# Patient Record
Sex: FEMALE | Race: WHITE | Employment: OTHER | URBAN - METROPOLITAN AREA
[De-identification: names, ages, dates, MRNs, and addresses within clinical notes are randomized per-mention and may not be internally consistent; named-entity substitution may affect disease eponyms.]

---

## 2017-01-03 ENCOUNTER — TELEPHONE (OUTPATIENT)
Dept: INTERNAL MEDICINE | Age: 77
End: 2017-01-03

## 2017-01-03 RX ORDER — DULOXETIN HYDROCHLORIDE 30 MG/1
30 CAPSULE, DELAYED RELEASE ORAL DAILY
Qty: 30 CAPSULE | Refills: 5 | Status: SHIPPED | OUTPATIENT
Start: 2017-01-03 | End: 2017-10-18 | Stop reason: CLARIF

## 2017-01-23 RX ORDER — LANSOPRAZOLE 30 MG/1
CAPSULE, DELAYED RELEASE ORAL
Qty: 90 CAPSULE | Refills: 0 | Status: SHIPPED | OUTPATIENT
Start: 2017-01-23 | End: 2017-01-26 | Stop reason: RX

## 2017-01-26 RX ORDER — PANTOPRAZOLE SODIUM 40 MG/1
40 TABLET, DELAYED RELEASE ORAL DAILY
Qty: 30 TABLET | Refills: 5 | Status: SHIPPED | OUTPATIENT
Start: 2017-01-26 | End: 2017-10-18 | Stop reason: CLARIF

## 2017-02-03 ENCOUNTER — OFFICE VISIT (OUTPATIENT)
Dept: INTERNAL MEDICINE | Age: 77
End: 2017-02-03

## 2017-02-03 VITALS
DIASTOLIC BLOOD PRESSURE: 76 MMHG | BODY MASS INDEX: 24.32 KG/M2 | HEIGHT: 65 IN | SYSTOLIC BLOOD PRESSURE: 120 MMHG | WEIGHT: 146 LBS

## 2017-02-03 DIAGNOSIS — E11.9 CONTROLLED TYPE 2 DIABETES MELLITUS WITHOUT COMPLICATION, WITHOUT LONG-TERM CURRENT USE OF INSULIN (HCC): ICD-10-CM

## 2017-02-03 DIAGNOSIS — B18.2 HEP C W/O COMA, CHRONIC (HCC): Primary | ICD-10-CM

## 2017-02-03 DIAGNOSIS — M05.739 RHEUMATOID ARTHRITIS INVOLVING WRIST WITH POSITIVE RHEUMATOID FACTOR, UNSPECIFIED LATERALITY (HCC): ICD-10-CM

## 2017-02-03 LAB
A/G RATIO: 1.2 (ref 1.1–2.2)
ALBUMIN SERPL-MCNC: 4.1 G/DL (ref 3.4–5)
ALP BLD-CCNC: 52 U/L (ref 40–129)
ALT SERPL-CCNC: 34 U/L (ref 10–40)
ANION GAP SERPL CALCULATED.3IONS-SCNC: 18 MMOL/L (ref 3–16)
AST SERPL-CCNC: 32 U/L (ref 15–37)
BASOPHILS ABSOLUTE: 0.1 K/UL (ref 0–0.2)
BASOPHILS RELATIVE PERCENT: 0.8 %
BILIRUB SERPL-MCNC: 0.3 MG/DL (ref 0–1)
BUN BLDV-MCNC: 27 MG/DL (ref 7–20)
CALCIUM SERPL-MCNC: 10.3 MG/DL (ref 8.3–10.6)
CHLORIDE BLD-SCNC: 97 MMOL/L (ref 99–110)
CO2: 21 MMOL/L (ref 21–32)
CREAT SERPL-MCNC: 0.9 MG/DL (ref 0.6–1.2)
EOSINOPHILS ABSOLUTE: 0.2 K/UL (ref 0–0.6)
EOSINOPHILS RELATIVE PERCENT: 1.4 %
GFR AFRICAN AMERICAN: >60
GFR NON-AFRICAN AMERICAN: >60
GLOBULIN: 3.4 G/DL
GLUCOSE BLD-MCNC: 165 MG/DL (ref 70–99)
HCT VFR BLD CALC: 40.7 % (ref 36–48)
HEMOGLOBIN: 13.3 G/DL (ref 12–16)
LYMPHOCYTES ABSOLUTE: 2.6 K/UL (ref 1–5.1)
LYMPHOCYTES RELATIVE PERCENT: 21.2 %
MCH RBC QN AUTO: 27.8 PG (ref 26–34)
MCHC RBC AUTO-ENTMCNC: 32.8 G/DL (ref 31–36)
MCV RBC AUTO: 84.9 FL (ref 80–100)
MONOCYTES ABSOLUTE: 1.4 K/UL (ref 0–1.3)
MONOCYTES RELATIVE PERCENT: 11.5 %
NEUTROPHILS ABSOLUTE: 8 K/UL (ref 1.7–7.7)
NEUTROPHILS RELATIVE PERCENT: 65.1 %
PDW BLD-RTO: 13.4 % (ref 12.4–15.4)
PLATELET # BLD: 366 K/UL (ref 135–450)
PMV BLD AUTO: 8.2 FL (ref 5–10.5)
POTASSIUM SERPL-SCNC: 4.9 MMOL/L (ref 3.5–5.1)
RBC # BLD: 4.8 M/UL (ref 4–5.2)
SODIUM BLD-SCNC: 136 MMOL/L (ref 136–145)
TOTAL PROTEIN: 7.5 G/DL (ref 6.4–8.2)
WBC # BLD: 12.2 K/UL (ref 4–11)

## 2017-02-03 PROCEDURE — G8484 FLU IMMUNIZE NO ADMIN: HCPCS | Performed by: INTERNAL MEDICINE

## 2017-02-03 PROCEDURE — G8420 CALC BMI NORM PARAMETERS: HCPCS | Performed by: INTERNAL MEDICINE

## 2017-02-03 PROCEDURE — G8400 PT W/DXA NO RESULTS DOC: HCPCS | Performed by: INTERNAL MEDICINE

## 2017-02-03 PROCEDURE — 4040F PNEUMOC VAC/ADMIN/RCVD: CPT | Performed by: INTERNAL MEDICINE

## 2017-02-03 PROCEDURE — 1123F ACP DISCUSS/DSCN MKR DOCD: CPT | Performed by: INTERNAL MEDICINE

## 2017-02-03 PROCEDURE — 1090F PRES/ABSN URINE INCON ASSESS: CPT | Performed by: INTERNAL MEDICINE

## 2017-02-03 PROCEDURE — 1036F TOBACCO NON-USER: CPT | Performed by: INTERNAL MEDICINE

## 2017-02-03 PROCEDURE — G8427 DOCREV CUR MEDS BY ELIG CLIN: HCPCS | Performed by: INTERNAL MEDICINE

## 2017-02-03 PROCEDURE — 99213 OFFICE O/P EST LOW 20 MIN: CPT | Performed by: INTERNAL MEDICINE

## 2017-02-03 ASSESSMENT — ENCOUNTER SYMPTOMS
WHEEZING: 0
NAUSEA: 0
STRIDOR: 0
COUGH: 1
CONSTIPATION: 0
ABDOMINAL PAIN: 0
SHORTNESS OF BREATH: 0
CHOKING: 0
CHEST TIGHTNESS: 0
DIARRHEA: 0
VOMITING: 0

## 2017-02-04 LAB
ESTIMATED AVERAGE GLUCOSE: 203 MG/DL
HBA1C MFR BLD: 8.7 %

## 2017-02-13 ENCOUNTER — TELEPHONE (OUTPATIENT)
Dept: INTERNAL MEDICINE | Age: 77
End: 2017-02-13

## 2017-02-24 RX ORDER — ESTROGEN,CON/M-PROGEST ACET 0.625-2.5
TABLET ORAL
Qty: 84 TABLET | Refills: 0 | Status: SHIPPED | OUTPATIENT
Start: 2017-02-24 | End: 2017-05-28 | Stop reason: SDUPTHER

## 2017-03-07 DIAGNOSIS — E11.9 CONTROLLED TYPE 2 DIABETES MELLITUS WITHOUT COMPLICATION, WITHOUT LONG-TERM CURRENT USE OF INSULIN (HCC): ICD-10-CM

## 2017-03-07 DIAGNOSIS — F98.8 ATTENTION DEFICIT DISORDER: ICD-10-CM

## 2017-03-08 RX ORDER — METHYLPHENIDATE HYDROCHLORIDE 10 MG/1
10 TABLET ORAL 2 TIMES DAILY
Qty: 120 TABLET | Refills: 0 | Status: SHIPPED | OUTPATIENT
Start: 2017-03-08 | End: 2017-10-18 | Stop reason: CLARIF

## 2017-03-08 RX ORDER — METHYLPHENIDATE HYDROCHLORIDE 20 MG/1
20 TABLET ORAL 2 TIMES DAILY
Qty: 120 TABLET | Refills: 0 | Status: SHIPPED | OUTPATIENT
Start: 2017-03-08 | End: 2017-10-18 | Stop reason: CLARIF

## 2017-04-13 RX ORDER — CELECOXIB 200 MG/1
CAPSULE ORAL
Qty: 90 CAPSULE | Refills: 0 | Status: SHIPPED | OUTPATIENT
Start: 2017-04-13 | End: 2017-08-13 | Stop reason: SDUPTHER

## 2017-05-12 ENCOUNTER — OFFICE VISIT (OUTPATIENT)
Dept: INTERNAL MEDICINE | Age: 77
End: 2017-05-12

## 2017-05-12 VITALS
SYSTOLIC BLOOD PRESSURE: 118 MMHG | BODY MASS INDEX: 23.3 KG/M2 | DIASTOLIC BLOOD PRESSURE: 64 MMHG | HEIGHT: 66 IN | WEIGHT: 145 LBS

## 2017-05-12 DIAGNOSIS — F22 DELUSIONS (HCC): ICD-10-CM

## 2017-05-12 DIAGNOSIS — E11.9 CONTROLLED TYPE 2 DIABETES MELLITUS WITHOUT COMPLICATION, WITHOUT LONG-TERM CURRENT USE OF INSULIN (HCC): ICD-10-CM

## 2017-05-12 DIAGNOSIS — F98.8 ATTENTION DEFICIT DISORDER: ICD-10-CM

## 2017-05-12 DIAGNOSIS — M05.739 RHEUMATOID ARTHRITIS INVOLVING WRIST WITH POSITIVE RHEUMATOID FACTOR, UNSPECIFIED LATERALITY (HCC): Primary | ICD-10-CM

## 2017-05-12 LAB
A/G RATIO: 1.1 (ref 1.1–2.2)
ALBUMIN SERPL-MCNC: 3.9 G/DL (ref 3.4–5)
ALP BLD-CCNC: 52 U/L (ref 40–129)
ALT SERPL-CCNC: 33 U/L (ref 10–40)
ANION GAP SERPL CALCULATED.3IONS-SCNC: 19 MMOL/L (ref 3–16)
AST SERPL-CCNC: 33 U/L (ref 15–37)
BASOPHILS ABSOLUTE: 0.1 K/UL (ref 0–0.2)
BASOPHILS RELATIVE PERCENT: 0.8 %
BILIRUB SERPL-MCNC: <0.2 MG/DL (ref 0–1)
BUN BLDV-MCNC: 26 MG/DL (ref 7–20)
CALCIUM SERPL-MCNC: 10.1 MG/DL (ref 8.3–10.6)
CHLORIDE BLD-SCNC: 100 MMOL/L (ref 99–110)
CO2: 21 MMOL/L (ref 21–32)
CREAT SERPL-MCNC: 1.1 MG/DL (ref 0.6–1.2)
EOSINOPHILS ABSOLUTE: 0.1 K/UL (ref 0–0.6)
EOSINOPHILS RELATIVE PERCENT: 0.6 %
GFR AFRICAN AMERICAN: 58
GFR NON-AFRICAN AMERICAN: 48
GLOBULIN: 3.4 G/DL
GLUCOSE BLD-MCNC: 327 MG/DL (ref 70–99)
HCT VFR BLD CALC: 38.5 % (ref 36–48)
HEMOGLOBIN: 12.5 G/DL (ref 12–16)
LYMPHOCYTES ABSOLUTE: 2.3 K/UL (ref 1–5.1)
LYMPHOCYTES RELATIVE PERCENT: 19.4 %
MCH RBC QN AUTO: 27.1 PG (ref 26–34)
MCHC RBC AUTO-ENTMCNC: 32.4 G/DL (ref 31–36)
MCV RBC AUTO: 83.5 FL (ref 80–100)
MONOCYTES ABSOLUTE: 1.3 K/UL (ref 0–1.3)
MONOCYTES RELATIVE PERCENT: 11.3 %
NEUTROPHILS ABSOLUTE: 8.1 K/UL (ref 1.7–7.7)
NEUTROPHILS RELATIVE PERCENT: 67.9 %
PDW BLD-RTO: 15.2 % (ref 12.4–15.4)
PLATELET # BLD: 297 K/UL (ref 135–450)
PMV BLD AUTO: 8.4 FL (ref 5–10.5)
POTASSIUM SERPL-SCNC: 4.6 MMOL/L (ref 3.5–5.1)
RBC # BLD: 4.61 M/UL (ref 4–5.2)
SODIUM BLD-SCNC: 140 MMOL/L (ref 136–145)
TOTAL PROTEIN: 7.3 G/DL (ref 6.4–8.2)
TSH SERPL DL<=0.05 MIU/L-ACNC: 2.58 UIU/ML (ref 0.27–4.2)
WBC # BLD: 11.9 K/UL (ref 4–11)

## 2017-05-12 PROCEDURE — 99213 OFFICE O/P EST LOW 20 MIN: CPT | Performed by: INTERNAL MEDICINE

## 2017-05-12 PROCEDURE — G8427 DOCREV CUR MEDS BY ELIG CLIN: HCPCS | Performed by: INTERNAL MEDICINE

## 2017-05-12 PROCEDURE — 4040F PNEUMOC VAC/ADMIN/RCVD: CPT | Performed by: INTERNAL MEDICINE

## 2017-05-12 PROCEDURE — G8400 PT W/DXA NO RESULTS DOC: HCPCS | Performed by: INTERNAL MEDICINE

## 2017-05-12 PROCEDURE — G8420 CALC BMI NORM PARAMETERS: HCPCS | Performed by: INTERNAL MEDICINE

## 2017-05-12 PROCEDURE — 1090F PRES/ABSN URINE INCON ASSESS: CPT | Performed by: INTERNAL MEDICINE

## 2017-05-12 PROCEDURE — 1036F TOBACCO NON-USER: CPT | Performed by: INTERNAL MEDICINE

## 2017-05-12 PROCEDURE — 1123F ACP DISCUSS/DSCN MKR DOCD: CPT | Performed by: INTERNAL MEDICINE

## 2017-05-12 RX ORDER — METHYLPHENIDATE HYDROCHLORIDE 10 MG/1
10 TABLET ORAL 2 TIMES DAILY
Qty: 120 TABLET | Refills: 0 | Status: CANCELLED | OUTPATIENT
Start: 2017-05-12 | End: 2018-05-12

## 2017-05-12 RX ORDER — METHYLPHENIDATE HYDROCHLORIDE 20 MG/1
20 TABLET ORAL 2 TIMES DAILY
Qty: 120 TABLET | Refills: 0 | Status: CANCELLED | OUTPATIENT
Start: 2017-05-12 | End: 2018-05-12

## 2017-05-12 ASSESSMENT — ENCOUNTER SYMPTOMS
CONSTIPATION: 0
BLOOD IN STOOL: 0
VOMITING: 0
SHORTNESS OF BREATH: 1
NAUSEA: 0
ABDOMINAL PAIN: 0
CHEST TIGHTNESS: 0
COUGH: 1
DIARRHEA: 0

## 2017-05-13 LAB
ESTIMATED AVERAGE GLUCOSE: 248.9 MG/DL
HBA1C MFR BLD: 10.3 %

## 2017-05-30 RX ORDER — ESTROGEN,CON/M-PROGEST ACET 0.625-2.5
TABLET ORAL
Qty: 84 TABLET | Refills: 0 | Status: SHIPPED | OUTPATIENT
Start: 2017-05-30 | End: 2017-10-18 | Stop reason: CLARIF

## 2017-06-01 RX ORDER — ALPRAZOLAM 0.5 MG/1
TABLET ORAL
Qty: 360 TABLET | Refills: 0 | Status: ON HOLD | OUTPATIENT
Start: 2017-06-01 | End: 2017-10-27 | Stop reason: HOSPADM

## 2017-08-14 RX ORDER — CELECOXIB 200 MG/1
CAPSULE ORAL
Qty: 90 CAPSULE | Refills: 0 | Status: SHIPPED | OUTPATIENT
Start: 2017-08-14 | End: 2017-10-18 | Stop reason: CLARIF

## 2017-08-23 RX ORDER — MOMETASONE FUROATE 1 MG/G
CREAM TOPICAL
Qty: 15 G | Refills: 0 | Status: SHIPPED | OUTPATIENT
Start: 2017-08-23 | End: 2017-09-28 | Stop reason: ALTCHOICE

## 2017-09-08 ENCOUNTER — TELEPHONE (OUTPATIENT)
Dept: INTERNAL MEDICINE | Age: 77
End: 2017-09-08

## 2017-09-19 ENCOUNTER — TELEPHONE (OUTPATIENT)
Dept: INTERNAL MEDICINE | Age: 77
End: 2017-09-19

## 2017-09-19 ENCOUNTER — OFFICE VISIT (OUTPATIENT)
Dept: INTERNAL MEDICINE | Age: 77
End: 2017-09-19

## 2017-09-19 DIAGNOSIS — E11.9 CONTROLLED TYPE 2 DIABETES MELLITUS WITHOUT COMPLICATION, WITHOUT LONG-TERM CURRENT USE OF INSULIN (HCC): ICD-10-CM

## 2017-09-19 DIAGNOSIS — F03.91 DEMENTIA WITH BEHAVIORAL DISTURBANCE, UNSPECIFIED DEMENTIA TYPE: Primary | ICD-10-CM

## 2017-09-19 DIAGNOSIS — F32.89 DEPRESSIVE DISORDER, NOT ELSEWHERE CLASSIFIED: ICD-10-CM

## 2017-09-19 PROCEDURE — 99214 OFFICE O/P EST MOD 30 MIN: CPT | Performed by: INTERNAL MEDICINE

## 2017-09-21 ENCOUNTER — TELEPHONE (OUTPATIENT)
Dept: INTERNAL MEDICINE | Age: 77
End: 2017-09-21

## 2017-09-28 ENCOUNTER — TELEPHONE (OUTPATIENT)
Dept: INTERNAL MEDICINE | Age: 77
End: 2017-09-28

## 2017-09-29 ENCOUNTER — TELEPHONE (OUTPATIENT)
Dept: INTERNAL MEDICINE | Age: 77
End: 2017-09-29

## 2017-10-12 ENCOUNTER — TELEPHONE (OUTPATIENT)
Dept: INTERNAL MEDICINE | Age: 77
End: 2017-10-12

## 2017-10-12 NOTE — TELEPHONE ENCOUNTER
Pts daughter would like to receive documentation in regards to all of the diabetic medication that the patient should be taking. She also needs the medication directions. The patient is confused about what she should be taking and how. The patients daughter feels like the only way to make the patient understand what she should be taking is to show her documentation from Dr. Shawna Diaz. Can it be emailed to [email or web address]. Please call with questions.

## 2017-10-13 NOTE — TELEPHONE ENCOUNTER
Call returned to the patient's daughter. Patient is currently in a skilled nursing facility. Her daughter has been advised that she should let the patient know that she needs to follow the directions of the physician that is caring for her at the facility. She is now asking that we put this in writing so that the patient believes her. [id number] Aletha Quarles for note to be sent. Message left for the patient's daughter.

## 2017-10-18 ENCOUNTER — TELEPHONE (OUTPATIENT)
Dept: INTERNAL MEDICINE | Age: 77
End: 2017-10-18

## 2017-10-18 NOTE — TELEPHONE ENCOUNTER
CALL RETURNED TO THE PATIENT'S DAUGHTER NATACHA    PATIENT HAS JUST BEEN SENT BACK TO [facility] DUE TO MENTAL STATUS CHANGE AND UTI    I HAVE ADVISED NATACHA TO CALL ME BACK WHEN SHE KNOWS MORE OF WHEN AN APPOINTMENT WILL BE NEEDED.

## 2017-10-23 ENCOUNTER — TELEPHONE (OUTPATIENT)
Dept: INTERNAL MEDICINE | Age: 77
End: 2017-10-23

## 2017-10-23 NOTE — TELEPHONE ENCOUNTER
Juanis Gomez pt daughter stated that needs to know the status of pt. What test was ran, what is the plan for py.   Please call

## 2017-11-16 ENCOUNTER — CARE COORDINATION (OUTPATIENT)
Dept: CASE MANAGEMENT | Age: 77
End: 2017-11-16

## 2017-12-07 ENCOUNTER — TELEPHONE (OUTPATIENT)
Dept: INTERNAL MEDICINE | Age: 77
End: 2017-12-07

## 2017-12-09 ENCOUNTER — CARE COORDINATION (OUTPATIENT)
Dept: CASE MANAGEMENT | Age: 77
End: 2017-12-09

## 2017-12-09 NOTE — CARE COORDINATION
Care Transition call back to pt home and spoke with Branden. She said [facility] finished and left. She said she is just trying to organize herself with the care of her mother. Discussed having her family help and she said they are helping. Also discussed option of private duty if further assist needed with out of pocket pay. She will keep in mind. Dtr seemed a little overwhelmed but she also said this is her first day with caring for pt and she thinks it will go a little smoother with time. [id number] Transition Coordinator  [phone]  [email or web address]

## 2017-12-09 NOTE — CARE COORDINATION
Received a Careport alert patient discharged to home with [facility] on 12/8/17. Agency unknown. CC notified. Post acute care coordinator signing off.  SABRINA MixN RN  Care Transition Coordinator  [phone]

## 2017-12-09 NOTE — CARE COORDINATION
[facility] Discharge Call    2017    Patient: Cassie Mendiola Patient : 1940   MRN: [id number]  Reason for Admission: Acute encephalopathy  Discharge Date: 10/27/17 RARS: Geisinger Risk Score: 24       Discharge Facility: [facility]  Non face-to-face services provided:  Scheduled appointment with PCP-Dr Salomon 4 Acute Facility Transition    Have you scheduled your follow up appointment?:  Yes (Comment: 17)   Were you discharged with any Home Care or Post Acute Services or do you currently have any active services?:  Yes   Post Acute Services:  Home Health (Comment: [facility] )         Care Transitions Interventions     Care Transition f/u call to pt dtr- Sherry Barbosa. She was meeting with [facility] who was in home at time of call and requested call back later. Noted pt does have PCP appt scheduled as noted. Future Appointments  Date Time Provider Tosha Aguilar   2017 2:40 PM Reji Moy MD [address] [place] [address] Transition Coordinator  [phone]  [email or web address]

## 2017-12-12 ENCOUNTER — OFFICE VISIT (OUTPATIENT)
Dept: INTERNAL MEDICINE | Age: 77
End: 2017-12-12

## 2017-12-12 ENCOUNTER — HOSPITAL ENCOUNTER (OUTPATIENT)
Dept: OTHER | Age: 77
Discharge: HOME | End: 2017-12-12
Attending: INTERNAL MEDICINE | Admitting: INTERNAL MEDICINE

## 2017-12-12 ENCOUNTER — TELEPHONE (OUTPATIENT)
Dept: INTERNAL MEDICINE | Age: 77
End: 2017-12-12

## 2017-12-12 VITALS
HEIGHT: 66 IN | WEIGHT: 133 LBS | DIASTOLIC BLOOD PRESSURE: 74 MMHG | BODY MASS INDEX: 21.38 KG/M2 | SYSTOLIC BLOOD PRESSURE: 122 MMHG

## 2017-12-12 DIAGNOSIS — E11.65 TYPE 2 DIABETES MELLITUS WITH HYPERGLYCEMIA, WITHOUT LONG-TERM CURRENT USE OF INSULIN (HCC): Primary | ICD-10-CM

## 2017-12-12 DIAGNOSIS — R07.89 OTHER CHEST PAIN: ICD-10-CM

## 2017-12-12 LAB
A/G RATIO: 1 (ref 1.1–2.2)
ALBUMIN SERPL-MCNC: 3.7 G/DL (ref 3.4–5)
ALP BLD-CCNC: 73 U/L (ref 40–129)
ALT SERPL-CCNC: 21 U/L (ref 10–40)
ANION GAP SERPL CALCULATED.3IONS-SCNC: 15 MMOL/L (ref 3–16)
AST SERPL-CCNC: 22 U/L (ref 15–37)
BILIRUB SERPL-MCNC: <0.2 MG/DL (ref 0–1)
BUN BLDV-MCNC: 20 MG/DL (ref 7–20)
CALCIUM SERPL-MCNC: 10.9 MG/DL (ref 8.3–10.6)
CHLORIDE BLD-SCNC: 100 MMOL/L (ref 99–110)
CO2: 25 MMOL/L (ref 21–32)
CREAT SERPL-MCNC: 0.7 MG/DL (ref 0.6–1.2)
GFR AFRICAN AMERICAN: >60
GFR NON-AFRICAN AMERICAN: >60
GLOBULIN: 3.7 G/DL
GLUCOSE BLD-MCNC: 155 MG/DL (ref 70–99)
POTASSIUM SERPL-SCNC: 5.3 MMOL/L (ref 3.5–5.1)
SODIUM BLD-SCNC: 140 MMOL/L (ref 136–145)
TOTAL PROTEIN: 7.4 G/DL (ref 6.4–8.2)

## 2017-12-12 PROCEDURE — G8400 PT W/DXA NO RESULTS DOC: HCPCS | Performed by: INTERNAL MEDICINE

## 2017-12-12 PROCEDURE — 1090F PRES/ABSN URINE INCON ASSESS: CPT | Performed by: INTERNAL MEDICINE

## 2017-12-12 PROCEDURE — 4040F PNEUMOC VAC/ADMIN/RCVD: CPT | Performed by: INTERNAL MEDICINE

## 2017-12-12 PROCEDURE — G8420 CALC BMI NORM PARAMETERS: HCPCS | Performed by: INTERNAL MEDICINE

## 2017-12-12 PROCEDURE — G8427 DOCREV CUR MEDS BY ELIG CLIN: HCPCS | Performed by: INTERNAL MEDICINE

## 2017-12-12 PROCEDURE — 99213 OFFICE O/P EST LOW 20 MIN: CPT | Performed by: INTERNAL MEDICINE

## 2017-12-12 PROCEDURE — 1036F TOBACCO NON-USER: CPT | Performed by: INTERNAL MEDICINE

## 2017-12-12 PROCEDURE — G8484 FLU IMMUNIZE NO ADMIN: HCPCS | Performed by: INTERNAL MEDICINE

## 2017-12-12 PROCEDURE — 1123F ACP DISCUSS/DSCN MKR DOCD: CPT | Performed by: INTERNAL MEDICINE

## 2017-12-12 RX ORDER — GLIMEPIRIDE 4 MG/1
4 TABLET ORAL
Qty: 30 TABLET | Refills: 3 | Status: SHIPPED | OUTPATIENT
Start: 2017-12-12 | End: 2018-03-07 | Stop reason: CLARIF

## 2017-12-12 RX ORDER — ZINC GLUCONATE 13.3 MG
LOZENGE ORAL
Qty: 30 TABLET | Refills: 3 | Status: SHIPPED | OUTPATIENT
Start: 2017-12-12 | End: 2018-03-07 | Stop reason: CLARIF

## 2017-12-12 RX ORDER — OMEPRAZOLE 20 MG/1
20 CAPSULE, DELAYED RELEASE ORAL 2 TIMES DAILY
Qty: 60 CAPSULE | Refills: 3 | Status: SHIPPED | OUTPATIENT
Start: 2017-12-12 | End: 2018-05-03 | Stop reason: SDUPTHER

## 2017-12-12 RX ORDER — SENNA AND DOCUSATE SODIUM 50; 8.6 MG/1; MG/1
2 TABLET, FILM COATED ORAL DAILY PRN
Qty: 30 TABLET | Refills: 3 | Status: SHIPPED | OUTPATIENT
Start: 2017-12-12 | End: 2018-10-10 | Stop reason: CLARIF

## 2017-12-12 RX ORDER — DULOXETIN HYDROCHLORIDE 30 MG/1
30 CAPSULE, DELAYED RELEASE ORAL 2 TIMES DAILY
Qty: 60 CAPSULE | Refills: 3 | Status: SHIPPED | OUTPATIENT
Start: 2017-12-12 | End: 2018-05-27 | Stop reason: SDUPTHER

## 2017-12-12 ASSESSMENT — PATIENT HEALTH QUESTIONNAIRE - PHQ9
SUM OF ALL RESPONSES TO PHQ9 QUESTIONS 1 & 2: 1
2. FEELING DOWN, DEPRESSED OR HOPELESS: 0
SUM OF ALL RESPONSES TO PHQ QUESTIONS 1-9: 1
1. LITTLE INTEREST OR PLEASURE IN DOING THINGS: 1

## 2017-12-13 ENCOUNTER — CARE COORDINATION (OUTPATIENT)
Dept: CASE MANAGEMENT | Age: 77
End: 2017-12-13

## 2017-12-13 ENCOUNTER — TELEPHONE (OUTPATIENT)
Dept: INTERNAL MEDICINE | Age: 77
End: 2017-12-13

## 2017-12-13 LAB
BASOPHILS ABSOLUTE: 0.1 K/UL (ref 0–0.2)
BASOPHILS RELATIVE PERCENT: 0.9 %
BILIRUBIN URINE: NEGATIVE
BLOOD, URINE: NEGATIVE
CLARITY: CLEAR
COLOR: YELLOW
EOSINOPHILS ABSOLUTE: 0.1 K/UL (ref 0–0.6)
EOSINOPHILS RELATIVE PERCENT: 0.8 %
ESTIMATED AVERAGE GLUCOSE: 182.9 MG/DL
GLUCOSE URINE: NEGATIVE MG/DL
HBA1C MFR BLD: 8 %
HCT VFR BLD CALC: 38.2 % (ref 36–48)
HEMATOLOGY PATH CONSULT: NO
HEMOGLOBIN: 12.6 G/DL (ref 12–16)
KETONES, URINE: NEGATIVE MG/DL
LEUKOCYTE ESTERASE, URINE: NEGATIVE
LYMPHOCYTES ABSOLUTE: 2.2 K/UL (ref 1–5.1)
LYMPHOCYTES RELATIVE PERCENT: 14.4 %
MCH RBC QN AUTO: 26.2 PG (ref 26–34)
MCHC RBC AUTO-ENTMCNC: 32.9 G/DL (ref 31–36)
MCV RBC AUTO: 79.8 FL (ref 80–100)
MICROSCOPIC EXAMINATION: NORMAL
MONOCYTES ABSOLUTE: 1.8 K/UL (ref 0–1.3)
MONOCYTES RELATIVE PERCENT: 12.3 %
NEUTROPHILS ABSOLUTE: 10.8 K/UL (ref 1.7–7.7)
NEUTROPHILS RELATIVE PERCENT: 71.6 %
NITRITE, URINE: NEGATIVE
PDW BLD-RTO: 15.6 % (ref 12.4–15.4)
PH UA: 7
PLATELET # BLD: 523 K/UL (ref 135–450)
PMV BLD AUTO: 7.6 FL (ref 5–10.5)
PROTEIN UA: NEGATIVE MG/DL
RBC # BLD: 4.78 M/UL (ref 4–5.2)
SPECIFIC GRAVITY UA: 1.01
URINE TYPE: NORMAL
UROBILINOGEN, URINE: 1 E.U./DL
WBC # BLD: 15 K/UL (ref 4–11)

## 2017-12-13 NOTE — CARE COORDINATION
[facility] Discharge Call    2017    Patient: Rafy Goodman Patient : 1940   MRN: [id number]  Reason for Admission: AMS  Discharge Date: 10/27/17 RARS: Geisinger Risk Score: 24       Discharge Facility: [address] [place] [address] Transition    Do you have any ongoing symptoms?:  No   Do you have all of your prescriptions and are they filled?:  Yes   Do you have any questions related to your medications?:  No   Have you scheduled your follow up appointment?:  Yes   How are you going to get to your appointment?:  Car - family or friend to transport   Were you discharged with any Home Care or  [facility] or do you currently have any active services?:  Yes   Post Acute Services:  Home Health (Comment: [address] )         Care Transitions Interventions     Care Transition call to pt dtr. She said pt is doing \"pretty good\". She took pt to see Dr Froilan Urban this week. No changes in meds. Noted dtr has called for refills on all meds. Pt now lives with her dtr and is in [facility]. Discussed referral to [facility] and dtr is interested. CTN will make referral on line-Done. Dtr said [facility] is coming to see pt. She would like other options for HC. She said she wasn't completely happy with the service but did not provide details. Provided options of [facility], [facility] and [facility]. Advised only 1 company can be in at a given time for skilled services. Dtr seemed less stressed on today's call compared to 24hr call with dtr on Sat. No future appointments. [address] Transition Coordinator  [phone]  [email or web address]

## 2017-12-14 ENCOUNTER — TELEPHONE (OUTPATIENT)
Dept: INTERNAL MEDICINE | Age: 77
End: 2017-12-14

## 2017-12-14 RX ORDER — PROMETHAZINE HYDROCHLORIDE AND PHENYLEPHRINE HYDROCHLORIDE 6.25; 5 MG/5ML; MG/5ML
5 SYRUP ORAL 4 TIMES DAILY PRN
Qty: 120 ML | Refills: 0 | Status: SHIPPED | OUTPATIENT
Start: 2017-12-14 | End: 2018-03-07 | Stop reason: CLARIF

## 2017-12-14 RX ORDER — PEN NEEDLE, DIABETIC 31 GX5/16"
NEEDLE, DISPOSABLE MISCELLANEOUS
Qty: 100 EACH | Refills: 1 | Status: SHIPPED | OUTPATIENT
Start: 2017-12-14 | End: 2018-03-07 | Stop reason: CLARIF

## 2017-12-14 NOTE — TELEPHONE ENCOUNTER
[facility] [phone]  C/C - non productive cough x 1 week that has worsen in the last x 3 days   Med tried - OTC Mucinex DM with no relief in sx's   Would like a call back to be advised what Flavio Fenton MD  Recommends

## 2017-12-15 ENCOUNTER — TELEPHONE (OUTPATIENT)
Dept: INTERNAL MEDICINE | Age: 77
End: 2017-12-15

## 2017-12-15 NOTE — TELEPHONE ENCOUNTER
Pt called needs RX for. Solo Pugh Lancets  (FREESTYLE LITE) pt test tid Sent to . .. Pharm on file . .. Advised Pt to check with Pharm in 24-48 hours . ..  Pls send  Elizabeth Baker can be reached at [phone]

## 2017-12-18 ENCOUNTER — TELEPHONE (OUTPATIENT)
Dept: INTERNAL MEDICINE | Age: 77
End: 2017-12-18

## 2017-12-18 NOTE — TELEPHONE ENCOUNTER
Courtesy Call   [facility] -[phone]  C/C - Patient refused Physical Therapy last week( actually daughter refused for pt ) due to not feeling well   Aware that Kami Cornell MD arrives at 1:20 pm

## 2017-12-19 ENCOUNTER — TELEPHONE (OUTPATIENT)
Dept: INTERNAL MEDICINE | Age: 77
End: 2017-12-19

## 2017-12-19 NOTE — TELEPHONE ENCOUNTER
The pts daughter would like to know at what point the patient should stop taking Promethazine-Phenylephrine [facility]) 6.25-5 MG/5ML syrup due to possible side effects. Please call.

## 2017-12-21 ENCOUNTER — CARE COORDINATION (OUTPATIENT)
Dept: CASE MANAGEMENT | Age: 77
End: 2017-12-21

## 2017-12-22 ENCOUNTER — TELEPHONE (OUTPATIENT)
Dept: INTERNAL MEDICINE | Age: 77
End: 2017-12-22

## 2017-12-22 RX ORDER — AMOXICILLIN AND CLAVULANATE POTASSIUM 875; 125 MG/1; MG/1
1 TABLET, FILM COATED ORAL 2 TIMES DAILY
Qty: 14 TABLET | Refills: 0 | Status: ON HOLD | OUTPATIENT
Start: 2017-12-22 | End: 2018-01-02 | Stop reason: HOSPADM

## 2017-12-25 PROBLEM — J18.9 PNEUMONIA: Status: ACTIVE | Noted: 2017-12-25

## 2018-01-08 ENCOUNTER — TELEPHONE (OUTPATIENT)
Dept: INTERNAL MEDICINE | Age: 78
End: 2018-01-08

## 2018-01-08 NOTE — TELEPHONE ENCOUNTER
Marylou Orellana Speech therapist stated that needs to discuss swallowing evaluation. Marylou Orellana Speech Therapist stated family has concerns about consistency.   Please call

## 2018-01-09 NOTE — TELEPHONE ENCOUNTER
Call returned to Yasir at [facility]. We only have Lantus listed on her medication list.    I see where she was on Humalog in the past.    Yasir has been notified of this. Called returned to [employer] .     She already received the information that she needed from [facility]

## 2018-01-09 NOTE — TELEPHONE ENCOUNTER
reinaldo [facility] skill rehab is calling to get the dose of insulin the patient takes   Please call [phone] ask for janeth

## 2018-01-18 ENCOUNTER — TELEPHONE (OUTPATIENT)
Dept: INTERNAL MEDICINE | Age: 78
End: 2018-01-18

## 2018-01-18 RX ORDER — MOMETASONE FUROATE 1 MG/G
CREAM TOPICAL
Qty: 15 G | Refills: 2 | Status: SHIPPED | OUTPATIENT
Start: 2018-01-18 | End: 2019-08-19 | Stop reason: SDUPTHER

## 2018-01-23 ENCOUNTER — CARE COORDINATION (OUTPATIENT)
Dept: CASE MANAGEMENT | Age: 78
End: 2018-01-23

## 2018-01-23 ENCOUNTER — OFFICE VISIT (OUTPATIENT)
Dept: INTERNAL MEDICINE | Age: 78
End: 2018-01-23

## 2018-01-23 ENCOUNTER — TELEPHONE (OUTPATIENT)
Dept: INTERNAL MEDICINE | Age: 78
End: 2018-01-23

## 2018-01-23 ENCOUNTER — HOSPITAL ENCOUNTER (OUTPATIENT)
Dept: OTHER | Age: 78
Discharge: HOME | End: 2018-01-23
Attending: INTERNAL MEDICINE | Admitting: INTERNAL MEDICINE

## 2018-01-23 VITALS — BODY MASS INDEX: 22.67 KG/M2 | DIASTOLIC BLOOD PRESSURE: 64 MMHG | SYSTOLIC BLOOD PRESSURE: 122 MMHG | WEIGHT: 128 LBS
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
## 2018-01-23 DIAGNOSIS — J18.9 PNEUMONIA DUE TO ORGANISM: Primary | ICD-10-CM

## 2018-01-23 DIAGNOSIS — Z09 HOSPITAL DISCHARGE FOLLOW-UP: ICD-10-CM

## 2018-01-23 DIAGNOSIS — Z09 HOSPITAL DISCHARGE FOLLOW-UP: Primary | ICD-10-CM

## 2018-01-23 LAB
A/G RATIO: 1.1 (ref 1.1–2.2)
ALBUMIN SERPL-MCNC: 3.8 G/DL (ref 3.4–5)
ALP BLD-CCNC: 77 U/L (ref 40–129)
ALT SERPL-CCNC: 27 U/L (ref 10–40)
ANION GAP SERPL CALCULATED.3IONS-SCNC: 13 MMOL/L (ref 3–16)
AST SERPL-CCNC: 24 U/L (ref 15–37)
BASOPHILS ABSOLUTE: 0.1 K/UL (ref 0–0.2)
BASOPHILS RELATIVE PERCENT: 1.1 %
BILIRUB SERPL-MCNC: <0.2 MG/DL (ref 0–1)
BUN BLDV-MCNC: 26 MG/DL (ref 7–20)
CALCIUM SERPL-MCNC: 11.2 MG/DL (ref 8.3–10.6)
CHLORIDE BLD-SCNC: 101 MMOL/L (ref 99–110)
CO2: 25 MMOL/L (ref 21–32)
CREAT SERPL-MCNC: 0.7 MG/DL (ref 0.6–1.2)
EOSINOPHILS ABSOLUTE: 0.2 K/UL (ref 0–0.6)
EOSINOPHILS RELATIVE PERCENT: 1.6 %
GFR AFRICAN AMERICAN: >60
GFR NON-AFRICAN AMERICAN: >60
GLOBULIN: 3.6 G/DL
GLUCOSE BLD-MCNC: 203 MG/DL (ref 70–99)
HCT VFR BLD CALC: 39.4 % (ref 36–48)
HEMOGLOBIN: 12.7 G/DL (ref 12–16)
LYMPHOCYTES ABSOLUTE: 2.2 K/UL (ref 1–5.1)
LYMPHOCYTES RELATIVE PERCENT: 17.1 %
MCH RBC QN AUTO: 25.7 PG (ref 26–34)
MCHC RBC AUTO-ENTMCNC: 32.2 G/DL (ref 31–36)
MCV RBC AUTO: 79.7 FL (ref 80–100)
MONOCYTES ABSOLUTE: 1.5 K/UL (ref 0–1.3)
MONOCYTES RELATIVE PERCENT: 11.4 %
NEUTROPHILS ABSOLUTE: 8.8 K/UL (ref 1.7–7.7)
NEUTROPHILS RELATIVE PERCENT: 68.8 %
PDW BLD-RTO: 16.2 % (ref 12.4–15.4)
PLATELET # BLD: 506 K/UL (ref 135–450)
PMV BLD AUTO: 7.8 FL (ref 5–10.5)
POTASSIUM SERPL-SCNC: 6.4 MMOL/L (ref 3.5–5.1)
RBC # BLD: 4.95 M/UL (ref 4–5.2)
SODIUM BLD-SCNC: 139 MMOL/L (ref 136–145)
TOTAL PROTEIN: 7.4 G/DL (ref 6.4–8.2)
WBC # BLD: 12.8 K/UL (ref 4–11)

## 2018-01-23 PROCEDURE — 1036F TOBACCO NON-USER: CPT | Performed by: INTERNAL MEDICINE

## 2018-01-23 PROCEDURE — G8427 DOCREV CUR MEDS BY ELIG CLIN: HCPCS | Performed by: INTERNAL MEDICINE

## 2018-01-23 PROCEDURE — G8420 CALC BMI NORM PARAMETERS: HCPCS | Performed by: INTERNAL MEDICINE

## 2018-01-23 PROCEDURE — G8400 PT W/DXA NO RESULTS DOC: HCPCS | Performed by: INTERNAL MEDICINE

## 2018-01-23 PROCEDURE — 99213 OFFICE O/P EST LOW 20 MIN: CPT | Performed by: INTERNAL MEDICINE

## 2018-01-23 PROCEDURE — G8484 FLU IMMUNIZE NO ADMIN: HCPCS | Performed by: INTERNAL MEDICINE

## 2018-01-23 PROCEDURE — 1123F ACP DISCUSS/DSCN MKR DOCD: CPT | Performed by: INTERNAL MEDICINE

## 2018-01-23 PROCEDURE — 4040F PNEUMOC VAC/ADMIN/RCVD: CPT | Performed by: INTERNAL MEDICINE

## 2018-01-23 PROCEDURE — 1111F DSCHRG MED/CURRENT MED MERGE: CPT | Performed by: INTERNAL MEDICINE

## 2018-01-23 PROCEDURE — 1090F PRES/ABSN URINE INCON ASSESS: CPT | Performed by: INTERNAL MEDICINE

## 2018-01-23 RX ORDER — INSULIN GLARGINE 100 [IU]/ML
INJECTION, SOLUTION SUBCUTANEOUS
Qty: 15 PEN | Refills: 1 | Status: SHIPPED | OUTPATIENT
Start: 2018-01-23 | End: 2018-02-20 | Stop reason: DRUGHIGH

## 2018-01-23 NOTE — CARE COORDINATION
Per skilled nursing facility [address] [facility], rehab manager Bereket Mathew reports patient discharged to home on 1/18/18 with [facility] [address] [facility]. Will continue to follow.  Juli Jacobo, SABRINAN RN  Care Transition Coordinator  [phone]

## 2018-01-23 NOTE — PROGRESS NOTES
 insulin glargine (LANTUS SOLOSTAR) 100 UNIT/ML injection pen Inject 15 Units into the skin nightly 5 pen 3     No current facility-administered medications for this visit. Physical Exam   /64   Wt 128 lb (58.1 kg)   BMI 22.67 kg/m²     Physical Exam   Constitutional: She is oriented to person, place, and time. She appears well-developed and well-nourished. HENT:   Head: Normocephalic and atraumatic. Eyes: EOM are normal. Pupils are equal, round, and reactive to light. Neck: Normal range of motion. Neck supple. Cardiovascular: Normal rate and regular rhythm. Pulmonary/Chest: Effort normal and breath sounds normal.   Abdominal: Soft. Bowel sounds are normal.   Musculoskeletal: Normal range of motion. Neurological: She is alert and oriented to person, place, and time. Skin: Skin is warm. Capillary refill takes less than 2 seconds. Psychiatric: She has a normal mood and affect. Assessment/ plan:     Sheryl Villar was seen today for other. Diagnoses and all orders for this visit:    Hospital discharge follow-up    Fall, subsequent encounter      Hospital follow up    Fall and R side pain: will get CXR  - encouraged to walk with walker  - home health/PT/OT twice a week each   - tylenol, advil and topical lidocaine as needed     DM2: poorly controlled, BS has been 101-287, the goal is less than 180      Lester Silva, [address]

## 2018-01-23 NOTE — TELEPHONE ENCOUNTER
Radha Mendoza from [place] [phone]  Called to inform Dr. Ramona Verma about pt home health skilled care. Pt is doing p.t & o.t. Pt declined speach therapy.

## 2018-01-24 ENCOUNTER — TELEPHONE (OUTPATIENT)
Dept: INTERNAL MEDICINE | Age: 78
End: 2018-01-24

## 2018-01-24 DIAGNOSIS — E87.5 SERUM POTASSIUM ELEVATED: Primary | ICD-10-CM

## 2018-01-24 DIAGNOSIS — D64.9 ANEMIA, UNSPECIFIED TYPE: Primary | ICD-10-CM

## 2018-01-24 DIAGNOSIS — E83.52 SERUM CALCIUM ELEVATED: ICD-10-CM

## 2018-01-24 LAB
A/G RATIO: 1.1 (ref 1.1–2.2)
ALBUMIN SERPL-MCNC: 4 G/DL (ref 3.4–5)
ALP BLD-CCNC: 81 U/L (ref 40–129)
ALT SERPL-CCNC: 27 U/L (ref 10–40)
ANION GAP SERPL CALCULATED.3IONS-SCNC: 15 MMOL/L (ref 3–16)
AST SERPL-CCNC: 25 U/L (ref 15–37)
BILIRUB SERPL-MCNC: 0.3 MG/DL (ref 0–1)
BUN BLDV-MCNC: 22 MG/DL (ref 7–20)
CALCIUM SERPL-MCNC: 11 MG/DL (ref 8.3–10.6)
CHLORIDE BLD-SCNC: 98 MMOL/L (ref 99–110)
CO2: 24 MMOL/L (ref 21–32)
CREAT SERPL-MCNC: 0.7 MG/DL (ref 0.6–1.2)
GFR AFRICAN AMERICAN: >60
GFR NON-AFRICAN AMERICAN: >60
GLOBULIN: 3.5 G/DL
GLUCOSE BLD-MCNC: 304 MG/DL (ref 70–99)
IRON SATURATION: 18 % (ref 15–50)
IRON: 53 UG/DL (ref 37–145)
PARATHYROID HORMONE INTACT: 26.6 PG/ML (ref 14–72)
POTASSIUM SERPL-SCNC: 5.6 MMOL/L (ref 3.5–5.1)
SODIUM BLD-SCNC: 137 MMOL/L (ref 136–145)
TOTAL IRON BINDING CAPACITY: 293 UG/DL (ref 260–445)
TOTAL PROTEIN: 7.5 G/DL (ref 6.4–8.2)

## 2018-01-24 NOTE — CARE COORDINATION
[facility] Discharge Call    2018    Patient: Jaziel Kingston Patient : 1940   MRN: [id number]    Discharge Date: 18 RARS: Geisinger Risk Score: 22.75       Discharge Facility: [address] [place] [address] Transitions Post Acute Facility Transition    Do you have any ongoing symptoms?:  No   Do you have all of your prescriptions and are they filled?:  Yes   Do you have any questions related to your medications?:  Yes   Patient Reports:  some medication on list not taking   Have you scheduled your follow up appointment?:  Yes (Comment: 18)   How are you going to get to your appointment?:  Car - family or friend to transport   Were you discharged with any Home Care or  [facility] or do you currently have any active services?:  Yes   Post Acute Services:  [address] [place] [address] (Comment: On license of [facility])         Do you have support at home?:  Child   Do you feel like you have everything you need to keep you well at home?:  Yes   Care Transitions Interventions  No Identified Needs       Initial call, Patient participating in therapy per daughter Rosa Maria Dyer. She reports patient is doing fine. Patient seen by PCP yesterday. Patient requested to return to office d/t blood draw results for further testing. Medications reviewed,daughter had questions about some medications on the list. Message routed to office to call and advise. Will follow up.   Future Appointments  Date Time Provider Tosha Aguilar   3/7/2018 4:40 PM [facility]       Aydin Lamas RN

## 2018-01-25 ENCOUNTER — TELEPHONE (OUTPATIENT)
Dept: INTERNAL MEDICINE | Age: 78
End: 2018-01-25

## 2018-01-25 RX ORDER — PYRIDOXINE HCL (VITAMIN B6) 100 MG
TABLET ORAL
Qty: 30 TABLET | COMMUNITY
Start: 2018-01-25 | End: 2018-03-07 | Stop reason: CLARIF

## 2018-01-25 RX ORDER — ASCORBIC ACID 1000 MG
1 TABLET, EXTENDED RELEASE ORAL DAILY
Qty: 30 TABLET | Refills: 0 | COMMUNITY
Start: 2018-01-25

## 2018-01-25 RX ORDER — IBUPROFEN 200 MG
200 TABLET ORAL EVERY 6 HOURS PRN
Qty: 120 TABLET | Refills: 3 | COMMUNITY
Start: 2018-01-25 | End: 2019-12-01

## 2018-01-25 RX ORDER — CHOLECALCIFEROL (VITAMIN D3) 125 MCG
500 CAPSULE ORAL DAILY
Qty: 30 TABLET | Refills: 3 | COMMUNITY
Start: 2018-01-25 | End: 2019-06-12

## 2018-01-25 RX ORDER — GUAIFENESIN 600 MG/1
600 TABLET, EXTENDED RELEASE ORAL 2 TIMES DAILY PRN
COMMUNITY
Start: 2018-01-25

## 2018-01-25 NOTE — TELEPHONE ENCOUNTER
Is there a maxium dose of Advil per day? Max of 800 mg    Patient is drinking Propel is this ok. Should stop and drink plain water for now. Using patch on rib cage can she use gel instead patch is not holding the pain , ok to use gel but not both at same time     How do you want the bs levels presented - fasting morning blood sugar is fine     Daughter notified.

## 2018-01-25 NOTE — TELEPHONE ENCOUNTER
Pt's daughter also wants to know if patient should be taking basaglar or should she be switching back to lantus. Also want to know if there should be any adjustment to dosage after labs.  Please advise

## 2018-01-25 NOTE — TELEPHONE ENCOUNTER
Patient's daughter Escobar Lee is calling for x-ray, lab results and bigg want's to know what her mother can take she doesn't want to take tylenol anymore what can she take   Pt has be using salonpas patch 4% how often she the patient change the patch is there a better medication maybe a gel if possible   Pt daughter wants to know how does MD want blood sugar to be written to give to him at pt office visits.   Please call pt daughter to advise

## 2018-01-26 ENCOUNTER — TELEPHONE (OUTPATIENT)
Dept: INTERNAL MEDICINE | Age: 78
End: 2018-01-26

## 2018-01-29 ENCOUNTER — CARE COORDINATION (OUTPATIENT)
Dept: CASE MANAGEMENT | Age: 78
End: 2018-01-29

## 2018-01-30 LAB
BILIRUBIN URINE: NEGATIVE
BLOOD, URINE: NEGATIVE
CLARITY: ABNORMAL
COLOR: ABNORMAL
CRYSTALS, UA: ABNORMAL /HPF
EPITHELIAL CELLS, UA: 3 /HPF (ref 0–5)
GLUCOSE URINE: NEGATIVE MG/DL
HYALINE CASTS: 13 /LPF (ref 0–8)
KETONES, URINE: NEGATIVE MG/DL
LEUKOCYTE ESTERASE, URINE: ABNORMAL
MICROSCOPIC EXAMINATION: YES
NITRITE, URINE: NEGATIVE
PH UA: 5.5
PROTEIN UA: 30 MG/DL
RBC UA: 6 /HPF (ref 0–4)
SPECIFIC GRAVITY UA: 1.02
URINE TYPE: ABNORMAL
UROBILINOGEN, URINE: 1 E.U./DL
WBC UA: 12 /HPF (ref 0–5)

## 2018-01-31 ENCOUNTER — TELEPHONE (OUTPATIENT)
Dept: INTERNAL MEDICINE | Age: 78
End: 2018-01-31

## 2018-02-01 ENCOUNTER — TELEPHONE (OUTPATIENT)
Dept: INTERNAL MEDICINE | Age: 78
End: 2018-02-01

## 2018-02-01 RX ORDER — AMLODIPINE BESYLATE 5 MG/1
5 TABLET ORAL NIGHTLY
Qty: 90 TABLET | Refills: 1 | Status: SHIPPED | OUTPATIENT
Start: 2018-02-01 | End: 2018-04-09 | Stop reason: CLARIF

## 2018-02-05 ENCOUNTER — TELEPHONE (OUTPATIENT)
Dept: INTERNAL MEDICINE | Age: 78
End: 2018-02-05

## 2018-02-05 RX ORDER — LANCETS 28 GAUGE
EACH MISCELLANEOUS
Qty: 100 EACH | Refills: 3 | Status: SHIPPED | OUTPATIENT
Start: 2018-02-05 | End: 2018-03-07 | Stop reason: CLARIF

## 2018-02-05 RX ORDER — BLOOD PRESSURE TEST KIT
KIT MISCELLANEOUS
Qty: 100 EACH | Refills: 3 | Status: SHIPPED | OUTPATIENT
Start: 2018-02-05 | End: 2018-03-07 | Stop reason: CLARIF

## 2018-02-05 RX ORDER — SULFAMETHOXAZOLE AND TRIMETHOPRIM 800; 160 MG/1; MG/1
1 TABLET ORAL 2 TIMES DAILY
Qty: 14 TABLET | Refills: 0 | Status: SHIPPED | OUTPATIENT
Start: 2018-02-05 | End: 2018-02-12

## 2018-02-05 NOTE — TELEPHONE ENCOUNTER
Pt daughter calling to discuss blood sugar uti and a bad cough. Pt daughter has taken glimepiride (AMARYL) 4 MG tablet  out of med rotation. Has changed units of lancets. Was taking 17 now taking 10. Took blood sugar at  530A 156 and 9:30 Blood sugar is high 223. Pt has UTI would like to know if that would have any effect of blood sugar. Pt has had the cough x3 days. PT has been sleep disoriented ans weak. Please call.

## 2018-02-05 NOTE — TELEPHONE ENCOUNTER
Maricarmen from [place] [id number] to see if it is ok if pt is seen by company. Pt family requested, during hospital visit.   Please call. [phone]

## 2018-02-05 NOTE — TELEPHONE ENCOUNTER
Bactrim DS bid x 7 days    Continue Lantus 10 mg nightly for now    Do not take Amaryl    Probably not but will treat anyway. Daughter advised of recommendations    What should the patient use for cough medicine? The over the counter cough syrups interact with her medications. Should she use phenergan , all the time or limit use of? Use phenergan only as needed. Message left for patient's daughter.

## 2018-02-09 ENCOUNTER — TELEPHONE (OUTPATIENT)
Dept: INTERNAL MEDICINE | Age: 78
End: 2018-02-09

## 2018-02-20 ENCOUNTER — TELEPHONE (OUTPATIENT)
Dept: INTERNAL MEDICINE | Age: 78
End: 2018-02-20

## 2018-02-20 NOTE — TELEPHONE ENCOUNTER
Call returned to [facility]. Per 's instructions: increase Lantus by 5 units and continue daily.

## 2018-02-20 NOTE — TELEPHONE ENCOUNTER
Pts daughter called concerned about an elevated sugar reading this afternoon of 326. The reading was 3 hours after she ate. Advised that Dr. Winter Hermosillo is not in the office. Pt is currently using 18 Units of Lantus every evening before bed. Please advise.

## 2018-03-07 ENCOUNTER — OFFICE VISIT (OUTPATIENT)
Dept: INTERNAL MEDICINE | Age: 78
End: 2018-03-07

## 2018-03-07 VITALS — SYSTOLIC BLOOD PRESSURE: 110 MMHG | DIASTOLIC BLOOD PRESSURE: 70 MMHG | BODY MASS INDEX: 22.32 KG/M2 | WEIGHT: 126 LBS

## 2018-03-07 DIAGNOSIS — E11.9 DIABETES MELLITUS TYPE 2 IN NONOBESE (HCC): Primary | ICD-10-CM

## 2018-03-07 DIAGNOSIS — M05.739 RHEUMATOID ARTHRITIS INVOLVING WRIST WITH POSITIVE RHEUMATOID FACTOR, UNSPECIFIED LATERALITY (HCC): ICD-10-CM

## 2018-03-07 DIAGNOSIS — I10 ESSENTIAL HYPERTENSION: ICD-10-CM

## 2018-03-07 PROBLEM — J18.9 PNEUMONIA DUE TO ORGANISM: Status: RESOLVED | Noted: 2017-12-25 | Resolved: 2018-03-07

## 2018-03-07 LAB
A/G RATIO: 1.1 (ref 1.1–2.2)
ALBUMIN SERPL-MCNC: 4 G/DL (ref 3.4–5)
ALP BLD-CCNC: 77 U/L (ref 40–129)
ALT SERPL-CCNC: 28 U/L (ref 10–40)
ANION GAP SERPL CALCULATED.3IONS-SCNC: 12 MMOL/L (ref 3–16)
AST SERPL-CCNC: 31 U/L (ref 15–37)
BILIRUB SERPL-MCNC: <0.2 MG/DL (ref 0–1)
BUN BLDV-MCNC: 22 MG/DL (ref 7–20)
CALCIUM SERPL-MCNC: 10.3 MG/DL (ref 8.3–10.6)
CHLORIDE BLD-SCNC: 103 MMOL/L (ref 99–110)
CO2: 25 MMOL/L (ref 21–32)
CREAT SERPL-MCNC: 0.8 MG/DL (ref 0.6–1.2)
GFR AFRICAN AMERICAN: >60
GFR NON-AFRICAN AMERICAN: >60
GLOBULIN: 3.5 G/DL
GLUCOSE BLD-MCNC: 207 MG/DL (ref 70–99)
POTASSIUM SERPL-SCNC: 5.5 MMOL/L (ref 3.5–5.1)
SODIUM BLD-SCNC: 140 MMOL/L (ref 136–145)
TOTAL PROTEIN: 7.5 G/DL (ref 6.4–8.2)

## 2018-03-07 PROCEDURE — 99213 OFFICE O/P EST LOW 20 MIN: CPT | Performed by: INTERNAL MEDICINE

## 2018-03-07 PROCEDURE — 1123F ACP DISCUSS/DSCN MKR DOCD: CPT | Performed by: INTERNAL MEDICINE

## 2018-03-07 PROCEDURE — G8427 DOCREV CUR MEDS BY ELIG CLIN: HCPCS | Performed by: INTERNAL MEDICINE

## 2018-03-07 PROCEDURE — 4040F PNEUMOC VAC/ADMIN/RCVD: CPT | Performed by: INTERNAL MEDICINE

## 2018-03-07 PROCEDURE — G8400 PT W/DXA NO RESULTS DOC: HCPCS | Performed by: INTERNAL MEDICINE

## 2018-03-07 PROCEDURE — 1090F PRES/ABSN URINE INCON ASSESS: CPT | Performed by: INTERNAL MEDICINE

## 2018-03-07 PROCEDURE — 1036F TOBACCO NON-USER: CPT | Performed by: INTERNAL MEDICINE

## 2018-03-07 PROCEDURE — G8484 FLU IMMUNIZE NO ADMIN: HCPCS | Performed by: INTERNAL MEDICINE

## 2018-03-07 PROCEDURE — G8420 CALC BMI NORM PARAMETERS: HCPCS | Performed by: INTERNAL MEDICINE

## 2018-03-07 RX ORDER — MULTIVIT WITH MINERALS/LUTEIN
TABLET ORAL DAILY
Status: ON HOLD | COMMUNITY
End: 2020-08-12

## 2018-03-07 NOTE — PROGRESS NOTES
HPI: Patient presented with [place] up of her diabetes she notes that she's been having some orthostatic symptoms after starting Norvasc and stopped it a few days ago and her blood pressures been fine and her orthostasis is resolved her blood sugars have been much better controlled    Review of Systems: Otherwise negative    Vital Signs: /70   Wt 126 lb (57.2 kg)   BMI 22.32 kg/m²   General: Patient appears  non-toxic  HENT: Atraumatic, normocephalic, oral mucosa moist  Lungs:  Clear bilaterally  Heart: Regular rate and rhythm  Abdomen: Non-distended, soft, non-tender  Extremities: No edema  Neuro: Nonfocal    Medical Decision Making and Plan:  Pertinent Labs & Imaging studies reviewed. (See chart for details)  Blood studies are pending    1. Rheumatoid arthritis involving wrist with positive rheumatoid factor, unspecified laterality ([address]  His problem is stable continue to monitor    2. Diabetes mellitus type 2 in nonobese [facility])  This problem is stable check above labs continue present meds i.e. insulin 23 units a day  - CBC Auto Differential  - Comprehensive Metabolic Panel  - Hemoglobin A1C    3.  Essential hypertension  Problem is stable off blood pressure medicine she is to get her blood pressure checked at least every 3 days and if her blood pressure starts going up over 150/90 will restart blood pressure medicines

## 2018-03-08 LAB
ESTIMATED AVERAGE GLUCOSE: 177.2 MG/DL
HBA1C MFR BLD: 7.8 %

## 2018-03-12 ENCOUNTER — TELEPHONE (OUTPATIENT)
Dept: INTERNAL MEDICINE | Age: 78
End: 2018-03-12

## 2018-03-12 NOTE — TELEPHONE ENCOUNTER
Take half the dose this morning and half tonight then resume regular dose tomorrow in the evening. Detailed message has been left for the patient's daughter.

## 2018-04-09 ENCOUNTER — OFFICE VISIT (OUTPATIENT)
Dept: INTERNAL MEDICINE | Age: 78
End: 2018-04-09

## 2018-04-09 VITALS — BODY MASS INDEX: 21.05 KG/M2 | WEIGHT: 131 LBS | HEIGHT: 66 IN

## 2018-04-09 DIAGNOSIS — E11.9 TYPE 2 DIABETES MELLITUS WITHOUT COMPLICATION, WITH LONG-TERM CURRENT USE OF INSULIN (HCC): ICD-10-CM

## 2018-04-09 DIAGNOSIS — M75.51 BURSITIS OF RIGHT SHOULDER: Primary | ICD-10-CM

## 2018-04-09 DIAGNOSIS — Z79.4 TYPE 2 DIABETES MELLITUS WITHOUT COMPLICATION, WITH LONG-TERM CURRENT USE OF INSULIN (HCC): ICD-10-CM

## 2018-04-09 PROCEDURE — G8400 PT W/DXA NO RESULTS DOC: HCPCS | Performed by: INTERNAL MEDICINE

## 2018-04-09 PROCEDURE — G8427 DOCREV CUR MEDS BY ELIG CLIN: HCPCS | Performed by: INTERNAL MEDICINE

## 2018-04-09 PROCEDURE — 1123F ACP DISCUSS/DSCN MKR DOCD: CPT | Performed by: INTERNAL MEDICINE

## 2018-04-09 PROCEDURE — G8420 CALC BMI NORM PARAMETERS: HCPCS | Performed by: INTERNAL MEDICINE

## 2018-04-09 PROCEDURE — 4040F PNEUMOC VAC/ADMIN/RCVD: CPT | Performed by: INTERNAL MEDICINE

## 2018-04-09 PROCEDURE — 1036F TOBACCO NON-USER: CPT | Performed by: INTERNAL MEDICINE

## 2018-04-09 PROCEDURE — 99213 OFFICE O/P EST LOW 20 MIN: CPT | Performed by: INTERNAL MEDICINE

## 2018-04-09 PROCEDURE — 1090F PRES/ABSN URINE INCON ASSESS: CPT | Performed by: INTERNAL MEDICINE

## 2018-04-09 RX ORDER — INCONTINENCE PAD,LINER,DISP
EACH MISCELLANEOUS
Qty: 5 PACKAGE | Refills: 11 | Status: SHIPPED | OUTPATIENT
Start: 2018-04-09 | End: 2018-07-10 | Stop reason: CLARIF

## 2018-04-09 ASSESSMENT — ENCOUNTER SYMPTOMS
SHORTNESS OF BREATH: 0
COUGH: 0

## 2018-04-12 ENCOUNTER — TELEPHONE (OUTPATIENT)
Dept: INTERNAL MEDICINE | Age: 78
End: 2018-04-12

## 2018-05-03 RX ORDER — OMEPRAZOLE 20 MG/1
CAPSULE, DELAYED RELEASE ORAL
Qty: 60 CAPSULE | Refills: 2 | Status: SHIPPED | OUTPATIENT
Start: 2018-05-03 | End: 2018-07-10 | Stop reason: SDUPTHER

## 2018-05-29 ENCOUNTER — TELEPHONE (OUTPATIENT)
Dept: INTERNAL MEDICINE | Age: 78
End: 2018-05-29

## 2018-05-29 DIAGNOSIS — R41.82 ALTERED MENTAL STATUS, UNSPECIFIED ALTERED MENTAL STATUS TYPE: Primary | ICD-10-CM

## 2018-05-29 RX ORDER — DULOXETIN HYDROCHLORIDE 30 MG/1
CAPSULE, DELAYED RELEASE ORAL
Qty: 60 CAPSULE | Refills: 0 | Status: SHIPPED | OUTPATIENT
Start: 2018-05-29 | End: 2018-06-01 | Stop reason: SDUPTHER

## 2018-06-05 RX ORDER — DULOXETIN HYDROCHLORIDE 30 MG/1
CAPSULE, DELAYED RELEASE ORAL
Qty: 60 CAPSULE | Refills: 0 | Status: SHIPPED | OUTPATIENT
Start: 2018-06-05 | End: 2018-07-02 | Stop reason: SDUPTHER

## 2018-06-07 ENCOUNTER — TELEPHONE (OUTPATIENT)
Dept: INTERNAL MEDICINE | Age: 78
End: 2018-06-07

## 2018-06-11 ENCOUNTER — TELEPHONE (OUTPATIENT)
Dept: INTERNAL MEDICINE | Age: 78
End: 2018-06-11

## 2018-06-28 ENCOUNTER — TELEPHONE (OUTPATIENT)
Dept: INTERNAL MEDICINE | Age: 78
End: 2018-06-28

## 2018-07-02 LAB
BASOPHILS ABSOLUTE: 0.3 K/UL (ref 0–0.2)
BASOPHILS RELATIVE PERCENT: 2.4 %
EOSINOPHILS ABSOLUTE: 0.1 K/UL (ref 0–0.6)
EOSINOPHILS RELATIVE PERCENT: 1 %
HCT VFR BLD CALC: 35.8 % (ref 36–48)
HEMATOLOGY PATH CONSULT: NO
HEMOGLOBIN: 12 G/DL (ref 12–16)
LYMPHOCYTES ABSOLUTE: 2.4 K/UL (ref 1–5.1)
LYMPHOCYTES RELATIVE PERCENT: 18.2 %
MCH RBC QN AUTO: 26 PG (ref 26–34)
MCHC RBC AUTO-ENTMCNC: 33.4 G/DL (ref 31–36)
MCV RBC AUTO: 77.7 FL (ref 80–100)
MONOCYTES ABSOLUTE: 1.8 K/UL (ref 0–1.3)
MONOCYTES RELATIVE PERCENT: 13.7 %
NEUTROPHILS ABSOLUTE: 8.6 K/UL (ref 1.7–7.7)
NEUTROPHILS RELATIVE PERCENT: 64.7 %
PDW BLD-RTO: 17.4 % (ref 12.4–15.4)
PLATELET # BLD: 405 K/UL (ref 135–450)
PMV BLD AUTO: 8 FL (ref 5–10.5)
RBC # BLD: 4.61 M/UL (ref 4–5.2)
WBC # BLD: 13.4 K/UL (ref 4–11)

## 2018-07-02 RX ORDER — DULOXETIN HYDROCHLORIDE 30 MG/1
CAPSULE, DELAYED RELEASE ORAL
Qty: 60 CAPSULE | Refills: 5 | Status: SHIPPED | OUTPATIENT
Start: 2018-07-02 | End: 2018-10-10 | Stop reason: CLARIF

## 2018-07-03 DIAGNOSIS — D64.9 ANEMIA, UNSPECIFIED TYPE: Primary | ICD-10-CM

## 2018-07-09 RX ORDER — PEN NEEDLE, DIABETIC 31 GX5/16"
NEEDLE, DISPOSABLE MISCELLANEOUS
Qty: 100 EACH | Refills: 0 | Status: SHIPPED | OUTPATIENT
Start: 2018-07-09 | End: 2018-07-10 | Stop reason: CLARIF

## 2018-07-10 ENCOUNTER — OFFICE VISIT (OUTPATIENT)
Dept: INTERNAL MEDICINE | Age: 78
End: 2018-07-10

## 2018-07-10 VITALS
DIASTOLIC BLOOD PRESSURE: 66 MMHG | WEIGHT: 136 LBS | SYSTOLIC BLOOD PRESSURE: 120 MMHG | BODY MASS INDEX: 21.86 KG/M2 | HEIGHT: 66 IN

## 2018-07-10 DIAGNOSIS — R63.5 WEIGHT GAIN: ICD-10-CM

## 2018-07-10 DIAGNOSIS — K21.9 GASTROESOPHAGEAL REFLUX DISEASE WITHOUT ESOPHAGITIS: ICD-10-CM

## 2018-07-10 DIAGNOSIS — E11.9 DIABETES MELLITUS TYPE 2 IN NONOBESE (HCC): Primary | ICD-10-CM

## 2018-07-10 DIAGNOSIS — Z91.81 AT HIGH RISK FOR FALLS: ICD-10-CM

## 2018-07-10 DIAGNOSIS — N39.44 NOCTURNAL ENURESIS: ICD-10-CM

## 2018-07-10 LAB — TSH SERPL DL<=0.05 MIU/L-ACNC: 3.32 UIU/ML (ref 0.27–4.2)

## 2018-07-10 PROCEDURE — 4040F PNEUMOC VAC/ADMIN/RCVD: CPT | Performed by: INTERNAL MEDICINE

## 2018-07-10 PROCEDURE — G8420 CALC BMI NORM PARAMETERS: HCPCS | Performed by: INTERNAL MEDICINE

## 2018-07-10 PROCEDURE — G8400 PT W/DXA NO RESULTS DOC: HCPCS | Performed by: INTERNAL MEDICINE

## 2018-07-10 PROCEDURE — 99213 OFFICE O/P EST LOW 20 MIN: CPT | Performed by: INTERNAL MEDICINE

## 2018-07-10 PROCEDURE — 0509F URINE INCON PLAN DOCD: CPT | Performed by: INTERNAL MEDICINE

## 2018-07-10 PROCEDURE — 1090F PRES/ABSN URINE INCON ASSESS: CPT | Performed by: INTERNAL MEDICINE

## 2018-07-10 PROCEDURE — 1123F ACP DISCUSS/DSCN MKR DOCD: CPT | Performed by: INTERNAL MEDICINE

## 2018-07-10 PROCEDURE — 1036F TOBACCO NON-USER: CPT | Performed by: INTERNAL MEDICINE

## 2018-07-10 PROCEDURE — G8427 DOCREV CUR MEDS BY ELIG CLIN: HCPCS | Performed by: INTERNAL MEDICINE

## 2018-07-10 RX ORDER — OMEPRAZOLE 20 MG/1
40 CAPSULE, DELAYED RELEASE ORAL DAILY
Qty: 60 CAPSULE | Refills: 3 | Status: SHIPPED | OUTPATIENT
Start: 2018-07-10 | End: 2018-07-10 | Stop reason: ALTCHOICE

## 2018-07-10 RX ORDER — CHOLECALCIFEROL (VITAMIN D3) 125 MCG
5 CAPSULE ORAL DAILY
COMMUNITY
End: 2018-10-10 | Stop reason: CLARIF

## 2018-07-10 NOTE — PROGRESS NOTES
Follow Up Visit  Established Patient Visit    Patient:  Savannah Condon                                               : 1940  Age: 65 y.o. MRN: [id number]  Date : 7/10/2018      CHIEF COMPLAINT: Savannah Condon is a 65 y.o. female who presents for :  Follow-up    Chief Complaint   Patient presents with    Diabetes    Hypertension     Melatonin takes a few hours to kick in, and has dizziness in the morning. Reports phlegm in mornings and after large meals.    20lbs weight gain, fatigue and malaise, night sweats in past 6mo  Urinary and bowel incontinence for 1 year  -250    Past Medical History:   Diagnosis Date    ADHD (attention deficit hyperactivity disorder)     Attention deficit disorder without mention of hyperactivity 2010    Autoimmune disease NEC     Carotid artery disease ([facility] 2013    LEFT CAROTID ENDARTERECTOMY               Carotid artery disease ([facility] 8/3/2013    Chronic persistent hepatitis ([facility] 2010    Depression     Depressive disorder, not elsewhere classified 2010    Double vision     left eye, since cataract surgery    GERD (gastroesophageal reflux disease)     Neuropathy ([facility]     Rheumatoid arthritis(714.0) 2010    Spinal stenosis     Type II or unspecified type diabetes mellitus without mention of complication, not stated as uncontrolled 2010    Unspecified cerebral artery occlusion with cerebral infarction     right hand, loss of some sensation       Past Surgical History:   Procedure Laterality Date    COLONOSCOPY      COSMETIC SURGERY      tummy tuck,face lift,mammoplasties- hepatitis blood transfusion complication    EYE SURGERY Bilateral     cataract       Current Outpatient Prescriptions   Medication Sig Dispense Refill    melatonin 5 MG TABS tablet Take 5 mg by mouth daily      omeprazole (PRILOSEC) 20 MG delayed release capsule Take 2 capsules by mouth Daily 60 capsule 3    DULoxetine (CYMBALTA) 30 MG extended release capsule TAKE ONE CAPSULE BY MOUTH TWICE A DAY 60 capsule 5    insulin glargine (LANTUS SOLOSTAR) 100 UNIT/ML injection pen Inject 25 Units into the skin nightly 5 pen 5    Pyridoxine HCl (VITAMIN B6 PO) Take by mouth      Multiple Vitamins-Minerals (CENTRUM SILVER) TABS Take by mouth daily      Biotin 1000 MCG TABS Take one tablet daily in am.  0    Ascorbic Acid (VITAMIN C CR) 1000 MG TBCR Take 1 tablet by mouth daily 30 tablet 0    vitamin B-12 (CYANOCOBALAMIN) 500 MCG tablet Take 1 tablet by mouth daily 30 tablet 3    guaiFENesin (MUCINEX) 600 MG extended release tablet Take 1 tablet by mouth 2 times daily as needed for Congestion      ibuprofen (ADVIL) 200 MG tablet Take 1 tablet by mouth every 6 hours as needed for Pain (max 800 mg in 24 hours) 120 tablet 3    mometasone (ELOCON) 0.1 % cream APPLY DAILY 15 g 2    sennosides-docusate sodium (SENOKOT-S) 8.6-50 MG tablet Take 2 tablets by mouth daily as needed for Constipation 30 tablet 3    polyethyl glycol-propyl glycol 0.4-0.3 % (SYSTANE) 0.4-0.3 % ophthalmic solution Place 1 drop into both eyes as needed for Dry Eyes 1 Bottle 3    Vitamin D 3 (CHOLECALCIFEROL) 1000 UNITS TABS tablet Take 1,000 Units by mouth daily. No current facility-administered medications for this visit. Physical Exam   /66   Ht 5' 6\" (1.676 m)   Wt 136 lb (61.7 kg)   BMI 21.95 kg/m²     Physical Exam   Constitutional: She is oriented to person, place, and time. She appears well-developed and well-nourished. No distress. HENT:   Head: Normocephalic and atraumatic. Eyes: EOM are normal.   Cardiovascular: Normal rate, regular rhythm, normal heart sounds and intact distal pulses. Pulmonary/Chest: Effort normal and breath sounds normal. No respiratory distress. She has no wheezes. Abdominal: Soft. She exhibits no distension. There is no tenderness. Musculoskeletal: Normal range of motion. She exhibits deformity (RA). She exhibits no edema.

## 2018-07-13 ENCOUNTER — TELEPHONE (OUTPATIENT)
Dept: INTERNAL MEDICINE | Age: 78
End: 2018-07-13

## 2018-07-13 RX ORDER — ESOMEPRAZOLE MAGNESIUM 40 MG/1
40 CAPSULE, DELAYED RELEASE ORAL 2 TIMES DAILY
Qty: 60 CAPSULE | Refills: 3 | Status: SHIPPED | OUTPATIENT
Start: 2018-07-13 | End: 2018-11-08 | Stop reason: SDUPTHER

## 2018-07-13 RX ORDER — ESOMEPRAZOLE MAGNESIUM 40 MG/1
40 CAPSULE, DELAYED RELEASE ORAL 2 TIMES DAILY
Qty: 30 CAPSULE | Refills: 3 | Status: CANCELLED | OUTPATIENT
Start: 2018-07-13

## 2018-07-13 NOTE — TELEPHONE ENCOUNTER
needs to review the result. And I need to verify the medication dose. Nexium 40 mg twice daily. Labs are ok.     Ok to send in Nexium 40 mg bid

## 2018-07-19 ENCOUNTER — TELEPHONE (OUTPATIENT)
Dept: INTERNAL MEDICINE | Age: 78
End: 2018-07-19

## 2018-07-19 NOTE — TELEPHONE ENCOUNTER
Urinalysis was completed in office     It was normal , no white cells or blood found in urine. Rita Breath was notified.

## 2018-07-30 RX ORDER — ISOPROPYL ALCOHOL 0.75 G/1
SWAB TOPICAL
Qty: 100 EACH | Refills: 2 | Status: SHIPPED | OUTPATIENT
Start: 2018-07-30 | End: 2018-09-25 | Stop reason: CLARIF

## 2018-07-31 ENCOUNTER — TELEPHONE (OUTPATIENT)
Dept: INTERNAL MEDICINE | Age: 78
End: 2018-07-31

## 2018-07-31 RX ORDER — DOXYCYCLINE HYCLATE 100 MG
100 TABLET ORAL 2 TIMES DAILY
Qty: 14 TABLET | Refills: 0 | Status: SHIPPED | OUTPATIENT
Start: 2018-07-31 | End: 2018-08-07

## 2018-07-31 NOTE — TELEPHONE ENCOUNTER
Skin cyst?    Doxycycline 100 mg bid x 7 days     Blood sugar should go down when infection goes away.

## 2018-08-01 ENCOUNTER — TELEPHONE (OUTPATIENT)
Dept: INTERNAL MEDICINE | Age: 78
End: 2018-08-01

## 2018-08-01 NOTE — TELEPHONE ENCOUNTER
Pt will be in office 8/2 for nurse visit, and request the Physicians Report that was faxed to office 7/31 be available to  during that time as well. Thank you.

## 2018-08-06 ENCOUNTER — TELEPHONE (OUTPATIENT)
Dept: INTERNAL MEDICINE | Age: 78
End: 2018-08-06

## 2018-08-06 NOTE — TELEPHONE ENCOUNTER
Have patient take the Cymbalta one tablet daily for 7 days and then stop the medication. Call returned to the patient's daughter Rachel Miranda.

## 2018-08-10 ENCOUNTER — TELEPHONE (OUTPATIENT)
Dept: INTERNAL MEDICINE | Age: 78
End: 2018-08-10

## 2018-08-10 NOTE — TELEPHONE ENCOUNTER
These are fasting BS    Pt takes 25 units Lantus nightly    States that she has had a stressful week.

## 2018-08-21 ENCOUNTER — TELEPHONE (OUTPATIENT)
Dept: INTERNAL MEDICINE | Age: 78
End: 2018-08-21

## 2018-08-31 ENCOUNTER — TELEPHONE (OUTPATIENT)
Dept: INTERNAL MEDICINE | Age: 78
End: 2018-08-31

## 2018-08-31 RX ORDER — FUROSEMIDE 20 MG/1
20 TABLET ORAL DAILY
Qty: 5 TABLET | Refills: 0 | Status: SHIPPED | OUTPATIENT
Start: 2018-08-31 | End: 2018-09-02 | Stop reason: SDUPTHER

## 2018-08-31 NOTE — TELEPHONE ENCOUNTER
Lasix X 5 days 20 mg, keep legs elevated- per [facility] to [facility] about this

## 2018-09-04 ENCOUNTER — TELEPHONE (OUTPATIENT)
Dept: INTERNAL MEDICINE | Age: 78
End: 2018-09-04

## 2018-09-04 RX ORDER — FUROSEMIDE 20 MG/1
TABLET ORAL
Qty: 5 TABLET | Refills: 0 | Status: SHIPPED | OUTPATIENT
Start: 2018-09-04 | End: 2018-09-05 | Stop reason: SDUPTHER

## 2018-09-07 RX ORDER — FUROSEMIDE 20 MG/1
20 TABLET ORAL DAILY PRN
Qty: 30 TABLET | Refills: 0 | Status: SHIPPED | OUTPATIENT
Start: 2018-09-07 | End: 2018-10-10 | Stop reason: CLARIF

## 2018-09-13 ENCOUNTER — OFFICE VISIT (OUTPATIENT)
Dept: INTERNAL MEDICINE | Age: 78
End: 2018-09-13

## 2018-09-13 VITALS
WEIGHT: 134 LBS | SYSTOLIC BLOOD PRESSURE: 138 MMHG | TEMPERATURE: 98.6 F | DIASTOLIC BLOOD PRESSURE: 70 MMHG | HEIGHT: 66 IN | BODY MASS INDEX: 21.53 KG/M2

## 2018-09-13 DIAGNOSIS — E11.9 DIABETES MELLITUS TYPE 2 IN NONOBESE (HCC): Primary | ICD-10-CM

## 2018-09-13 DIAGNOSIS — R05.9 COUGH: ICD-10-CM

## 2018-09-13 DIAGNOSIS — R60.0 LOCALIZED EDEMA: ICD-10-CM

## 2018-09-13 DIAGNOSIS — I10 ESSENTIAL HYPERTENSION: ICD-10-CM

## 2018-09-13 PROBLEM — R60.9 EDEMA: Status: ACTIVE | Noted: 2018-09-13

## 2018-09-13 PROCEDURE — 1036F TOBACCO NON-USER: CPT | Performed by: INTERNAL MEDICINE

## 2018-09-13 PROCEDURE — G8400 PT W/DXA NO RESULTS DOC: HCPCS | Performed by: INTERNAL MEDICINE

## 2018-09-13 PROCEDURE — 1101F PT FALLS ASSESS-DOCD LE1/YR: CPT | Performed by: INTERNAL MEDICINE

## 2018-09-13 PROCEDURE — 1090F PRES/ABSN URINE INCON ASSESS: CPT | Performed by: INTERNAL MEDICINE

## 2018-09-13 PROCEDURE — G8427 DOCREV CUR MEDS BY ELIG CLIN: HCPCS | Performed by: INTERNAL MEDICINE

## 2018-09-13 PROCEDURE — 4040F PNEUMOC VAC/ADMIN/RCVD: CPT | Performed by: INTERNAL MEDICINE

## 2018-09-13 PROCEDURE — G8510 SCR DEP NEG, NO PLAN REQD: HCPCS | Performed by: INTERNAL MEDICINE

## 2018-09-13 PROCEDURE — 99214 OFFICE O/P EST MOD 30 MIN: CPT | Performed by: INTERNAL MEDICINE

## 2018-09-13 PROCEDURE — G8420 CALC BMI NORM PARAMETERS: HCPCS | Performed by: INTERNAL MEDICINE

## 2018-09-13 PROCEDURE — 1123F ACP DISCUSS/DSCN MKR DOCD: CPT | Performed by: INTERNAL MEDICINE

## 2018-09-13 ASSESSMENT — ENCOUNTER SYMPTOMS
COUGH: 1
ABDOMINAL PAIN: 0
BACK PAIN: 0
CHEST TIGHTNESS: 0
WHEEZING: 0
SHORTNESS OF BREATH: 1
COLOR CHANGE: 0

## 2018-09-13 ASSESSMENT — PATIENT HEALTH QUESTIONNAIRE - PHQ9
1. LITTLE INTEREST OR PLEASURE IN DOING THINGS: 1
SUM OF ALL RESPONSES TO PHQ9 QUESTIONS 1 & 2: 1
SUM OF ALL RESPONSES TO PHQ QUESTIONS 1-9: 1
2. FEELING DOWN, DEPRESSED OR HOPELESS: 0
SUM OF ALL RESPONSES TO PHQ QUESTIONS 1-9: 1

## 2018-09-13 NOTE — ASSESSMENT & PLAN NOTE
Needs to do CXR- wants to do it tomorrow -refusing abx for now -there was a ?  Of aspiration per daughter so needs cxr

## 2018-09-17 ENCOUNTER — HOSPITAL ENCOUNTER (OUTPATIENT)
Dept: GENERAL RADIOLOGY | Age: 78
Discharge: HOME | End: 2018-09-17
Payer: MEDICARE

## 2018-09-17 ENCOUNTER — HOSPITAL ENCOUNTER (OUTPATIENT)
Age: 78
Discharge: HOME | End: 2018-09-17
Payer: MEDICARE

## 2018-09-17 DIAGNOSIS — E11.9 DIABETES MELLITUS TYPE 2 IN NONOBESE (HCC): ICD-10-CM

## 2018-09-17 DIAGNOSIS — R60.0 LOCALIZED EDEMA: ICD-10-CM

## 2018-09-17 DIAGNOSIS — R05.9 COUGH: ICD-10-CM

## 2018-09-17 PROCEDURE — 71046 X-RAY EXAM CHEST 2 VIEWS: CPT

## 2018-09-18 ENCOUNTER — TELEPHONE (OUTPATIENT)
Dept: INTERNAL MEDICINE | Age: 78
End: 2018-09-18

## 2018-09-20 ENCOUNTER — TELEPHONE (OUTPATIENT)
Dept: ORTHOPEDIC SURGERY | Age: 78
End: 2018-09-20

## 2018-09-20 NOTE — TELEPHONE ENCOUNTER
RECEIVED A PA VIA [facility] FOR Basaglar KwikPen 100UNIT/ML SC SOPN  I DO NOT SEE A SCRIPT IN THE CHART FOR THIS

## 2018-09-25 ENCOUNTER — OFFICE VISIT (OUTPATIENT)
Dept: INTERNAL MEDICINE CLINIC | Age: 78
End: 2018-09-25
Payer: MEDICARE

## 2018-09-25 ENCOUNTER — TELEPHONE (OUTPATIENT)
Dept: INTERNAL MEDICINE CLINIC | Age: 78
End: 2018-09-25

## 2018-09-25 VITALS
HEIGHT: 66 IN | SYSTOLIC BLOOD PRESSURE: 140 MMHG | WEIGHT: 137 LBS | BODY MASS INDEX: 22.02 KG/M2 | OXYGEN SATURATION: 98 % | DIASTOLIC BLOOD PRESSURE: 90 MMHG | HEART RATE: 96 BPM

## 2018-09-25 DIAGNOSIS — Z23 NEED FOR INFLUENZA VACCINATION: ICD-10-CM

## 2018-09-25 DIAGNOSIS — R60.0 LOCALIZED EDEMA: Primary | ICD-10-CM

## 2018-09-25 PROCEDURE — G0008 ADMIN INFLUENZA VIRUS VAC: HCPCS | Performed by: INTERNAL MEDICINE

## 2018-09-25 PROCEDURE — G8427 DOCREV CUR MEDS BY ELIG CLIN: HCPCS | Performed by: INTERNAL MEDICINE

## 2018-09-25 PROCEDURE — 1123F ACP DISCUSS/DSCN MKR DOCD: CPT | Performed by: INTERNAL MEDICINE

## 2018-09-25 PROCEDURE — 1036F TOBACCO NON-USER: CPT | Performed by: INTERNAL MEDICINE

## 2018-09-25 PROCEDURE — G8400 PT W/DXA NO RESULTS DOC: HCPCS | Performed by: INTERNAL MEDICINE

## 2018-09-25 PROCEDURE — 4040F PNEUMOC VAC/ADMIN/RCVD: CPT | Performed by: INTERNAL MEDICINE

## 2018-09-25 PROCEDURE — 90662 IIV NO PRSV INCREASED AG IM: CPT | Performed by: INTERNAL MEDICINE

## 2018-09-25 PROCEDURE — 1101F PT FALLS ASSESS-DOCD LE1/YR: CPT | Performed by: INTERNAL MEDICINE

## 2018-09-25 PROCEDURE — 99213 OFFICE O/P EST LOW 20 MIN: CPT | Performed by: INTERNAL MEDICINE

## 2018-09-25 PROCEDURE — 1090F PRES/ABSN URINE INCON ASSESS: CPT | Performed by: INTERNAL MEDICINE

## 2018-09-25 PROCEDURE — G8420 CALC BMI NORM PARAMETERS: HCPCS | Performed by: INTERNAL MEDICINE

## 2018-09-25 RX ORDER — SPIRONOLACTONE AND HYDROCHLOROTHIAZIDE 25; 25 MG/1; MG/1
1 TABLET ORAL DAILY
Qty: 30 TABLET | Refills: 3 | Status: SHIPPED | OUTPATIENT
Start: 2018-09-25 | End: 2018-09-25 | Stop reason: SDUPTHER

## 2018-09-25 RX ORDER — SPIRONOLACTONE AND HYDROCHLOROTHIAZIDE 25; 25 MG/1; MG/1
1 TABLET ORAL DAILY
Qty: 30 TABLET | Refills: 3 | Status: SHIPPED | OUTPATIENT
Start: 2018-09-25 | End: 2019-07-31 | Stop reason: SDUPTHER

## 2018-09-25 NOTE — PROGRESS NOTES
Follow Up Visit  Established Patient Visit    Patient:  Juan Miguel Acuna                                               : 1940  Age: 77 y.o. MRN: [id number]  Date : 2018      CHIEF COMPLAINT: Juan Miguel Acuna is a 77 y.o. female who presents for : Foot swelling    Ms. Moses presents with 1 month history of bilateral foot and lower extremity swelling, and cough. She also notices sweating in the morning and has a wet tshirt when she wakes up in the morning. She feels like she has associated chills as well and needs to put a sweater on a night. When asked about changes in weight she says she has both weight loss and weight gain. She was most recently admitted to the hospital in [place] [address] for aspiration pna. She was discharged to ARU and has since fallen. She was in the hospital in 2017 after a fall that resulted in a broken collar bone. Her glucose has been un-controlled on recent blood work.        Chief Complaint   Patient presents with    Edema     ankles    Cough       Past Medical History:   Diagnosis Date    ADHD (attention deficit hyperactivity disorder)     Attention deficit disorder without mention of hyperactivity 2010    Autoimmune disease NEC     Carotid artery disease ([facility] [address] 2013    LEFT CAROTID ENDARTERECTOMY               Carotid artery disease ([facility] [address] 8/3/2013    Chronic persistent hepatitis ([facility] [address] 2010    Depression     Depressive disorder, not elsewhere classified 2010    Double vision     left eye, since cataract surgery    GERD (gastroesophageal reflux disease)     Neuropathy     Rheumatoid arthritis(714.0) 2010    Spinal stenosis     Type II or unspecified type diabetes mellitus without mention of complication, not stated as uncontrolled 2010    Unspecified cerebral artery occlusion with cerebral infarction     right hand, loss of some sensation       Past Surgical History:   Procedure Laterality Date    COLONOSCOPY      COSMETIC SURGERY      tummy tuck,face lift,mammoplasties- hepatitis blood transfusion complication    EYE SURGERY Bilateral     cataract       Current Outpatient Prescriptions   Medication Sig Dispense Refill    spironolactone-hydrochlorothiazide (ALDACTAZIDE) 25-25 MG per tablet Take 1 tablet by mouth daily 30 tablet 3    insulin glargine (LANTUS SOLOSTAR) 100 UNIT/ML injection pen Inject 30 Units into the skin nightly 5 pen 5    furosemide (LASIX) 20 MG tablet Take 1 tablet by mouth daily as needed (edema) 30 tablet 0    esomeprazole (NEXIUM) 40 MG delayed release capsule Take 1 capsule by mouth 2 times daily 60 capsule 3    melatonin 5 MG TABS tablet Take 5 mg by mouth daily      DULoxetine (CYMBALTA) 30 MG extended release capsule TAKE ONE CAPSULE BY MOUTH TWICE A DAY 60 capsule 5    Pyridoxine HCl (VITAMIN B6 PO) Take by mouth      Multiple Vitamins-Minerals (CENTRUM SILVER) TABS Take by mouth daily      Biotin 1000 MCG TABS Take one tablet daily in am.  0    Ascorbic Acid (VITAMIN C CR) 1000 MG TBCR Take 1 tablet by mouth daily 30 tablet 0    vitamin B-12 (CYANOCOBALAMIN) 500 MCG tablet Take 1 tablet by mouth daily 30 tablet 3    guaiFENesin (MUCINEX) 600 MG extended release tablet Take 1 tablet by mouth 2 times daily as needed for Congestion      ibuprofen (ADVIL) 200 MG tablet Take 1 tablet by mouth every 6 hours as needed for Pain (max 800 mg in 24 hours) 120 tablet 3    mometasone (ELOCON) 0.1 % cream APPLY DAILY 15 g 2    sennosides-docusate sodium (SENOKOT-S) 8.6-50 MG tablet Take 2 tablets by mouth daily as needed for Constipation 30 tablet 3    polyethyl glycol-propyl glycol 0.4-0.3 % (SYSTANE) 0.4-0.3 % ophthalmic solution Place 1 drop into both eyes as needed for Dry Eyes 1 Bottle 3    Vitamin D 3 (CHOLECALCIFEROL) 1000 UNITS TABS tablet Take 1,000 Units by mouth daily. No current facility-administered medications for this visit.         Physical Exam   BP (!) 140/90   Pulse 96   Ht 5' 6\" (1.676 m)   Wt 137 lb (62.1 kg)   SpO2 98%   BMI 22.11 kg/m²     Physical Exam   Constitutional: She is oriented to person, place, and time. She appears well-nourished. HENT:   Head: Atraumatic. Eyes: EOM are normal.   Cardiovascular: Normal rate, regular rhythm and normal heart sounds. Pulmonary/Chest: Effort normal. She has rales. Abdominal: Soft. Bowel sounds are normal. She exhibits no distension. Musculoskeletal: She exhibits edema. Neurological: She is oriented to person, place, and time. Skin: Skin is warm and dry. Assessment/ plan:     Chito Stapleton was seen today for edema and cough. Diagnoses and all orders for this visit:    Localized edema to the lower extremities  - Start aldactazide 25-25mg daily  -     COMPREHENSIVE METABOLIC PANEL; Future    Jai Negron, [address]

## 2018-10-04 ENCOUNTER — TELEPHONE (OUTPATIENT)
Dept: INTERNAL MEDICINE CLINIC | Age: 78
End: 2018-10-04

## 2018-10-04 RX ORDER — PEN NEEDLE, DIABETIC 31 GX5/16"
NEEDLE, DISPOSABLE MISCELLANEOUS
Qty: 100 EACH | Refills: 0 | Status: SHIPPED | OUTPATIENT
Start: 2018-10-04 | End: 2019-02-21 | Stop reason: SDUPTHER

## 2018-10-10 ENCOUNTER — APPOINTMENT (OUTPATIENT)
Dept: GENERAL RADIOLOGY | Age: 78
DRG: 563 | End: 2018-10-10
Payer: MEDICARE

## 2018-10-10 ENCOUNTER — HOSPITAL ENCOUNTER (INPATIENT)
Age: 78
LOS: 6 days | Discharge: NURSING, REHAB OR LONG-TERM CARE | DRG: 563 | End: 2018-10-16
Attending: EMERGENCY MEDICINE | Admitting: INTERNAL MEDICINE
Payer: MEDICARE

## 2018-10-10 ENCOUNTER — APPOINTMENT (OUTPATIENT)
Dept: CT IMAGING | Age: 78
DRG: 563 | End: 2018-10-10
Payer: MEDICARE

## 2018-10-10 DIAGNOSIS — S42.291A OTHER CLOSED DISPLACED FRACTURE OF PROXIMAL END OF RIGHT HUMERUS, INITIAL ENCOUNTER: Primary | ICD-10-CM
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
## 2018-10-10 PROBLEM — T14.8XXA FRACTURE: Status: ACTIVE | Noted: 2018-10-10

## 2018-10-10 LAB
ANION GAP SERPL CALCULATED.3IONS-SCNC: 14 MMOL/L (ref 3–16)
BASE EXCESS VENOUS: -2 (ref -3–3)
BASOPHILS ABSOLUTE: 0.1 K/UL (ref 0–0.2)
BASOPHILS RELATIVE PERCENT: 1 %
BUN BLDV-MCNC: 27 MG/DL (ref 7–20)
CALCIUM SERPL-MCNC: 10.3 MG/DL (ref 8.3–10.6)
CHLORIDE BLD-SCNC: 99 MMOL/L (ref 99–110)
CO2: 18 MMOL/L (ref 21–32)
CREAT SERPL-MCNC: 0.8 MG/DL (ref 0.6–1.2)
EOSINOPHILS ABSOLUTE: 0 K/UL (ref 0–0.6)
EOSINOPHILS RELATIVE PERCENT: 0.5 %
GFR AFRICAN AMERICAN: >60
GFR NON-AFRICAN AMERICAN: >60
GLUCOSE BLD-MCNC: 318 MG/DL (ref 70–99)
GLUCOSE BLD-MCNC: 347 MG/DL (ref 70–99)
GLUCOSE BLD-MCNC: 389 MG/DL (ref 70–99)
HCO3 VENOUS: 21.8 MMOL/L (ref 23–29)
HCT VFR BLD CALC: 39.5 % (ref 36–48)
HEMOGLOBIN: 12.7 G/DL (ref 12–16)
LACTATE: 1.51 MMOL/L (ref 0.4–2)
LYMPHOCYTES ABSOLUTE: 1.8 K/UL (ref 1–5.1)
LYMPHOCYTES RELATIVE PERCENT: 17.3 %
MCH RBC QN AUTO: 25.7 PG (ref 26–34)
MCHC RBC AUTO-ENTMCNC: 32.1 G/DL (ref 31–36)
MCV RBC AUTO: 80.3 FL (ref 80–100)
MONOCYTES ABSOLUTE: 1.2 K/UL (ref 0–1.3)
MONOCYTES RELATIVE PERCENT: 12 %
NEUTROPHILS ABSOLUTE: 7.2 K/UL (ref 1.7–7.7)
NEUTROPHILS RELATIVE PERCENT: 69.2 %
O2 SAT, VEN: 71 %
PCO2, VEN: 28.9 MM HG (ref 40–50)
PDW BLD-RTO: 15.6 % (ref 12.4–15.4)
PERFORMED ON: ABNORMAL
PH VENOUS: 7.49 (ref 7.35–7.45)
PLATELET # BLD: 281 K/UL (ref 135–450)
PMV BLD AUTO: 8.3 FL (ref 5–10.5)
PO2, VEN: 34 MM HG
POC SAMPLE TYPE: ABNORMAL
POTASSIUM SERPL-SCNC: 4.6 MMOL/L (ref 3.5–5.1)
RBC # BLD: 4.92 M/UL (ref 4–5.2)
SODIUM BLD-SCNC: 131 MMOL/L (ref 136–145)
TCO2 CALC VENOUS: 23 MMOL/L
WBC # BLD: 10.4 K/UL (ref 4–11)

## 2018-10-10 PROCEDURE — 73200 CT UPPER EXTREMITY W/O DYE: CPT

## 2018-10-10 PROCEDURE — 96375 TX/PRO/DX INJ NEW DRUG ADDON: CPT

## 2018-10-10 PROCEDURE — 36415 COLL VENOUS BLD VENIPUNCTURE: CPT

## 2018-10-10 PROCEDURE — 99285 EMERGENCY DEPT VISIT HI MDM: CPT

## 2018-10-10 PROCEDURE — 6370000000 HC RX 637 (ALT 250 FOR IP): Performed by: ORTHOPAEDIC SURGERY

## 2018-10-10 PROCEDURE — 1200000000 HC SEMI PRIVATE

## 2018-10-10 PROCEDURE — 6370000000 HC RX 637 (ALT 250 FOR IP): Performed by: STUDENT IN AN ORGANIZED HEALTH CARE EDUCATION/TRAINING PROGRAM

## 2018-10-10 PROCEDURE — 80048 BASIC METABOLIC PNL TOTAL CA: CPT

## 2018-10-10 PROCEDURE — 6360000002 HC RX W HCPCS: Performed by: STUDENT IN AN ORGANIZED HEALTH CARE EDUCATION/TRAINING PROGRAM

## 2018-10-10 PROCEDURE — 96374 THER/PROPH/DIAG INJ IV PUSH: CPT

## 2018-10-10 PROCEDURE — 85025 COMPLETE CBC W/AUTO DIFF WBC: CPT

## 2018-10-10 PROCEDURE — 72125 CT NECK SPINE W/O DYE: CPT

## 2018-10-10 PROCEDURE — 70450 CT HEAD/BRAIN W/O DYE: CPT

## 2018-10-10 PROCEDURE — 73090 X-RAY EXAM OF FOREARM: CPT

## 2018-10-10 PROCEDURE — 82803 BLOOD GASES ANY COMBINATION: CPT

## 2018-10-10 PROCEDURE — 2580000003 HC RX 258: Performed by: EMERGENCY MEDICINE

## 2018-10-10 PROCEDURE — 83605 ASSAY OF LACTIC ACID: CPT

## 2018-10-10 PROCEDURE — 73060 X-RAY EXAM OF HUMERUS: CPT

## 2018-10-10 PROCEDURE — 71046 X-RAY EXAM CHEST 2 VIEWS: CPT

## 2018-10-10 PROCEDURE — 73070 X-RAY EXAM OF ELBOW: CPT

## 2018-10-10 PROCEDURE — 96376 TX/PRO/DX INJ SAME DRUG ADON: CPT

## 2018-10-10 PROCEDURE — 6360000002 HC RX W HCPCS: Performed by: EMERGENCY MEDICINE

## 2018-10-10 PROCEDURE — 6370000000 HC RX 637 (ALT 250 FOR IP): Performed by: EMERGENCY MEDICINE

## 2018-10-10 PROCEDURE — 2580000003 HC RX 258: Performed by: STUDENT IN AN ORGANIZED HEALTH CARE EDUCATION/TRAINING PROGRAM

## 2018-10-10 PROCEDURE — 93005 ELECTROCARDIOGRAM TRACING: CPT | Performed by: ORTHOPAEDIC SURGERY

## 2018-10-10 RX ORDER — SENNA AND DOCUSATE SODIUM 50; 8.6 MG/1; MG/1
2 TABLET, FILM COATED ORAL DAILY PRN
Status: DISCONTINUED | OUTPATIENT
Start: 2018-10-10 | End: 2018-10-16 | Stop reason: HOSPADM

## 2018-10-10 RX ORDER — SPIRONOLACTONE 25 MG/1
25 TABLET ORAL DAILY
Status: DISCONTINUED | OUTPATIENT
Start: 2018-10-11 | End: 2018-10-16 | Stop reason: HOSPADM

## 2018-10-10 RX ORDER — POLYVINYL ALCOHOL 14 MG/ML
1 SOLUTION/ DROPS OPHTHALMIC PRN
Status: DISCONTINUED | OUTPATIENT
Start: 2018-10-10 | End: 2018-10-11 | Stop reason: ALTCHOICE

## 2018-10-10 RX ORDER — UREA 10 %
5 LOTION (ML) TOPICAL DAILY
Status: DISCONTINUED | OUTPATIENT
Start: 2018-10-10 | End: 2018-10-11

## 2018-10-10 RX ORDER — DEXTROSE MONOHYDRATE 25 G/50ML
12.5 INJECTION, SOLUTION INTRAVENOUS PRN
Status: DISCONTINUED | OUTPATIENT
Start: 2018-10-10 | End: 2018-10-16 | Stop reason: HOSPADM

## 2018-10-10 RX ORDER — SPIRONOLACTONE AND HYDROCHLOROTHIAZIDE 25; 25 MG/1; MG/1
1 TABLET ORAL DAILY
Status: DISCONTINUED | OUTPATIENT
Start: 2018-10-10 | End: 2018-10-10

## 2018-10-10 RX ORDER — ASCORBIC ACID 500 MG
1000 TABLET ORAL DAILY
Status: DISCONTINUED | OUTPATIENT
Start: 2018-10-10 | End: 2018-10-16 | Stop reason: HOSPADM

## 2018-10-10 RX ORDER — IBUPROFEN 200 MG
400 TABLET ORAL EVERY 6 HOURS PRN
Status: DISCONTINUED | OUTPATIENT
Start: 2018-10-10 | End: 2018-10-16 | Stop reason: HOSPADM

## 2018-10-10 RX ORDER — 0.9 % SODIUM CHLORIDE 0.9 %
1000 INTRAVENOUS SOLUTION INTRAVENOUS ONCE
Status: COMPLETED | OUTPATIENT
Start: 2018-10-10 | End: 2018-10-10

## 2018-10-10 RX ORDER — MORPHINE SULFATE 4 MG/ML
4 INJECTION, SOLUTION INTRAMUSCULAR; INTRAVENOUS ONCE
Status: DISCONTINUED | OUTPATIENT
Start: 2018-10-10 | End: 2018-10-10

## 2018-10-10 RX ORDER — SODIUM CHLORIDE 0.9 % (FLUSH) 0.9 %
10 SYRINGE (ML) INJECTION PRN
Status: DISCONTINUED | OUTPATIENT
Start: 2018-10-10 | End: 2018-10-16 | Stop reason: HOSPADM

## 2018-10-10 RX ORDER — ASCORBIC ACID 1000 MG
1 TABLET, EXTENDED RELEASE ORAL DAILY
Status: DISCONTINUED | OUTPATIENT
Start: 2018-10-10 | End: 2018-10-10

## 2018-10-10 RX ORDER — HYDROCODONE BITARTRATE AND ACETAMINOPHEN 5; 325 MG/1; MG/1
2 TABLET ORAL EVERY 6 HOURS PRN
Status: DISCONTINUED | OUTPATIENT
Start: 2018-10-10 | End: 2018-10-16 | Stop reason: HOSPADM

## 2018-10-10 RX ORDER — FUROSEMIDE 40 MG/1
20 TABLET ORAL DAILY PRN
Status: DISCONTINUED | OUTPATIENT
Start: 2018-10-10 | End: 2018-10-16 | Stop reason: HOSPADM

## 2018-10-10 RX ORDER — DULOXETIN HYDROCHLORIDE 30 MG/1
30 CAPSULE, DELAYED RELEASE ORAL DAILY
Status: DISCONTINUED | OUTPATIENT
Start: 2018-10-11 | End: 2018-10-11

## 2018-10-10 RX ORDER — ONDANSETRON 2 MG/ML
4 INJECTION INTRAMUSCULAR; INTRAVENOUS EVERY 6 HOURS PRN
Status: DISCONTINUED | OUTPATIENT
Start: 2018-10-10 | End: 2018-10-16 | Stop reason: HOSPADM

## 2018-10-10 RX ORDER — MORPHINE SULFATE 4 MG/ML
4 INJECTION, SOLUTION INTRAMUSCULAR; INTRAVENOUS ONCE
Status: COMPLETED | OUTPATIENT
Start: 2018-10-10 | End: 2018-10-10

## 2018-10-10 RX ORDER — LANOLIN ALCOHOL/MO/W.PET/CERES
500 CREAM (GRAM) TOPICAL DAILY
Status: DISCONTINUED | OUTPATIENT
Start: 2018-10-10 | End: 2018-10-16 | Stop reason: HOSPADM

## 2018-10-10 RX ORDER — NICOTINE POLACRILEX 4 MG
15 LOZENGE BUCCAL PRN
Status: DISCONTINUED | OUTPATIENT
Start: 2018-10-10 | End: 2018-10-16 | Stop reason: HOSPADM

## 2018-10-10 RX ORDER — DEXTROSE MONOHYDRATE 50 MG/ML
100 INJECTION, SOLUTION INTRAVENOUS PRN
Status: DISCONTINUED | OUTPATIENT
Start: 2018-10-10 | End: 2018-10-16 | Stop reason: HOSPADM

## 2018-10-10 RX ORDER — HYDROCHLOROTHIAZIDE 25 MG/1
25 TABLET ORAL DAILY
Status: DISCONTINUED | OUTPATIENT
Start: 2018-10-11 | End: 2018-10-16 | Stop reason: HOSPADM

## 2018-10-10 RX ORDER — BIOTIN 1 MG
1000 TABLET ORAL DAILY
Status: DISCONTINUED | OUTPATIENT
Start: 2018-10-11 | End: 2018-10-10

## 2018-10-10 RX ORDER — SODIUM CHLORIDE 0.9 % (FLUSH) 0.9 %
10 SYRINGE (ML) INJECTION EVERY 12 HOURS SCHEDULED
Status: DISCONTINUED | OUTPATIENT
Start: 2018-10-10 | End: 2018-10-16 | Stop reason: HOSPADM

## 2018-10-10 RX ADMIN — HYDROMORPHONE HYDROCHLORIDE 1 MG: 1 INJECTION, SOLUTION INTRAMUSCULAR; INTRAVENOUS; SUBCUTANEOUS at 23:02

## 2018-10-10 RX ADMIN — HYDROMORPHONE HYDROCHLORIDE 0.5 MG: 1 INJECTION, SOLUTION INTRAMUSCULAR; INTRAVENOUS; SUBCUTANEOUS at 17:21

## 2018-10-10 RX ADMIN — INSULIN GLARGINE 15 UNITS: 100 INJECTION, SOLUTION SUBCUTANEOUS at 23:28

## 2018-10-10 RX ADMIN — SODIUM CHLORIDE 1000 ML: 9 INJECTION, SOLUTION INTRAVENOUS at 17:14

## 2018-10-10 RX ADMIN — HYDROCODONE BITARTRATE AND ACETAMINOPHEN 1 TABLET: 5; 325 TABLET ORAL at 20:54

## 2018-10-10 RX ADMIN — INSULIN LISPRO 7 UNITS: 100 INJECTION, SOLUTION INTRAVENOUS; SUBCUTANEOUS at 18:02

## 2018-10-10 RX ADMIN — INSULIN LISPRO 4 UNITS: 100 INJECTION, SOLUTION INTRAVENOUS; SUBCUTANEOUS at 23:30

## 2018-10-10 RX ADMIN — MORPHINE SULFATE 4 MG: 4 INJECTION INTRAVENOUS at 15:35

## 2018-10-10 RX ADMIN — HYDROMORPHONE HYDROCHLORIDE 1 MG: 1 INJECTION, SOLUTION INTRAMUSCULAR; INTRAVENOUS; SUBCUTANEOUS at 16:23

## 2018-10-10 RX ADMIN — Medication 10 ML: at 23:27

## 2018-10-10 ASSESSMENT — PAIN SCALES - GENERAL
PAINLEVEL_OUTOF10: 9
PAINLEVEL_OUTOF10: 6
PAINLEVEL_OUTOF10: 7
PAINLEVEL_OUTOF10: 10
PAINLEVEL_OUTOF10: 9

## 2018-10-10 NOTE — ED NOTES
Attempted to call report to [address] [place] [address] [address] again, RN stated she was unable to take report at this time.      Diane El RN  10/10/18 [id number]

## 2018-10-10 NOTE — ED NOTES
Attempted to call report to RN on [address] [facility], [facility] informed me that the bed had not been assigned yet.        Lashonda Dia RN  10/10/18 4636

## 2018-10-10 NOTE — ED NOTES
Asked patient if she needed to use the bathroom, patient refused and stated that her depends was dirty. I informed the patient that I could change her depends and get her cleaned up and patient refused stating, \"It will hurt too much. \"  Patient's daughter in room and also stated to leave the patient alone at this time. I explained to her that the patient ran the risk of getting skin breakdown if she laid in a soiled depends. Patient still refusing to be turned and changed.      Chaya Hodge RN  10/10/18 6037

## 2018-10-10 NOTE — ED PROVIDER NOTES
1. Follow-up with your cardiologist within 2-3 days for reassessment.   2. Return to the Emergency Department if you have increased pain, shortness of breath, dizziness, or for any other concerns.        Uncertain Causes of Chest Pain    Chest pain can happen for a number of reasons. Sometimes the cause can not be determined. If your condition does not seem serious, and your pain does not appear to be coming from your heart, your healthcare provider may recommend watching it closely. Sometimes the signs of a serious problem take more time to appear. Many problems can cause chest pain that are not related to your heart. These include:  · Musculoskeletal. Costochondritis, an inflammation of the tissues around the ribs that can occur from trauma or overuse injuries  · Respiratory.Pneumonia, pneumothorax, or pneumonitis (inflammation of the lining of the chest and lungs)  · Gastrointestinal. Esophageal reflux, heartburn, or gallbladder disease  · Anxiety and panic disorders  · Nerve compression and neuritis  · Miscellaneous others such as aortic aneurysm or pulmonary embolism (a blood clot in the lungs)  Home care  After your visit, follow these recommendations:  · Rest today and avoid strenuous activity.  · Take any prescribed medicine as directed.  · Be aware of any recurrent chest pain and notice any changes  Follow-up care  Follow up with your healthcare provider if you do not start to feel better within 24 hours, or as advised.  Call 911  Call 911 if any of these occur:  · A change in the type of pain: if it feels different, becomes more severe, lasts longer, or begins to spread into your shoulder, arm, neck, jaw or back  · Shortness of breath or increased pain with breathing  · Weakness, dizziness, or fainting  · Rapid heart beat  · Crushing sensation in your chest  When to seek medical advice  Call your healthcare provider right away if any of the following occur:  · Cough with dark colored sputum (phlegm) or  blood  · Fever of 100.4ºF (38ºC) or higher, or as directed by your healthcare provider  · Swelling, pain or redness in one leg  · Shortness of breath  © 4214-4934 [employer]. [address] [place] [address], [place] [address]. All rights reserved. This information is not intended as a substitute for professional medical care. Always follow your healthcare professional's instructions.           fragment adjacent to the anteroinferior glenoid suggestive of an osseous Bankart injury. This implies that there was also a anterior shoulder dislocation. The alignment of the joint is in near-anatomic alignment. 3. Extravasation of marrow fat into the subdeltoid space. CT HEAD WO CONTRAST   Final Result      Age-related changes      No acute intracranial hemorrhage or signs of mass effect            CT cervical spine      HISTORY: Neck trauma; neck pain; patient fell      Thin section axial images were obtained from skull base to upper thoracic spine. Images reconstructed in sagittal and coronal planes. Craniovertebral junction and atlantodental interval normal. Odontoid intact. Lateral masses of C1 normal.      Reversal of lordotic curvature centered at C5. Multilevel degenerative disc disease with disc space narrowing predominating from C5-C6 to T1-T2. Osteophytic spurring at each level. Minimal (2 mm) (anterolisthesis of C3 over C4. Vertebral height maintained. Prevertebral soft tissue thickness normal.      No fracture or compression deformity. Posterior neural arch structures intact. C2-C3-AP and transverse dimensions of spinal canal are within normal limits and neural foramina patent. C3-C4-minimal anterolisthesis of C3 due to facet arthropathy. No significant spinal stenosis. Uncinate and facet arthropathy with mild bilateral foraminal stenosis      C4-C5-central spondylotic protrusion with underlying soft tissues component producing moderate central spinal canal stenosis. Uncinate and facet arthropathy with severe right foraminal stenosis. C5-C6-broad central-right paracentral spondylotic protrusion, moderately impressing on the cord, with posterior cord displacement. Mild bilateral foraminal stenosis with uncinate arthropathy      C6-C7-broad right paracentral/preforaminal spondylotic protrusion with thecal sac effacement and probable flattening of the anterolateral cord. Uncinate arthropathy with at least mild bilateral foraminal stenosis      C7-T1-no significant abnormality. IMPRESSION:      Multilevel degenerative changes as described with minimal degenerative anterolisthesis of C3 over C4      Right paracentral spondylotic protrusion C5-C6 with cord compression      Broad right paracentral/preforaminal spondylotic protrusion C6-C7 with probable flattening of the anterolateral cord and      No fracture or prevertebral soft tissue swelling            CT CERVICAL SPINE WO CONTRAST   Final Result      Age-related changes      No acute intracranial hemorrhage or signs of mass effect            CT cervical spine      HISTORY: Neck trauma; neck pain; patient fell      Thin section axial images were obtained from skull base to upper thoracic spine. Images reconstructed in sagittal and coronal planes. Craniovertebral junction and atlantodental interval normal. Odontoid intact. Lateral masses of C1 normal.      Reversal of lordotic curvature centered at C5. Multilevel degenerative disc disease with disc space narrowing predominating from C5-C6 to T1-T2. Osteophytic spurring at each level. Minimal (2 mm) (anterolisthesis of C3 over C4. Vertebral height maintained. Prevertebral soft tissue thickness normal.      No fracture or compression deformity. Posterior neural arch structures intact. C2-C3-AP and transverse dimensions of spinal canal are within normal limits and neural foramina patent. C3-C4-minimal anterolisthesis of C3 due to facet arthropathy. No significant spinal stenosis. Uncinate and facet arthropathy with mild bilateral foraminal stenosis      C4-C5-central spondylotic protrusion with underlying soft tissues component producing moderate central spinal canal stenosis. Uncinate and facet arthropathy with severe right foraminal stenosis.       C5-C6-broad central-right paracentral spondylotic protrusion, moderately impressing on the cord, with posterior

## 2018-10-10 NOTE — ED NOTES
Bed: A10-10  Expected date:   Expected time:   Means of arrival:   Comments:  Tonja Magaña RN  10/10/18 6613

## 2018-10-10 NOTE — H&P
alert and oriented to person, place, and time. Skin: Skin is warm and dry. Psychiatric: She has a normal mood and affect. Physical exam:       Vitals:    10/10/18 1516   BP: (!) 180/72   Pulse: 97   Resp: 18   Temp: 97.7 °F (36.5 °C)   SpO2: 100%       DATA:    Labs:  CBC:   Recent Labs      10/10/18   1540   WBC  10.4   HGB  12.7   HCT  39.5   PLT  281       BMP:   Recent Labs      10/10/18   1540   NA  131*   K  4.6   CL  99   CO2  18*   BUN  27*   CREATININE  0.8   GLUCOSE  389*     LFT's: No results for input(s): AST, ALT, ALB, BILITOT, ALKPHOS in the last 72 hours. Troponin: No results for input(s): TROPONINI in the last 72 hours. BNP:No results for input(s): BNP in the last 72 hours. ABGs: No results for input(s): PHART, QCM5ELL, PO2ART in the last 72 hours. INR: No results for input(s): INR in the last 72 hours. [email or web address]    CT SHOULDER RIGHT WO CONTRAST   Final Result   1. Highly comminuted fracture involving surgical neck of the humerus with involvement of the greater tuberosity. There is a fracture plane that extends into the superior/posterior lateral humeral head which could represent a Hill-Sachs injury. 2. Bone fragment adjacent to the anteroinferior glenoid suggestive of an osseous Bankart injury. This implies that there was also a anterior shoulder dislocation. The alignment of the joint is in near-anatomic alignment. 3. Extravasation of marrow fat into the subdeltoid space. CT HEAD WO CONTRAST   Final Result      Age-related changes      No acute intracranial hemorrhage or signs of mass effect            CT cervical spine      HISTORY: Neck trauma; neck pain; patient fell      Thin section axial images were obtained from skull base to upper thoracic spine. Images reconstructed in sagittal and coronal planes. probable flattening of the anterolateral cord and      No fracture or prevertebral soft tissue swelling            XR ELBOW RIGHT (2 VIEWS)   Final Result   1. Mildly comminuted, impacted fracture within the surgical neck of the humerus with suspected involvement of the greater tuberosity. 2. No acute fracture in the elbow or forearm. 3. Osteopenia. XR HUMERUS RIGHT (MIN 2 VIEWS)   Final Result   1. Mildly comminuted, impacted fracture within the surgical neck of the humerus with suspected involvement of the greater tuberosity. 2. No acute fracture in the elbow or forearm. 3. Osteopenia. XR RADIUS ULNA RIGHT (2 VIEWS)   Final Result   1. Mildly comminuted, impacted fracture within the surgical neck of the humerus with suspected involvement of the greater tuberosity. 2. No acute fracture in the elbow or forearm. 3. Osteopenia. ASSESSMENT AND PLAN:  Ms. Álvaro Rinaldi is a 78 yo F with PMHx significant for HTN, DM-2 and depression who presents after mechanical fall. Found to have surgical neck fracture of humerus. Surgical Neck fracture of the humerus 2/2 mechanical fall- Patient presents from half-way home after mechanical fall. Elbow, humerus, radius and ulna x-rays remarkable for mildly comminuted impacted fracture within the surgical neck of humerus with suspected involvement of greater tuberosity. No acute fracture in elbow or forearm. CT head negative for acute bleed. Patient did not lose consciousness. She is able to move hand and has intact sensation throughout arm and fingertips. - Ortho consulted   - Diluadid 1mg Q4H for pain  - NPO at midnight   - Follow- up CXR  - PT/INR  - Daily CBC, BMP  - SCDs PPx  - Protonix daily     DM-2- Patient with known history of DM-2 requiring insulin, hyperglycemic on presentation.   - Medium dose sliding scale  - hypoglycemic protocol  - 30 units lantus nightly   - accu checks    Hyponatremia- likely 2/2 hyperglycemia.  Patient with Na 131

## 2018-10-11 ENCOUNTER — APPOINTMENT (OUTPATIENT)
Dept: GENERAL RADIOLOGY | Age: 78
DRG: 563 | End: 2018-10-11
Payer: MEDICARE

## 2018-10-11 LAB
ANION GAP SERPL CALCULATED.3IONS-SCNC: 9 MMOL/L (ref 3–16)
APTT: 28.3 SEC (ref 26–36)
BASOPHILS ABSOLUTE: 0.1 K/UL (ref 0–0.2)
BASOPHILS RELATIVE PERCENT: 0.9 %
BUN BLDV-MCNC: 22 MG/DL (ref 7–20)
CALCIUM SERPL-MCNC: 10 MG/DL (ref 8.3–10.6)
CHLORIDE BLD-SCNC: 101 MMOL/L (ref 99–110)
CO2: 23 MMOL/L (ref 21–32)
CREAT SERPL-MCNC: 0.6 MG/DL (ref 0.6–1.2)
EOSINOPHILS ABSOLUTE: 0.1 K/UL (ref 0–0.6)
EOSINOPHILS RELATIVE PERCENT: 0.7 %
GFR AFRICAN AMERICAN: >60
GFR NON-AFRICAN AMERICAN: >60
GLUCOSE BLD-MCNC: 218 MG/DL (ref 70–99)
GLUCOSE BLD-MCNC: 220 MG/DL (ref 70–99)
GLUCOSE BLD-MCNC: 242 MG/DL (ref 70–99)
GLUCOSE BLD-MCNC: 242 MG/DL (ref 70–99)
GLUCOSE BLD-MCNC: 297 MG/DL (ref 70–99)
HCT VFR BLD CALC: 33.2 % (ref 36–48)
HEMOGLOBIN: 10.6 G/DL (ref 12–16)
INR BLD: 1.04 (ref 0.86–1.14)
LYMPHOCYTES ABSOLUTE: 1.9 K/UL (ref 1–5.1)
LYMPHOCYTES RELATIVE PERCENT: 17.1 %
MCH RBC QN AUTO: 25.6 PG (ref 26–34)
MCHC RBC AUTO-ENTMCNC: 32 G/DL (ref 31–36)
MCV RBC AUTO: 79.8 FL (ref 80–100)
MONOCYTES ABSOLUTE: 1.5 K/UL (ref 0–1.3)
MONOCYTES RELATIVE PERCENT: 13.3 %
NEUTROPHILS ABSOLUTE: 7.5 K/UL (ref 1.7–7.7)
NEUTROPHILS RELATIVE PERCENT: 68 %
PDW BLD-RTO: 15.9 % (ref 12.4–15.4)
PERFORMED ON: ABNORMAL
PLATELET # BLD: 273 K/UL (ref 135–450)
PMV BLD AUTO: 8.2 FL (ref 5–10.5)
POTASSIUM REFLEX MAGNESIUM: 4.1 MMOL/L (ref 3.5–5.1)
PROTHROMBIN TIME: 11.9 SEC (ref 9.8–13)
RBC # BLD: 4.16 M/UL (ref 4–5.2)
SODIUM BLD-SCNC: 133 MMOL/L (ref 136–145)
WBC # BLD: 11.1 K/UL (ref 4–11)

## 2018-10-11 PROCEDURE — 6360000002 HC RX W HCPCS: Performed by: STUDENT IN AN ORGANIZED HEALTH CARE EDUCATION/TRAINING PROGRAM

## 2018-10-11 PROCEDURE — 85730 THROMBOPLASTIN TIME PARTIAL: CPT

## 2018-10-11 PROCEDURE — 6370000000 HC RX 637 (ALT 250 FOR IP): Performed by: STUDENT IN AN ORGANIZED HEALTH CARE EDUCATION/TRAINING PROGRAM

## 2018-10-11 PROCEDURE — 99221 1ST HOSP IP/OBS SF/LOW 40: CPT | Performed by: NURSE PRACTITIONER

## 2018-10-11 PROCEDURE — 85610 PROTHROMBIN TIME: CPT

## 2018-10-11 PROCEDURE — 2580000003 HC RX 258: Performed by: STUDENT IN AN ORGANIZED HEALTH CARE EDUCATION/TRAINING PROGRAM

## 2018-10-11 PROCEDURE — 6370000000 HC RX 637 (ALT 250 FOR IP): Performed by: NURSE PRACTITIONER

## 2018-10-11 PROCEDURE — 1200000000 HC SEMI PRIVATE

## 2018-10-11 PROCEDURE — 85025 COMPLETE CBC W/AUTO DIFF WBC: CPT

## 2018-10-11 PROCEDURE — 99222 1ST HOSP IP/OBS MODERATE 55: CPT | Performed by: INTERNAL MEDICINE

## 2018-10-11 PROCEDURE — 36415 COLL VENOUS BLD VENIPUNCTURE: CPT

## 2018-10-11 PROCEDURE — 80048 BASIC METABOLIC PNL TOTAL CA: CPT

## 2018-10-11 PROCEDURE — 6370000000 HC RX 637 (ALT 250 FOR IP): Performed by: ORTHOPAEDIC SURGERY

## 2018-10-11 RX ORDER — PANTOPRAZOLE SODIUM 40 MG/1
40 TABLET, DELAYED RELEASE ORAL
Status: DISCONTINUED | OUTPATIENT
Start: 2018-10-11 | End: 2018-10-16 | Stop reason: HOSPADM

## 2018-10-11 RX ORDER — SENNA PLUS 8.6 MG/1
1 TABLET ORAL NIGHTLY
Status: DISCONTINUED | OUTPATIENT
Start: 2018-10-11 | End: 2018-10-16 | Stop reason: HOSPADM

## 2018-10-11 RX ORDER — PANTOPRAZOLE SODIUM 40 MG/1
40 TABLET, DELAYED RELEASE ORAL
Status: DISCONTINUED | OUTPATIENT
Start: 2018-10-11 | End: 2018-10-11

## 2018-10-11 RX ORDER — PANTOPRAZOLE SODIUM 40 MG/1
40 TABLET, DELAYED RELEASE ORAL
Status: DISCONTINUED | OUTPATIENT
Start: 2018-10-12 | End: 2018-10-11

## 2018-10-11 RX ORDER — PETROLATUM 42 G/100G
OINTMENT TOPICAL 2 TIMES DAILY PRN
Status: DISCONTINUED | OUTPATIENT
Start: 2018-10-11 | End: 2018-10-16 | Stop reason: HOSPADM

## 2018-10-11 RX ORDER — POLYVINYL ALCOHOL 14 MG/ML
1 SOLUTION/ DROPS OPHTHALMIC PRN
Status: DISCONTINUED | OUTPATIENT
Start: 2018-10-11 | End: 2018-10-12

## 2018-10-11 RX ADMIN — CYANOCOBALAMIN TAB 1000 MCG 500 MCG: 1000 TAB at 10:15

## 2018-10-11 RX ADMIN — Medication 10 ML: at 10:16

## 2018-10-11 RX ADMIN — INSULIN LISPRO 4 UNITS: 100 INJECTION, SOLUTION INTRAVENOUS; SUBCUTANEOUS at 16:24

## 2018-10-11 RX ADMIN — PETROLATUM: 42 OINTMENT TOPICAL at 14:59

## 2018-10-11 RX ADMIN — HYDROCHLOROTHIAZIDE 25 MG: 25 TABLET ORAL at 10:13

## 2018-10-11 RX ADMIN — INSULIN LISPRO 4 UNITS: 100 INJECTION, SOLUTION INTRAVENOUS; SUBCUTANEOUS at 10:21

## 2018-10-11 RX ADMIN — HYPROMELLOSE 2906 (4000 MPA.S) AND HYPROMELLOSE 2906 (50 MPA.S) 1 DROP: 25; 25 SOLUTION OPHTHALMIC at 23:05

## 2018-10-11 RX ADMIN — POLYVINYL ALCOHOL 1 DROP: 14 SOLUTION/ DROPS OPHTHALMIC at 14:56

## 2018-10-11 RX ADMIN — PANTOPRAZOLE SODIUM 40 MG: 40 TABLET, DELAYED RELEASE ORAL at 10:35

## 2018-10-11 RX ADMIN — PANTOPRAZOLE SODIUM 40 MG: 40 TABLET, DELAYED RELEASE ORAL at 16:29

## 2018-10-11 RX ADMIN — INSULIN LISPRO 2 UNITS: 100 INJECTION, SOLUTION INTRAVENOUS; SUBCUTANEOUS at 20:49

## 2018-10-11 RX ADMIN — OXYCODONE HYDROCHLORIDE AND ACETAMINOPHEN 1000 MG: 500 TABLET ORAL at 10:15

## 2018-10-11 RX ADMIN — HYDROMORPHONE HYDROCHLORIDE 1 MG: 1 INJECTION, SOLUTION INTRAMUSCULAR; INTRAVENOUS; SUBCUTANEOUS at 15:43

## 2018-10-11 RX ADMIN — SENNOSIDES 8.6 MG: 8.6 TABLET, FILM COATED ORAL at 20:48

## 2018-10-11 RX ADMIN — HYDROMORPHONE HYDROCHLORIDE 1 MG: 1 INJECTION, SOLUTION INTRAMUSCULAR; INTRAVENOUS; SUBCUTANEOUS at 20:48

## 2018-10-11 RX ADMIN — SPIRONOLACTONE 25 MG: 25 TABLET ORAL at 10:14

## 2018-10-11 RX ADMIN — INSULIN LISPRO 6 UNITS: 100 INJECTION, SOLUTION INTRAVENOUS; SUBCUTANEOUS at 12:24

## 2018-10-11 RX ADMIN — HYDROCODONE BITARTRATE AND ACETAMINOPHEN 2 TABLET: 5; 325 TABLET ORAL at 08:24

## 2018-10-11 RX ADMIN — INSULIN GLARGINE 15 UNITS: 100 INJECTION, SOLUTION SUBCUTANEOUS at 20:48

## 2018-10-11 RX ADMIN — Medication 10 ML: at 20:48

## 2018-10-11 RX ADMIN — HYDROCODONE BITARTRATE AND ACETAMINOPHEN 2 TABLET: 5; 325 TABLET ORAL at 14:47

## 2018-10-11 RX ADMIN — HYDROMORPHONE HYDROCHLORIDE 1 MG: 1 INJECTION, SOLUTION INTRAMUSCULAR; INTRAVENOUS; SUBCUTANEOUS at 10:48

## 2018-10-11 RX ADMIN — CHOLECALCIFEROL TAB 25 MCG (1000 UNIT) 1000 UNITS: 25 TAB at 10:15

## 2018-10-11 ASSESSMENT — PAIN DESCRIPTION - PAIN TYPE
TYPE: ACUTE PAIN

## 2018-10-11 ASSESSMENT — PAIN SCALES - GENERAL
PAINLEVEL_OUTOF10: 7
PAINLEVEL_OUTOF10: 6
PAINLEVEL_OUTOF10: 7
PAINLEVEL_OUTOF10: 8
PAINLEVEL_OUTOF10: 7
PAINLEVEL_OUTOF10: 6

## 2018-10-11 ASSESSMENT — PAIN DESCRIPTION - ORIENTATION
ORIENTATION: RIGHT

## 2018-10-11 ASSESSMENT — PAIN DESCRIPTION - LOCATION
LOCATION: ARM

## 2018-10-11 ASSESSMENT — PAIN DESCRIPTION - FREQUENCY
FREQUENCY: CONTINUOUS

## 2018-10-11 ASSESSMENT — PAIN DESCRIPTION - PROGRESSION
CLINICAL_PROGRESSION: NOT CHANGED

## 2018-10-11 ASSESSMENT — PAIN DESCRIPTION - ONSET
ONSET: ON-GOING

## 2018-10-11 ASSESSMENT — PAIN DESCRIPTION - DESCRIPTORS
DESCRIPTORS: CONSTANT;SHARP;THROBBING

## 2018-10-11 NOTE — CONSULTS
intake reduced; LOC full/confusion  20% ? Bed Bound; Extensive disease; Total care; intake minimal; Drowsy/coma  10% ? Bed Bound; Extensive disease; Total care; Mouth care only; Drowsy/coma  0 ?  Death    PPS: 50%    Vitals:    BP (!) 118/57   Pulse 90   Temp 97.9 °F (36.6 °C) (Oral)   Resp 16   SpO2 93%     DATA:    CBC with Differential:    Lab Results   Component Value Date    WBC 11.1 10/11/2018    RBC 4.16 10/11/2018    HGB 10.6 10/11/2018    HCT 33.2 10/11/2018     10/11/2018    MCV 79.8 10/11/2018    MCH 25.6 10/11/2018    MCHC 32.0 10/11/2018    RDW 15.9 10/11/2018    SEGSPCT 66.8 03/18/2013    BANDSPCT 2 10/25/2017    LYMPHOPCT 17.1 10/11/2018    MONOPCT 13.3 10/11/2018    EOSPCT 1.0 03/09/2012    BASOPCT 0.9 10/11/2018    MONOSABS 1.5 10/11/2018    LYMPHSABS 1.9 10/11/2018    EOSABS 0.1 10/11/2018    BASOSABS 0.1 10/11/2018    DIFFTYPE Auto-K 03/18/2013     CMP:    Lab Results   Component Value Date     10/11/2018    K 4.1 10/11/2018     10/11/2018    CO2 23 10/11/2018    BUN 22 10/11/2018    CREATININE 0.6 10/11/2018    GFRAA >60 10/11/2018    GFRAA >60 03/18/2013    AGRATIO 1.1 10/03/2018    LABGLOM >60 10/11/2018    GLUCOSE 220 10/11/2018    GLUCOSE 163 09/09/2011    PROT 7.6 10/03/2018    PROT 7.8 03/18/2013    LABALBU 3.9 10/03/2018    CALCIUM 10.0 10/11/2018    BILITOT 0.4 10/03/2018    ALKPHOS 72 10/03/2018    AST 56 10/03/2018    ALT 47 10/03/2018     Hepatic Function Panel:    Lab Results   Component Value Date    ALKPHOS 72 10/03/2018    ALT 47 10/03/2018    AST 56 10/03/2018    PROT 7.6 10/03/2018    PROT 7.8 03/18/2013    BILITOT 0.4 10/03/2018    BILIDIR <0.2 10/18/2017    IBILI see below 10/18/2017    LABALBU 3.9 10/03/2018     Albumin:    Lab Results   Component Value Date    LABALBU 3.9 10/03/2018         Conclusion/Time spent:         Recommendations see above    Time spent with patient and/or family:20  Time reviewing records:10  Time communicating with staff:10    A

## 2018-10-11 NOTE — PLAN OF CARE
Problem: Pain:  Goal: Pain level will decrease  Pain level will decrease   Outcome: Ongoing  Pt. Asks for pain medication as often as allowed per order. States pain is usually around an 8-10/10 to LUE. States pain medications especially Dilaudid are helpful. States pain does not go below a 7/10 however. Problem: Risk for Impaired Skin Integrity  Goal: Tissue integrity - skin and mucous membranes  Structural intactness and normal physiological function of skin and  mucous membranes. Outcome: Ongoing  Blanchable redness noted to bilateral heels. Small scab to L heel. Heels elevated on pillows. Scattered bruising noted. Moderate amount of bruising to R side of forehead from fall. KIMMIE bottom d/t refusal of pt.

## 2018-10-12 LAB
ANION GAP SERPL CALCULATED.3IONS-SCNC: 11 MMOL/L (ref 3–16)
BASOPHILS ABSOLUTE: 0 K/UL (ref 0–0.2)
BASOPHILS RELATIVE PERCENT: 0 %
BUN BLDV-MCNC: 23 MG/DL (ref 7–20)
CALCIUM SERPL-MCNC: 9.7 MG/DL (ref 8.3–10.6)
CHLORIDE BLD-SCNC: 95 MMOL/L (ref 99–110)
CO2: 23 MMOL/L (ref 21–32)
CREAT SERPL-MCNC: 0.7 MG/DL (ref 0.6–1.2)
EOSINOPHILS ABSOLUTE: 0.1 K/UL (ref 0–0.6)
EOSINOPHILS RELATIVE PERCENT: 1 %
FERRITIN: 123.5 NG/ML (ref 15–150)
GFR AFRICAN AMERICAN: >60
GFR NON-AFRICAN AMERICAN: >60
GLUCOSE BLD-MCNC: 218 MG/DL (ref 70–99)
GLUCOSE BLD-MCNC: 234 MG/DL (ref 70–99)
GLUCOSE BLD-MCNC: 248 MG/DL (ref 70–99)
GLUCOSE BLD-MCNC: 308 MG/DL (ref 70–99)
GLUCOSE BLD-MCNC: 333 MG/DL (ref 70–99)
HCT VFR BLD CALC: 33.7 % (ref 36–48)
HEMOGLOBIN: 11 G/DL (ref 12–16)
IRON SATURATION: 15 % (ref 15–50)
IRON: 41 UG/DL (ref 37–145)
LYMPHOCYTES ABSOLUTE: 1.8 K/UL (ref 1–5.1)
LYMPHOCYTES RELATIVE PERCENT: 15 %
MCH RBC QN AUTO: 25.9 PG (ref 26–34)
MCHC RBC AUTO-ENTMCNC: 32.6 G/DL (ref 31–36)
MCV RBC AUTO: 79.4 FL (ref 80–100)
MONOCYTES ABSOLUTE: 1.1 K/UL (ref 0–1.3)
MONOCYTES RELATIVE PERCENT: 9 %
NEUTROPHILS ABSOLUTE: 8.8 K/UL (ref 1.7–7.7)
NEUTROPHILS RELATIVE PERCENT: 75 %
PDW BLD-RTO: 15.3 % (ref 12.4–15.4)
PERFORMED ON: ABNORMAL
PLATELET # BLD: 261 K/UL (ref 135–450)
PMV BLD AUTO: 8.4 FL (ref 5–10.5)
POTASSIUM REFLEX MAGNESIUM: 4.7 MMOL/L (ref 3.5–5.1)
RBC # BLD: 4.24 M/UL (ref 4–5.2)
SODIUM BLD-SCNC: 129 MMOL/L (ref 136–145)
TOTAL IRON BINDING CAPACITY: 282 UG/DL (ref 260–445)
WBC # BLD: 11.7 K/UL (ref 4–11)

## 2018-10-12 PROCEDURE — 6370000000 HC RX 637 (ALT 250 FOR IP): Performed by: STUDENT IN AN ORGANIZED HEALTH CARE EDUCATION/TRAINING PROGRAM

## 2018-10-12 PROCEDURE — 83550 IRON BINDING TEST: CPT

## 2018-10-12 PROCEDURE — 2580000003 HC RX 258: Performed by: STUDENT IN AN ORGANIZED HEALTH CARE EDUCATION/TRAINING PROGRAM

## 2018-10-12 PROCEDURE — 99232 SBSQ HOSP IP/OBS MODERATE 35: CPT | Performed by: INTERNAL MEDICINE

## 2018-10-12 PROCEDURE — 1200000000 HC SEMI PRIVATE

## 2018-10-12 PROCEDURE — 80048 BASIC METABOLIC PNL TOTAL CA: CPT

## 2018-10-12 PROCEDURE — 6360000002 HC RX W HCPCS: Performed by: STUDENT IN AN ORGANIZED HEALTH CARE EDUCATION/TRAINING PROGRAM

## 2018-10-12 PROCEDURE — 83540 ASSAY OF IRON: CPT

## 2018-10-12 PROCEDURE — 85025 COMPLETE CBC W/AUTO DIFF WBC: CPT

## 2018-10-12 PROCEDURE — 36415 COLL VENOUS BLD VENIPUNCTURE: CPT

## 2018-10-12 PROCEDURE — 6370000000 HC RX 637 (ALT 250 FOR IP): Performed by: ORTHOPAEDIC SURGERY

## 2018-10-12 PROCEDURE — 82728 ASSAY OF FERRITIN: CPT

## 2018-10-12 RX ORDER — MULTIVITAMIN WITH FOLIC ACID 400 MCG
1 TABLET ORAL DAILY
Status: DISCONTINUED | OUTPATIENT
Start: 2018-10-12 | End: 2018-10-16 | Stop reason: HOSPADM

## 2018-10-12 RX ORDER — CALCIUM CARBONATE 200(500)MG
500 TABLET,CHEWABLE ORAL 3 TIMES DAILY PRN
Status: DISCONTINUED | OUTPATIENT
Start: 2018-10-12 | End: 2018-10-16 | Stop reason: HOSPADM

## 2018-10-12 RX ADMIN — INSULIN LISPRO 2 UNITS: 100 INJECTION, SOLUTION INTRAVENOUS; SUBCUTANEOUS at 21:12

## 2018-10-12 RX ADMIN — OXYCODONE HYDROCHLORIDE AND ACETAMINOPHEN 1000 MG: 500 TABLET ORAL at 08:39

## 2018-10-12 RX ADMIN — INSULIN LISPRO 4 UNITS: 100 INJECTION, SOLUTION INTRAVENOUS; SUBCUTANEOUS at 17:24

## 2018-10-12 RX ADMIN — INSULIN LISPRO 4 UNITS: 100 INJECTION, SOLUTION INTRAVENOUS; SUBCUTANEOUS at 13:08

## 2018-10-12 RX ADMIN — THERA TABS 1 TABLET: TAB at 14:28

## 2018-10-12 RX ADMIN — HYDROCHLOROTHIAZIDE 25 MG: 25 TABLET ORAL at 08:39

## 2018-10-12 RX ADMIN — Medication 10 ML: at 08:42

## 2018-10-12 RX ADMIN — CYANOCOBALAMIN TAB 1000 MCG 500 MCG: 1000 TAB at 08:40

## 2018-10-12 RX ADMIN — CHOLECALCIFEROL TAB 25 MCG (1000 UNIT) 1000 UNITS: 25 TAB at 08:40

## 2018-10-12 RX ADMIN — PANTOPRAZOLE SODIUM 40 MG: 40 TABLET, DELAYED RELEASE ORAL at 06:58

## 2018-10-12 RX ADMIN — HYDROCODONE BITARTRATE AND ACETAMINOPHEN 2 TABLET: 5; 325 TABLET ORAL at 17:23

## 2018-10-12 RX ADMIN — INSULIN LISPRO 8 UNITS: 100 INJECTION, SOLUTION INTRAVENOUS; SUBCUTANEOUS at 07:29

## 2018-10-12 RX ADMIN — HYDROMORPHONE HYDROCHLORIDE 1 MG: 1 INJECTION, SOLUTION INTRAMUSCULAR; INTRAVENOUS; SUBCUTANEOUS at 18:27

## 2018-10-12 RX ADMIN — SPIRONOLACTONE 25 MG: 25 TABLET ORAL at 08:39

## 2018-10-12 RX ADMIN — HYDROMORPHONE HYDROCHLORIDE 1 MG: 1 INJECTION, SOLUTION INTRAMUSCULAR; INTRAVENOUS; SUBCUTANEOUS at 06:26

## 2018-10-12 RX ADMIN — ANTACID TABLETS 500 MG: 500 TABLET, CHEWABLE ORAL at 10:02

## 2018-10-12 RX ADMIN — HYDROMORPHONE HYDROCHLORIDE 1 MG: 1 INJECTION, SOLUTION INTRAMUSCULAR; INTRAVENOUS; SUBCUTANEOUS at 10:26

## 2018-10-12 RX ADMIN — PANTOPRAZOLE SODIUM 40 MG: 40 TABLET, DELAYED RELEASE ORAL at 16:20

## 2018-10-12 RX ADMIN — HYDROMORPHONE HYDROCHLORIDE 1 MG: 1 INJECTION, SOLUTION INTRAMUSCULAR; INTRAVENOUS; SUBCUTANEOUS at 14:28

## 2018-10-12 ASSESSMENT — PAIN SCALES - GENERAL
PAINLEVEL_OUTOF10: 7
PAINLEVEL_OUTOF10: 8
PAINLEVEL_OUTOF10: 7

## 2018-10-12 ASSESSMENT — PAIN DESCRIPTION - ORIENTATION: ORIENTATION: RIGHT

## 2018-10-12 ASSESSMENT — PAIN DESCRIPTION - LOCATION: LOCATION: ARM

## 2018-10-12 ASSESSMENT — PAIN DESCRIPTION - FREQUENCY: FREQUENCY: CONTINUOUS

## 2018-10-12 ASSESSMENT — PAIN DESCRIPTION - DESCRIPTORS: DESCRIPTORS: SORE

## 2018-10-12 ASSESSMENT — PAIN DESCRIPTION - PAIN TYPE: TYPE: ACUTE PAIN

## 2018-10-12 ASSESSMENT — PAIN DESCRIPTION - ONSET: ONSET: ON-GOING

## 2018-10-12 NOTE — DISCHARGE SUMMARY
done which indicated a mildly comminuted impacted fracture within the surgical neck of the humerus with suspected involvement of the greater tuberosity. No acute fracture in elbow or forearm. She was given 4mg of morphine which did not touch her pain. She was eventually started on Dilaudid 1mg which relieved pain significantly. Hospital Course: While in the hospital, Dr. Karthik Conde evaluated her for surgical management. It was determined to treat her conservatively and she did not receive surgical fixation of her fracture. Due to concerns of her progressively becoming debilitated after this fall, it was recommended that she go to SNF after this hospital stay to work on functional mobility and ADL's. Her pain was controlled with dilaudid IV and Norco PO. Her blood glucoses were elevated during her stay. She was resumed on home lantus on day 2 of her hospital stay and her blood glucoses were better controlled. She remained in the 230's-330's during her hospital stay. During her hospital stay she was managed for the following:     Surgical Neck fracture of the humerus 2/2 mechanical fall- Patient presented from halfway home after mechanical fall. Elbow, humerus, radius and ulna x-rays remarkable for mildly comminuted impacted fracture within the surgical neck of humerus with suspected involvement of greater tuberosity. No acute fracture in elbow or forearm. CT head negative for acute bleed. No LOC. Patient has normal ROM of wrist joint, intact sensation throughout arm and fingertips.  Currently pain well controlled not in any distress   - Ortho consulted; no surgical intervention   - PO pain management  - SCDs PPx  - Protonix BID     DM-2- Patient with known history of DM-2 requiring insulin, hyperglycemic on presentation.   - Medium dose sliding scale  - hypoglycemic protocol  - increase to 40 units lantus nightly   - accu checks     HTN-   - Cont Aldactazide (or pharmacy equivalent)  - Cont Lasix 20mg cream APPLY DAILY  Qty: 15 g, Refills: 2      polyethyl glycol-propyl glycol 0.4-0.3 % (SYSTANE) 0.4-0.3 % ophthalmic solution Place 1 drop into both eyes as needed for Dry Eyes  Qty: 1 Bottle, Refills: 3      Vitamin D 3 (CHOLECALCIFEROL) 1000 UNITS TABS tablet Take 1,000 Units by mouth daily. B-D ULTRAFINE III SHORT PEN 31G X 8 MM MISC USE DAILY AS DIRECTED  Qty: 100 each, Refills: 0      guaiFENesin (MUCINEX) 600 MG extended release tablet Take 1 tablet by mouth 2 times daily as needed for Congestion      ibuprofen (ADVIL) 200 MG tablet Take 1 tablet by mouth every 6 hours as needed for Pain (max 800 mg in 24 hours)  Qty: 120 tablet, Refills: 3           Current Discharge Medication List            Exam:   /61   Pulse 86   Temp 98.2 °F (36.8 °C) (Oral)   Resp 16   SpO2 97%   Physical Exam   Constitutional: She is oriented to person, place, and time and well-developed, well-nourished, and in no distress. HENT:   Head: Normocephalic and atraumatic. Eyes: Pupils are equal, round, and reactive to light. Conjunctivae and EOM are normal.   Neck: Normal range of motion. Neck supple. Cardiovascular: Normal rate, regular rhythm, normal heart sounds and intact distal pulses. Pulmonary/Chest: Effort normal and breath sounds normal.   Abdominal: Soft. Bowel sounds are normal.   Musculoskeletal:   Sling in place   Neurological: She is alert and oriented to person, place, and time. Skin: Skin is warm and dry. Significant Test Results   Radiology:  CT SHOULDER RIGHT WO CONTRAST   Final Result   1. Highly comminuted fracture involving surgical neck of the humerus with involvement of the greater tuberosity. There is a fracture plane that extends into the superior/posterior lateral humeral head which could represent a Hill-Sachs injury. 2. Bone fragment adjacent to the anteroinferior glenoid suggestive of an osseous Bankart injury.  This implies that there was also a anterior shoulder degenerative changes as described with minimal degenerative anterolisthesis of C3 over C4      Right paracentral spondylotic protrusion C5-C6 with cord compression      Broad right paracentral/preforaminal spondylotic protrusion C6-C7 with probable flattening of the anterolateral cord and      No fracture or prevertebral soft tissue swelling            CT CERVICAL SPINE WO CONTRAST   Final Result      Age-related changes      No acute intracranial hemorrhage or signs of mass effect            CT cervical spine      HISTORY: Neck trauma; neck pain; patient fell      Thin section axial images were obtained from skull base to upper thoracic spine. Images reconstructed in sagittal and coronal planes. Craniovertebral junction and atlantodental interval normal. Odontoid intact. Lateral masses of C1 normal.      Reversal of lordotic curvature centered at C5. Multilevel degenerative disc disease with disc space narrowing predominating from C5-C6 to T1-T2. Osteophytic spurring at each level. Minimal (2 mm) (anterolisthesis of C3 over C4. Vertebral height maintained. Prevertebral soft tissue thickness normal.      No fracture or compression deformity. Posterior neural arch structures intact. C2-C3-AP and transverse dimensions of spinal canal are within normal limits and neural foramina patent. C3-C4-minimal anterolisthesis of C3 due to facet arthropathy. No significant spinal stenosis. Uncinate and facet arthropathy with mild bilateral foraminal stenosis      C4-C5-central spondylotic protrusion with underlying soft tissues component producing moderate central spinal canal stenosis. Uncinate and facet arthropathy with severe right foraminal stenosis. C5-C6-broad central-right paracentral spondylotic protrusion, moderately impressing on the cord, with posterior cord displacement.  Mild bilateral foraminal stenosis with uncinate arthropathy      C6-C7-broad right paracentral/preforaminal spondylotic

## 2018-10-13 PROBLEM — R05.9 COUGH: Status: RESOLVED | Noted: 2018-09-13 | Resolved: 2018-10-13

## 2018-10-13 LAB
ANION GAP SERPL CALCULATED.3IONS-SCNC: 10 MMOL/L (ref 3–16)
BASOPHILS ABSOLUTE: 0.1 K/UL (ref 0–0.2)
BASOPHILS RELATIVE PERCENT: 0.6 %
BUN BLDV-MCNC: 27 MG/DL (ref 7–20)
CALCIUM SERPL-MCNC: 10.3 MG/DL (ref 8.3–10.6)
CHLORIDE BLD-SCNC: 97 MMOL/L (ref 99–110)
CO2: 23 MMOL/L (ref 21–32)
CREAT SERPL-MCNC: 0.8 MG/DL (ref 0.6–1.2)
EOSINOPHILS ABSOLUTE: 0 K/UL (ref 0–0.6)
EOSINOPHILS RELATIVE PERCENT: 0.3 %
GFR AFRICAN AMERICAN: >60
GFR NON-AFRICAN AMERICAN: >60
GLUCOSE BLD-MCNC: 235 MG/DL (ref 70–99)
GLUCOSE BLD-MCNC: 237 MG/DL (ref 70–99)
GLUCOSE BLD-MCNC: 274 MG/DL (ref 70–99)
GLUCOSE BLD-MCNC: 280 MG/DL (ref 70–99)
GLUCOSE BLD-MCNC: 322 MG/DL (ref 70–99)
HCT VFR BLD CALC: 34.3 % (ref 36–48)
HEMOGLOBIN: 11.1 G/DL (ref 12–16)
LYMPHOCYTES ABSOLUTE: 1.8 K/UL (ref 1–5.1)
LYMPHOCYTES RELATIVE PERCENT: 13.4 %
MCH RBC QN AUTO: 25.7 PG (ref 26–34)
MCHC RBC AUTO-ENTMCNC: 32.3 G/DL (ref 31–36)
MCV RBC AUTO: 79.5 FL (ref 80–100)
MONOCYTES ABSOLUTE: 1.8 K/UL (ref 0–1.3)
MONOCYTES RELATIVE PERCENT: 13.1 %
NEUTROPHILS ABSOLUTE: 9.8 K/UL (ref 1.7–7.7)
NEUTROPHILS RELATIVE PERCENT: 72.6 %
PDW BLD-RTO: 15.4 % (ref 12.4–15.4)
PERFORMED ON: ABNORMAL
PLATELET # BLD: 274 K/UL (ref 135–450)
PMV BLD AUTO: 8.1 FL (ref 5–10.5)
POTASSIUM REFLEX MAGNESIUM: 4.7 MMOL/L (ref 3.5–5.1)
RBC # BLD: 4.31 M/UL (ref 4–5.2)
SODIUM BLD-SCNC: 130 MMOL/L (ref 136–145)
WBC # BLD: 13.5 K/UL (ref 4–11)

## 2018-10-13 PROCEDURE — 6370000000 HC RX 637 (ALT 250 FOR IP): Performed by: STUDENT IN AN ORGANIZED HEALTH CARE EDUCATION/TRAINING PROGRAM

## 2018-10-13 PROCEDURE — 6360000002 HC RX W HCPCS: Performed by: STUDENT IN AN ORGANIZED HEALTH CARE EDUCATION/TRAINING PROGRAM

## 2018-10-13 PROCEDURE — 85025 COMPLETE CBC W/AUTO DIFF WBC: CPT

## 2018-10-13 PROCEDURE — 36415 COLL VENOUS BLD VENIPUNCTURE: CPT

## 2018-10-13 PROCEDURE — 80048 BASIC METABOLIC PNL TOTAL CA: CPT

## 2018-10-13 PROCEDURE — 99232 SBSQ HOSP IP/OBS MODERATE 35: CPT | Performed by: INTERNAL MEDICINE

## 2018-10-13 PROCEDURE — 1200000000 HC SEMI PRIVATE

## 2018-10-13 PROCEDURE — 2580000003 HC RX 258: Performed by: STUDENT IN AN ORGANIZED HEALTH CARE EDUCATION/TRAINING PROGRAM

## 2018-10-13 PROCEDURE — 6370000000 HC RX 637 (ALT 250 FOR IP): Performed by: ORTHOPAEDIC SURGERY

## 2018-10-13 RX ORDER — HYDROCODONE BITARTRATE AND ACETAMINOPHEN 5; 325 MG/1; MG/1
2 TABLET ORAL EVERY 6 HOURS PRN
Qty: 15 TABLET | Refills: 0 | Status: SHIPPED | OUTPATIENT
Start: 2018-10-13 | End: 2018-11-15 | Stop reason: SDUPTHER

## 2018-10-13 RX ORDER — SENNA AND DOCUSATE SODIUM 50; 8.6 MG/1; MG/1
2 TABLET, FILM COATED ORAL DAILY PRN
Qty: 30 TABLET | Refills: 0 | Status: SHIPPED | OUTPATIENT
Start: 2018-10-13 | End: 2018-11-28 | Stop reason: ALTCHOICE

## 2018-10-13 RX ORDER — FUROSEMIDE 20 MG/1
20 TABLET ORAL DAILY PRN
Qty: 60 TABLET | Refills: 0 | Status: SHIPPED | OUTPATIENT
Start: 2018-10-13 | End: 2018-11-28 | Stop reason: ALTCHOICE

## 2018-10-13 RX ORDER — CALCIUM CARBONATE 200(500)MG
500 TABLET,CHEWABLE ORAL 3 TIMES DAILY PRN
Qty: 30 TABLET | Refills: 0 | Status: SHIPPED | OUTPATIENT
Start: 2018-10-13 | End: 2018-11-12

## 2018-10-13 RX ADMIN — CHOLECALCIFEROL TAB 25 MCG (1000 UNIT) 1000 UNITS: 25 TAB at 09:06

## 2018-10-13 RX ADMIN — HYDROCODONE BITARTRATE AND ACETAMINOPHEN 2 TABLET: 5; 325 TABLET ORAL at 02:33

## 2018-10-13 RX ADMIN — HYDROCODONE BITARTRATE AND ACETAMINOPHEN 2 TABLET: 5; 325 TABLET ORAL at 09:30

## 2018-10-13 RX ADMIN — INSULIN LISPRO 5 UNITS: 100 INJECTION, SOLUTION INTRAVENOUS; SUBCUTANEOUS at 17:59

## 2018-10-13 RX ADMIN — IBUPROFEN 400 MG: 200 TABLET, FILM COATED ORAL at 14:36

## 2018-10-13 RX ADMIN — INSULIN LISPRO 4 UNITS: 100 INJECTION, SOLUTION INTRAVENOUS; SUBCUTANEOUS at 09:04

## 2018-10-13 RX ADMIN — PANTOPRAZOLE SODIUM 40 MG: 40 TABLET, DELAYED RELEASE ORAL at 15:30

## 2018-10-13 RX ADMIN — INSULIN LISPRO 6 UNITS: 100 INJECTION, SOLUTION INTRAVENOUS; SUBCUTANEOUS at 18:04

## 2018-10-13 RX ADMIN — Medication 10 ML: at 09:13

## 2018-10-13 RX ADMIN — CYANOCOBALAMIN TAB 1000 MCG 500 MCG: 1000 TAB at 09:06

## 2018-10-13 RX ADMIN — OXYCODONE HYDROCHLORIDE AND ACETAMINOPHEN 1000 MG: 500 TABLET ORAL at 09:05

## 2018-10-13 RX ADMIN — HYDROCODONE BITARTRATE AND ACETAMINOPHEN 2 TABLET: 5; 325 TABLET ORAL at 15:30

## 2018-10-13 RX ADMIN — SPIRONOLACTONE 25 MG: 25 TABLET ORAL at 09:05

## 2018-10-13 RX ADMIN — Medication 10 ML: at 22:26

## 2018-10-13 RX ADMIN — INSULIN LISPRO 8 UNITS: 100 INJECTION, SOLUTION INTRAVENOUS; SUBCUTANEOUS at 12:05

## 2018-10-13 RX ADMIN — HYPROMELLOSE 2906 (4000 MPA.S) AND HYPROMELLOSE 2906 (50 MPA.S) 1 DROP: 25; 25 SOLUTION OPHTHALMIC at 12:01

## 2018-10-13 RX ADMIN — HYDROCHLOROTHIAZIDE 25 MG: 25 TABLET ORAL at 09:05

## 2018-10-13 RX ADMIN — THERA TABS 1 TABLET: TAB at 09:06

## 2018-10-13 RX ADMIN — INSULIN LISPRO 2 UNITS: 100 INJECTION, SOLUTION INTRAVENOUS; SUBCUTANEOUS at 22:23

## 2018-10-13 RX ADMIN — HYDROMORPHONE HYDROCHLORIDE 1 MG: 1 INJECTION, SOLUTION INTRAMUSCULAR; INTRAVENOUS; SUBCUTANEOUS at 04:43

## 2018-10-13 ASSESSMENT — PAIN DESCRIPTION - ORIENTATION
ORIENTATION: RIGHT

## 2018-10-13 ASSESSMENT — PAIN DESCRIPTION - DESCRIPTORS
DESCRIPTORS: ACHING
DESCRIPTORS: ACHING;CONSTANT
DESCRIPTORS: ACHING;CONSTANT
DESCRIPTORS: ACHING
DESCRIPTORS: ACHING;SORE

## 2018-10-13 ASSESSMENT — PAIN DESCRIPTION - PROGRESSION
CLINICAL_PROGRESSION: NOT CHANGED

## 2018-10-13 ASSESSMENT — PAIN SCALES - GENERAL
PAINLEVEL_OUTOF10: 6
PAINLEVEL_OUTOF10: 3
PAINLEVEL_OUTOF10: 10
PAINLEVEL_OUTOF10: 3
PAINLEVEL_OUTOF10: 0
PAINLEVEL_OUTOF10: 6
PAINLEVEL_OUTOF10: 0
PAINLEVEL_OUTOF10: 6

## 2018-10-13 ASSESSMENT — PAIN DESCRIPTION - PAIN TYPE
TYPE: ACUTE PAIN

## 2018-10-13 ASSESSMENT — PAIN DESCRIPTION - LOCATION
LOCATION: ARM

## 2018-10-13 ASSESSMENT — PAIN DESCRIPTION - ONSET
ONSET: ON-GOING

## 2018-10-13 ASSESSMENT — PAIN DESCRIPTION - FREQUENCY
FREQUENCY: CONTINUOUS

## 2018-10-14 LAB
ANION GAP SERPL CALCULATED.3IONS-SCNC: 11 MMOL/L (ref 3–16)
BASOPHILS ABSOLUTE: 0.1 K/UL (ref 0–0.2)
BASOPHILS RELATIVE PERCENT: 0.7 %
BUN BLDV-MCNC: 46 MG/DL (ref 7–20)
CALCIUM SERPL-MCNC: 9.9 MG/DL (ref 8.3–10.6)
CHLORIDE BLD-SCNC: 100 MMOL/L (ref 99–110)
CO2: 24 MMOL/L (ref 21–32)
CREAT SERPL-MCNC: 1 MG/DL (ref 0.6–1.2)
EOSINOPHILS ABSOLUTE: 0.1 K/UL (ref 0–0.6)
EOSINOPHILS RELATIVE PERCENT: 0.6 %
GFR AFRICAN AMERICAN: >60
GFR NON-AFRICAN AMERICAN: 53
GLUCOSE BLD-MCNC: 146 MG/DL (ref 70–99)
GLUCOSE BLD-MCNC: 178 MG/DL (ref 70–99)
GLUCOSE BLD-MCNC: 216 MG/DL (ref 70–99)
GLUCOSE BLD-MCNC: 255 MG/DL (ref 70–99)
GLUCOSE BLD-MCNC: 267 MG/DL (ref 70–99)
HCT VFR BLD CALC: 34.2 % (ref 36–48)
HEMOGLOBIN: 11.3 G/DL (ref 12–16)
LYMPHOCYTES ABSOLUTE: 1.9 K/UL (ref 1–5.1)
LYMPHOCYTES RELATIVE PERCENT: 18.2 %
MCH RBC QN AUTO: 25.9 PG (ref 26–34)
MCHC RBC AUTO-ENTMCNC: 32.9 G/DL (ref 31–36)
MCV RBC AUTO: 78.9 FL (ref 80–100)
MONOCYTES ABSOLUTE: 1.6 K/UL (ref 0–1.3)
MONOCYTES RELATIVE PERCENT: 14.6 %
NEUTROPHILS ABSOLUTE: 7 K/UL (ref 1.7–7.7)
NEUTROPHILS RELATIVE PERCENT: 65.9 %
PDW BLD-RTO: 15.5 % (ref 12.4–15.4)
PERFORMED ON: ABNORMAL
PLATELET # BLD: 282 K/UL (ref 135–450)
PMV BLD AUTO: 7.9 FL (ref 5–10.5)
POTASSIUM REFLEX MAGNESIUM: 4.7 MMOL/L (ref 3.5–5.1)
RBC # BLD: 4.34 M/UL (ref 4–5.2)
SODIUM BLD-SCNC: 135 MMOL/L (ref 136–145)
WBC # BLD: 10.7 K/UL (ref 4–11)

## 2018-10-14 PROCEDURE — 80048 BASIC METABOLIC PNL TOTAL CA: CPT

## 2018-10-14 PROCEDURE — 2580000003 HC RX 258: Performed by: STUDENT IN AN ORGANIZED HEALTH CARE EDUCATION/TRAINING PROGRAM

## 2018-10-14 PROCEDURE — 36415 COLL VENOUS BLD VENIPUNCTURE: CPT

## 2018-10-14 PROCEDURE — 6370000000 HC RX 637 (ALT 250 FOR IP): Performed by: STUDENT IN AN ORGANIZED HEALTH CARE EDUCATION/TRAINING PROGRAM

## 2018-10-14 PROCEDURE — 85025 COMPLETE CBC W/AUTO DIFF WBC: CPT

## 2018-10-14 PROCEDURE — 99232 SBSQ HOSP IP/OBS MODERATE 35: CPT | Performed by: INTERNAL MEDICINE

## 2018-10-14 PROCEDURE — 1200000000 HC SEMI PRIVATE

## 2018-10-14 PROCEDURE — 6370000000 HC RX 637 (ALT 250 FOR IP): Performed by: ORTHOPAEDIC SURGERY

## 2018-10-14 RX ADMIN — INSULIN LISPRO 2 UNITS: 100 INJECTION, SOLUTION INTRAVENOUS; SUBCUTANEOUS at 17:11

## 2018-10-14 RX ADMIN — HYDROCHLOROTHIAZIDE 25 MG: 25 TABLET ORAL at 09:07

## 2018-10-14 RX ADMIN — INSULIN LISPRO 5 UNITS: 100 INJECTION, SOLUTION INTRAVENOUS; SUBCUTANEOUS at 12:35

## 2018-10-14 RX ADMIN — INSULIN LISPRO 4 UNITS: 100 INJECTION, SOLUTION INTRAVENOUS; SUBCUTANEOUS at 10:20

## 2018-10-14 RX ADMIN — PANTOPRAZOLE SODIUM 40 MG: 40 TABLET, DELAYED RELEASE ORAL at 05:58

## 2018-10-14 RX ADMIN — ANTACID TABLETS 500 MG: 500 TABLET, CHEWABLE ORAL at 21:32

## 2018-10-14 RX ADMIN — THERA TABS 1 TABLET: TAB at 09:07

## 2018-10-14 RX ADMIN — IBUPROFEN 400 MG: 200 TABLET, FILM COATED ORAL at 09:25

## 2018-10-14 RX ADMIN — INSULIN LISPRO 5 UNITS: 100 INJECTION, SOLUTION INTRAVENOUS; SUBCUTANEOUS at 17:17

## 2018-10-14 RX ADMIN — CHOLECALCIFEROL TAB 25 MCG (1000 UNIT) 1000 UNITS: 25 TAB at 09:07

## 2018-10-14 RX ADMIN — CYANOCOBALAMIN TAB 1000 MCG 500 MCG: 1000 TAB at 09:07

## 2018-10-14 RX ADMIN — INSULIN LISPRO 6 UNITS: 100 INJECTION, SOLUTION INTRAVENOUS; SUBCUTANEOUS at 12:31

## 2018-10-14 RX ADMIN — PANTOPRAZOLE SODIUM 40 MG: 40 TABLET, DELAYED RELEASE ORAL at 15:39

## 2018-10-14 RX ADMIN — INSULIN LISPRO 5 UNITS: 100 INJECTION, SOLUTION INTRAVENOUS; SUBCUTANEOUS at 10:26

## 2018-10-14 RX ADMIN — Medication 10 ML: at 09:08

## 2018-10-14 RX ADMIN — HYDROCODONE BITARTRATE AND ACETAMINOPHEN 2 TABLET: 5; 325 TABLET ORAL at 05:58

## 2018-10-14 RX ADMIN — Medication 10 ML: at 21:33

## 2018-10-14 RX ADMIN — SPIRONOLACTONE 25 MG: 25 TABLET ORAL at 09:07

## 2018-10-14 RX ADMIN — IBUPROFEN 400 MG: 200 TABLET, FILM COATED ORAL at 21:32

## 2018-10-14 RX ADMIN — HYDROCODONE BITARTRATE AND ACETAMINOPHEN 2 TABLET: 5; 325 TABLET ORAL at 12:02

## 2018-10-14 RX ADMIN — HYDROCODONE BITARTRATE AND ACETAMINOPHEN 2 TABLET: 5; 325 TABLET ORAL at 18:14

## 2018-10-14 RX ADMIN — OXYCODONE HYDROCHLORIDE AND ACETAMINOPHEN 1000 MG: 500 TABLET ORAL at 09:07

## 2018-10-14 ASSESSMENT — PAIN DESCRIPTION - ONSET
ONSET: ON-GOING
ONSET: ON-GOING

## 2018-10-14 ASSESSMENT — PAIN DESCRIPTION - DESCRIPTORS
DESCRIPTORS: ACHING;SORE
DESCRIPTORS: ACHING

## 2018-10-14 ASSESSMENT — PAIN DESCRIPTION - FREQUENCY
FREQUENCY: CONTINUOUS
FREQUENCY: CONTINUOUS

## 2018-10-14 ASSESSMENT — PAIN SCALES - GENERAL
PAINLEVEL_OUTOF10: 7
PAINLEVEL_OUTOF10: 0
PAINLEVEL_OUTOF10: 4
PAINLEVEL_OUTOF10: 0
PAINLEVEL_OUTOF10: 6
PAINLEVEL_OUTOF10: 4
PAINLEVEL_OUTOF10: 7
PAINLEVEL_OUTOF10: 3

## 2018-10-14 ASSESSMENT — PAIN DESCRIPTION - ORIENTATION
ORIENTATION: RIGHT
ORIENTATION: RIGHT

## 2018-10-14 ASSESSMENT — PAIN DESCRIPTION - LOCATION
LOCATION: ARM
LOCATION: ARM

## 2018-10-14 ASSESSMENT — PAIN DESCRIPTION - PAIN TYPE
TYPE: ACUTE PAIN
TYPE: ACUTE PAIN

## 2018-10-15 ENCOUNTER — TELEPHONE (OUTPATIENT)
Dept: INTERNAL MEDICINE CLINIC | Age: 78
End: 2018-10-15

## 2018-10-15 ENCOUNTER — TELEPHONE (OUTPATIENT)
Dept: ORTHOPEDIC SURGERY | Age: 78
End: 2018-10-15

## 2018-10-15 LAB
GLUCOSE BLD-MCNC: 123 MG/DL (ref 70–99)
GLUCOSE BLD-MCNC: 135 MG/DL (ref 70–99)
GLUCOSE BLD-MCNC: 207 MG/DL (ref 70–99)
GLUCOSE BLD-MCNC: 354 MG/DL (ref 70–99)
PERFORMED ON: ABNORMAL

## 2018-10-15 PROCEDURE — 99232 SBSQ HOSP IP/OBS MODERATE 35: CPT | Performed by: INTERNAL MEDICINE

## 2018-10-15 PROCEDURE — 6370000000 HC RX 637 (ALT 250 FOR IP): Performed by: STUDENT IN AN ORGANIZED HEALTH CARE EDUCATION/TRAINING PROGRAM

## 2018-10-15 PROCEDURE — 2580000003 HC RX 258: Performed by: STUDENT IN AN ORGANIZED HEALTH CARE EDUCATION/TRAINING PROGRAM

## 2018-10-15 PROCEDURE — 1200000000 HC SEMI PRIVATE

## 2018-10-15 PROCEDURE — 6370000000 HC RX 637 (ALT 250 FOR IP): Performed by: ORTHOPAEDIC SURGERY

## 2018-10-15 PROCEDURE — 6360000002 HC RX W HCPCS: Performed by: STUDENT IN AN ORGANIZED HEALTH CARE EDUCATION/TRAINING PROGRAM

## 2018-10-15 RX ADMIN — THERA TABS 1 TABLET: TAB at 08:44

## 2018-10-15 RX ADMIN — Medication 10 ML: at 19:58

## 2018-10-15 RX ADMIN — HYDROCODONE BITARTRATE AND ACETAMINOPHEN 2 TABLET: 5; 325 TABLET ORAL at 00:06

## 2018-10-15 RX ADMIN — INSULIN LISPRO 4 UNITS: 100 INJECTION, SOLUTION INTRAVENOUS; SUBCUTANEOUS at 12:34

## 2018-10-15 RX ADMIN — CHOLECALCIFEROL TAB 25 MCG (1000 UNIT) 1000 UNITS: 25 TAB at 08:45

## 2018-10-15 RX ADMIN — HYPROMELLOSE 2906 (4000 MPA.S) AND HYPROMELLOSE 2906 (50 MPA.S) 1 DROP: 25; 25 SOLUTION OPHTHALMIC at 19:59

## 2018-10-15 RX ADMIN — CYANOCOBALAMIN TAB 1000 MCG 500 MCG: 1000 TAB at 08:44

## 2018-10-15 RX ADMIN — ANTACID TABLETS 500 MG: 500 TABLET, CHEWABLE ORAL at 09:46

## 2018-10-15 RX ADMIN — OXYCODONE HYDROCHLORIDE AND ACETAMINOPHEN 1000 MG: 500 TABLET ORAL at 08:45

## 2018-10-15 RX ADMIN — SPIRONOLACTONE 25 MG: 25 TABLET ORAL at 08:44

## 2018-10-15 RX ADMIN — PANTOPRAZOLE SODIUM 40 MG: 40 TABLET, DELAYED RELEASE ORAL at 16:36

## 2018-10-15 RX ADMIN — PANTOPRAZOLE SODIUM 40 MG: 40 TABLET, DELAYED RELEASE ORAL at 08:10

## 2018-10-15 RX ADMIN — INSULIN LISPRO 10 UNITS: 100 INJECTION, SOLUTION INTRAVENOUS; SUBCUTANEOUS at 16:41

## 2018-10-15 RX ADMIN — HYDROCODONE BITARTRATE AND ACETAMINOPHEN 2 TABLET: 5; 325 TABLET ORAL at 18:42

## 2018-10-15 RX ADMIN — INSULIN LISPRO 5 UNITS: 100 INJECTION, SOLUTION INTRAVENOUS; SUBCUTANEOUS at 12:37

## 2018-10-15 RX ADMIN — HYDROMORPHONE HYDROCHLORIDE 0.5 MG: 1 INJECTION, SOLUTION INTRAMUSCULAR; INTRAVENOUS; SUBCUTANEOUS at 21:58

## 2018-10-15 RX ADMIN — HYDROCHLOROTHIAZIDE 25 MG: 25 TABLET ORAL at 08:45

## 2018-10-15 RX ADMIN — HYDROCODONE BITARTRATE AND ACETAMINOPHEN 2 TABLET: 5; 325 TABLET ORAL at 11:12

## 2018-10-15 RX ADMIN — Medication 10 ML: at 08:49

## 2018-10-15 RX ADMIN — INSULIN LISPRO 5 UNITS: 100 INJECTION, SOLUTION INTRAVENOUS; SUBCUTANEOUS at 16:53

## 2018-10-15 ASSESSMENT — PAIN DESCRIPTION - DESCRIPTORS
DESCRIPTORS: ACHING

## 2018-10-15 ASSESSMENT — PAIN DESCRIPTION - LOCATION
LOCATION: ARM
LOCATION: ARM
LOCATION: ARM;LEG
LOCATION: ARM

## 2018-10-15 ASSESSMENT — PAIN DESCRIPTION - ONSET
ONSET: ON-GOING

## 2018-10-15 ASSESSMENT — PAIN SCALES - GENERAL
PAINLEVEL_OUTOF10: 10
PAINLEVEL_OUTOF10: 0
PAINLEVEL_OUTOF10: 8
PAINLEVEL_OUTOF10: 6
PAINLEVEL_OUTOF10: 0
PAINLEVEL_OUTOF10: 6
PAINLEVEL_OUTOF10: 4

## 2018-10-15 ASSESSMENT — PAIN DESCRIPTION - PAIN TYPE
TYPE: ACUTE PAIN

## 2018-10-15 ASSESSMENT — PAIN DESCRIPTION - PROGRESSION
CLINICAL_PROGRESSION: NOT CHANGED

## 2018-10-15 ASSESSMENT — PAIN DESCRIPTION - ORIENTATION
ORIENTATION: RIGHT

## 2018-10-15 ASSESSMENT — PAIN DESCRIPTION - FREQUENCY
FREQUENCY: CONTINUOUS

## 2018-10-15 NOTE — TELEPHONE ENCOUNTER
Follow up with ortho for aftercare. Call has been returned to the patient's daughter who needs to know exactly what the plan of care is. Is the patient going to skilled nursing? She needs to be discharged by noon tomorrow in order not to have to pay out of pocket, the appeal was denied. Call returned again. Per physician he plans to discharge the patient. Skilled nursing/rehab will not be covered due to patient not participating.

## 2018-10-15 NOTE — FLOWSHEET NOTE
10/15/18 0951   Encounter Summary   Services provided to: Patient   Referral/Consult From: Palliative Care   Continue Visiting (es 10/15)   Complexity of Encounter Low   Length of Encounter 15 minutes   Routine   Type Initial   Assessment Approachable   Intervention Active listening   Outcome Refused/declined

## 2018-10-16 VITALS
OXYGEN SATURATION: 97 % | TEMPERATURE: 98.2 F | DIASTOLIC BLOOD PRESSURE: 61 MMHG | SYSTOLIC BLOOD PRESSURE: 104 MMHG | HEART RATE: 86 BPM | RESPIRATION RATE: 16 BRPM

## 2018-10-16 LAB
GLUCOSE BLD-MCNC: 108 MG/DL (ref 70–99)
GLUCOSE BLD-MCNC: 231 MG/DL (ref 70–99)
PERFORMED ON: ABNORMAL
PERFORMED ON: ABNORMAL

## 2018-10-16 PROCEDURE — 99238 HOSP IP/OBS DSCHRG MGMT 30/<: CPT | Performed by: INTERNAL MEDICINE

## 2018-10-16 PROCEDURE — 6370000000 HC RX 637 (ALT 250 FOR IP): Performed by: ORTHOPAEDIC SURGERY

## 2018-10-16 PROCEDURE — 6370000000 HC RX 637 (ALT 250 FOR IP): Performed by: STUDENT IN AN ORGANIZED HEALTH CARE EDUCATION/TRAINING PROGRAM

## 2018-10-16 PROCEDURE — 6360000002 HC RX W HCPCS: Performed by: STUDENT IN AN ORGANIZED HEALTH CARE EDUCATION/TRAINING PROGRAM

## 2018-10-16 RX ORDER — LORAZEPAM 2 MG/ML
1 INJECTION INTRAMUSCULAR ONCE
Status: COMPLETED | OUTPATIENT
Start: 2018-10-16 | End: 2018-10-16

## 2018-10-16 RX ADMIN — CYANOCOBALAMIN TAB 1000 MCG 500 MCG: 1000 TAB at 09:48

## 2018-10-16 RX ADMIN — LORAZEPAM 1 MG: 2 INJECTION INTRAMUSCULAR; INTRAVENOUS at 11:42

## 2018-10-16 RX ADMIN — OXYCODONE HYDROCHLORIDE AND ACETAMINOPHEN 1000 MG: 500 TABLET ORAL at 09:48

## 2018-10-16 RX ADMIN — HYDROCODONE BITARTRATE AND ACETAMINOPHEN 2 TABLET: 5; 325 TABLET ORAL at 03:53

## 2018-10-16 RX ADMIN — PANTOPRAZOLE SODIUM 40 MG: 40 TABLET, DELAYED RELEASE ORAL at 06:17

## 2018-10-16 RX ADMIN — CHOLECALCIFEROL TAB 25 MCG (1000 UNIT) 1000 UNITS: 25 TAB at 09:48

## 2018-10-16 RX ADMIN — INSULIN LISPRO 5 UNITS: 100 INJECTION, SOLUTION INTRAVENOUS; SUBCUTANEOUS at 07:56

## 2018-10-16 RX ADMIN — IBUPROFEN 400 MG: 200 TABLET, FILM COATED ORAL at 06:20

## 2018-10-16 RX ADMIN — IBUPROFEN 400 MG: 200 TABLET, FILM COATED ORAL at 11:42

## 2018-10-16 RX ADMIN — HYDROCODONE BITARTRATE AND ACETAMINOPHEN 2 TABLET: 5; 325 TABLET ORAL at 10:16

## 2018-10-16 RX ADMIN — THERA TABS 1 TABLET: TAB at 09:48

## 2018-10-16 ASSESSMENT — PAIN DESCRIPTION - DESCRIPTORS: DESCRIPTORS: ACHING

## 2018-10-16 ASSESSMENT — PAIN DESCRIPTION - PAIN TYPE: TYPE: ACUTE PAIN

## 2018-10-16 ASSESSMENT — PAIN SCALES - GENERAL
PAINLEVEL_OUTOF10: 0
PAINLEVEL_OUTOF10: 3
PAINLEVEL_OUTOF10: 3
PAINLEVEL_OUTOF10: 6
PAINLEVEL_OUTOF10: 6

## 2018-10-16 ASSESSMENT — PAIN DESCRIPTION - ONSET: ONSET: ON-GOING

## 2018-10-16 ASSESSMENT — PAIN DESCRIPTION - LOCATION: LOCATION: ARM

## 2018-10-16 ASSESSMENT — PAIN DESCRIPTION - FREQUENCY: FREQUENCY: CONTINUOUS

## 2018-10-16 ASSESSMENT — PAIN DESCRIPTION - PROGRESSION: CLINICAL_PROGRESSION: NOT CHANGED

## 2018-10-16 ASSESSMENT — PAIN DESCRIPTION - ORIENTATION: ORIENTATION: RIGHT

## 2018-10-16 NOTE — CARE COORDINATION
Daughter left message for SW/CM requesting that a referral be made to [facility] instead of [facility]. SW faxed referral to [facility] and left message for admissions asking about bed availability. No precert needed. But new HENS will need to be done prior to discharge.   Electronically signed by VIN Talbot, TIFFANIE on 10/14/2018 at 8:10 AM
No determination per [employer].     Electronically signed by Dany Douglass RN on 10/15/2018 at 12:39 PM
Patient has appealed discharge with [facility].     SW faxed progress notes and IMM form to [facility] Starr today at 3:45pm.      Informed Beck at [facility] that patient is not going today due to appeal.      Electronically signed by VIN Coughlin, TIFFANIE on 10/13/2018 at 3:50 PM
Pt is from [place] [id number] but may need SNF LOC. Called Beck at [facility] to ask if they would need therapy notes but had to leave a message. She asked me to fax her facesheet so they could review her information and then they will call me back if they can accept to SNF. If so, will need to submit a HENS.     Electronically signed by Juventino Xiao RN on 10/12/2018 at 12:44 PM
Received a call back from Kunal at [facility] and they are able to accept this patient. They will have a bed available tomorrow and once transport has been arranged we can call Beck at [phone] and report can be called to [phone] and fax orders to [phone]. Electronically signed by Jah Flor RN on 10/12/2018 at 1:56 PM     HENS submitted.
Spoke with admissions at [facility] who said that the patient has an outstanding bill of $6,000 and that she will have to discuss this with her  on Monday. But she said that if they can pay the bill, then there is a bed available. Called patient's daughter Tuyet Chaudhry [phone] to inform her that patient owes this bill and likely not going to be approved to go to [facility]. Asked daughter to provide a second choice and explained in detail that when the [employer] ruling comes through, no matter if appeals is granted or denied - there will be a discharge date. If the appeal is denied, then the patient could be discharged as soon as Monday. Daughter has many questions concerning her mother's health and SW referred her to the attending physician for health concerns. ARTUR provided daughter with Fannie Ganser  phone number if she calls on Monday, because I will not be in on Monday. In summary:  Daughter should have another SNF name for referral by Monday. Daughter does not want patient to go to [facility]. And discharge date is contingent on [facility] outcome.     Electronically signed by VIN Rabago, PAULIEW on 10/14/2018 at 9:15 AM
[facility]  Care Transitions Interventions     Care transition nurse met with pt and dtr today. Plan for pt to go to [facility]. Pt lived at [facility] prior to admission with home PT. Pt will be followed by Marlee Best at [facility]. Dtr asked who she would call if pt has problems after discharge and provided Patient Relations [phone]. Follow Up  No future appointments. Health Maintenance  There are no preventive care reminders to display for this patient.     Valentino Baird RN

## 2018-10-16 NOTE — PROGRESS NOTES
D/Glen per stretcher/ambulance to [facility]. Orders and report sent with pt. Condition good and goals met at D/C.
[facility] Orthopedic Surgery   Progress Note      SUBJECTIVE:  Patient feeling more comfortable today with regards to right shoulder. No additional new complaints reported per patient. OBJECTIVE:  /72   Pulse 89   Temp 97.5 °F (36.4 °C) (Oral)   Resp 16   SpO2 96%   General: alert, awake, eating breakfast  Right upper extremity:   Sling in place  Ecchymoses noted to distal arm with swelling near shoulder, no additional skin changes  Active 5/5 finger flexion/extension/abduction  5/5 epl/fpl/thumb abduction  5/5 ain/pin/io  Cap refill brisk all fingers, 2+ radial pulse  Sensation intact median/ulnar/radial nerve without deficit      CBC:   Lab Results   Component Value Date    WBC 10.7 10/14/2018    RBC 4.34 10/14/2018    HGB 11.3 10/14/2018    HCT 34.2 10/14/2018    MCV 78.9 10/14/2018    MCH 25.9 10/14/2018    MCHC 32.9 10/14/2018    RDW 15.5 10/14/2018     10/14/2018    MPV 7.9 10/14/2018     Hemoglobin/Hematocrit:    Lab Results   Component Value Date    HGB 11.3 10/14/2018    HCT 34.2 10/14/2018     PT/INR:    Lab Results   Component Value Date    PROTIME 11.9 10/11/2018    INR 1.04 10/11/2018     Chem Basic:   Lab Results   Component Value Date     10/14/2018    K 4.7 10/14/2018     10/14/2018    CO2 24 10/14/2018    GLUCOSE 255 10/14/2018    GLUCOSE 163 09/09/2011    BUN 46 10/14/2018    CREATININE 1.0 10/14/2018             ASSESSMENT AND PLAN:  78 y/o F with right proximal humerus fracture, fall 10/10/18  1. Continue with sling for support  2. Encourage finger and wrist ROM  3. Po pain control, pain improved  4. Ok to discharge from orthopaedic standpoint  Patient is to followup with Dr. Erik Hunter for further discussion of treatment options next week. From treatment standpoint, I did discuss again with patient treatment options including operative and nonopertive intervention. At this time, patient strongly prefers nonoperative treatment if possible.    Further discussion about
Message sent to the resident currently covering the pt updating them on the pt's appeal and that she will be staying rather than being discharged.   Jojo Medici
Palliative Care Chart Review  and Check in Note:     NAME:  Arleth Smoker  Admit Date: 10/10/2018  Hospital Day:  Hospital Day: 3   Current Code status: DNR-CCA    Palliative care is continuing to following Ms. Moses for symptom management, discharge planning and goals of care discussion as needed. Patient's chart reviewed today 10/12/18. The following are the currently established goals/code status, and discharge plan to date. Goals of care/Code status: DNR/CCA. As a DNR/CCA, this patient will continue to receive standard medical care until the time he or she experiences a cardiac or respiratory arrest.  In the event of respiratory distress, he/she would be okay with temporary intubation prior to cardiac/pulmonary arrest occurring. PC met with patient dtr, we discussed that again would not participate in the PT/OT evaluation today. She asked for advice regarding patient's lack of insight or desire to participate in therapy, PC again stressed that if she is able to return to [facility], and she continues not to participate and she is not safe to return to [address] living, then she will be looking at long term care. We again reviewed patient's pain management regimen, and PC explained that patient needs to take more of the oral pain medication because she will not be able to get IV pain medications at the SNF. Also, encouraged her to try the Advil for the antiinflammatory affects. Discharge plan: Touched base with Melody Chu, Rn/CM and [facility] can take patient, please see Melody Chu, Note.     Elissa Burch, APRN/CNP  Palliative Care  [phone]
Physical Therapy/Occupational therapy  Discharge    Chart reviewed. Attempted PT and OT evals this AM.  Spoke with pt x 10 min. Pt very clearly and repeatedly states \"I want to die. I want to leave this earth. \"  Educated pt on benefits of being OOB and focus of therapy will be on her goals. Pt continues to state, \"my goal is to die. \"  Discussed with RN. Will sign off at this time per pt request.    Ernesto Toussaint, PT, DPT  [id number]  ABE  [facility], [address] [place] [address]
Pt changed her mind and wanted to take the motrin, see MAR. Romana Carton
Pt states she she filed an appeal for her discharge, the pt doesn't want to leave for discharge and wants to stay in the hospital.  This nurse asked the pt the reason she wants to stay in the hospital vs going to [facility] and the pt cannot give this nurse an answer, the daughter then spoke for the pt and said \"she just isn't ready. \"  The pt continues to refuse most of her care here. Will continue to follow.   Will update the MD about the pt filing an appeal.  Leslie Whitney
Pt's daughters are at the bedside visiting, pt still refuses to turn/reposition. The pt's external female cath is still pulling in her urine.   Pt is talking with her daughters about her discharge for today, the pt and the daughters are aware she is scheduled to be picked up today at 5 pm.  Alek King
spinal canal stenosis. Uncinate and facet arthropathy with severe right foraminal stenosis. C5-C6-broad central-right paracentral spondylotic protrusion, moderately impressing on the cord, with posterior cord displacement. Mild bilateral foraminal stenosis with uncinate arthropathy      C6-C7-broad right paracentral/preforaminal spondylotic protrusion with thecal sac effacement and probable flattening of the anterolateral cord. Uncinate arthropathy with at least mild bilateral foraminal stenosis      C7-T1-no significant abnormality. IMPRESSION:      Multilevel degenerative changes as described with minimal degenerative anterolisthesis of C3 over C4      Right paracentral spondylotic protrusion C5-C6 with cord compression      Broad right paracentral/preforaminal spondylotic protrusion C6-C7 with probable flattening of the anterolateral cord and      No fracture or prevertebral soft tissue swelling            CT CERVICAL SPINE WO CONTRAST   Final Result      Age-related changes      No acute intracranial hemorrhage or signs of mass effect            CT cervical spine      HISTORY: Neck trauma; neck pain; patient fell      Thin section axial images were obtained from skull base to upper thoracic spine. Images reconstructed in sagittal and coronal planes. Craniovertebral junction and atlantodental interval normal. Odontoid intact. Lateral masses of C1 normal.      Reversal of lordotic curvature centered at C5. Multilevel degenerative disc disease with disc space narrowing predominating from C5-C6 to T1-T2. Osteophytic spurring at each level. Minimal (2 mm) (anterolisthesis of C3 over C4. Vertebral height maintained. Prevertebral soft tissue thickness normal.      No fracture or compression deformity. Posterior neural arch structures intact. C2-C3-AP and transverse dimensions of spinal canal are within normal limits and neural foramina patent.       C3-C4-minimal anterolisthesis of C3 due to
minimal degenerative anterolisthesis of C3 over C4      Right paracentral spondylotic protrusion C5-C6 with cord compression      Broad right paracentral/preforaminal spondylotic protrusion C6-C7 with probable flattening of the anterolateral cord and      No fracture or prevertebral soft tissue swelling            XR ELBOW RIGHT (2 VIEWS)   Final Result   1. Mildly comminuted, impacted fracture within the surgical neck of the humerus with suspected involvement of the greater tuberosity. 2. No acute fracture in the elbow or forearm. 3. Osteopenia. XR HUMERUS RIGHT (MIN 2 VIEWS)   Final Result   1. Mildly comminuted, impacted fracture within the surgical neck of the humerus with suspected involvement of the greater tuberosity. 2. No acute fracture in the elbow or forearm. 3. Osteopenia. XR RADIUS ULNA RIGHT (2 VIEWS)   Final Result   1. Mildly comminuted, impacted fracture within the surgical neck of the humerus with suspected involvement of the greater tuberosity. 2. No acute fracture in the elbow or forearm. 3. Osteopenia. Assessment/Plan:    Active Hospital Problems    Diagnosis Date Noted    Closed fracture of right proximal humerus [S42.201A]     Acute pain of right shoulder [M25.511]     Goals of care, counseling/discussion [Z71.89]     DNR (do not resuscitate) Marito Chaidezdt     Fracture [T14. 8XXA] 10/10/2018    Dementia without behavioral disturbance [F03.90]     Diabetes mellitus type 2 in nonobese [facility]) [E11.9] 07/22/2010     Kelsey Pa is a 77 y.o. female  PMHx significant for HTN, DM-2 and depression who presents after mechanical fall. Found to have surgical neck fracture of humerus. Patient has been medically cleared to be discharged however pt wants to contest dc order.      Surgical Neck fracture of the humerus 2/2 mechanical fall- Patient presents from long-term home after mechanical fall.  Elbow, humerus, radius and ulna x-rays remarkable for mildly comminuted

## 2018-10-17 LAB
EKG ATRIAL RATE: 96 BPM
EKG DIAGNOSIS: NORMAL
EKG P AXIS: 79 DEGREES
EKG P-R INTERVAL: 146 MS
EKG Q-T INTERVAL: 362 MS
EKG QRS DURATION: 114 MS
EKG QTC CALCULATION (BAZETT): 457 MS
EKG R AXIS: -43 DEGREES
EKG T AXIS: 79 DEGREES
EKG VENTRICULAR RATE: 96 BPM

## 2018-11-01 ENCOUNTER — TELEPHONE (OUTPATIENT)
Dept: INTERNAL MEDICINE CLINIC | Age: 78
End: 2018-11-01

## 2018-11-01 DIAGNOSIS — S42.201A CLOSED FRACTURE OF PROXIMAL END OF RIGHT HUMERUS, UNSPECIFIED FRACTURE MORPHOLOGY, INITIAL ENCOUNTER: Primary | ICD-10-CM

## 2018-11-01 NOTE — TELEPHONE ENCOUNTER
Pt daughter request a referral to Ortho to location closer to patient. Preference Dr. Holger Meyers or  Dr. Abhishek Han or / [facility] ([facility]) for a follow up X-ray once she is discharge from rehab. Please advise.

## 2018-11-08 RX ORDER — ESOMEPRAZOLE MAGNESIUM 40 MG/1
CAPSULE, DELAYED RELEASE ORAL
Qty: 60 CAPSULE | Refills: 3 | Status: SHIPPED | OUTPATIENT
Start: 2018-11-08 | End: 2018-11-28 | Stop reason: ALTCHOICE

## 2018-11-15 ENCOUNTER — OFFICE VISIT (OUTPATIENT)
Dept: ORTHOPEDIC SURGERY | Age: 78
End: 2018-11-15
Payer: MEDICARE

## 2018-11-15 VITALS
HEIGHT: 66 IN | BODY MASS INDEX: 22 KG/M2 | WEIGHT: 136.91 LBS | HEART RATE: 79 BPM | DIASTOLIC BLOOD PRESSURE: 43 MMHG | SYSTOLIC BLOOD PRESSURE: 99 MMHG

## 2018-11-15 DIAGNOSIS — M25.511 RIGHT SHOULDER PAIN, UNSPECIFIED CHRONICITY: ICD-10-CM

## 2018-11-15 DIAGNOSIS — S42.291D OTHER CLOSED DISPLACED FRACTURE OF PROXIMAL END OF RIGHT HUMERUS WITH ROUTINE HEALING, SUBSEQUENT ENCOUNTER: Primary | ICD-10-CM

## 2018-11-15 DIAGNOSIS — S42.291A OTHER CLOSED DISPLACED FRACTURE OF PROXIMAL END OF RIGHT HUMERUS, INITIAL ENCOUNTER: ICD-10-CM

## 2018-11-15 PROCEDURE — 23600 CLTX PROX HUMRL FX W/O MNPJ: CPT | Performed by: ORTHOPAEDIC SURGERY

## 2018-11-15 PROCEDURE — 99999 PR OFFICE/OUTPT VISIT,PROCEDURE ONLY: CPT | Performed by: ORTHOPAEDIC SURGERY

## 2018-11-15 RX ORDER — HYDROCODONE BITARTRATE AND ACETAMINOPHEN 5; 325 MG/1; MG/1
1 TABLET ORAL EVERY 6 HOURS PRN
Qty: 40 TABLET | Refills: 0 | Status: SHIPPED | OUTPATIENT
Start: 2018-11-15 | End: 2018-11-22

## 2018-11-15 NOTE — PROGRESS NOTES
Chief Complaint    Shoulder Injury (RIGHT shoulder fall on 10/10/18; seen at the [facility], [facility].; has been in sling since ER visit and currenlty living at [facility])      History of Present Illness:  Teresa Buchanan is a 77 y.o. female presents to the office today for a new problem. Patient sent in orthopedic consultation per Dr. Pierre Farris. Patient is here complaining of right shoulder pain after a fall at home on 10/10/2018. [facility] and is currently residing at [facility]. Currently the patient is in a shoulder immobilizer. She is also working on hand and wrist range of motion. She has not been participating in any elbow or shoulder exercises. Pain is concentrated over her proximal humerus. Increased pain with activities and improvement with rest.  She does suffer from rheumatoid arthritis and diabetes.     Pain Assessment  Location of Pain: Shoulder  Location Modifiers: Right  Severity of Pain: 7  Quality of Pain: Sharp, Aching, Dull  Duration of Pain: Persistent  Frequency of Pain: Intermittent  Date Pain First Started: 10/10/18  Aggravating Factors: Bending, Stretching, Straightening  Limiting Behavior: Some  Relieving Factors: Rest    Medical History:  Past Medical History:   Diagnosis Date    ADHD (attention deficit hyperactivity disorder)     Attention deficit disorder without mention of hyperactivity 7/22/2010    Autoimmune disease NEC     Carotid artery disease ([address] 8/2/2013    LEFT CAROTID ENDARTERECTOMY               Carotid artery disease ([facility] [address] 8/3/2013    Chronic persistent hepatitis ([facility] [address] 7/22/2010    Depression     Depressive disorder, not elsewhere classified 7/22/2010    Double vision     left eye, since cataract surgery    Fractures     GERD (gastroesophageal reflux disease)     Neuropathy     Rheumatoid arthritis(714.0) 7/22/2010    Spinal stenosis     Type II or unspecified type diabetes mellitus without mention of complication, not stated as patient's mood and affect are appropriate. Lymphatic: The lymphatic examination bilaterally reveals all areas to be without enlargement or induration. Vascular: Examination reveals no swelling or calf tenderness. Peripheral pulses are palpable and 2+. Neurological: The patient has good coordination. There is no weakness or sensory deficit. Right Shoulder Examination:    Inspection:  No rashes, scars, or lesions. No deformity or atrophy. Palpation:  Diffuse tenderness anterior lateral shoulder    Range of Motion:  Passive range of motion of 120° of forward flexion and external rotation 20°    Strength:  Biceps and triceps strength is 5/5. Skin: There are no rashes, ulcerations or lesions. Gait: Normal gait pattern    Reflex normal deep tendon reflexes    Additional Comments:       Additional Examinations:         Contralateral Exam: Examination of the left shoulder reveals no atrophy or deformity. Skin is warm and dry. Range of motion is within normal limits. There is no focal tenderness with palpation. No AC joint tenderness. Negative Neer and Baker-Elver exams. Strength is graded 5/5 throughout. Neck: Examination of the neck does not show any tenderness, deformity or injury. Range of motion is unremarkable. There is no gross instability. There are no rashes, ulcerations or lesions. Strength and tone are normal.    Radiology:     X-rays obtained and reviewed in office:  Views 2 views including AP and scapular Y  Location right shoulder  Impression: Minimally displaced right proximal humerus fracture. Positive new bone formation. Impression:  Encounter Diagnoses   Name Primary?     Right shoulder pain, unspecified chronicity     Other closed displaced fracture of proximal end of right humerus with routine healing, subsequent encounter Yes    Other closed displaced fracture of proximal end of right humerus, initial encounter        Office Procedures:  Orders Placed This Encounter   Procedures    XR SHOULDER RIGHT (MIN 2 VIEWS)    SC CLOSED RX PROX HUMERUS FRACTURE       Treatment Plan:  Patient will continue in her shoulder immobilizer at this time. Patient will continue to work on hand and wrist range of motion. We would also like her to work on active and passive range of motion of the elbow. She will start gentle pendulum exercises for the right shoulder. We will see her back in the office in approximately 3 weeks for 2 views of the right shoulder followed by clinical exam. In addition I would like occupational therapy to work on ADLs in an effort to transition her home in a safe manner. We will provide her with a printed prescription for hydrocodone today to use once she leaves the nursing home.

## 2018-11-21 ENCOUNTER — TELEPHONE (OUTPATIENT)
Dept: INTERNAL MEDICINE CLINIC | Age: 78
End: 2018-11-21

## 2018-11-27 ENCOUNTER — TELEPHONE (OUTPATIENT)
Dept: INTERNAL MEDICINE CLINIC | Age: 78
End: 2018-11-27

## 2018-11-27 NOTE — TELEPHONE ENCOUNTER
Pt daughter needs a call back to set up an appt   Pt's Left foot is swollen, red, warm to the touch, all toes are swollen as well  Pt also has peeling on the bottom of the foot  Daughter would like pt to be seen tomorrow if possible

## 2018-11-28 ENCOUNTER — OFFICE VISIT (OUTPATIENT)
Dept: INTERNAL MEDICINE CLINIC | Age: 78
End: 2018-11-28
Payer: MEDICARE

## 2018-11-28 VITALS — WEIGHT: 128 LBS | SYSTOLIC BLOOD PRESSURE: 112 MMHG | BODY MASS INDEX: 20.67 KG/M2 | DIASTOLIC BLOOD PRESSURE: 64 MMHG

## 2018-11-28 DIAGNOSIS — M79.605 LEFT LEG PAIN: ICD-10-CM

## 2018-11-28 DIAGNOSIS — R20.9 BILATERAL COLD FEET: Primary | ICD-10-CM

## 2018-11-28 DIAGNOSIS — I73.9 PAD (PERIPHERAL ARTERY DISEASE) (HCC): ICD-10-CM

## 2018-11-28 PROCEDURE — 1090F PRES/ABSN URINE INCON ASSESS: CPT | Performed by: HOSPITALIST

## 2018-11-28 PROCEDURE — G8482 FLU IMMUNIZE ORDER/ADMIN: HCPCS | Performed by: HOSPITALIST

## 2018-11-28 PROCEDURE — 1036F TOBACCO NON-USER: CPT | Performed by: HOSPITALIST

## 2018-11-28 PROCEDURE — G8420 CALC BMI NORM PARAMETERS: HCPCS | Performed by: HOSPITALIST

## 2018-11-28 PROCEDURE — G8427 DOCREV CUR MEDS BY ELIG CLIN: HCPCS | Performed by: HOSPITALIST

## 2018-11-28 PROCEDURE — 99213 OFFICE O/P EST LOW 20 MIN: CPT | Performed by: HOSPITALIST

## 2018-11-28 PROCEDURE — 1123F ACP DISCUSS/DSCN MKR DOCD: CPT | Performed by: HOSPITALIST

## 2018-11-28 PROCEDURE — G8400 PT W/DXA NO RESULTS DOC: HCPCS | Performed by: HOSPITALIST

## 2018-11-28 PROCEDURE — 4040F PNEUMOC VAC/ADMIN/RCVD: CPT | Performed by: HOSPITALIST

## 2018-11-28 PROCEDURE — 1101F PT FALLS ASSESS-DOCD LE1/YR: CPT | Performed by: HOSPITALIST

## 2018-11-28 RX ORDER — PANTOPRAZOLE SODIUM 40 MG/1
40 TABLET, DELAYED RELEASE ORAL 2 TIMES DAILY
Status: ON HOLD | COMMUNITY
End: 2019-05-06 | Stop reason: HOSPADM

## 2018-11-28 RX ORDER — DULOXETIN HYDROCHLORIDE 20 MG/1
20 CAPSULE, DELAYED RELEASE ORAL 2 TIMES DAILY
Qty: 60 CAPSULE | Refills: 2 | Status: SHIPPED | OUTPATIENT
Start: 2018-11-28 | End: 2019-02-21 | Stop reason: SDUPTHER

## 2018-11-28 RX ORDER — DULOXETIN HYDROCHLORIDE 20 MG/1
20 CAPSULE, DELAYED RELEASE ORAL DAILY
COMMUNITY
End: 2018-11-28 | Stop reason: SDUPTHER

## 2018-11-28 RX ORDER — HYDROCODONE BITARTRATE AND ACETAMINOPHEN 5; 325 MG/1; MG/1
2 TABLET ORAL EVERY 6 HOURS PRN
COMMUNITY
End: 2019-03-04 | Stop reason: CLARIF

## 2018-11-28 NOTE — PROGRESS NOTES
Follow Up Visit Established Patient Visit    Patient:  Silviano Ross                                               : 1940  Age: 77 y.o. MRN: [id number]  Date : 2018    Silviano Ross is a 77 y.o. female who presents for :  Evaluation of  swelling and the redness of the left foot. Chief Complaint   Patient presents with    Foot Swelling     swelling, redness of foot    Medication Problem     discuss tapering off duloxetine, on low dose     Left foot redness and swelling was noticed yesterday by a the patient's home health nurse. Emi Champion doesn't report fever/chills. No foot pain. There was no recent left foot injury. She reports left foot and the distal left leg burning and numbness.     Past Medical History:   Diagnosis Date    ADHD (attention deficit hyperactivity disorder)     Attention deficit disorder without mention of hyperactivity 2010    Autoimmune disease NEC     Carotid artery disease ([facility] 2013    LEFT CAROTID ENDARTERECTOMY               Carotid artery disease ([facility] [address] [facility] 8/3/2013    Chronic persistent hepatitis ([facility] 2010    Depression     Depressive disorder, not elsewhere classified 2010    Double vision     left eye, since cataract surgery    Fractures     GERD (gastroesophageal reflux disease)     Neuropathy     Rheumatoid arthritis(714.0) 2010    Spinal stenosis     Type II or unspecified type diabetes mellitus without mention of complication, not stated as uncontrolled 2010    Unspecified cerebral artery occlusion with cerebral infarction     right hand, loss of some sensation       Past Surgical History:   Procedure Laterality Date    COLONOSCOPY      COSMETIC SURGERY      tummy tuck,face lift,mammoplasties- hepatitis blood transfusion complication    EYE SURGERY Bilateral     cataract       Current Outpatient Prescriptions   Medication Sig Dispense Refill    pantoprazole (PROTONIX) 40 MG tablet Take 40 mg by mouth 2 times daily  HYDROcodone-acetaminophen (NORCO) 5-325 MG per tablet Take 2 tablets by mouth every 6 hours as needed for Pain. May take 1 or 2 tablets .  DULoxetine (CYMBALTA) 20 MG extended release capsule Take 1 capsule by mouth 2 times daily 60 capsule 2    insulin glargine (LANTUS SOLOSTAR) 100 UNIT/ML injection pen Inject 36 Units into the skin nightly       B-D ULTRAFINE III SHORT PEN 31G X 8 MM MISC USE DAILY AS DIRECTED 100 each 0    spironolactone-hydrochlorothiazide (ALDACTAZIDE) 25-25 MG per tablet Take 1 tablet by mouth daily 30 tablet 3    Pyridoxine HCl (VITAMIN B6 PO) Take by mouth      Multiple Vitamins-Minerals (CENTRUM SILVER) TABS Take by mouth daily      Biotin 1000 MCG TABS Take one tablet daily in am.  0    Ascorbic Acid (VITAMIN C CR) 1000 MG TBCR Take 1 tablet by mouth daily 30 tablet 0    vitamin B-12 (CYANOCOBALAMIN) 500 MCG tablet Take 1 tablet by mouth daily 30 tablet 3    mometasone (ELOCON) 0.1 % cream APPLY DAILY 15 g 2    polyethyl glycol-propyl glycol 0.4-0.3 % (SYSTANE) 0.4-0.3 % ophthalmic solution Place 1 drop into both eyes as needed for Dry Eyes 1 Bottle 3    Vitamin D 3 (CHOLECALCIFEROL) 1000 UNITS TABS tablet Take 1,000 Units by mouth daily.  guaiFENesin (MUCINEX) 600 MG extended release tablet Take 1 tablet by mouth 2 times daily as needed for Congestion      ibuprofen (ADVIL) 200 MG tablet Take 1 tablet by mouth every 6 hours as needed for Pain (max 800 mg in 24 hours) 120 tablet 3     No current facility-administered medications for this visit. /64   Wt 128 lb (58.1 kg)   BMI 20.67 kg/m²     Physical Exam   Constitutional: She is oriented to person, place, and time. Thin looking  female. In no acute distress. HENT:   Head: Normocephalic and atraumatic. Mouth/Throat: Oropharynx is clear and moist.   Eyes: Pupils are equal, round, and reactive to light. Conjunctivae and EOM are normal. No scleral icterus.    Neck: Normal range of

## 2018-11-29 ENCOUNTER — TELEPHONE (OUTPATIENT)
Dept: INTERNAL MEDICINE CLINIC | Age: 78
End: 2018-11-29

## 2018-11-30 ENCOUNTER — TELEPHONE (OUTPATIENT)
Dept: INTERNAL MEDICINE CLINIC | Age: 78
End: 2018-11-30

## 2018-11-30 NOTE — TELEPHONE ENCOUNTER
Pt daughter reports that patients takes 36 units of Lantus, and request an updated order be sent to pharmacy below.      insulin glargine (LANTUS SOLOSTAR) 100 UNIT/ML injection pen    [facility] [address] [phone]

## 2018-12-01 NOTE — TELEPHONE ENCOUNTER
Spoke with Saul Nash (daughter) and home health nurse rechecked BP and was told BP now back to normal after 20 min. At this time Saul Nash could not provide reading but will call next week when RN comes to recheck patient's BP. Per Saul Nash no need to add another dose of medication at this time.

## 2018-12-05 ENCOUNTER — OFFICE VISIT (OUTPATIENT)
Dept: ORTHOPEDIC SURGERY | Age: 78
End: 2018-12-05
Payer: MEDICARE

## 2018-12-05 DIAGNOSIS — M89.8X2 PAIN OF RIGHT HUMERUS: ICD-10-CM

## 2018-12-05 DIAGNOSIS — S42.291D OTHER CLOSED DISPLACED FRACTURE OF PROXIMAL END OF RIGHT HUMERUS WITH ROUTINE HEALING, SUBSEQUENT ENCOUNTER: Primary | ICD-10-CM

## 2018-12-05 PROCEDURE — 99024 POSTOP FOLLOW-UP VISIT: CPT | Performed by: PHYSICIAN ASSISTANT

## 2018-12-05 PROCEDURE — L3660 SO 8 AB RSTR CAN/WEB PRE OTS: HCPCS | Performed by: PHYSICIAN ASSISTANT

## 2018-12-10 ENCOUNTER — HOSPITAL ENCOUNTER (OUTPATIENT)
Dept: VASCULAR LAB | Age: 78
Discharge: HOME | End: 2018-12-10
Payer: MEDICARE

## 2018-12-10 DIAGNOSIS — I73.9 PAD (PERIPHERAL ARTERY DISEASE) (HCC): ICD-10-CM

## 2018-12-10 DIAGNOSIS — R20.9 BILATERAL COLD FEET: ICD-10-CM

## 2018-12-10 PROCEDURE — 93925 LOWER EXTREMITY STUDY: CPT

## 2018-12-11 ENCOUNTER — TELEPHONE (OUTPATIENT)
Dept: INTERNAL MEDICINE CLINIC | Age: 78
End: 2018-12-11

## 2018-12-11 DIAGNOSIS — I99.9 VASCULAR DISEASE: Primary | ICD-10-CM

## 2018-12-12 ENCOUNTER — TELEPHONE (OUTPATIENT)
Dept: INTERNAL MEDICINE CLINIC | Age: 78
End: 2018-12-12

## 2018-12-13 ENCOUNTER — TELEPHONE (OUTPATIENT)
Dept: VASCULAR SURGERY | Age: 78
End: 2018-12-13

## 2018-12-13 ENCOUNTER — TELEPHONE (OUTPATIENT)
Dept: INTERNAL MEDICINE CLINIC | Age: 78
End: 2018-12-13

## 2018-12-13 NOTE — TELEPHONE ENCOUNTER
Per physician :    Make appointment if symptoms/issues persist.    Nurse has been notified. Susan Lilly has been notified. Advised to keep appointment that is scheduled currently with .

## 2018-12-13 NOTE — TELEPHONE ENCOUNTER
Alba Chery would like to inform pt is extremely tired and BP and sugar is elevated.   Alba Chery would like medical advise   BP- 153/78   Blood Sugar- 360

## 2018-12-14 ENCOUNTER — TELEPHONE (OUTPATIENT)
Dept: VASCULAR SURGERY | Age: 78
End: 2018-12-14

## 2018-12-18 ENCOUNTER — OFFICE VISIT (OUTPATIENT)
Dept: INTERNAL MEDICINE CLINIC | Age: 78
End: 2018-12-18
Payer: MEDICARE

## 2018-12-18 VITALS
BODY MASS INDEX: 21.16 KG/M2 | HEIGHT: 65 IN | SYSTOLIC BLOOD PRESSURE: 118 MMHG | WEIGHT: 127 LBS | DIASTOLIC BLOOD PRESSURE: 72 MMHG

## 2018-12-18 DIAGNOSIS — E55.9 VITAMIN D DEFICIENCY: ICD-10-CM

## 2018-12-18 DIAGNOSIS — I73.9 PAD (PERIPHERAL ARTERY DISEASE) (HCC): ICD-10-CM

## 2018-12-18 DIAGNOSIS — I10 ESSENTIAL HYPERTENSION: Primary | ICD-10-CM

## 2018-12-18 DIAGNOSIS — H04.123 DRY EYES: ICD-10-CM

## 2018-12-18 DIAGNOSIS — E11.9 DIABETES MELLITUS TYPE 2 IN NONOBESE (HCC): ICD-10-CM

## 2018-12-18 DIAGNOSIS — F51.01 PRIMARY INSOMNIA: ICD-10-CM

## 2018-12-18 DIAGNOSIS — R53.1 WEAKNESS: ICD-10-CM

## 2018-12-18 DIAGNOSIS — I10 ESSENTIAL HYPERTENSION: ICD-10-CM

## 2018-12-18 LAB
A/G RATIO: 1 (ref 1.1–2.2)
ALBUMIN SERPL-MCNC: 3.6 G/DL (ref 3.4–5)
ALP BLD-CCNC: 73 U/L (ref 40–129)
ALT SERPL-CCNC: 31 U/L (ref 10–40)
ANION GAP SERPL CALCULATED.3IONS-SCNC: 15 MMOL/L (ref 3–16)
AST SERPL-CCNC: 30 U/L (ref 15–37)
BASOPHILS ABSOLUTE: 0.1 K/UL (ref 0–0.2)
BASOPHILS RELATIVE PERCENT: 0.8 %
BILIRUB SERPL-MCNC: <0.2 MG/DL (ref 0–1)
BUN BLDV-MCNC: 25 MG/DL (ref 7–20)
CALCIUM SERPL-MCNC: 10.3 MG/DL (ref 8.3–10.6)
CHLORIDE BLD-SCNC: 100 MMOL/L (ref 99–110)
CO2: 23 MMOL/L (ref 21–32)
CREAT SERPL-MCNC: 0.7 MG/DL (ref 0.6–1.2)
EOSINOPHILS ABSOLUTE: 0.1 K/UL (ref 0–0.6)
EOSINOPHILS RELATIVE PERCENT: 1 %
GFR AFRICAN AMERICAN: >60
GFR NON-AFRICAN AMERICAN: >60
GLOBULIN: 3.7 G/DL
GLUCOSE BLD-MCNC: 221 MG/DL (ref 70–99)
HCT VFR BLD CALC: 36.6 % (ref 36–48)
HEMATOLOGY PATH CONSULT: NO
HEMOGLOBIN: 12.2 G/DL (ref 12–16)
LYMPHOCYTES ABSOLUTE: 2 K/UL (ref 1–5.1)
LYMPHOCYTES RELATIVE PERCENT: 17.6 %
MCH RBC QN AUTO: 26.9 PG (ref 26–34)
MCHC RBC AUTO-ENTMCNC: 33.3 G/DL (ref 31–36)
MCV RBC AUTO: 80.7 FL (ref 80–100)
MONOCYTES ABSOLUTE: 1.6 K/UL (ref 0–1.3)
MONOCYTES RELATIVE PERCENT: 14.1 %
NEUTROPHILS ABSOLUTE: 7.7 K/UL (ref 1.7–7.7)
NEUTROPHILS RELATIVE PERCENT: 66.5 %
PDW BLD-RTO: 16.5 % (ref 12.4–15.4)
PLATELET # BLD: 380 K/UL (ref 135–450)
PMV BLD AUTO: 8 FL (ref 5–10.5)
POTASSIUM SERPL-SCNC: 4.9 MMOL/L (ref 3.5–5.1)
RBC # BLD: 4.53 M/UL (ref 4–5.2)
SODIUM BLD-SCNC: 138 MMOL/L (ref 136–145)
T4 FREE: 1 NG/DL (ref 0.9–1.8)
TOTAL PROTEIN: 7.3 G/DL (ref 6.4–8.2)
TSH SERPL DL<=0.05 MIU/L-ACNC: 3.06 UIU/ML (ref 0.27–4.2)
VITAMIN D 25-HYDROXY: 47 NG/ML
WBC # BLD: 11.5 K/UL (ref 4–11)

## 2018-12-18 PROCEDURE — 4040F PNEUMOC VAC/ADMIN/RCVD: CPT | Performed by: INTERNAL MEDICINE

## 2018-12-18 PROCEDURE — G8420 CALC BMI NORM PARAMETERS: HCPCS | Performed by: INTERNAL MEDICINE

## 2018-12-18 PROCEDURE — G8482 FLU IMMUNIZE ORDER/ADMIN: HCPCS | Performed by: INTERNAL MEDICINE

## 2018-12-18 PROCEDURE — 99214 OFFICE O/P EST MOD 30 MIN: CPT | Performed by: INTERNAL MEDICINE

## 2018-12-18 PROCEDURE — 1090F PRES/ABSN URINE INCON ASSESS: CPT | Performed by: INTERNAL MEDICINE

## 2018-12-18 PROCEDURE — 1036F TOBACCO NON-USER: CPT | Performed by: INTERNAL MEDICINE

## 2018-12-18 PROCEDURE — G8427 DOCREV CUR MEDS BY ELIG CLIN: HCPCS | Performed by: INTERNAL MEDICINE

## 2018-12-18 PROCEDURE — G8400 PT W/DXA NO RESULTS DOC: HCPCS | Performed by: INTERNAL MEDICINE

## 2018-12-18 PROCEDURE — 1123F ACP DISCUSS/DSCN MKR DOCD: CPT | Performed by: INTERNAL MEDICINE

## 2018-12-18 PROCEDURE — 1111F DSCHRG MED/CURRENT MED MERGE: CPT | Performed by: INTERNAL MEDICINE

## 2018-12-18 PROCEDURE — 1101F PT FALLS ASSESS-DOCD LE1/YR: CPT | Performed by: INTERNAL MEDICINE

## 2018-12-18 ASSESSMENT — ENCOUNTER SYMPTOMS
COLOR CHANGE: 0
ABDOMINAL PAIN: 0
CHEST TIGHTNESS: 0
BACK PAIN: 0
WHEEZING: 0

## 2018-12-19 LAB
ESTIMATED AVERAGE GLUCOSE: 185.8 MG/DL
HBA1C MFR BLD: 8.1 %

## 2018-12-21 ENCOUNTER — TELEPHONE (OUTPATIENT)
Dept: INTERNAL MEDICINE CLINIC | Age: 78
End: 2018-12-21

## 2018-12-21 LAB
BACTERIA, URINE: ABNORMAL /HPF
BILIRUBIN, URINE: NEGATIVE
CLARITY: CLEAR
COLOR: YELLOW
EPITHELIAL CELLS, UA: 1 /HPF
ERYTHROCYTES URINE: 0 /HPF (ref 0–5)
GLUCOSE URINE: NEGATIVE
HB: SOURCE: ABNORMAL
HYALINE CASTS: 102 /LPF
KETONES, URINE: NEGATIVE
LEUKOCYTE ESTERASE, URINE: ABNORMAL
LEUKOCYTES, UA: 23 /HPF (ref 0–3)
MUCUS: PRESENT
NITRITE, URINE: NEGATIVE
PH, URINE: 5.5 (ref 5–8)
PROTEIN, URINE: ABNORMAL
RBC URINE: NEGATIVE
SPECIFIC GRAVITY UA: 1.02 (ref 1–1.03)
URINE TYPE: ABNORMAL
UROBILINOGEN, URINE: <2 MG/DL

## 2018-12-23 LAB
CULTURE RESULT: NORMAL
Lab: NORMAL
REPORT STATUS: NORMAL
SPECIMEN DESCRIPTION: NORMAL

## 2018-12-27 ENCOUNTER — OFFICE VISIT (OUTPATIENT)
Dept: VASCULAR SURGERY | Age: 78
End: 2018-12-27
Payer: MEDICARE

## 2018-12-27 VITALS
TEMPERATURE: 98.2 F | WEIGHT: 130 LBS | BODY MASS INDEX: 20.89 KG/M2 | SYSTOLIC BLOOD PRESSURE: 126 MMHG | HEIGHT: 66 IN | DIASTOLIC BLOOD PRESSURE: 67 MMHG

## 2018-12-27 DIAGNOSIS — I65.22 OCCLUSION AND STENOSIS OF LEFT CAROTID ARTERY: ICD-10-CM

## 2018-12-27 DIAGNOSIS — I77.9 CAROTID ARTERY DISEASE, UNSPECIFIED LATERALITY, UNSPECIFIED TYPE (HCC): ICD-10-CM

## 2018-12-27 DIAGNOSIS — I70.0 ATHEROSCLEROSIS OF AORTA (HCC): ICD-10-CM

## 2018-12-27 DIAGNOSIS — I73.9 PAD (PERIPHERAL ARTERY DISEASE) (HCC): Primary | ICD-10-CM

## 2018-12-27 PROCEDURE — 1090F PRES/ABSN URINE INCON ASSESS: CPT | Performed by: SURGERY

## 2018-12-27 PROCEDURE — 4040F PNEUMOC VAC/ADMIN/RCVD: CPT | Performed by: SURGERY

## 2018-12-27 PROCEDURE — G8427 DOCREV CUR MEDS BY ELIG CLIN: HCPCS | Performed by: SURGERY

## 2018-12-27 PROCEDURE — G8400 PT W/DXA NO RESULTS DOC: HCPCS | Performed by: SURGERY

## 2018-12-27 PROCEDURE — G8598 ASA/ANTIPLAT THER USED: HCPCS | Performed by: SURGERY

## 2018-12-27 PROCEDURE — 1101F PT FALLS ASSESS-DOCD LE1/YR: CPT | Performed by: SURGERY

## 2018-12-27 PROCEDURE — 1036F TOBACCO NON-USER: CPT | Performed by: SURGERY

## 2018-12-27 PROCEDURE — G8420 CALC BMI NORM PARAMETERS: HCPCS | Performed by: SURGERY

## 2018-12-27 PROCEDURE — 1123F ACP DISCUSS/DSCN MKR DOCD: CPT | Performed by: SURGERY

## 2018-12-27 PROCEDURE — 99205 OFFICE O/P NEW HI 60 MIN: CPT | Performed by: SURGERY

## 2018-12-27 PROCEDURE — G8482 FLU IMMUNIZE ORDER/ADMIN: HCPCS | Performed by: SURGERY

## 2019-01-02 ENCOUNTER — TELEPHONE (OUTPATIENT)
Dept: INTERNAL MEDICINE CLINIC | Age: 79
End: 2019-01-02

## 2019-01-04 ENCOUNTER — TELEPHONE (OUTPATIENT)
Dept: INTERNAL MEDICINE CLINIC | Age: 79
End: 2019-01-04

## 2019-01-07 ENCOUNTER — OFFICE VISIT (OUTPATIENT)
Dept: ORTHOPEDIC SURGERY | Age: 79
End: 2019-01-07

## 2019-01-07 DIAGNOSIS — M25.511 PAIN IN JOINT OF RIGHT SHOULDER REGION: ICD-10-CM

## 2019-01-07 DIAGNOSIS — S42.291D OTHER CLOSED DISPLACED FRACTURE OF PROXIMAL END OF RIGHT HUMERUS WITH ROUTINE HEALING, SUBSEQUENT ENCOUNTER: Primary | ICD-10-CM

## 2019-01-07 PROCEDURE — 99024 POSTOP FOLLOW-UP VISIT: CPT | Performed by: ORTHOPAEDIC SURGERY

## 2019-01-08 ENCOUNTER — HOSPITAL ENCOUNTER (OUTPATIENT)
Dept: VASCULAR LAB | Age: 79
Discharge: HOME | End: 2019-01-08
Payer: MEDICARE

## 2019-01-08 DIAGNOSIS — I65.22 OCCLUSION AND STENOSIS OF LEFT CAROTID ARTERY: ICD-10-CM

## 2019-01-08 DIAGNOSIS — I77.9 CAROTID ARTERY DISEASE, UNSPECIFIED LATERALITY, UNSPECIFIED TYPE (HCC): ICD-10-CM

## 2019-01-08 PROCEDURE — 93880 EXTRACRANIAL BILAT STUDY: CPT

## 2019-01-09 ENCOUNTER — TELEPHONE (OUTPATIENT)
Dept: INTERNAL MEDICINE CLINIC | Age: 79
End: 2019-01-09

## 2019-01-17 ENCOUNTER — TELEPHONE (OUTPATIENT)
Dept: INTERNAL MEDICINE CLINIC | Age: 79
End: 2019-01-17

## 2019-02-06 ENCOUNTER — TELEPHONE (OUTPATIENT)
Dept: VASCULAR SURGERY | Age: 79
End: 2019-02-06

## 2019-02-21 DIAGNOSIS — M79.605 LEFT LEG PAIN: ICD-10-CM

## 2019-02-25 RX ORDER — DULOXETIN HYDROCHLORIDE 20 MG/1
CAPSULE, DELAYED RELEASE ORAL
Qty: 60 CAPSULE | Refills: 1 | Status: SHIPPED | OUTPATIENT
Start: 2019-02-25 | End: 2019-04-30 | Stop reason: SDUPTHER

## 2019-02-25 RX ORDER — PEN NEEDLE, DIABETIC 31 GX5/16"
NEEDLE, DISPOSABLE MISCELLANEOUS
Qty: 100 EACH | Refills: 0 | Status: SHIPPED | OUTPATIENT
Start: 2019-02-25 | End: 2019-04-24 | Stop reason: SDUPTHER

## 2019-03-04 ENCOUNTER — OFFICE VISIT (OUTPATIENT)
Dept: INTERNAL MEDICINE CLINIC | Age: 79
End: 2019-03-04
Payer: MEDICARE

## 2019-03-04 VITALS
WEIGHT: 131 LBS | HEIGHT: 66 IN | BODY MASS INDEX: 21.05 KG/M2 | SYSTOLIC BLOOD PRESSURE: 120 MMHG | DIASTOLIC BLOOD PRESSURE: 80 MMHG | OXYGEN SATURATION: 95 % | TEMPERATURE: 97.4 F

## 2019-03-04 DIAGNOSIS — R05.9 COUGH: ICD-10-CM

## 2019-03-04 DIAGNOSIS — R09.89 BILATERAL RALES: ICD-10-CM

## 2019-03-04 DIAGNOSIS — I48.91 NEW ONSET ATRIAL FIBRILLATION (HCC): Primary | ICD-10-CM

## 2019-03-04 DIAGNOSIS — I49.9 IRREGULAR HEART RHYTHM: ICD-10-CM

## 2019-03-04 DIAGNOSIS — R06.02 SOB (SHORTNESS OF BREATH): ICD-10-CM

## 2019-03-04 PROCEDURE — 1036F TOBACCO NON-USER: CPT | Performed by: NURSE PRACTITIONER

## 2019-03-04 PROCEDURE — 1123F ACP DISCUSS/DSCN MKR DOCD: CPT | Performed by: NURSE PRACTITIONER

## 2019-03-04 PROCEDURE — 99214 OFFICE O/P EST MOD 30 MIN: CPT | Performed by: NURSE PRACTITIONER

## 2019-03-04 PROCEDURE — 93000 ELECTROCARDIOGRAM COMPLETE: CPT | Performed by: NURSE PRACTITIONER

## 2019-03-04 PROCEDURE — G8482 FLU IMMUNIZE ORDER/ADMIN: HCPCS | Performed by: NURSE PRACTITIONER

## 2019-03-04 PROCEDURE — G8427 DOCREV CUR MEDS BY ELIG CLIN: HCPCS | Performed by: NURSE PRACTITIONER

## 2019-03-04 PROCEDURE — G8400 PT W/DXA NO RESULTS DOC: HCPCS | Performed by: NURSE PRACTITIONER

## 2019-03-04 PROCEDURE — 1101F PT FALLS ASSESS-DOCD LE1/YR: CPT | Performed by: NURSE PRACTITIONER

## 2019-03-04 PROCEDURE — G8599 NO ASA/ANTIPLAT THER USE RNG: HCPCS | Performed by: NURSE PRACTITIONER

## 2019-03-04 PROCEDURE — 1090F PRES/ABSN URINE INCON ASSESS: CPT | Performed by: NURSE PRACTITIONER

## 2019-03-04 PROCEDURE — 4040F PNEUMOC VAC/ADMIN/RCVD: CPT | Performed by: NURSE PRACTITIONER

## 2019-03-04 PROCEDURE — G8420 CALC BMI NORM PARAMETERS: HCPCS | Performed by: NURSE PRACTITIONER

## 2019-03-04 ASSESSMENT — ENCOUNTER SYMPTOMS
GASTROINTESTINAL NEGATIVE: 1
COUGH: 1
SHORTNESS OF BREATH: 1

## 2019-03-06 ENCOUNTER — TELEPHONE (OUTPATIENT)
Dept: INTERNAL MEDICINE CLINIC | Age: 79
End: 2019-03-06

## 2019-03-20 RX ORDER — ESOMEPRAZOLE MAGNESIUM 40 MG/1
CAPSULE, DELAYED RELEASE ORAL
Qty: 60 CAPSULE | Refills: 2 | Status: SHIPPED | OUTPATIENT
Start: 2019-03-20 | End: 2019-07-03 | Stop reason: SDUPTHER

## 2019-03-27 ENCOUNTER — TELEPHONE (OUTPATIENT)
Dept: INTERNAL MEDICINE CLINIC | Age: 79
End: 2019-03-27

## 2019-04-01 ENCOUNTER — TELEPHONE (OUTPATIENT)
Dept: INTERNAL MEDICINE CLINIC | Age: 79
End: 2019-04-01

## 2019-04-01 DIAGNOSIS — R39.9 SYMPTOMS OF URINARY TRACT INFECTION: Primary | ICD-10-CM

## 2019-04-01 NOTE — TELEPHONE ENCOUNTER
Patient would like appt to be seen for UTI. She was advised Dr. Irma Dolan schedule is full but a message would be taken to see if she can be put on providers schedule. Daughter would like instead to have an order sent to lab for UA C&S. Patient thinks she may need to adjust her Lantus. Past few days her bs have been in the 170's (average). Please advise. Please call patients daughter at [phone].

## 2019-04-01 NOTE — TELEPHONE ENCOUNTER
Call returned to the patient's daughter Joann Hampton. Message left. Will order urinalysis and c and s to [facility] facility. Can increase Lantus, call if you need to verify instructions on increasing medication. Can increase by one unit if fasting blood sugar is above 140.

## 2019-04-24 RX ORDER — LANCETS 28 GAUGE
EACH MISCELLANEOUS
Qty: 100 EACH | Refills: 0 | Status: SHIPPED | OUTPATIENT
Start: 2019-04-24 | End: 2019-05-01 | Stop reason: SDUPTHER

## 2019-04-24 RX ORDER — PEN NEEDLE, DIABETIC 31 GX5/16"
NEEDLE, DISPOSABLE MISCELLANEOUS
Qty: 100 EACH | Refills: 0 | Status: SHIPPED | OUTPATIENT
Start: 2019-04-24 | End: 2019-05-14 | Stop reason: CLARIF

## 2019-04-24 RX ORDER — BLOOD-GLUCOSE METER
KIT MISCELLANEOUS
Qty: 100 STRIP | Refills: 0 | Status: SHIPPED | OUTPATIENT
Start: 2019-04-24 | End: 2019-05-14 | Stop reason: CLARIF

## 2019-04-30 DIAGNOSIS — M79.605 LEFT LEG PAIN: ICD-10-CM

## 2019-05-01 RX ORDER — DULOXETIN HYDROCHLORIDE 20 MG/1
CAPSULE, DELAYED RELEASE ORAL
Qty: 60 CAPSULE | Refills: 1 | Status: SHIPPED | OUTPATIENT
Start: 2019-05-01 | End: 2019-07-03 | Stop reason: SDUPTHER

## 2019-05-04 ENCOUNTER — APPOINTMENT (OUTPATIENT)
Dept: GENERAL RADIOLOGY | Age: 79
DRG: 185 | End: 2019-05-04
Payer: MEDICARE

## 2019-05-04 ENCOUNTER — APPOINTMENT (OUTPATIENT)
Dept: CT IMAGING | Age: 79
DRG: 185 | End: 2019-05-04
Payer: MEDICARE

## 2019-05-04 ENCOUNTER — HOSPITAL ENCOUNTER (INPATIENT)
Age: 79
LOS: 2 days | Discharge: HOME HEALTH | DRG: 185 | End: 2019-05-06
Attending: EMERGENCY MEDICINE | Admitting: INTERNAL MEDICINE
Payer: MEDICARE

## 2019-05-04 DIAGNOSIS — S22.41XA CLOSED FRACTURE OF MULTIPLE RIBS OF RIGHT SIDE, INITIAL ENCOUNTER: Primary | ICD-10-CM

## 2019-05-04 LAB
ANION GAP SERPL CALCULATED.3IONS-SCNC: 12 MMOL/L (ref 3–16)
BASOPHILS ABSOLUTE: 0.1 K/UL (ref 0–0.2)
BASOPHILS RELATIVE PERCENT: 0.8 %
BILIRUBIN URINE: NEGATIVE
BLOOD, URINE: NEGATIVE
BUN BLDV-MCNC: 33 MG/DL (ref 7–20)
CALCIUM SERPL-MCNC: 10 MG/DL (ref 8.3–10.6)
CHLORIDE BLD-SCNC: 104 MMOL/L (ref 99–110)
CLARITY: CLEAR
CO2: 21 MMOL/L (ref 21–32)
COLOR: YELLOW
CREAT SERPL-MCNC: 0.8 MG/DL (ref 0.6–1.2)
EOSINOPHILS ABSOLUTE: 0.1 K/UL (ref 0–0.6)
EOSINOPHILS RELATIVE PERCENT: 0.4 %
GFR AFRICAN AMERICAN: >60
GFR NON-AFRICAN AMERICAN: >60
GLUCOSE BLD-MCNC: 128 MG/DL (ref 70–99)
GLUCOSE BLD-MCNC: 166 MG/DL (ref 70–99)
GLUCOSE BLD-MCNC: 168 MG/DL (ref 70–99)
GLUCOSE BLD-MCNC: 201 MG/DL (ref 70–99)
GLUCOSE BLD-MCNC: 202 MG/DL (ref 70–99)
GLUCOSE BLD-MCNC: 245 MG/DL (ref 70–99)
GLUCOSE URINE: NEGATIVE MG/DL
HCT VFR BLD CALC: 35.2 % (ref 36–48)
HEMOGLOBIN: 11.4 G/DL (ref 12–16)
KETONES, URINE: NEGATIVE MG/DL
LEUKOCYTE ESTERASE, URINE: ABNORMAL
LYMPHOCYTES ABSOLUTE: 1.5 K/UL (ref 1–5.1)
LYMPHOCYTES RELATIVE PERCENT: 8.7 %
MCH RBC QN AUTO: 25.9 PG (ref 26–34)
MCHC RBC AUTO-ENTMCNC: 32.4 G/DL (ref 31–36)
MCV RBC AUTO: 79.8 FL (ref 80–100)
MICROSCOPIC EXAMINATION: YES
MONOCYTES ABSOLUTE: 2.5 K/UL (ref 0–1.3)
MONOCYTES RELATIVE PERCENT: 14.3 %
NEUTROPHILS ABSOLUTE: 13.2 K/UL (ref 1.7–7.7)
NEUTROPHILS RELATIVE PERCENT: 75.8 %
NITRITE, URINE: NEGATIVE
PDW BLD-RTO: 16.1 % (ref 12.4–15.4)
PERFORMED ON: ABNORMAL
PH UA: 5 (ref 5–8)
PLATELET # BLD: 281 K/UL (ref 135–450)
PMV BLD AUTO: 7.8 FL (ref 5–10.5)
POTASSIUM REFLEX MAGNESIUM: 4.6 MMOL/L (ref 3.5–5.1)
PROTEIN UA: NEGATIVE MG/DL
RBC # BLD: 4.41 M/UL (ref 4–5.2)
RBC UA: NORMAL /HPF (ref 0–2)
SODIUM BLD-SCNC: 137 MMOL/L (ref 136–145)
SPECIFIC GRAVITY UA: 1.01 (ref 1–1.03)
URINE REFLEX TO CULTURE: YES
URINE TYPE: ABNORMAL
UROBILINOGEN, URINE: 0.2 E.U./DL
WBC # BLD: 17.4 K/UL (ref 4–11)
WBC UA: NORMAL /HPF (ref 0–5)

## 2019-05-04 PROCEDURE — 6370000000 HC RX 637 (ALT 250 FOR IP): Performed by: STUDENT IN AN ORGANIZED HEALTH CARE EDUCATION/TRAINING PROGRAM

## 2019-05-04 PROCEDURE — 96374 THER/PROPH/DIAG INJ IV PUSH: CPT

## 2019-05-04 PROCEDURE — 94664 DEMO&/EVAL PT USE INHALER: CPT

## 2019-05-04 PROCEDURE — 2580000003 HC RX 258: Performed by: STUDENT IN AN ORGANIZED HEALTH CARE EDUCATION/TRAINING PROGRAM

## 2019-05-04 PROCEDURE — 70450 CT HEAD/BRAIN W/O DYE: CPT

## 2019-05-04 PROCEDURE — 94761 N-INVAS EAR/PLS OXIMETRY MLT: CPT

## 2019-05-04 PROCEDURE — 1200000000 HC SEMI PRIVATE

## 2019-05-04 PROCEDURE — 72125 CT NECK SPINE W/O DYE: CPT

## 2019-05-04 PROCEDURE — 85025 COMPLETE CBC W/AUTO DIFF WBC: CPT

## 2019-05-04 PROCEDURE — 81001 URINALYSIS AUTO W/SCOPE: CPT

## 2019-05-04 PROCEDURE — 99285 EMERGENCY DEPT VISIT HI MDM: CPT

## 2019-05-04 PROCEDURE — 6360000002 HC RX W HCPCS: Performed by: STUDENT IN AN ORGANIZED HEALTH CARE EDUCATION/TRAINING PROGRAM

## 2019-05-04 PROCEDURE — 6370000000 HC RX 637 (ALT 250 FOR IP): Performed by: EMERGENCY MEDICINE

## 2019-05-04 PROCEDURE — 87040 BLOOD CULTURE FOR BACTERIA: CPT

## 2019-05-04 PROCEDURE — 73130 X-RAY EXAM OF HAND: CPT

## 2019-05-04 PROCEDURE — 36415 COLL VENOUS BLD VENIPUNCTURE: CPT

## 2019-05-04 PROCEDURE — 94150 VITAL CAPACITY TEST: CPT

## 2019-05-04 PROCEDURE — 99222 1ST HOSP IP/OBS MODERATE 55: CPT | Performed by: INTERNAL MEDICINE

## 2019-05-04 PROCEDURE — 87086 URINE CULTURE/COLONY COUNT: CPT

## 2019-05-04 PROCEDURE — 73060 X-RAY EXAM OF HUMERUS: CPT

## 2019-05-04 PROCEDURE — 71250 CT THORAX DX C-: CPT

## 2019-05-04 PROCEDURE — 80048 BASIC METABOLIC PNL TOTAL CA: CPT

## 2019-05-04 PROCEDURE — 6370000000 HC RX 637 (ALT 250 FOR IP): Performed by: INTERNAL MEDICINE

## 2019-05-04 RX ORDER — ACETAMINOPHEN 325 MG/1
650 TABLET ORAL EVERY 4 HOURS PRN
Status: DISCONTINUED | OUTPATIENT
Start: 2019-05-04 | End: 2019-05-06 | Stop reason: HOSPADM

## 2019-05-04 RX ORDER — PANTOPRAZOLE SODIUM 40 MG/1
40 TABLET, DELAYED RELEASE ORAL
Status: DISCONTINUED | OUTPATIENT
Start: 2019-05-04 | End: 2019-05-06 | Stop reason: HOSPADM

## 2019-05-04 RX ORDER — LIDOCAINE 4 G/G
1 PATCH TOPICAL ONCE
Status: COMPLETED | OUTPATIENT
Start: 2019-05-04 | End: 2019-05-04

## 2019-05-04 RX ORDER — SPIRONOLACTONE 25 MG/1
25 TABLET ORAL DAILY
Status: DISCONTINUED | OUTPATIENT
Start: 2019-05-04 | End: 2019-05-06 | Stop reason: HOSPADM

## 2019-05-04 RX ORDER — DOCUSATE SODIUM 100 MG/1
100 CAPSULE, LIQUID FILLED ORAL DAILY
Status: DISCONTINUED | OUTPATIENT
Start: 2019-05-04 | End: 2019-05-06 | Stop reason: HOSPADM

## 2019-05-04 RX ORDER — DULOXETIN HYDROCHLORIDE 20 MG/1
20 CAPSULE, DELAYED RELEASE ORAL 2 TIMES DAILY
Status: DISCONTINUED | OUTPATIENT
Start: 2019-05-04 | End: 2019-05-06 | Stop reason: HOSPADM

## 2019-05-04 RX ORDER — SODIUM CHLORIDE 0.9 % (FLUSH) 0.9 %
10 SYRINGE (ML) INJECTION EVERY 12 HOURS SCHEDULED
Status: DISCONTINUED | OUTPATIENT
Start: 2019-05-04 | End: 2019-05-06 | Stop reason: HOSPADM

## 2019-05-04 RX ORDER — LIDOCAINE 4 G/G
1 PATCH TOPICAL DAILY
Status: DISCONTINUED | OUTPATIENT
Start: 2019-05-04 | End: 2019-05-04

## 2019-05-04 RX ORDER — NICOTINE POLACRILEX 4 MG
15 LOZENGE BUCCAL PRN
Status: DISCONTINUED | OUTPATIENT
Start: 2019-05-04 | End: 2019-05-06 | Stop reason: HOSPADM

## 2019-05-04 RX ORDER — DEXTROSE MONOHYDRATE 25 G/50ML
12.5 INJECTION, SOLUTION INTRAVENOUS PRN
Status: DISCONTINUED | OUTPATIENT
Start: 2019-05-04 | End: 2019-05-06 | Stop reason: HOSPADM

## 2019-05-04 RX ORDER — HYDROCHLOROTHIAZIDE 25 MG/1
25 TABLET ORAL DAILY
Status: DISCONTINUED | OUTPATIENT
Start: 2019-05-04 | End: 2019-05-06 | Stop reason: HOSPADM

## 2019-05-04 RX ORDER — ONDANSETRON 2 MG/ML
4 INJECTION INTRAMUSCULAR; INTRAVENOUS EVERY 6 HOURS PRN
Status: DISCONTINUED | OUTPATIENT
Start: 2019-05-04 | End: 2019-05-06 | Stop reason: HOSPADM

## 2019-05-04 RX ORDER — OXYCODONE HYDROCHLORIDE AND ACETAMINOPHEN 5; 325 MG/1; MG/1
1 TABLET ORAL EVERY 4 HOURS PRN
Status: DISCONTINUED | OUTPATIENT
Start: 2019-05-04 | End: 2019-05-06 | Stop reason: HOSPADM

## 2019-05-04 RX ORDER — HYDRALAZINE HYDROCHLORIDE 20 MG/ML
5 INJECTION INTRAMUSCULAR; INTRAVENOUS EVERY 4 HOURS PRN
Status: DISCONTINUED | OUTPATIENT
Start: 2019-05-04 | End: 2019-05-06 | Stop reason: HOSPADM

## 2019-05-04 RX ORDER — OXYCODONE HYDROCHLORIDE AND ACETAMINOPHEN 5; 325 MG/1; MG/1
2 TABLET ORAL EVERY 4 HOURS PRN
Status: DISCONTINUED | OUTPATIENT
Start: 2019-05-04 | End: 2019-05-06 | Stop reason: HOSPADM

## 2019-05-04 RX ORDER — SODIUM CHLORIDE, SODIUM LACTATE, POTASSIUM CHLORIDE, CALCIUM CHLORIDE 600; 310; 30; 20 MG/100ML; MG/100ML; MG/100ML; MG/100ML
INJECTION, SOLUTION INTRAVENOUS CONTINUOUS
Status: DISCONTINUED | OUTPATIENT
Start: 2019-05-04 | End: 2019-05-05

## 2019-05-04 RX ORDER — IBUPROFEN 400 MG/1
800 TABLET ORAL ONCE
Status: COMPLETED | OUTPATIENT
Start: 2019-05-04 | End: 2019-05-04

## 2019-05-04 RX ORDER — LIDOCAINE 4 G/G
1 PATCH TOPICAL ONCE
Status: COMPLETED | OUTPATIENT
Start: 2019-05-04 | End: 2019-05-05

## 2019-05-04 RX ORDER — SODIUM CHLORIDE 0.9 % (FLUSH) 0.9 %
10 SYRINGE (ML) INJECTION PRN
Status: DISCONTINUED | OUTPATIENT
Start: 2019-05-04 | End: 2019-05-06 | Stop reason: HOSPADM

## 2019-05-04 RX ORDER — SPIRONOLACTONE AND HYDROCHLOROTHIAZIDE 25; 25 MG/1; MG/1
1 TABLET ORAL DAILY
Status: DISCONTINUED | OUTPATIENT
Start: 2019-05-04 | End: 2019-05-04 | Stop reason: CLARIF

## 2019-05-04 RX ORDER — MORPHINE SULFATE 2 MG/ML
2 INJECTION, SOLUTION INTRAMUSCULAR; INTRAVENOUS
Status: DISCONTINUED | OUTPATIENT
Start: 2019-05-04 | End: 2019-05-04

## 2019-05-04 RX ORDER — DEXTROSE MONOHYDRATE 50 MG/ML
100 INJECTION, SOLUTION INTRAVENOUS PRN
Status: DISCONTINUED | OUTPATIENT
Start: 2019-05-04 | End: 2019-05-06 | Stop reason: HOSPADM

## 2019-05-04 RX ADMIN — INSULIN LISPRO 1 UNITS: 100 INJECTION, SOLUTION INTRAVENOUS; SUBCUTANEOUS at 18:05

## 2019-05-04 RX ADMIN — OXYCODONE HYDROCHLORIDE AND ACETAMINOPHEN 2 TABLET: 5; 325 TABLET ORAL at 17:15

## 2019-05-04 RX ADMIN — ENOXAPARIN SODIUM 40 MG: 40 INJECTION SUBCUTANEOUS at 09:46

## 2019-05-04 RX ADMIN — OXYCODONE HYDROCHLORIDE AND ACETAMINOPHEN 2 TABLET: 5; 325 TABLET ORAL at 13:12

## 2019-05-04 RX ADMIN — SODIUM CHLORIDE, POTASSIUM CHLORIDE, SODIUM LACTATE AND CALCIUM CHLORIDE: 600; 310; 30; 20 INJECTION, SOLUTION INTRAVENOUS at 06:32

## 2019-05-04 RX ADMIN — HYDROMORPHONE HYDROCHLORIDE 1 MG: 1 INJECTION, SOLUTION INTRAMUSCULAR; INTRAVENOUS; SUBCUTANEOUS at 05:46

## 2019-05-04 RX ADMIN — INSULIN LISPRO 1 UNITS: 100 INJECTION, SOLUTION INTRAVENOUS; SUBCUTANEOUS at 21:11

## 2019-05-04 RX ADMIN — INSULIN GLARGINE 30 UNITS: 100 INJECTION, SOLUTION SUBCUTANEOUS at 21:11

## 2019-05-04 RX ADMIN — IBUPROFEN 800 MG: 400 TABLET, FILM COATED ORAL at 04:41

## 2019-05-04 RX ADMIN — OXYCODONE HYDROCHLORIDE AND ACETAMINOPHEN 2 TABLET: 5; 325 TABLET ORAL at 21:12

## 2019-05-04 RX ADMIN — INSULIN LISPRO 2 UNITS: 100 INJECTION, SOLUTION INTRAVENOUS; SUBCUTANEOUS at 09:46

## 2019-05-04 RX ADMIN — DULOXETINE HYDROCHLORIDE 20 MG: 20 CAPSULE, DELAYED RELEASE ORAL at 21:12

## 2019-05-04 RX ADMIN — DULOXETINE HYDROCHLORIDE 20 MG: 20 CAPSULE, DELAYED RELEASE ORAL at 09:46

## 2019-05-04 RX ADMIN — PANTOPRAZOLE SODIUM 40 MG: 40 TABLET, DELAYED RELEASE ORAL at 06:58

## 2019-05-04 ASSESSMENT — ENCOUNTER SYMPTOMS
SHORTNESS OF BREATH: 0
CHEST TIGHTNESS: 0
VOMITING: 0
NAUSEA: 0
EYE PAIN: 0
COUGH: 0
ABDOMINAL PAIN: 0
WHEEZING: 0
DIARRHEA: 0
BACK PAIN: 1

## 2019-05-04 ASSESSMENT — PAIN DESCRIPTION - PROGRESSION
CLINICAL_PROGRESSION: GRADUALLY WORSENING
CLINICAL_PROGRESSION: NOT CHANGED
CLINICAL_PROGRESSION: GRADUALLY WORSENING

## 2019-05-04 ASSESSMENT — PAIN DESCRIPTION - ORIENTATION
ORIENTATION: RIGHT

## 2019-05-04 ASSESSMENT — PAIN DESCRIPTION - PAIN TYPE
TYPE: ACUTE PAIN

## 2019-05-04 ASSESSMENT — PAIN SCALES - GENERAL
PAINLEVEL_OUTOF10: 8
PAINLEVEL_OUTOF10: 8
PAINLEVEL_OUTOF10: 7
PAINLEVEL_OUTOF10: 0
PAINLEVEL_OUTOF10: 3
PAINLEVEL_OUTOF10: 7
PAINLEVEL_OUTOF10: 0
PAINLEVEL_OUTOF10: 8
PAINLEVEL_OUTOF10: 8
PAINLEVEL_OUTOF10: 7
PAINLEVEL_OUTOF10: 4

## 2019-05-04 ASSESSMENT — PAIN DESCRIPTION - DESCRIPTORS
DESCRIPTORS: ACHING

## 2019-05-04 ASSESSMENT — PAIN DESCRIPTION - FREQUENCY
FREQUENCY: CONTINUOUS

## 2019-05-04 ASSESSMENT — PAIN DESCRIPTION - LOCATION
LOCATION: RIB CAGE;ARM
LOCATION: RIB CAGE

## 2019-05-04 ASSESSMENT — PAIN DESCRIPTION - ONSET
ONSET: ON-GOING

## 2019-05-04 NOTE — H&P
Internal Medicine PGY-1 Resident History & Physical      PCP: Ary Stewart MD    Date of Admission: 5/4/2019    Chief Complaint:  Broken ribs      History Of Present Illness:     77 y.o. female with PMH as mentioned below presents to [facility] after a fall at home. Patient states she fell in the bathroom while trying to use the toilet. Patient hit her head but did not lose consciousness. Patient does not remember hitting R side of chest wall but did state there was significant chest wall tenderness after fall. On presentation patient denies any headache, n/v, chest pain, SOB or abdominal pain. No upper or lower extremity pain. Patient states she is afraid to take in deep breath for fear of coughing and causing pain. Patient does not appear to be in any acute distress on exam.     ED: XR of L hand showed no findings. XR R humerus showed prior fx involving surgical neck that is almost completely healed. CT head and CT c-spine did not show any acute findings. CT chest showed nondisplaced to minimally displaced R rib fractures with no associated pneumothorax or pulmonary contusion. Given morphine for pain in ED.      Past Medical History:          Diagnosis Date    ADHD (attention deficit hyperactivity disorder)     Attention deficit disorder without mention of hyperactivity 7/22/2010    Autoimmune disease NEC     Carotid artery disease ([address] 8/2/2013    LEFT CAROTID ENDARTERECTOMY               Carotid artery disease ([address] 8/3/2013    Chronic persistent hepatitis ([address] 7/22/2010    Depression     Depressive disorder, not elsewhere classified 7/22/2010    Double vision     left eye, since cataract surgery    Fractures     GERD (gastroesophageal reflux disease)     Neuropathy     Rheumatoid arthritis(714.0) 7/22/2010    Spinal stenosis     Type II or unspecified type diabetes mellitus without mention of complication, not stated as uncontrolled 7/22/2010    Unspecified cerebral artery occlusion with cerebral infarction     right hand, loss of some sensation       Past Surgical History:          Procedure Laterality Date    COLONOSCOPY      COSMETIC SURGERY      tummy tuck,face lift,mammoplasties- hepatitis blood transfusion complication    EYE SURGERY Bilateral     cataract       Medications Prior to Admission:      Prior to Admission medications    Medication Sig Start Date End Date Taking? Authorizing Provider   DULoxetine (CYMBALTA) 20 MG extended release capsule TAKE ONE CAPSULE BY MOUTH TWICE A DAY 5/1/19   Rafal Garcia MD   B-D ULTRAFINE III SHORT PEN 31G X 8 MM MISC USE DAILY AS DIRECTED 4/24/19   MD LUIS PhamSTYLE LANCETS MISC USE TO TEST BLOOD SUGAR ONCE DAILY.  4/24/19   MD LUIS PhamSTYLE LITE strip USE ONE STRIP TO TEST THREE TIMES A DAY 4/24/19   Rafal Garcia MD   esomeprazole (DailyLook1 [employer]) 40 MG delayed release capsule TAKE ONE CAPSULE BY MOUTH TWICE A DAY 3/20/19   Rafal Garcia MD   insulin glargine (LANTUS SOLOSTAR) 100 UNIT/ML injection pen Inject 30 Units into the skin nightly 1/9/19   Rafal Garcia MD   pantoprazole (PROTONIX) 40 MG tablet Take 40 mg by mouth 2 times daily    Historical Provider, MD   spironolactone-hydrochlorothiazide Lyndle Piper) 25-25 MG per tablet Take 1 tablet by mouth daily 9/25/18   Rafal Garcia MD   Pyridoxine HCl (VITAMIN B6 PO) Take by mouth    Historical Provider, MD   Multiple Vitamins-Minerals (CENTRUM SILVER) TABS Take by mouth daily    Historical Provider, MD   Biotin 1000 MCG TABS Take one tablet daily in am. 1/25/18   Rafal Garcia MD   Ascorbic Acid (VITAMIN C CR) 1000 MG TBCR Take 1 tablet by mouth daily 1/25/18   Rafal Garcia MD   vitamin B-12 (CYANOCOBALAMIN) 500 MCG tablet Take 1 tablet by mouth daily 1/25/18   Rafal Garcia MD   guaiFENesin [facility]) 600 MG extended release tablet Take 1 tablet by mouth 2 times daily as needed for Congestion 1/25/18   Rafal Garcia MD   ibuprofen (ADVIL) 200 MG tablet Take 1 tablet by mouth every 6 hours as needed for Pain (max 800 mg in 24 hours) 1/25/18   Alecia Duffy MD   mometasone (ELOCON) 0.1 % cream APPLY DAILY 1/18/18   Alecia Duffy MD   polyethyl glycol-propyl glycol 0.4-0.3 % (SYSTANE) 0.4-0.3 % ophthalmic solution Place 1 drop into both eyes as needed for Dry Eyes 12/12/17   Alecia Duffy MD   Vitamin D 3 (CHOLECALCIFEROL) 1000 UNITS TABS tablet Take 1,000 Units by mouth daily. Historical Provider, MD       Allergies:  Erythromycin and Sulfites    Social History:      TOBACCO:   reports that she has never smoked. She has never used smokeless tobacco.  ETOH:   reports that she drinks alcohol. Family History:     History reviewed. No pertinent family history. REVIEW OF SYSTEMS: Pertinent positives as noted in the HPI. All other systems reviewed and negative. ROS: Review of Systems   Constitutional: Negative for chills and fever. Respiratory: Negative for chest tightness and shortness of breath. Cardiovascular: Positive for chest pain. Negative for leg swelling. Gastrointestinal: Negative for abdominal pain, nausea and vomiting. Genitourinary: Negative for difficulty urinating and dysuria. Musculoskeletal: Positive for arthralgias. Neurological: Negative for dizziness and headaches. PHYSICAL EXAM PERFORMED:    BP (!) 154/59   Pulse 86   Temp 97.8 °F (36.6 °C) (Oral)   Resp 20   Wt 131 lb (59.4 kg)   SpO2 95%   BMI 21.47 kg/m²     General appearance:  No apparent distress. Alert and oriented x 3. HEENT:  Contusion on R forehead. Pupils equal, round, and reactive to light. Extra ocular muscles intact. Conjunctivae/corneas clear. Neck: Supple. No jugular venous distention. Trachea midline. Respiratory:  Normal respiratory effort. Clear to auscultation bilaterally. Cardiovascular:  Regular rate and rhythm. Normal heart sounds. Abdomen: Soft, non-tender, non-distended with normal bowel sounds. Musculoskeletal:  No clubbing, cyanosis or edema bilaterally.  Tenderness to palpation along R chest wall. Skin: Skin color, texture, turgor normal.   Neurologic:  Neurovascularly intact without any focal sensory/motor deficits. Labs:     Recent Labs     05/04/19  0517   WBC 17.4*   HGB 11.4*   HCT 35.2*        No results for input(s): NA, K, CL, CO2, BUN, CREATININE, CALCIUM, PHOS in the last 72 hours. Invalid input(s): MAGNES  No results for input(s): AST, ALT, BILIDIR, BILITOT, ALKPHOS in the last 72 hours. No results for input(s): INR in the last 72 hours. No results for input(s): Braxton Daily in the last 72 hours. Urinalysis:      Lab Results   Component Value Date    NITRU NEGATIVE 12/21/2018    WBCUA 12 01/30/2018    BACTERIA SEE NOTES 12/21/2018    BACTERIA 1+ 10/18/2017    RBCUA NEGATIVE 12/21/2018    BLOODU Negative 01/30/2018    SPECGRAV 1.017 12/21/2018    GLUCOSEU NEGATIVE 12/21/2018       Radiology:     CT CHEST WO CONTRAST   Final Result     Acute nondisplaced to minimally displaced right rib fractures. No    associated pneumothorax or pulmonary contusion. CT Cervical Spine WO Contrast   Final Result     No evidence of acute or healing fracture or malalignment. XR HUMERUS RIGHT (MIN 2 VIEWS)   Final Result     Almost completely healed. The fracture involving the surgical neck of    the right proximal humerus. Possible posterior angulation of the humeral    shaft at the fracture site. Suggestion of subacromial space narrowing at the periphery. If still    concerned for rotator cuff pathology, consider MRI. Diffuse osteopenia redemonstrated. No focal soft tissue abnormalities. CT Head WO Contrast   Final Result     No evidence of acute intracranial pathology. Diffuse involutional changes and chronic ischemic small vessel white    matter disease. XR HAND LEFT (MIN 3 VIEWS)   Final Result     No acute or healing fracture or acute posttraumatic malalignment.      Findings suggest underlying rheumatoid arthritis or other similar    arthritic process. ASSESSMENT & PLAN:    Active Hospital Problems    Diagnosis Date Noted    Multiple closed fractures of ribs of right side [S22.41XA] 05/04/2019     Multiple closed rib fractures on R side 2/2 mechanical fall   CT chest showed multiple R sided minimally displaced rib fractures. CT head negative. CT c-spine negative.     - dilaudid pain panel   - lidocaine patch   - incentive spirometry     Leukocytosis  - U/A to follow up   - blood culture x 2   - CT chest did not show any significant airspace or interstitial disease   - IVFs    T2DM  - lantus 30 U nightly     HTN  - hydralazine PRN       DVT Prophylaxis: lovenox/SCD  Diet: No diet orders on file  Code Status: Prior    Dispo - [facility]    Rajeev Azevedo MD  PGY-1 Internal Medicine

## 2019-05-04 NOTE — PROGRESS NOTES
Internal Medicine  Progress Note  PGY-1    Hospital Day: 1                                                      Admit Date: 5/4/2019                                     PCP: Simin Trevizo MD      CC:  Fall, broken ribs                      Interval Hx: No acute events. Daily Plan:  5/4/2019    Subjective: Patient seen and examined at bedside. Still with significant pain R sided rib pain, mostly posterior. Rodrigo any dizziness, lightheadedness, palpitations prior to fall yesterday. Denies any chest pain, shortness of breath, abdominal pain, nausea, vomiting, diarrhea, constipation. Medications:    Scheduled Meds:   lidocaine  1 patch Transdermal Once    DULoxetine  20 mg Oral BID    pantoprazole  40 mg Oral QAM AC    insulin glargine  30 Units Subcutaneous Nightly    sodium chloride flush  10 mL Intravenous 2 times per day    enoxaparin  40 mg Subcutaneous Daily      Continuous Infusions:   lactated ringers 100 mL/hr at 05/04/19 0632    dextrose       PRN Meds:sodium chloride flush, magnesium hydroxide, ondansetron, acetaminophen, HYDROmorphone **OR** HYDROmorphone, hydrALAZINE, glucose, dextrose, glucagon (rDNA), dextrose    Allergies: Allergies   Allergen Reactions    Erythromycin     Sulfites        Physical Exam:     Vitals: BP (!) 158/69   Pulse 92   Temp 98.3 °F (36.8 °C) (Oral)   Resp 16   Ht 5' 2.99\" (1.6 m)   Wt 131 lb (59.4 kg)   SpO2 90%   BMI 23.21 kg/m²     I/O:  No intake or output data in the 24 hours ending 05/04/19 0712      Physical Exam   Constitutional: She is oriented to person, place, and time. She appears well-developed and well-nourished. Appears to be in pain   HENT:   Head: Normocephalic and atraumatic. Eyes: Pupils are equal, round, and reactive to light. EOM are normal. Right eye exhibits no discharge. Left eye exhibits no discharge. No scleral icterus. Neck: Normal range of motion. Neck supple. Cardiovascular: Normal rate and regular rhythm.  Exam reveals tissue abnormalities. CT Head WO Contrast   Final Result     No evidence of acute intracranial pathology. Diffuse involutional changes and chronic ischemic small vessel white    matter disease. XR HAND LEFT (MIN 3 VIEWS)   Final Result     No acute or healing fracture or acute posttraumatic malalignment. Findings suggest underlying rheumatoid arthritis or other similar    arthritic process. ASSESSMENT AND PLAN:   78 yo female presenting with rib pain after mechanical fall. Multiple closed rib fractures on R side 2/2 mechanical fall - CT chest showed multiple R sided minimally displaced rib fractures. CT head negative. CT c-spine negative.     - percocet pain panel   - lidocaine patch   - incentive spirometry   - PT/OT     Leukocytosis - CT chest did not show any significant airspace or interstitial disease   - U/A   - blood culture x 2   -  mL/hr     T2DM - A1c 8.1 12/2018  - lantus 30 U nightly   - low dose SSI  - hypoglycemia protocol  - accuchecks AC/HS     HTN  - continue home spironolactone-hctz  - hydralazine PRN     Code Status:DNR-CC  FEN: DIET CARB CONTROL;  PPX: Lovenox  DISPO: GMF  PT/OT Eval Status: Consulted     I will discuss the patient with the senior resident and Mary Cheung MD  -----------------------------  Orquidea Marte MD  Internal Medicine Resident, PGY-1

## 2019-05-04 NOTE — ED PROVIDER NOTES
1 [facility]  EMERGENCY DEPARTMENT ENCOUNTER          ATTENDING PHYSICIAN NOTE       Date of evaluation: 5/4/2019    Chief Complaint     Fall      History of Present Illness     Makenzie Xie is a 77 y.o. female who presents with a chief complaint of fall. Patient was using the bathroom when she went to get up and slipped and fell over into her shower. She did strike her head but denies LOC. No lightheadedness. Complains of pain in her right arm where she had a previous fracture as well as her right side of her thoracic back. No SOB. No focal numbness or weakness. Location: R back and R humerus  Onset: Tonight  Quality: sharp  Radiation: none  Severity: 3/10  Exacerbating or relieving factors: worse with movement  Associated factors: associated with fall      Review of Systems     Review of Systems   Constitutional: Negative for chills and fever. HENT: Negative for congestion. Eyes: Negative for pain. Respiratory: Negative for cough, shortness of breath and wheezing. Cardiovascular: Negative for chest pain and leg swelling. Gastrointestinal: Negative for abdominal pain, diarrhea, nausea and vomiting. Genitourinary: Negative for dysuria. Musculoskeletal: Positive for arthralgias and back pain. Skin: Negative for rash. Neurological: Negative for weakness and headaches. All other systems reviewed and are negative.       Past Medical, Surgical, Family, and Social History         Diagnosis Date    ADHD (attention deficit hyperactivity disorder)     Attention deficit disorder without mention of hyperactivity 7/22/2010    Autoimmune disease NEC     Carotid artery disease ([address] 8/2/2013    LEFT CAROTID ENDARTERECTOMY               Carotid artery disease ([address] 8/3/2013    Chronic persistent hepatitis ([address] 7/22/2010    Depression     Depressive disorder, not elsewhere classified 7/22/2010    Double vision     left eye, since cataract surgery    Fractures     GERD (gastroesophageal reflux disease)     Neuropathy     Rheumatoid arthritis(714.0) 7/22/2010    Spinal stenosis     Type II or unspecified type diabetes mellitus without mention of complication, not stated as uncontrolled 7/22/2010    Unspecified cerebral artery occlusion with cerebral infarction     right hand, loss of some sensation         Procedure Laterality Date    COLONOSCOPY      COSMETIC SURGERY      tummy tuck,face lift,mammoplasties- hepatitis blood transfusion complication    EYE SURGERY Bilateral     cataract     Her family history is not on file. She reports that she has never smoked. She has never used smokeless tobacco. She reports that she drinks alcohol. She reports that she does not use drugs. Medications     Previous Medications    ASCORBIC ACID (VITAMIN C CR) 1000 MG TBCR    Take 1 tablet by mouth daily    B-D ULTRAFINE III SHORT PEN 31G X 8 MM MISC    USE DAILY AS DIRECTED    BIOTIN 1000 MCG TABS    Take one tablet daily in am.    DULOXETINE (CYMBALTA) 20 MG EXTENDED RELEASE CAPSULE    TAKE ONE CAPSULE BY MOUTH TWICE A DAY    ESOMEPRAZOLE (NEXIUM) 40 MG DELAYED RELEASE CAPSULE    TAKE ONE CAPSULE BY MOUTH TWICE A DAY    FREESTYLE LANCETS MISC    USE TO TEST BLOOD SUGAR ONCE DAILY.     FREESTYLE LITE STRIP    USE ONE STRIP TO TEST THREE TIMES A DAY    GUAIFENESIN (MUCINEX) 600 MG EXTENDED RELEASE TABLET    Take 1 tablet by mouth 2 times daily as needed for Congestion    IBUPROFEN (ADVIL) 200 MG TABLET    Take 1 tablet by mouth every 6 hours as needed for Pain (max 800 mg in 24 hours)    INSULIN GLARGINE (LANTUS SOLOSTAR) 100 UNIT/ML INJECTION PEN    Inject 30 Units into the skin nightly    MOMETASONE (ELOCON) 0.1 % CREAM    APPLY DAILY    MULTIPLE VITAMINS-MINERALS (CENTRUM SILVER) TABS    Take by mouth daily    PANTOPRAZOLE (PROTONIX) 40 MG TABLET    Take 40 mg by mouth 2 times daily    POLYETHYL GLYCOL-PROPYL GLYCOL 0.4-0.3 % (SYSTANE) 0.4-0.3 % OPHTHALMIC SOLUTION    Place 1 drop VIEWS)   Final Result     Almost completely healed. The fracture involving the surgical neck of    the right proximal humerus. Possible posterior angulation of the humeral    shaft at the fracture site. Suggestion of subacromial space narrowing at the periphery. If still    concerned for rotator cuff pathology, consider MRI. Diffuse osteopenia redemonstrated. No focal soft tissue abnormalities. CT Head WO Contrast   Final Result     No evidence of acute intracranial pathology. Diffuse involutional changes and chronic ischemic small vessel white    matter disease. XR HAND LEFT (MIN 3 VIEWS)   Final Result     No acute or healing fracture or acute posttraumatic malalignment. Findings suggest underlying rheumatoid arthritis or other similar    arthritic process. LABS:   No results found for this visit on 05/04/19. RECENT VITALS:  BP: (!) 154/59, Temp: 97.8 °F (36.6 °C), Pulse: 86, Resp: 20, SpO2: 95 %     Procedures       ED Course     Nursing Notes, Past Medical Hx, Past Surgical Hx, Social Hx, Allergies, and Family Hx were reviewed. The patient was given the following medications:  Orders Placed This Encounter   Medications    DISCONTD: lidocaine 4 % external patch 1 patch    lidocaine 4 % external patch 1 patch    ibuprofen (ADVIL;MOTRIN) tablet 800 mg    morphine (PF) injection 2 mg       CONSULTS:  IP CONSULT TO [address] / ASSESSMENT / Mary Anne Rishi is a 77 y.o. female who presents with a mechanical fall into a shower at home. Has evidence of head trauma but GCS 15. No anticoagulation. CT head and C-spine negative. Multiple right sided rib fractures. Healing proximal humerus fx. Discussed with Dr. Afshan Brush who was on call for Dr. Natali De Dios who agreed the patient would benefit from admission for pain control given her age and multiple rib fractures with significant pain here in the ED. Discussed with the AOD to be admitted to the resident service under Dr. Zach Nath. Clinical Impression     1. Closed fracture of multiple ribs of right side, initial encounter        Disposition     PATIENT REFERRED TO:  No follow-up provider specified.     DISCHARGE MEDICATIONS:  New Prescriptions    No medications on file       [address], MD  05/04/19 0637

## 2019-05-04 NOTE — ED NOTES
Bed: A09-09  Expected date:   Expected time:   Means of arrival:   Comments:  Guy Perla RN  05/04/19 0338

## 2019-05-04 NOTE — PROGRESS NOTES
Pt has been A&Ox4, but has been a bit forgetful occasionally this shift, RN has continued to encourage IS and encourage pt to ask for any bathroom needs. Pt;s lungs are clear but diminished, RN believes she heard heart murmur, active bowel sounds. Pt is occasionally incontinent but has been continent this shift. Specimen collected. Awaiting therapy evaluation. Will continue to monitor.

## 2019-05-04 NOTE — PROGRESS NOTES
Pt admitted to room 5324. VSS. Alert and oriented x4. Incontinent-changed. RN reviewed safety precautions and pt stated and demonstrated understanding - able to use call light and agreeable to do so. Pt now resting in bed flat - most comfortable position for her. Will report to day shift RN.

## 2019-05-04 NOTE — ED NOTES
Pt to Ed from [facility] after fall. Sts that she was going to the bathroom and \"got tangled up in her slippers\" and fell sideways into the shower. Denies LOC. Bruising noted to forehead, c/o right arm pain and right upper back/rib pain. Respirations unlabored. Pt alert and oriented. Arrives in C collar per EMS.      Yeison Fuentes RN  05/04/19 2947

## 2019-05-04 NOTE — PLAN OF CARE
Problem: Falls - Risk of:  Goal: Will remain free from falls  Description  Will remain free from falls  Outcome: Met This Shift  Note:   Pt is A&Ox4 but is a bit forgetful, pt is high fall risk, Pt educated and encouraged to use call light to notify and wait for assistance from staff before getting OOB, pt uses call light appropriately, has made no unsafe movements, no attempts to get OOB without assistance. Pt's bed alarm remained on throughout shift, bed in lowest position, 2/4 side rails up, call light and bedside table within reach. No falls so far this shift, will continue to monitor. Problem: Pain:  Goal: Pain level will decrease  Description  Pain level will decrease  Outcome: Met This Shift  Note:   Pt encouraged to use call light to notify staff when pain rises beyond tolerable. Pt educated on pain scale and was using call light appropriately. Pt has been c/o rib/back pain from the fractures, pt has lidocaine patch and given percocet PRN per orders, see MAR, pt tolerates well. Will continue to monitor.

## 2019-05-05 LAB
ANION GAP SERPL CALCULATED.3IONS-SCNC: 9 MMOL/L (ref 3–16)
BASOPHILS ABSOLUTE: 0.1 K/UL (ref 0–0.2)
BASOPHILS RELATIVE PERCENT: 0.8 %
BUN BLDV-MCNC: 32 MG/DL (ref 7–20)
CALCIUM SERPL-MCNC: 9.3 MG/DL (ref 8.3–10.6)
CHLORIDE BLD-SCNC: 107 MMOL/L (ref 99–110)
CO2: 22 MMOL/L (ref 21–32)
CREAT SERPL-MCNC: 0.9 MG/DL (ref 0.6–1.2)
EOSINOPHILS ABSOLUTE: 0.1 K/UL (ref 0–0.6)
EOSINOPHILS RELATIVE PERCENT: 1.2 %
GFR AFRICAN AMERICAN: >60
GFR NON-AFRICAN AMERICAN: >60
GLUCOSE BLD-MCNC: 118 MG/DL (ref 70–99)
GLUCOSE BLD-MCNC: 137 MG/DL (ref 70–99)
GLUCOSE BLD-MCNC: 204 MG/DL (ref 70–99)
GLUCOSE BLD-MCNC: 74 MG/DL (ref 70–99)
GLUCOSE BLD-MCNC: 84 MG/DL (ref 70–99)
HCT VFR BLD CALC: 30.9 % (ref 36–48)
HEMOGLOBIN: 10 G/DL (ref 12–16)
LYMPHOCYTES ABSOLUTE: 1.9 K/UL (ref 1–5.1)
LYMPHOCYTES RELATIVE PERCENT: 20.9 %
MCH RBC QN AUTO: 26.3 PG (ref 26–34)
MCHC RBC AUTO-ENTMCNC: 32.5 G/DL (ref 31–36)
MCV RBC AUTO: 80.9 FL (ref 80–100)
MONOCYTES ABSOLUTE: 1.7 K/UL (ref 0–1.3)
MONOCYTES RELATIVE PERCENT: 18.8 %
NEUTROPHILS ABSOLUTE: 5.2 K/UL (ref 1.7–7.7)
NEUTROPHILS RELATIVE PERCENT: 58.3 %
PDW BLD-RTO: 16.2 % (ref 12.4–15.4)
PERFORMED ON: ABNORMAL
PERFORMED ON: NORMAL
PLATELET # BLD: 228 K/UL (ref 135–450)
PMV BLD AUTO: 7.7 FL (ref 5–10.5)
POTASSIUM REFLEX MAGNESIUM: 4.4 MMOL/L (ref 3.5–5.1)
RBC # BLD: 3.82 M/UL (ref 4–5.2)
SODIUM BLD-SCNC: 138 MMOL/L (ref 136–145)
WBC # BLD: 9 K/UL (ref 4–11)

## 2019-05-05 PROCEDURE — 80048 BASIC METABOLIC PNL TOTAL CA: CPT

## 2019-05-05 PROCEDURE — 6370000000 HC RX 637 (ALT 250 FOR IP): Performed by: STUDENT IN AN ORGANIZED HEALTH CARE EDUCATION/TRAINING PROGRAM

## 2019-05-05 PROCEDURE — 94150 VITAL CAPACITY TEST: CPT

## 2019-05-05 PROCEDURE — 94761 N-INVAS EAR/PLS OXIMETRY MLT: CPT

## 2019-05-05 PROCEDURE — 99232 SBSQ HOSP IP/OBS MODERATE 35: CPT | Performed by: INTERNAL MEDICINE

## 2019-05-05 PROCEDURE — 2580000003 HC RX 258: Performed by: STUDENT IN AN ORGANIZED HEALTH CARE EDUCATION/TRAINING PROGRAM

## 2019-05-05 PROCEDURE — 85025 COMPLETE CBC W/AUTO DIFF WBC: CPT

## 2019-05-05 PROCEDURE — 36415 COLL VENOUS BLD VENIPUNCTURE: CPT

## 2019-05-05 PROCEDURE — 1200000000 HC SEMI PRIVATE

## 2019-05-05 PROCEDURE — 94664 DEMO&/EVAL PT USE INHALER: CPT

## 2019-05-05 PROCEDURE — 6360000002 HC RX W HCPCS: Performed by: STUDENT IN AN ORGANIZED HEALTH CARE EDUCATION/TRAINING PROGRAM

## 2019-05-05 RX ADMIN — SPIRONOLACTONE 25 MG: 25 TABLET ORAL at 07:56

## 2019-05-05 RX ADMIN — ENOXAPARIN SODIUM 40 MG: 40 INJECTION SUBCUTANEOUS at 07:57

## 2019-05-05 RX ADMIN — OXYCODONE HYDROCHLORIDE AND ACETAMINOPHEN 2 TABLET: 5; 325 TABLET ORAL at 01:52

## 2019-05-05 RX ADMIN — INSULIN LISPRO 1 UNITS: 100 INJECTION, SOLUTION INTRAVENOUS; SUBCUTANEOUS at 20:26

## 2019-05-05 RX ADMIN — DOCUSATE SODIUM 100 MG: 100 CAPSULE, LIQUID FILLED ORAL at 07:56

## 2019-05-05 RX ADMIN — Medication 10 ML: at 20:28

## 2019-05-05 RX ADMIN — OXYCODONE HYDROCHLORIDE AND ACETAMINOPHEN 2 TABLET: 5; 325 TABLET ORAL at 16:04

## 2019-05-05 RX ADMIN — OXYCODONE HYDROCHLORIDE AND ACETAMINOPHEN 2 TABLET: 5; 325 TABLET ORAL at 12:00

## 2019-05-05 RX ADMIN — SODIUM CHLORIDE, POTASSIUM CHLORIDE, SODIUM LACTATE AND CALCIUM CHLORIDE: 600; 310; 30; 20 INJECTION, SOLUTION INTRAVENOUS at 01:55

## 2019-05-05 RX ADMIN — Medication 10 ML: at 07:57

## 2019-05-05 RX ADMIN — DULOXETINE HYDROCHLORIDE 20 MG: 20 CAPSULE, DELAYED RELEASE ORAL at 20:14

## 2019-05-05 RX ADMIN — OXYCODONE HYDROCHLORIDE AND ACETAMINOPHEN 2 TABLET: 5; 325 TABLET ORAL at 20:14

## 2019-05-05 RX ADMIN — HYDROCHLOROTHIAZIDE 25 MG: 25 TABLET ORAL at 07:56

## 2019-05-05 RX ADMIN — DULOXETINE HYDROCHLORIDE 20 MG: 20 CAPSULE, DELAYED RELEASE ORAL at 07:56

## 2019-05-05 RX ADMIN — PANTOPRAZOLE SODIUM 40 MG: 40 TABLET, DELAYED RELEASE ORAL at 07:56

## 2019-05-05 RX ADMIN — OXYCODONE HYDROCHLORIDE AND ACETAMINOPHEN 2 TABLET: 5; 325 TABLET ORAL at 07:56

## 2019-05-05 ASSESSMENT — PAIN DESCRIPTION - LOCATION
LOCATION: RIB CAGE
LOCATION: RIB CAGE

## 2019-05-05 ASSESSMENT — PAIN SCALES - GENERAL
PAINLEVEL_OUTOF10: 7
PAINLEVEL_OUTOF10: 6
PAINLEVEL_OUTOF10: 7
PAINLEVEL_OUTOF10: 7
PAINLEVEL_OUTOF10: 0
PAINLEVEL_OUTOF10: 6
PAINLEVEL_OUTOF10: 7
PAINLEVEL_OUTOF10: 0
PAINLEVEL_OUTOF10: 5
PAINLEVEL_OUTOF10: 7

## 2019-05-05 ASSESSMENT — PAIN - FUNCTIONAL ASSESSMENT: PAIN_FUNCTIONAL_ASSESSMENT: ACTIVITIES ARE NOT PREVENTED

## 2019-05-05 ASSESSMENT — PAIN DESCRIPTION - ORIENTATION
ORIENTATION: RIGHT
ORIENTATION: RIGHT

## 2019-05-05 ASSESSMENT — PAIN DESCRIPTION - FREQUENCY
FREQUENCY: INTERMITTENT
FREQUENCY: INTERMITTENT

## 2019-05-05 ASSESSMENT — PAIN DESCRIPTION - PROGRESSION
CLINICAL_PROGRESSION: GRADUALLY IMPROVING
CLINICAL_PROGRESSION: RAPIDLY IMPROVING

## 2019-05-05 ASSESSMENT — PAIN DESCRIPTION - PAIN TYPE
TYPE: ACUTE PAIN
TYPE: ACUTE PAIN

## 2019-05-05 ASSESSMENT — PAIN DESCRIPTION - ONSET
ONSET: GRADUAL
ONSET: ON-GOING

## 2019-05-05 ASSESSMENT — PAIN DESCRIPTION - DESCRIPTORS
DESCRIPTORS: ACHING
DESCRIPTORS: ACHING

## 2019-05-05 NOTE — PLAN OF CARE
Problem: Falls - Risk of:  Goal: Will remain free from falls  Description  Will remain free from falls  Outcome: Ongoing  Note:   Patient up with walker, gait steady. Bed and chair alarms used. Call light kept in reach. Bed locked and in low position. Patient calls out appropriately for assistance. Safety maintained  Goal: Absence of physical injury  Description  Absence of physical injury  Outcome: Ongoing     Problem: Pain:  Description  Pain management should include both nonpharmacologic and pharmacologic interventions. Goal: Pain level will decrease  Description  Pain level will decrease  Outcome: Ongoing  Note:   Patient continues with rib pain. Percocet given with benefit. Encouraged ambulation and sitting up. Will monitor. Goal: Control of acute pain  Description  Control of acute pain  Outcome: Ongoing     Problem: Airway Clearance - Ineffective:  Goal: Ability to maintain a clear airway will improve  Description  Ability to maintain a clear airway will improve  Outcome: Ongoing  Note:   Patient not taking deep breaths in, due to discomfort. Encouraged pain medication as needed. Reviewed the risks of pneumonia with fractures and not deep breathing and coughing to clear secretions. Encouraged IS, reviewed instructions, needs re-enforcement. Ambulated patient in halls several times during shift. Will monitor.

## 2019-05-05 NOTE — PROGRESS NOTES
VSS. Pt has been sleeping for intervals. Pt assisted to BR with aide of her own wheeled walker. Pt tolerates well. Pt continues to report right berkley pain 7/10. PT medicated with prn percocet with good results. Neida gonzalezn

## 2019-05-05 NOTE — PLAN OF CARE
Problem: Falls - Risk of:  Goal: Will remain free from falls  Description  Will remain free from falls  5/5/2019 0031 by Tariq Nava RN  Outcome: Ongoing  Pt remains in fall precautions. Bed locked in low position with alarm set. Frequent rounding done to assess for needs. Pt has been using call light for needs. Problem: Pain:  Goal: Control of acute pain  Description  Control of acute pain  Outcome: Ongoing  Pt c/o right sided rib/chest pain 7/10. Pt medicated with prn percocet. Pt sleeping upon reassessment.

## 2019-05-05 NOTE — PROGRESS NOTES
Internal Medicine  Progress Note  PGY-3    Hospital Day: 2                                                      Admit Date: 5/4/2019                                     PCP: Betsy Rogers MD      CC:  Fall, broken ribs                      Interval Hx: No acute events. Daily Plan:  5/5/2019    Subjective: Patient seen and examined at bedside. Right-sided chest pain with cough, pain moderately well controlled with current PRN meds. Medications:    Scheduled Meds:   insulin glargine  25 Units Subcutaneous Nightly    DULoxetine  20 mg Oral BID    pantoprazole  40 mg Oral QAM AC    sodium chloride flush  10 mL Intravenous 2 times per day    enoxaparin  40 mg Subcutaneous Daily    insulin lispro  0-6 Units Subcutaneous TID WC    insulin lispro  0-3 Units Subcutaneous Nightly    spironolactone  25 mg Oral Daily    And    hydrochlorothiazide  25 mg Oral Daily    docusate sodium  100 mg Oral Daily      Continuous Infusions:   dextrose       PRN Meds:sodium chloride flush, magnesium hydroxide, ondansetron, acetaminophen, hydrALAZINE, glucose, dextrose, glucagon (rDNA), dextrose, oxyCODONE-acetaminophen **OR** oxyCODONE-acetaminophen    Allergies: Allergies   Allergen Reactions    Erythromycin     Sulfites        Physical Exam:     Vitals: BP (!) 160/53   Pulse 78   Temp 98.2 °F (36.8 °C)   Resp 16   Ht 5' 2.99\" (1.6 m)   Wt 131 lb (59.4 kg)   SpO2 94%   BMI 23.21 kg/m²     I/O:      Intake/Output Summary (Last 24 hours) at 5/5/2019 1120  Last data filed at 5/5/2019 1024  Gross per 24 hour   Intake 2038. 33 ml   Output 625 ml   Net 1413.33 ml         Physical Exam   Constitutional: She is oriented to person, place, and time. She appears well-developed and well-nourished. HENT:   Head: Normocephalic and atraumatic. Eyes: Pupils are equal, round, and reactive to light. EOM are normal. Right eye exhibits no discharge. Left eye exhibits no discharge. No scleral icterus.    Neck: Normal range of Suggestion of subacromial space narrowing at the periphery. If still    concerned for rotator cuff pathology, consider MRI. Diffuse osteopenia redemonstrated. No focal soft tissue abnormalities. CT Head WO Contrast   Final Result     No evidence of acute intracranial pathology. Diffuse involutional changes and chronic ischemic small vessel white    matter disease. XR HAND LEFT (MIN 3 VIEWS)   Final Result     No acute or healing fracture or acute posttraumatic malalignment. Findings suggest underlying rheumatoid arthritis or other similar    arthritic process. ASSESSMENT AND PLAN:   78 yo female presenting with rib pain after mechanical fall. Multiple closed rib fractures on R side 2/2 mechanical fall - CT chest showed multiple R sided minimally displaced rib fractures. CT head negative. CT c-spine negative.     - percocet pain panel   - lidocaine patch   - incentive spirometry   - PT/OT     T2DM - A1c 8.1 12/2018  - lantus 30 U nightly   - low dose SSI  - hypoglycemia protocol  - accuchecks AC/HS     HTN  - continue home spironolactone-hctz  - hydralazine PRN     Lower extremity edema  - continue aldactazide  - elevate legs in recliner  - discontinued IVF    Code Status:DNR-CC  FEN: DIET CARB CONTROL;  PPX: Lovenox  DISPO: F  PT/OT Eval Status: Consulted     I will discuss the patient with Eliu Rubio MD  -----------------------------  Haley Real MD  Internal Medicine Resident, PGY-3

## 2019-05-05 NOTE — PROGRESS NOTES
Patient alert and oriented, forgetful at times. Vitals stable. Acute rib pain controlled with percocet. Patient up with four wheeled walker , gait steady. Encouraged IS, and CDB. Patient ambulated in halls several times. Up to chair, with waffle cushion. Chair alarm on. Call light in reach. Will monitor.

## 2019-05-06 VITALS
HEART RATE: 83 BPM | WEIGHT: 131 LBS | BODY MASS INDEX: 23.21 KG/M2 | RESPIRATION RATE: 18 BRPM | TEMPERATURE: 97.7 F | HEIGHT: 63 IN | OXYGEN SATURATION: 91 % | SYSTOLIC BLOOD PRESSURE: 133 MMHG | DIASTOLIC BLOOD PRESSURE: 79 MMHG

## 2019-05-06 LAB
ALBUMIN SERPL-MCNC: 3.1 G/DL (ref 3.4–5)
ANION GAP SERPL CALCULATED.3IONS-SCNC: 8 MMOL/L (ref 3–16)
BASOPHILS ABSOLUTE: 0.1 K/UL (ref 0–0.2)
BASOPHILS RELATIVE PERCENT: 1 %
BUN BLDV-MCNC: 32 MG/DL (ref 7–20)
CALCIUM SERPL-MCNC: 9.8 MG/DL (ref 8.3–10.6)
CHLORIDE BLD-SCNC: 103 MMOL/L (ref 99–110)
CO2: 23 MMOL/L (ref 21–32)
CREAT SERPL-MCNC: 0.8 MG/DL (ref 0.6–1.2)
EOSINOPHILS ABSOLUTE: 0.1 K/UL (ref 0–0.6)
EOSINOPHILS RELATIVE PERCENT: 1 %
GFR AFRICAN AMERICAN: >60
GFR NON-AFRICAN AMERICAN: >60
GLUCOSE BLD-MCNC: 108 MG/DL (ref 70–99)
GLUCOSE BLD-MCNC: 134 MG/DL (ref 70–99)
GLUCOSE BLD-MCNC: 91 MG/DL (ref 70–99)
HCT VFR BLD CALC: 32.6 % (ref 36–48)
HEMOGLOBIN: 10.6 G/DL (ref 12–16)
LYMPHOCYTES ABSOLUTE: 1.8 K/UL (ref 1–5.1)
LYMPHOCYTES RELATIVE PERCENT: 16 %
MCH RBC QN AUTO: 26.2 PG (ref 26–34)
MCHC RBC AUTO-ENTMCNC: 32.6 G/DL (ref 31–36)
MCV RBC AUTO: 80.6 FL (ref 80–100)
MONOCYTES ABSOLUTE: 1.9 K/UL (ref 0–1.3)
MONOCYTES RELATIVE PERCENT: 17 %
NEUTROPHILS ABSOLUTE: 7.3 K/UL (ref 1.7–7.7)
NEUTROPHILS RELATIVE PERCENT: 65 %
PDW BLD-RTO: 16.3 % (ref 12.4–15.4)
PERFORMED ON: ABNORMAL
PERFORMED ON: NORMAL
PHOSPHORUS: 3.7 MG/DL (ref 2.5–4.9)
PLATELET # BLD: 250 K/UL (ref 135–450)
PMV BLD AUTO: 7.6 FL (ref 5–10.5)
POTASSIUM SERPL-SCNC: 5.1 MMOL/L (ref 3.5–5.1)
RBC # BLD: 4.04 M/UL (ref 4–5.2)
SODIUM BLD-SCNC: 134 MMOL/L (ref 136–145)
URINE CULTURE, ROUTINE: NORMAL
WBC # BLD: 11.3 K/UL (ref 4–11)

## 2019-05-06 PROCEDURE — 97535 SELF CARE MNGMENT TRAINING: CPT

## 2019-05-06 PROCEDURE — 94760 N-INVAS EAR/PLS OXIMETRY 1: CPT

## 2019-05-06 PROCEDURE — 97161 PT EVAL LOW COMPLEX 20 MIN: CPT

## 2019-05-06 PROCEDURE — 94664 DEMO&/EVAL PT USE INHALER: CPT

## 2019-05-06 PROCEDURE — 97165 OT EVAL LOW COMPLEX 30 MIN: CPT

## 2019-05-06 PROCEDURE — 99238 HOSP IP/OBS DSCHRG MGMT 30/<: CPT | Performed by: INTERNAL MEDICINE

## 2019-05-06 PROCEDURE — 6360000002 HC RX W HCPCS: Performed by: STUDENT IN AN ORGANIZED HEALTH CARE EDUCATION/TRAINING PROGRAM

## 2019-05-06 PROCEDURE — 6370000000 HC RX 637 (ALT 250 FOR IP): Performed by: STUDENT IN AN ORGANIZED HEALTH CARE EDUCATION/TRAINING PROGRAM

## 2019-05-06 PROCEDURE — 85025 COMPLETE CBC W/AUTO DIFF WBC: CPT

## 2019-05-06 PROCEDURE — 94150 VITAL CAPACITY TEST: CPT

## 2019-05-06 PROCEDURE — 36415 COLL VENOUS BLD VENIPUNCTURE: CPT

## 2019-05-06 PROCEDURE — 2580000003 HC RX 258: Performed by: STUDENT IN AN ORGANIZED HEALTH CARE EDUCATION/TRAINING PROGRAM

## 2019-05-06 PROCEDURE — 80069 RENAL FUNCTION PANEL: CPT

## 2019-05-06 PROCEDURE — 97116 GAIT TRAINING THERAPY: CPT

## 2019-05-06 PROCEDURE — 97530 THERAPEUTIC ACTIVITIES: CPT

## 2019-05-06 RX ORDER — PSEUDOEPHEDRINE HCL 30 MG
100 TABLET ORAL DAILY
Qty: 30 CAPSULE | Refills: 0 | Status: SHIPPED | OUTPATIENT
Start: 2019-05-07 | End: 2021-03-03

## 2019-05-06 RX ORDER — OXYCODONE HYDROCHLORIDE AND ACETAMINOPHEN 5; 325 MG/1; MG/1
1 TABLET ORAL EVERY 6 HOURS PRN
Qty: 20 TABLET | Refills: 0 | Status: SHIPPED | OUTPATIENT
Start: 2019-05-06 | End: 2019-05-10 | Stop reason: SDUPTHER

## 2019-05-06 RX ADMIN — HYDROCHLOROTHIAZIDE 25 MG: 25 TABLET ORAL at 08:27

## 2019-05-06 RX ADMIN — OXYCODONE HYDROCHLORIDE AND ACETAMINOPHEN 2 TABLET: 5; 325 TABLET ORAL at 04:17

## 2019-05-06 RX ADMIN — PANTOPRAZOLE SODIUM 40 MG: 40 TABLET, DELAYED RELEASE ORAL at 08:27

## 2019-05-06 RX ADMIN — OXYCODONE HYDROCHLORIDE AND ACETAMINOPHEN 2 TABLET: 5; 325 TABLET ORAL at 16:13

## 2019-05-06 RX ADMIN — ENOXAPARIN SODIUM 40 MG: 40 INJECTION SUBCUTANEOUS at 08:27

## 2019-05-06 RX ADMIN — DOCUSATE SODIUM 100 MG: 100 CAPSULE, LIQUID FILLED ORAL at 08:27

## 2019-05-06 RX ADMIN — SPIRONOLACTONE 25 MG: 25 TABLET ORAL at 08:27

## 2019-05-06 RX ADMIN — Medication 10 ML: at 08:29

## 2019-05-06 RX ADMIN — OXYCODONE HYDROCHLORIDE AND ACETAMINOPHEN 2 TABLET: 5; 325 TABLET ORAL at 00:32

## 2019-05-06 RX ADMIN — OXYCODONE HYDROCHLORIDE AND ACETAMINOPHEN 2 TABLET: 5; 325 TABLET ORAL at 08:27

## 2019-05-06 RX ADMIN — OXYCODONE HYDROCHLORIDE AND ACETAMINOPHEN 2 TABLET: 5; 325 TABLET ORAL at 12:09

## 2019-05-06 RX ADMIN — DULOXETINE HYDROCHLORIDE 20 MG: 20 CAPSULE, DELAYED RELEASE ORAL at 08:27

## 2019-05-06 ASSESSMENT — PAIN SCALES - GENERAL
PAINLEVEL_OUTOF10: 5
PAINLEVEL_OUTOF10: 8
PAINLEVEL_OUTOF10: 4
PAINLEVEL_OUTOF10: 8
PAINLEVEL_OUTOF10: 0
PAINLEVEL_OUTOF10: 10
PAINLEVEL_OUTOF10: 8
PAINLEVEL_OUTOF10: 3
PAINLEVEL_OUTOF10: 7

## 2019-05-06 ASSESSMENT — PAIN DESCRIPTION - ONSET
ONSET: ON-GOING
ONSET: ON-GOING
ONSET: AWAKENED FROM SLEEP
ONSET: ON-GOING
ONSET: ON-GOING

## 2019-05-06 ASSESSMENT — PAIN DESCRIPTION - PROGRESSION
CLINICAL_PROGRESSION: NOT CHANGED
CLINICAL_PROGRESSION: NOT CHANGED
CLINICAL_PROGRESSION: GRADUALLY WORSENING
CLINICAL_PROGRESSION: NOT CHANGED
CLINICAL_PROGRESSION: GRADUALLY IMPROVING

## 2019-05-06 ASSESSMENT — PAIN DESCRIPTION - DESCRIPTORS
DESCRIPTORS: ACHING
DESCRIPTORS: ACHING;THROBBING
DESCRIPTORS: ACHING
DESCRIPTORS: ACHING;THROBBING
DESCRIPTORS: ACHING

## 2019-05-06 ASSESSMENT — PAIN DESCRIPTION - ORIENTATION
ORIENTATION: RIGHT

## 2019-05-06 ASSESSMENT — PAIN DESCRIPTION - LOCATION
LOCATION: RIB CAGE

## 2019-05-06 ASSESSMENT — PAIN DESCRIPTION - PAIN TYPE
TYPE: ACUTE PAIN

## 2019-05-06 ASSESSMENT — PAIN DESCRIPTION - FREQUENCY
FREQUENCY: INTERMITTENT
FREQUENCY: CONTINUOUS
FREQUENCY: INTERMITTENT
FREQUENCY: CONTINUOUS
FREQUENCY: INTERMITTENT

## 2019-05-06 NOTE — PROGRESS NOTES
Occupational Therapy   Occupational Therapy Initial Assessment and Treatment  Late Entry for 1128  Possible Discharge  Date: 2019   Patient Name: Heather Oreilly  MRN: [id number]     : 1940    Date of Service: 2019    Discharge Recommendations:  Heather Oreilly scored a 20/24 on the AM-PAC ADL Inpatient form. Current research shows that an AM-PAC score of 18 or greater is typically associated with a discharge to the patient's home setting. Based on the patients AM-PAC score and their current ADL deficits, it is recommended that the patient have 2-3 sessions per week of Occupational Therapy at d/c to increase the patients independence. HOME HEALTH CARE: LEVEL 1 STANDARD    - Initial home health evaluation to occur within 24-48 hours, in patient home   - Therapy to evaluate with goal of regaining prior level of functioning   - Therapy to evaluate if patient has [address] needs for personal care         OT Equipment Recommendations  Equipment Needed: No    Assessment   Performance deficits / Impairments: Decreased functional mobility ; Decreased ADL status  Assessment: Pt admitted s/p fall - pt reports she got up to use toilet and attempted to ambulate to toilet w/o walker (2* short distance and urinary frequency). Pt states she is aware she should have changed positions more slowly and aware she should have taken her walker with her. Pt is able to perform ADLs w/ SB/CGA and able to ambulate using 4 wh walker and SBA. Plans are to return to [address] [place] [address] w/ prior level of assist and children assist (24 hour assist). Pt will benefit from continued OT at home. No DME needs. Continue per POC.   Treatment Diagnosis: impaired ADLs/transfers s/p fall  Prognosis: Good  Decision Making: Low Complexity  Patient Education: role of OT -stated understanding  REQUIRES OT FOLLOW UP: Yes  Activity Tolerance  Activity Tolerance: Patient Tolerated treatment well  Safety Devices  Safety Devices in place: Yes  Type of devices: Nurse notified; Left in chair;Chair alarm in place;Call light within reach(daughter at bedside; PT arrived in room)           Patient Diagnosis(es): The encounter diagnosis was Closed fracture of multiple ribs of right side, initial encounter. has a past medical history of ADHD (attention deficit hyperactivity disorder), Attention deficit disorder without mention of hyperactivity, Autoimmune disease NEC, Carotid artery disease ([facility] [address] Carotid artery disease ([facility] [address] Chronic persistent hepatitis ([facility] [address] Depression, Depressive disorder, not elsewhere classified, Double vision, Fractures, GERD (gastroesophageal reflux disease), Neuropathy, Rheumatoid arthritis(714.0), Spinal stenosis, Type II or unspecified type diabetes mellitus without mention of complication, not stated as uncontrolled, and Unspecified cerebral artery occlusion with cerebral infarction. has a past surgical history that includes Cosmetic surgery; eye surgery (Bilateral); and Colonoscopy. Treatment Diagnosis: impaired ADLs/transfers s/p fall      Restrictions  Position Activity Restriction  Other position/activity restrictions: Up with assist    Subjective   General  Chart Reviewed: Yes  Additional Pertinent Hx: 77 y.o. F to ED 5/4 w/ c/o fall (got up from bathroom, slipped and fell into shower, did strike head). Hospital Course:  L hand xray: neg fx, RA; CT Head: Neg; R Humerus xray: almost completely healed fx surgical neck R prox humerus; CT Chest: (+) Acute nondisplaced to minimally displaced right rib fractures; CT c-spine: neg fx. PMH:    Family / Caregiver Present: Yes(daughter)  Referring Practitioner: Dr. Yvonne Combs  Diagnosis: Closed Fx of Multiple Ribs of R-side    Subjective  Subjective: Supine in bed on entry. Eager to be discharged home. Stating she does not want to go to rehab. \"I'm going home and I have 3 adult children that will be helping me. \"    Pain Assessment  Pain Assessment: pain - not rated, requesting pain meds    Social/Functional History  Social/Functional History  Lives With: Alone  Type of Home: (Independent Living)  Home Layout: One level  Home Access: Level entry, Elevator  Bathroom Shower/Tub: Walk-in shower  Bathroom Toilet: Standard  Bathroom Equipment: Grab bars in shower, Shower chair, Hand-held shower  Bathroom Accessibility: Accessible  Home Equipment: 4 wheeled walker, Alert [employer] Help From: (homecare 2 hours/day M-F)  ADL Assistance: (assist w/ shower; independent w/ dressing)  Ambulation Assistance: Independent(using 4 wh walker)  Transfer Assistance: Independent  Active : No  Additional Comments: family has looked into floor mount bedrail; 3 grown children intend to provide support at discharge       Objective   Vision: Within Functional Limits  Hearing: Within functional limits      Orientation  Overall Orientation Status: Within Functional Limits(oriented w/ conversation; not formally questioned)     Balance  Sitting Balance: (initially CGA and progressed to Supervision (static); SBA - dynamic (LB dressing))    Functional Mobility  Functional - Mobility Device: 4-Wheeled Walker  Activity: To/from bathroom; Other(and mobility in hallway)  Assist Level: Stand by assistance    Toilet Transfers  Toilet - Technique: Ambulating(using 4 wh walker)  Equipment Used: Standard toilet(w/ bar)  Toilet Transfer: Stand by assistance    ADL  Grooming: Stand by assistance(hand hygiene at sink, standing)  LE Dressing: Contact guard assistance(don/doff depends - increased time/effort)  Toileting: Contact guard assistance(incontinent at baseline; depends saturated w/ urine)  Coordination  Movements Are Fluid And Coordinated: No  Quality of Movement Other  Comment: arthritic hands; both hands w/ digits 2-5 w/ ulnar deviation; both thumbs collapsed joint     Bed mobility  Supine to Sit: Moderate assistance(hand held assist; attempted to have pt utilize log roll method and pt declined 2* pain \"I don't roll well\")  Comment: Note: discussed w/ dtr recommendation to utilize bedrail (dtr states she/other children are investigating their options for a floor mount bedrail 2* style of bed)     Transfers  Sit to stand: Stand by assistance  Stand to sit: Stand by assistance  Transfer Comments: Note: cues to lock brakes during all transitions; cues to reach back to surface prior to sitting down     Cognition  Overall Cognitive Status: Exceptions  Memory: Decreased short term memory  Cognition Comment: pleasant, cooperative; forgetful - multiple reminders to lock brakes of 4 wh walker before sitting down, decreased retention                    LUE Strength  Gross LUE Strength: WFL  RUE Strength  Gross RUE Strength: WFL                   Plan  This note will serve as a discharge summary in the event the patient is discharged prior to next treatment session.     Plan  Times per week: 2-5  Times per day: Daily  Current Treatment Recommendations: Strengthening, Endurance Training, Functional Mobility Training, Safety Education & Training, Self-Care / ADL                                                      AM-PAC Score        AM-[facility] Inpatient Daily Activity Raw Score: 20  AM-PAC Inpatient ADL T-Scale Score : 42.03  ADL Inpatient CMS 0-100% Score: 38.32  ADL Inpatient CMS G-Code Modifier : CJ    Goals  Short term goals  Time Frame for Short term goals: Discharge  Short term goal 1: toilet transfer w/ Supervision  Short term goal 2: LB dressing w/ Supervision  Short term goal 3: grooming tasks in standing w/ Supervision  Short term goal 4: functional mobility for item transport and retrieval and Supervision  Short term goal 5: supine to sit w/ Min A  Patient Goals   Patient goals : [place] w/ children assist       Therapy Time   Individual Concurrent Group Co-treatment   Time In 1033         Time Out 1128         Minutes 55              Timed Code Treatment Minutes:   40    Total Treatment Minutes:  Roshan BURROWS/SCOTTIE #[phone]

## 2019-05-06 NOTE — PROGRESS NOTES
Physical Therapy    Facility/Department: [facility]  Initial Assessment and Treatment    NAME: Jacob Butler  : 1940  MRN: [id number]    Date of Service: 2019    Discharge Recommendations:    Jacob Butler scored a 18/24 on the AM-PAC short mobility form. Current research shows that an AM-PAC score of 18 or greater is typically associated with a discharge to the patient's home setting. Based on the patients AM-PAC score and their current functional mobility deficits, it is recommended that the patient have 2-3 sessions per week of Physical Therapy at d/c to increase the patients independence. PT Equipment Recommendations  Equipment Needed: No    Assessment   Assessment: Pt is 76 yo F with fairly good functional mobility. Pt feels she is functioning not far from her baseline. Pt is experiencing rib pain, which limits her bed mobility per OT. Pt and family advised they can purchase a bedrail for PT's bed at home. Pt is hopeful to return home with increased assistance from her family. Will follow. Treatment Diagnosis: Decreased functional mobility related to closed fracture of multiple ribson R side  Prognosis: Good  Decision Making: Low Complexity  Patient Education: Role of PT- pt verbalized understanding  REQUIRES PT FOLLOW UP: Yes  Activity Tolerance  Activity Tolerance: Patient Tolerated treatment well       Patient Diagnosis(es): The encounter diagnosis was Closed fracture of multiple ribs of right side, initial encounter.      has a past medical history of ADHD (attention deficit hyperactivity disorder), Attention deficit disorder without mention of hyperactivity, Autoimmune disease NEC, Carotid artery disease ([address] Carotid artery disease ([address] Chronic persistent hepatitis ([address] Depression, Depressive disorder, not elsewhere classified, Double vision, Fractures, GERD (gastroesophageal reflux disease), Neuropathy, Rheumatoid arthritis(714.0), Spinal stenosis, Type II or unspecified type diabetes mellitus without mention of complication, not stated as uncontrolled, and Unspecified cerebral artery occlusion with cerebral infarction. has a past surgical history that includes Cosmetic surgery; eye surgery (Bilateral); and Colonoscopy. Restrictions  Position Activity Restriction  Other position/activity restrictions: Up with assist     Vision/Hearing  Vision: Within Functional Limits  Hearing: Within functional limits       Subjective  General  Chart Reviewed: Yes  Additional Pertinent Hx: Pt admitted on 5/4/19 with closed fracture of multiple ribs of R side. H/o ADHD, CAD, RA, depression, neuropathy, DM2, spinal stenosis.   Family / Caregiver Present: No  Referring Practitioner: Dr. Bryce Santacruz  Subjective  Subjective: Pt sitting up in chair and agreeable to PT  Pain Screening  Patient Currently in Pain: Yes, 5/10, nursing notified     Orientation  Orientation  Overall Orientation Status: Within Functional Limits     Social/Functional History  Social/Functional History  Lives With: Alone  Type of Home: (Independent Living)  Home Layout: One level  Home Access: Level entry, Elevator  Bathroom Shower/Tub: Walk-in shower  Bathroom Toilet: Standard  Bathroom Equipment: Grab bars in shower, Shower chair, Hand-held shower  Bathroom Accessibility: Accessible  Home Equipment: 4 wheeled walker, Alert [employer] Help From: (homecare 2 hours/day M-F)  ADL Assistance: (assist w/ shower; independent w/ dressing)  Ambulation Assistance: Independent(using 4 wh walker)  Transfer Assistance: Independent  Active : No  Additional Comments: family has looked into floor mount bedrail; 3 grown children intend to provide support at discharge    Objective  AROM RLE (degrees)  RLE AROM: WFL  AROM LLE (degrees)  LLE AROM : WFL     Strength RLE  Strength RLE: WFL  Strength LLE  Strength LLE: WFL     Transfers  Sit to Stand: Stand by assistance  Stand to sit: Stand by assistance  Comment: Cues for safe use of walker with keeping walker with pt and applying hand brakes      Ambulation  Ambulation?: Yes  Ambulation 1  Surface: level tile  Device: Rollator  Assistance: Stand by assistance  Quality of Gait: Slow and steady gait, flexed trunk, small B steps  Distance: 250 feet     Balance  Sitting - Static: Good  Standing - Dynamic: (SBA with rollator)      Treatment:  Functional mobility training and pt education    Plan   Plan  Times per week: 2-5  Current Treatment Recommendations: Gait Training, Balance Training, Functional Mobility Training, Transfer Training, Safety Education & Training  Safety Devices  Type of devices: Left in chair, Call light within reach, Chair alarm in place, Nurse notified    AM-PAC Score  AM-PAC Inpatient Mobility Raw Score : 18  AM-PAC Inpatient T-Scale Score : 43.63  Mobility Inpatient CMS 0-100% Score: 46.58  Mobility Inpatient CMS G-Code Modifier : CK    Goals  Short term goals  Time Frame for Short term goals: by discharge  Short term goal 1: Sit to stand with supervision  Short term goal 2: Pt will ambulate 300 feet with rollator and supervision  Patient Goals   Patient goals : Return home     Therapy Time   Individual Concurrent Group Co-treatment   Time In 1115         Time Out 1138         Minutes 23           Timed Code Treatment Minutes:   8    Total Treatment Minutes:  23     If pt d/c'd prior to next treatment, this note serves as a discharge note.   [place], [address] [address]

## 2019-05-06 NOTE — PROGRESS NOTES
Internal Medicine  Progress Note  PGY-1    Hospital Day: 3                                                      Admit Date: 5/4/2019                                     PCP: Mouna Chu MD      CC:  Fall, broken ribs                      Interval Hx: No acute events overnight. Daily Plan:  5/6/2019    Subjective: Patient seen and examined at bedside. Still with right sided pain, but Percocet is helping. States she has sore throat. No SOB, abdominal pain, nausea, vomiting, diarrhea. Patient and daughter do not want SNF placement, they would prefer patient go home with PT/OT and nursing care. Medications:    Scheduled Meds:   insulin glargine  25 Units Subcutaneous Nightly    DULoxetine  20 mg Oral BID    pantoprazole  40 mg Oral QAM AC    sodium chloride flush  10 mL Intravenous 2 times per day    enoxaparin  40 mg Subcutaneous Daily    insulin lispro  0-6 Units Subcutaneous TID WC    insulin lispro  0-3 Units Subcutaneous Nightly    spironolactone  25 mg Oral Daily    And    hydrochlorothiazide  25 mg Oral Daily    docusate sodium  100 mg Oral Daily      Continuous Infusions:   dextrose       PRN Meds:sodium chloride flush, magnesium hydroxide, ondansetron, acetaminophen, hydrALAZINE, glucose, dextrose, glucagon (rDNA), dextrose, oxyCODONE-acetaminophen **OR** oxyCODONE-acetaminophen    Allergies: Allergies   Allergen Reactions    Erythromycin     Sulfites          Physical Exam:     Vitals: /79   Pulse 83   Temp 97.7 °F (36.5 °C) (Oral)   Resp 18   Ht 5' 2.99\" (1.6 m)   Wt 131 lb (59.4 kg)   SpO2 93%   BMI 23.21 kg/m²     I/O:      Intake/Output Summary (Last 24 hours) at 5/6/2019 0852  Last data filed at 5/6/2019 0418  Gross per 24 hour   Intake 840 ml   Output 525 ml   Net 315 ml       Physical Exam  Constitutional: She is oriented to person, place, and time. She appears well-developed and well-nourished. Head: Normocephalic and atraumatic.  No erythema, exudates or swelling posterior pharynx . Eyes: Pupils are equal, round, and reactive to light. EOM are normal. Right eye exhibits no discharge. Left eye exhibits no discharge. No scleral icterus. Neck: Normal range of motion. Neck supple. Cardiovascular: Normal rate and regular rhythm. Exam reveals no gallop and no friction rub. Murmur (systolic murmur grade II/VI) heard. Pulmonary/Chest: Effort normal and breath sounds normal. No stridor. No respiratory distress. She has no wheezes. She has no rales. Tenderness to palpation posterior R rib cage, no erythema or rashes    Abdominal: Soft. Bowel sounds are normal. She exhibits no distension. There is no tenderness. There is no rebound and no guarding. Musculoskeletal: Normal range of motion. She exhibits 1+ pitting edema bilateral lower extremities. She exhibits no tenderness. Neurological: She is alert and oriented to person, place, and time. No cranial nerve deficit. Skin: Skin is warm and dry. Capillary refill takes less than 2 seconds. She is not diaphoretic. No erythema. DATA:       Labs  CBC:   Recent Labs     05/04/19  0517 05/05/19  0659 05/06/19  0622   WBC 17.4* 9.0 11.3*   HGB 11.4* 10.0* 10.6*   HCT 35.2* 30.9* 32.6*    228 250       BMP:   Recent Labs     05/04/19  0517 05/05/19  0659    138   K 4.6 4.4    107   CO2 21 22   BUN 33* 32*   CREATININE 0.8 0.9   GLUCOSE 245* 84     Urinalysis:  Lab Results   Component Value Date    NITRU Negative 05/04/2019    WBCUA 0-2 05/04/2019    BACTERIA SEE NOTES 12/21/2018    BACTERIA 1+ 10/18/2017    RBCUA None seen 05/04/2019    RBCUA NEGATIVE 12/21/2018    BLOODU Negative 05/04/2019    SPECGRAV 1.015 05/04/2019    GLUCOSEU Negative 05/04/2019       Radiology:  CT CHEST WO CONTRAST   Final Result     Acute nondisplaced to minimally displaced right rib fractures. No    associated pneumothorax or pulmonary contusion.           CT Cervical Spine WO Contrast   Final Result     No evidence of acute or healing fracture or malalignment. XR HUMERUS RIGHT (MIN 2 VIEWS)   Final Result     Almost completely healed. The fracture involving the surgical neck of    the right proximal humerus. Possible posterior angulation of the humeral    shaft at the fracture site. Suggestion of subacromial space narrowing at the periphery. If still    concerned for rotator cuff pathology, consider MRI. Diffuse osteopenia redemonstrated. No focal soft tissue abnormalities. CT Head WO Contrast   Final Result     No evidence of acute intracranial pathology. Diffuse involutional changes and chronic ischemic small vessel white    matter disease. XR HAND LEFT (MIN 3 VIEWS)   Final Result     No acute or healing fracture or acute posttraumatic malalignment. Findings suggest underlying rheumatoid arthritis or other similar    arthritic process. ASSESSMENT AND PLAN:   78 yo female presenting with rib pain after mechanical fall.      Multiple closed rib fractures on R side 2/2 mechanical fall - CT chest showed multiple R sided minimally displaced rib fractures. CT head negative.  CT c-spine negative.    - percocet pain panel   - lidocaine patch   - incentive spirometry   - PT/OT     T2DM - A1c 8.1 12/2018  - lantus 30 U nightly   - low dose SSI  - hypoglycemia protocol  - accuchecks AC/HS     HTN  - continue home spironolactone-hctz  - hydralazine PRN      Lower extremity edema  - continue aldactazide  - elevate legs in recliner    Code Status:DNR-CC  FEN: DIET CARB CONTROL;  PPX: Lovenox   DISPO: GMF, plan for DC with home health today   PT/OT Eval Status: Consulted     I will discuss the patient with the senior resident and Ashley Carter MD  -----------------------------  Lc Schulz MD  Internal Medicine Resident, PGY-1

## 2019-05-06 NOTE — DISCHARGE SUMMARY
INTERNAL MEDICINE    RESIDENT DISCHARGE SUMMARY   Discharge Summaries      Patient ID: Heather Oreilly   Gender: female      :  1940  AGE: 77 y.o. MRN:  [id number]  Code Status: DNR-CC   PCP: Rosemarie Graham MD    Admit date:  2019      Discharge date:  19    Admitting Physician:  Rosemarie Graham MD    Discharge Physician: Emely Mcelroy MD     Discharged Condition:  Stable     Discharge Diagnoses: Active Hospital Problems    Diagnosis Date Noted    Multiple closed fractures of ribs of right side [S22.41XA] 2019       The patient was seen and examined on day of discharge and this discharge summary is in conjunction with any daily progress note from day of discharge. Hospital Course:   77 y.o. female with PMH as mentioned below who presented to [facility] after a fall at home. Patient stated she fell in the bathroom while trying to use the toilet. CT of the chest was obtained and showed acute nondisplaced right fourth through sixth rib fractures anteriorly and right seventh through 10th rib fractures posteriorly, No dislocation. Other imaging results as below. Initially with leukocytosis which improved, likely reactive, no source of infection found. Pain was controlled with lidocaine patch and percocet. Patient evaluated by PT/OT and will be discharged home with home health care. Patient given 5 days of Percocet on discharge. Patient discharged in stable condition. Disposition:  Home with Home Health Care    Physical Exam Performed:     /79   Pulse 83   Temp 97.7 °F (36.5 °C) (Oral)   Resp 18   Ht 5' 2.99\" (1.6 m)   Wt 131 lb (59.4 kg)   SpO2 91%   BMI 23.21 kg/m²     Physical Exam   Constitutional: She is oriented to person, place, and time. She appears well-developed and well-nourished. Head: Normocephalic and atraumatic. No erythema, exudates or swelling posterior pharynx .    Eyes: Pupils are equal, round, and reactive to light. EOM are normal. Right eye exhibits no discharge. Left eye exhibits no discharge. No scleral icterus. Neck: Normal range of motion. Neck supple. Cardiovascular: Normal rate and regular rhythm. Exam reveals no gallop and no friction rub.   Murmur (systolic murmur grade II/VI) heard. Pulmonary/Chest: Effort normal and breath sounds normal. No stridor. No respiratory distress. She has no wheezes. She has no rales. Tenderness to palpation posterior R rib cage, no erythema or rashes    Abdominal: Soft. Bowel sounds are normal. She exhibits no distension. There is no tenderness. There is no rebound and no guarding. Musculoskeletal: Normal range of motion. She exhibits 1+ pitting edema bilateral lower extremities. She exhibits no tenderness. Neurological: She is alert and oriented to person, place, and time. No cranial nerve deficit. Skin: Skin is warm and dry. Capillary refill takes less than 2 seconds. She is not diaphoretic. No erythema.       Labs: For convenience and continuity at follow-up the following most recent labs are provided:      CBC:    Lab Results   Component Value Date    WBC 11.3 05/06/2019    HGB 10.6 05/06/2019    HCT 32.6 05/06/2019     05/06/2019       Renal:    Lab Results   Component Value Date     05/06/2019    K 5.1 05/06/2019    K 4.4 05/05/2019     05/06/2019    CO2 23 05/06/2019    BUN 32 05/06/2019    CREATININE 0.8 05/06/2019    CALCIUM 9.8 05/06/2019    PHOS 3.7 05/06/2019         Significant Diagnostic Studies    Radiology:   CT CHEST WO CONTRAST   Final Result     Acute nondisplaced to minimally displaced right rib fractures. No    associated pneumothorax or pulmonary contusion. CT Cervical Spine WO Contrast   Final Result     No evidence of acute or healing fracture or malalignment. XR HUMERUS RIGHT (MIN 2 VIEWS)   Final Result     Almost completely healed. The fracture involving the surgical neck of    the right proximal humerus.   Possible posterior angulation of the humeral    shaft at the fracture site. Suggestion of subacromial space narrowing at the periphery. If still    concerned for rotator cuff pathology, consider MRI. Diffuse osteopenia redemonstrated. No focal soft tissue abnormalities. CT Head WO Contrast   Final Result     No evidence of acute intracranial pathology. Diffuse involutional changes and chronic ischemic small vessel white    matter disease. XR HAND LEFT (MIN 3 VIEWS)   Final Result     No acute or healing fracture or acute posttraumatic malalignment. Findings suggest underlying rheumatoid arthritis or other similar    arthritic process. Consults:     IP CONSULT TO PRIMARY CARE PROVIDER  IP CONSULT TO HOME CARE NEEDS      Discharge Instructions/Follow-up:  Follow up with PCP in 1-2 weeks. Activity: activity as tolerated    Diet: diabetic diet    Discharge Medications:     Current Discharge Medication List           Details   oxyCODONE-acetaminophen (PERCOCET) 5-325 MG per tablet Take 1 tablet by mouth every 6 hours as needed (Severe Pain) for up to 3 days. Qty: 20 tablet, Refills: 0    Comments: Reduce doses taken as pain becomes manageable  Associated Diagnoses: Closed fracture of multiple ribs of right side, initial encounter      docusate (COLACE, DULCOLAX) 100 MG CAPS Take 100 mg by mouth daily  Qty: 30 capsule, Refills: 0              Details   DULoxetine (CYMBALTA) 20 MG extended release capsule TAKE ONE CAPSULE BY MOUTH TWICE A DAY  Qty: 60 capsule, Refills: 1    Associated Diagnoses: Left leg pain      B-D ULTRAFINE III SHORT PEN 31G X 8 MM MISC USE DAILY AS DIRECTED  Qty: 100 each, Refills: 0      FREESTYLE LANCETS MISC USE TO TEST BLOOD SUGAR ONCE DAILY.   Qty: 100 each, Refills: 0      FREESTYLE LITE strip USE ONE STRIP TO TEST THREE TIMES A DAY  Qty: 100 strip, Refills: 0      esomeprazole (NEXIUM) 40 MG delayed release capsule TAKE ONE CAPSULE BY MOUTH TWICE A DAY  Qty: 60 capsule, Refills: 2      insulin glargine (LANTUS SOLOSTAR) 100 UNIT/ML injection pen Inject 30 Units into the skin nightly  Qty: 5 pen, Refills: 3      spironolactone-hydrochlorothiazide (ALDACTAZIDE) 25-25 MG per tablet Take 1 tablet by mouth daily  Qty: 30 tablet, Refills: 3      Pyridoxine HCl (VITAMIN B6 PO) Take by mouth      Multiple Vitamins-Minerals (CENTRUM SILVER) TABS Take by mouth daily      Biotin 1000 MCG TABS Take one tablet daily in am.  Refills: 0      Ascorbic Acid (VITAMIN C CR) 1000 MG TBCR Take 1 tablet by mouth daily  Qty: 30 tablet, Refills: 0      vitamin B-12 (CYANOCOBALAMIN) 500 MCG tablet Take 1 tablet by mouth daily  Qty: 30 tablet, Refills: 3      guaiFENesin (MUCINEX) 600 MG extended release tablet Take 1 tablet by mouth 2 times daily as needed for Congestion      ibuprofen (ADVIL) 200 MG tablet Take 1 tablet by mouth every 6 hours as needed for Pain (max 800 mg in 24 hours)  Qty: 120 tablet, Refills: 3      mometasone (ELOCON) 0.1 % cream APPLY DAILY  Qty: 15 g, Refills: 2      polyethyl glycol-propyl glycol 0.4-0.3 % (SYSTANE) 0.4-0.3 % ophthalmic solution Place 1 drop into both eyes as needed for Dry Eyes  Qty: 1 Bottle, Refills: 3      Vitamin D 3 (CHOLECALCIFEROL) 1000 UNITS TABS tablet Take 1,000 Units by mouth daily. Time Spent on discharge is more than 30 minutes in the examination, evaluation, counseling and review of medications and discharge plan. Signed:    Joshua Dudley MD   Internal Medicine Resident, PGY-1  5/6/2019      Thank you Paul Golden MD for the opportunity to be involved in this patient's care. If you have any questions or concerns please feel free to contact me.

## 2019-05-06 NOTE — CARE COORDINATION
[address] Transitions Interview     2019    Patient: Casa Dunlap Patient : 1940   MRN: [id number]   Reason for Admission: Closed Fracture, Multiple Ribs   RARS: Readmission Risk Score: 25         Spoke with: Casa Dunlap and Daughter at bedside      Readmission Risk  Patient Active Problem List   Diagnosis    Rheumatoid arthritis [facility]    Diabetes mellitus type 2 in nonobese ([facility]    Chronic persistent hepatitis ([facility]    Depressive disorder, not elsewhere classified    Attention deficit disorder    DNR (do not resuscitate) discussion    Hep C w/o coma, chronic ([facility]    Spinal stenosis    Carotid artery disease ([facility]    Weakness    Oropharyngeal dysphagia    Encounter for palliative care    Dementia without behavioral disturbance    Delirium due to another medical condition    Hyponatremia    Essential hypertension    Slow transit constipation    Advance care planning    Edema    Fracture    Closed fracture of right proximal humerus    Acute pain of right shoulder    Goals of care, counseling/discussion    DNR (do not resuscitate)    Dry eyes    Primary insomnia    PAD (peripheral artery disease) ([facility]    Multiple closed fractures of ribs of right side       Inpatient Assessment  Care Transitions Summary    Care Transitions Inpatient Review  Medication Review  Do you have all of your prescriptions and are they filled?:  Yes   Are you able to afford your medications?:  Yes  How often do you have difficulty taking your medications?:  I always take them as prescribed.   Housing Review  Who do you live with?:  Alone  Are you an active caregiver in your home?:  No  Social Support  Do you have a ?:  Yes   Name:  Madan De La Torre to recall  Do you have a Home Health Coordinator?:  No  Durable Medical Equipment  Patient DME:  Other, Shower chair  Other Patient DME:  [address] [facility] with Seat, [address], Grab Bars   Functional Review  Ability to seek help/take action for Emergent/Urgent situations i.e. fire, crime, inclement weather or health crisis. :  Dependent  Ability handle personal hygiene needs (bathing/dressing/grooming):  Dependent  Ability to manage medications:  Dependent  Ability to prepare food:  Dependent  Ability to maintain home (clean home, laundry):  Dependent  Ability to drive and/or has transportation:  Dependent  Ability to do shopping:  Dependent  Ability to manage finances:  Dependent  Is patient able to live independently?:  No  Hearing and Vision  Visual Impairment:  None  Hearing Impairment:  None  Care Transitions Interventions  No Identified Needs       Summary  CTC spoke with patient and daughter this afternoon for initial face to face interview. Patient states she lives at [facility]. Patient states she is able to manage some ADL's and IADL's independently, but get's assistance through [employer] with a HHA 5 days per week for 2 hours a day with [facility]. HHA assist patient with supervision while bathing and dressing and homemaking. Patient reports she has used [facility] in the past, would like to resume services with [facility], if possible for SN. CTC explained CTC program as well as ongoing follow up CTC calls once discharged, patient and daughter are agreeable at this time. CTC spoke with Floor  [facility]arding [facility], also sent referral to Nidia Spann with [facility]. Follow Up  No future appointments. Health Maintenance  There are no preventive care reminders to display for this patient.     Ethan Romano RN

## 2019-05-06 NOTE — DISCHARGE INSTR - COC
Continuity of Care Form    Patient Name: Julián Tan   :  1940  MRN:  [id number]    Admit date:  2019  Discharge date:  ***    Code Status Order: [facility]   Advance Directives:   [facility] Documentation     Date/Time Healthcare Directive Type of Healthcare Directive Copy in [address] Agent's Name Healthcare Agent's Phone Number    [phone]  Yes, patient has an advance directive for healthcare treatment  Durable power of  for health care; Health care treatment directive; Living will  No, copy requested from family  Healthcare power of [address] [place] [address]  --          Admitting Physician:  Andrea Smart MD  PCP: Andrea Smart MD    Discharging Nurse: [facility] Unit/Room#: 3886/6133-92  Discharging Unit Phone Number: ***    Emergency Contact:   Extended Emergency Contact Information  Primary Emergency Contact: Jessica Estrada of [facility] Phone: [phone]  Mobile Phone: [phone]  Relation: Child  Secondary Emergency Contact: Javy Moses  Address: [address] Phone: [phone]  Mobile Phone: [phone]  Relation: Child    Past Surgical History:  Past Surgical History:   Procedure Laterality Date    COLONOSCOPY      COSMETIC SURGERY      tummy tuck,face lift,mammoplasties- hepatitis blood transfusion complication    EYE SURGERY Bilateral     cataract       Immunization History:   Immunization History   Administered Date(s) Administered    Influenza Whole 2008    Influenza, High Dose (Fluzone 65 yrs and older) 2011, 2018    Pneumococcal 13-valent Conjugate ([id number]) 2015    Pneumococcal Conjugate 7-valent 2006       Active Problems:  Patient Active Problem List   Diagnosis Code    Rheumatoid arthritis ([facility] M06.9    Diabetes mellitus type 2 in nonobese ([facility] E11.9    Chronic persistent hepatitis ([facility] K73.0    / YD:02194}  Urinary Catheter: {Urinary Catheter:375458113}   Colostomy/Ileostomy/Ileal Conduit: {YES / W}       Date of Last BM: ***    Intake/Output Summary (Last 24 hours) at 2019 1131  Last data filed at 2019 0418  Gross per 24 hour   Intake 600 ml   Output 325 ml   Net 275 ml     I/O last 3 completed shifts:   In: 5 [P.O.:840]  Out: 26 [Urine:525]    Safety Concerns:     [address] [employer] Safety Concerns:229372373}    Impairments/Disabilities:      [address] [employer] Impairments/Disabilities:216926532}    Nutrition Therapy:  Current Nutrition Therapy:   [address] [employer] Diet List:093902024}    Routes of Feeding: {CHP DME Other Feedings:413339963}  Liquids: {Slp liquid thickness:02675}  Daily Fluid Restriction: {CHP DME Yes amt example:226965468}  Last Modified Barium Swallow with Video (Video Swallowing Test): {Done Not Done IRQY:821840390}    Treatments at the Time of Hospital Discharge:   Respiratory Treatments: ***  Oxygen Therapy:  {Therapy; copd oxygen:22390}  Ventilator:    {MH CC Vent WGYI:157754667}    Rehab Therapies: {THERAPEUTIC INTERVENTION:0218088073}  Weight Bearing Status/Restrictions: [address]  Weight Bearin}  Other Medical Equipment (for information only, NOT a DME order):  {EQUIPMENT:172493853}  Other Treatments: ***    Patient's personal belongings (please select all that are sent with patient):  {P DME Belongings:451634549}    RN SIGNATURE:  {Esignature:347111471}    CASE MANAGEMENT/SOCIAL WORK SECTION    Inpatient Status Date: ***    Readmission Risk Assessment Score:  Readmission Risk              Risk of Unplanned Readmission:        17           Discharging to Facility/ Agency   · Name:   · Address:  · Phone:  · Fax:    Dialysis Facility (if applicable)   · Name:  · Address:  · Dialysis Schedule:  · Phone:  · Fax:    / signature: {Esignature:570548899}    PHYSICIAN SECTION    Prognosis: Good    Condition at Discharge: Stable    Rehab Potential (if transferring to Rehab): Good    Recommended Labs or Other Treatments After Discharge: Skilled Nursing, PT/OT, Nursing Aide    Physician Certification: I certify the above information and transfer of Rebecca Moscoso  is necessary for the continuing treatment of the diagnosis listed and that she requires [address] for less 30 days.      Update Admission H&P: No change in H&P    PHYSICIAN SIGNATURE:  Electronically signed by Bruce Moreno MD/ Leah Acosta MD on 5/6/19 at 11:31 AM

## 2019-05-06 NOTE — PROGRESS NOTES
Patient is alert and oriented x 4. Patient rates her pain '8' out of 0-10 pain scale to her right chest.  Patient medicated with Percocet 2 tabs per patient request and per doctor orders. Patient states it helps to make her pain tolerable. Order received to d/c patient to home. IV d/c'd without episode. Patient did meds to bed with her 2 take home prescriptions. All home medications were reviewed with the patient and her daughter.

## 2019-05-06 NOTE — CARE COORDINATION
[facility]    Spoke with patient regarding [facility] services. Patient aware and agreeable to services. Faxed orders to [facility].     Claude Brooking, LPN  Care Transition Nurse  [address]  [phone]

## 2019-05-06 NOTE — CARE COORDINATION
Case Management Discharge Assessment  Spoke with pt and daughter Kenny, daughter will drive home to [facility], set up with [facility] [place] following. 2019  [facility] Case Management Department    Patient: Jocelin Fontaine  MRN: [id number] / : 1940  ACCT: [de-identified]     Emergency Contacts  Healthcare Agent Appointed: Healthcare power of   Healthcare Agent's Name: MARINA Martinez    Admission Documentation  Attending Provider: Katie Ordaz MD  Admit date/time: 2019  3:30 AM  Status: Inpatient [101]  Diagnosis: Closed fracture of multiple ribs of right side, initial encounter     Readmission within last 30 days:  no     Living Situation  Discharge Planning  Living Arrangements: Alone  Support Systems: Children, Family Members  DME: Dominick Ku, Wheelchair  Potential Assistance Needed: Intermediate Care  Type of Home Care Services: PT, OT    Service Assessment:       Values / Beliefs  Do you have any ethnic, cultural, sacramental, or spiritual Anabaptist needs you would like us to be aware of while you are in the hospital?: No    Advance Directives (For Healthcare)  Pre-existing DNR Comfort Care/DNR Arrest/DNI Order: Yes, notify physician for order  Healthcare Directive: Yes, patient has an advance directive for healthcare treatment  Type of Healthcare Directive: Durable power of  for health care, Health care treatment directive, Living will  Copy in Chart: No, copy requested from family  [address] [place] [address] Agent's Name: MARINA Martinez  If you are unable to speak for yourself, does your Healthcare Agent or Legal Spokesperson know your healthcare wishes?: Yes                        Pharmacy: [facility] Medications:  No  Does patient want to participate in local refill/meds to beds program?: Yes    Notification completed in HENS/PAS?  No     IMM  No       Discharge disposition: Home     LOC Independent living  Name of Facility [facility] [address] car with daughter    Jaiden Garcia and her family were provided with choice of provider; she and her family are in agreement with the discharge plan.       Care Transitions patient: Yes    Adam Zavala RN  [facility]  Case Management Department  Ph: [phone] Fax: [phone]

## 2019-05-06 NOTE — PLAN OF CARE
Problem: Falls - Risk of:  Goal: Will remain free from falls  Description  Will remain free from falls  5/6/2019 0308 by Reynold Glynn RN  Outcome: Ongoing  Pt remains in fall precautions. Bed locked in low position with alarm set. Slip free socks in place. Pt assisted to BR with aide of walker and tolerates well. Pt remains free from injury. Problem: Pain:  Goal: Pain level will decrease  Description  Pain level will decrease  5/6/2019 0308 by Reynold Glynn RN  Outcome: Ongoing  Pt c/o 7/10 right sided rib pain from 7 fractured ribs. Pt medicated with prn percocet with good relief. Pt has been able to sleep for intervals. Problem: Airway Clearance - Ineffective:  Goal: Ability to maintain a clear airway will improve  Description  Ability to maintain a clear airway will improve    Pt taking shallow breaths due to pain with breathing in. Pain medication given as needed. Pt encourage to us IS every hour and to cough, deep breath. Pt can use IS up to 500. Will continue to monitor.   Electronically signed by Reynold Glynn RN on 5/6/19 at 3:18 AM

## 2019-05-06 NOTE — PROGRESS NOTES
VSS. Pt sat up in chair earlier in evening. PT c/o right sided rib pain 7/10. Medicated with prn percocet with good relief. Pt encouraged to use IS each hour while awake. Pt reaches 500. Fall precautions maintained. Call light in reach. Will continue to monitor for any needs.  Electronically signed by Melva Thapa RN on 5/6/19 at 3:08 AM

## 2019-05-07 ENCOUNTER — CARE COORDINATION (OUTPATIENT)
Dept: CASE MANAGEMENT | Age: 79
End: 2019-05-07

## 2019-05-07 ENCOUNTER — TELEPHONE (OUTPATIENT)
Dept: INTERNAL MEDICINE CLINIC | Age: 79
End: 2019-05-07

## 2019-05-07 DIAGNOSIS — S22.49XA: Primary | ICD-10-CM

## 2019-05-07 NOTE — CARE COORDINATION
[facility] Transitions Initial Follow Up Call    Call within 2 business days of discharge: Yes    Patient: Makenzie Xie Patient : 1940   MRN: [id number]   Reason for Admission: Multiple Closed Fractured Ribs  Discharge Date: 19 RARS: Readmission Risk Score: 18      Last Discharge [facility]       Complaint Diagnosis Description Type Department Provider    19 Fall Closed fracture of multiple ribs of right side, initial encounter ED to Hosp-Admission (Discharged) (ADMITTED) Debora Baumann MD; Fab Pruett... Spoke with: Daughter Rocco Pizarro: [facility]     Non-face-to-face services provided:  Obtained and reviewed discharge summary and/or continuity of care documents  Education of patient/family/caregiver/guardian to support self-management-. Assessment and support for treatment adherence and medication management-.    Care Transitions 24 Hour Call    Schedule Follow Up Appointment with PCP:  Declined  Do you have any ongoing symptoms?:  Yes  Patient-reported symptoms:  Pain  Do you have a copy of your discharge instructions?:  Yes  Do you have all of your prescriptions and are they filled?:  Yes  Have you been contacted by a [facility] Pharmacist?:  No  Have you scheduled your follow up appointment?:  No  Were you discharged with any Home Care or Post Acute Services:  Yes  Post Acute Services:   (Comment: [facility])  Patient DME:  Other, Shower chair  Other Patient DME:  Lashell Ray with [facility], [address], [facility] you have support at home?:  Alone  Do you feel like you have everything you need to keep you well at home?:  Yes  Are you an active caregiver in your home?:  No  Care Transitions Interventions  No Identified Needs       Summary  CTC spoke with patient's daughter this afternoon for initial 24 hour discharge follow up CTC call.   Daughter reports patient is doing much better this afternoon, having lot's of pain this am.  Daughter states patient is taking PRN pain medication, was sleeping at time of CTC call. Daughter reports patient's appetite is good, does not have any difficulty with urination, denies any complaints of nausea, vomiting, fevers, chills, SOB or Cough. Daughter to contact PCP and schedule follow up appointment in am, CTC and Patients daughter reviewed meds and CTC completed the 1111f, all medications filled and in home. [facility] agency SN was in home this am from [facility], PT and OT to be in tomorrow. Daughter does state patient has not had a BM. CTC encouraged daughter to offer plenty of fluids, has prune juice in home as well, instructed to offer as well. CTC instructed daughter to not give any over the counter Miralax until patient has started passing gas. CTC advised Pt of use of urgent care or physicians 24 hr access line if assistance is needed after hours or CTC weekend. CTC provided education on s/s that require medical attention and when to seek medical attention. Pt denies any needs or concerns and is agreeable with additional calls. Follow Up  No future appointments.     Alicia Gutierrez RN

## 2019-05-07 NOTE — TELEPHONE ENCOUNTER
Pt is home from [facility] care orders needed, Matteo would also like to touch basis regarding pt's med list.

## 2019-05-08 RX ORDER — LANCETS 28 GAUGE
EACH MISCELLANEOUS
Qty: 100 EACH | Refills: 0 | Status: SHIPPED | OUTPATIENT
Start: 2019-05-08 | End: 2019-05-14 | Stop reason: CLARIF

## 2019-05-09 ENCOUNTER — TELEPHONE (OUTPATIENT)
Dept: INTERNAL MEDICINE CLINIC | Age: 79
End: 2019-05-09

## 2019-05-09 LAB
BLOOD CULTURE, ROUTINE: NORMAL
CULTURE, BLOOD 2: NORMAL

## 2019-05-09 NOTE — TELEPHONE ENCOUNTER
Pts daughter states pt was admitted into the hospital 5/4/19 discharged 5/6. Requesting an appt to follow up from a fall with broken ribs within 7 days per hospital. Requesting anyday after 2p. Please advise. resolved b/l LE cramping, worsening (standing and walking) left calf TTP, left calf edema, left calf redness, and difficulty bearing weight/CALF PAIN

## 2019-05-10 ENCOUNTER — CARE COORDINATION (OUTPATIENT)
Dept: CASE MANAGEMENT | Age: 79
End: 2019-05-10

## 2019-05-10 ENCOUNTER — TELEPHONE (OUTPATIENT)
Dept: INTERNAL MEDICINE CLINIC | Age: 79
End: 2019-05-10

## 2019-05-10 DIAGNOSIS — S22.41XA CLOSED FRACTURE OF MULTIPLE RIBS OF RIGHT SIDE, INITIAL ENCOUNTER: Primary | ICD-10-CM

## 2019-05-10 RX ORDER — OXYCODONE HYDROCHLORIDE AND ACETAMINOPHEN 5; 325 MG/1; MG/1
1 TABLET ORAL EVERY 6 HOURS PRN
Qty: 12 TABLET | Refills: 0 | Status: SHIPPED | OUTPATIENT
Start: 2019-05-10 | End: 2019-05-13

## 2019-05-10 NOTE — TELEPHONE ENCOUNTER
Patient has an upcoming appointment next Tuesday Hospital follow up ( patient fractured 7 ribs) and is in a great deal of pain. Requesting an increase in pain medication as she was only sent home with 3 day supply.

## 2019-05-10 NOTE — CARE COORDINATION
[facility] Transitions Follow Up Call    5/10/2019    Patient: Zach Calles  Patient : 1940   MRN: [id number]   Reason for Admission: Mult. Rib Fractures   Discharge Date: 19 RARS: Readmission Risk Score: 25         Spoke with: Daughter [facility] Transitions Subsequent and Final Call    Schedule Follow Up Appointment with PCP:  Declined  Subsequent and Final Calls  Do you have any ongoing symptoms?:  Yes  Onset of Patient-reported symptoms:  Today  Patient-reported symptoms:  Pain  Interventions for patient-reported symptoms:  Notified PCP/Physician  Have your medications changed?:  No  Do you have any questions related to your medications?:  No  Do you currently have any active services?:  Yes  Are you currently active with any services?:  Home Health  Do you have any needs or concerns that I can assist you with?:  No  Identified Barriers:  None  Care Transitions Interventions  No Identified Needs  Other Interventions:          Summary  [facility] spoke with patient's daughter Abimael Acosta this afternoon for follow up CTC call. Daughter states patient is still having lot's of pain. Daughter has been attempting to stretch out times for PRN medication, as well as adding Ibuprofen in at times instead. CTC encouraged daughter to try cold compression as well. Patient not having any complaints of nausea, vomiting, fevers, chills, SOB or Cough. No difficulty with urination, BM's or Appetite at this time. SN with [facility] was in home earlier this week, will be back in on Saturday. Daughter stating she will place a call to PCP to see if she can get additional pain medication, as she is worried patient will not be good through weekend. Follow up appointment is scheduled with PCP for Monday, no other issues or concerns, or medications changed since last CTC call. [facility] advised Pt of use of urgent care or physicians 24 hr access line if assistance is needed after hours or CTC weekend.   [facility] provided education on s/s that require medical attention and when to seek medical attention. Pt denies any needs or concerns and is agreeable with additional calls.     Follow Up  Future Appointments   Date Time Provider Tosha Aguilar   5/14/2019  3:15 PM [facility]       Que Genao RN

## 2019-05-13 ENCOUNTER — CARE COORDINATION (OUTPATIENT)
Dept: CASE MANAGEMENT | Age: 79
End: 2019-05-13

## 2019-05-13 NOTE — CARE COORDINATION
[facility] Transitions Follow Up Call    2019    Patient: Hien Velasquez  Patient : 1940   MRN: [id number]   Reason for Admission: Multi. Rib Fractures   Discharge Date: 19 RARS: Readmission Risk Score: 25         Spoke with: Daughter [facility] Transitions Subsequent and Final Call    Schedule Follow Up Appointment with PCP:  Declined  Subsequent and Final Calls  Do you have any ongoing symptoms?:  Yes  Onset of Patient-reported symptoms:  Today  Patient-reported symptoms:  Pain  Have your medications changed?:  Yes  Patient Reports:  Percocet 5 mg PO Q6H/PRN increased an additional 3 days   Do you have any questions related to your medications?:  No  Do you currently have any active services?:  Yes  Are you currently active with any services?:  Home Health  Do you have any needs or concerns that I can assist you with?:  No  Identified Barriers:  None  Care Transitions Interventions  No Identified Needs  Other Interventions:          Summary  CTC spoke with patients daughter Sal Samson this afternoon for follow up CTC call. Daughter states patient is doing much better, states she was able to get a hold of MD on Friday and was given an additional 3 days worth of pain medication. Patient with no pain at time of CTC call, Daughter reports patient's appetite is good, no difficulty with urination or BM's. No complaints of nausea, vomiting, fevers, chills, SOB or Cough. SN with [facility] was in home on 2019, will be in later this week. Patient has follow up appointment scheduled with PCP for tomorrow 2019. CTC advised Pt of use of urgent care or physicians 24 hr access line if assistance is needed after hours or CTC weekend. CTC provided education on s/s that require medical attention and when to seek medical attention. Pt denies any needs or concerns and is agreeable with additional calls.     Follow Up  Future Appointments   Date Time Provider Tosha Aguilar   2019

## 2019-05-14 ENCOUNTER — OFFICE VISIT (OUTPATIENT)
Dept: INTERNAL MEDICINE CLINIC | Age: 79
End: 2019-05-14
Payer: MEDICARE

## 2019-05-14 VITALS
HEIGHT: 62 IN | BODY MASS INDEX: 25.58 KG/M2 | SYSTOLIC BLOOD PRESSURE: 120 MMHG | DIASTOLIC BLOOD PRESSURE: 78 MMHG | WEIGHT: 139 LBS

## 2019-05-14 DIAGNOSIS — S22.41XD CLOSED FRACTURE OF MULTIPLE RIBS OF RIGHT SIDE WITH ROUTINE HEALING, SUBSEQUENT ENCOUNTER: Primary | ICD-10-CM

## 2019-05-14 DIAGNOSIS — R30.0 BURNING WITH URINATION: ICD-10-CM

## 2019-05-14 LAB
BILIRUBIN, POC: ABNORMAL
BLOOD URINE, POC: ABNORMAL
CLARITY, POC: CLEAR
COLOR, POC: YELLOW
GLUCOSE URINE, POC: ABNORMAL
KETONES, POC: ABNORMAL
LEUKOCYTE EST, POC: ABNORMAL
NITRITE, POC: ABNORMAL
PH, POC: 5
PROTEIN, POC: ABNORMAL
SPECIFIC GRAVITY, POC: 1.01
UROBILINOGEN, POC: 2

## 2019-05-14 PROCEDURE — 4040F PNEUMOC VAC/ADMIN/RCVD: CPT | Performed by: INTERNAL MEDICINE

## 2019-05-14 PROCEDURE — 1090F PRES/ABSN URINE INCON ASSESS: CPT | Performed by: INTERNAL MEDICINE

## 2019-05-14 PROCEDURE — 1036F TOBACCO NON-USER: CPT | Performed by: INTERNAL MEDICINE

## 2019-05-14 PROCEDURE — G8598 ASA/ANTIPLAT THER USED: HCPCS | Performed by: INTERNAL MEDICINE

## 2019-05-14 PROCEDURE — 1111F DSCHRG MED/CURRENT MED MERGE: CPT | Performed by: INTERNAL MEDICINE

## 2019-05-14 PROCEDURE — G8419 CALC BMI OUT NRM PARAM NOF/U: HCPCS | Performed by: INTERNAL MEDICINE

## 2019-05-14 PROCEDURE — 99213 OFFICE O/P EST LOW 20 MIN: CPT | Performed by: INTERNAL MEDICINE

## 2019-05-14 PROCEDURE — G8400 PT W/DXA NO RESULTS DOC: HCPCS | Performed by: INTERNAL MEDICINE

## 2019-05-14 PROCEDURE — 1123F ACP DISCUSS/DSCN MKR DOCD: CPT | Performed by: INTERNAL MEDICINE

## 2019-05-14 PROCEDURE — 81002 URINALYSIS NONAUTO W/O SCOPE: CPT | Performed by: INTERNAL MEDICINE

## 2019-05-14 PROCEDURE — G8427 DOCREV CUR MEDS BY ELIG CLIN: HCPCS | Performed by: INTERNAL MEDICINE

## 2019-05-14 RX ORDER — OXYCODONE HYDROCHLORIDE AND ACETAMINOPHEN 5; 325 MG/1; MG/1
1 TABLET ORAL EVERY 4 HOURS PRN
COMMUNITY
End: 2019-06-12 | Stop reason: ALTCHOICE

## 2019-05-14 NOTE — PROGRESS NOTES
CHIEF COMPLAINT: Heidi Edwards is a 77 y.o. female who presents for : Follow-up of her rib fractures    HPI: Patient presented with follow-up of her Route fracture she still taking some Percocet for her rib fractures but only intermittently she was told if she continues to needed I cannot give her any and she'll have to go to a pain specialist and probably get a nerve block she denies any shortness of breath and her blood sugars have been well-controlled    Review of Systems:   Constitutional:  Denies fever or chills   Eyes:  Denies change in visual acuity   HENT:  Denies nasal congestion or sore throat   Respiratory:  Denies cough or shortness of breath   Cardiovascular:  Denies chest pain or edema   GI:  Denies abdominal pain, nausea, vomiting, bloody stools or diarrhea   :  Denies dysuria   Musculoskeletal:  Denies back pain or joint pain   Integument:  Denies rash   Neurologic:  Denies headache, focal weakness or sensory changes   Endocrine:  Denies polyuria or polydipsia   Lymphatic:  Denies swollen glands   Psychiatric:  Denies depression or anxiety     Past Medical History:        Diagnosis Date    ADHD (attention deficit hyperactivity disorder)     Attention deficit disorder without mention of hyperactivity 7/22/2010    Autoimmune disease NEC     Carotid artery disease ([facility] 8/2/2013    LEFT CAROTID ENDARTERECTOMY               Carotid artery disease ([facility] 8/3/2013    Chronic persistent hepatitis ([facility] 7/22/2010    Depression     Depressive disorder, not elsewhere classified 7/22/2010    Double vision     left eye, since cataract surgery    Fractures     GERD (gastroesophageal reflux disease)     Neuropathy     Rheumatoid arthritis(714.0) 7/22/2010    Spinal stenosis     Type II or unspecified type diabetes mellitus without mention of complication, not stated as uncontrolled 7/22/2010    Unspecified cerebral artery occlusion with cerebral infarction     right hand, loss of some sensation Past Surgical History:        Procedure Laterality Date    COLONOSCOPY      COSMETIC SURGERY      tummy tuck,face lift,mammoplasties- hepatitis blood transfusion complication    EYE SURGERY Bilateral     cataract       Family History:  No family history on file. Social History:  Social History     Socioeconomic History    Marital status:      Spouse name: None    Number of children: None    Years of education: None    Highest education level: None   Occupational History    None   Social Needs    Financial resource strain: None    Food insecurity:     Worry: None     Inability: None    Transportation needs:     Medical: None     Non-medical: None   Tobacco Use    Smoking status: Never Smoker    Smokeless tobacco: Never Used    Tobacco comment: smoked 30 yrs ago   Substance and Sexual Activity    Alcohol use: Yes     Comment: 2 per day    Drug use: No    Sexual activity: None   Lifestyle    Physical activity:     Days per week: None     Minutes per session: None    Stress: None   Relationships    Social connections:     Talks on phone: None     Gets together: None     Attends Restorationism service: None     Active member of club or organization: None     Attends meetings of clubs or organizations: None     Relationship status: None    Intimate partner violence:     Fear of current or ex partner: None     Emotionally abused: None     Physically abused: None     Forced sexual activity: None   Other Topics Concern    None   Social History Narrative    None         Allergies:  Erythromycin and Sulfites    Current Medications:    Prior to Admission medications    Medication Sig Start Date End Date Taking? Authorizing Provider   oxyCODONE-acetaminophen (PERCOCET) 5-325 MG per tablet Take 1 tablet by mouth every 4 hours as needed for Pain.    Yes Historical Provider, MD   docusate (COLACE, DULCOLAX) 100 MG CAPS Take 100 mg by mouth daily 5/7/19  Yes Wily Eason MD   DULoxetine ([address]) 20 MG extended release capsule TAKE ONE CAPSULE BY MOUTH TWICE A DAY 5/1/19  Yes Mary Cheung MD   esomeprazole ([employer]) 40 MG delayed release capsule TAKE ONE CAPSULE BY MOUTH TWICE A DAY 3/20/19  Yes Mary Cheung MD   insulin glargine (LANTUS SOLOSTAR) 100 UNIT/ML injection pen Inject 30 Units into the skin nightly 1/9/19  Yes Mary Cheung MD   spironolactone-hydrochlorothiazide Cloteal Dalia) 25-25 MG per tablet Take 1 tablet by mouth daily 9/25/18  Yes Mary Cheung MD   Pyridoxine HCl (VITAMIN B6 PO) Take by mouth   Yes Historical Provider, MD   Multiple Vitamins-Minerals (CENTRUM SILVER) TABS Take by mouth daily   Yes Historical Provider, MD   Biotin 1000 MCG TABS Take one tablet daily in am. 1/25/18  Yes Mary Cheung MD   Ascorbic Acid (VITAMIN C CR) 1000 MG TBCR Take 1 tablet by mouth daily 1/25/18  Yes Mary Cheung MD   vitamin B-12 (CYANOCOBALAMIN) 500 MCG tablet Take 1 tablet by mouth daily 1/25/18  Yes Mary Cheung MD   guaiFENesin [facility]) 600 MG extended release tablet Take 1 tablet by mouth 2 times daily as needed for Congestion 1/25/18  Yes Mary Cheung MD   ibuprofen (ADVIL) 200 MG tablet Take 1 tablet by mouth every 6 hours as needed for Pain (max 800 mg in 24 hours) 1/25/18  Yes Mary Cheung MD   mometasone (ELOCON) 0.1 % cream APPLY DAILY 1/18/18  Yes Mary Cheung MD   polyethyl glycol-propyl glycol 0.4-0.3 % (SYSTANE) 0.4-0.3 % ophthalmic solution Place 1 drop into both eyes as needed for Dry Eyes 12/12/17  Yes Mary Cheung MD   Vitamin D 3 (CHOLECALCIFEROL) 1000 UNITS TABS tablet Take 1,000 Units by mouth daily.    Yes Historical Provider, MD       Physical Exam:  Vital Signs: /78   Ht 5' 2\" (1.575 m)   Wt 139 lb (63 kg)   BMI 25.42 kg/m²   General: Patient appears  non-toxic  HENT: Atraumatic, normocephalic, oral mucosa moist  Lungs:  Clear bilaterally  Heart: Regular rate and rhythm  Abdomen: Non-distended, soft, non-tender  Extremities: No edema  Neuro: Nonfocal    Medical Decision

## 2019-05-16 LAB — URINE CULTURE, ROUTINE: NORMAL

## 2019-05-17 ENCOUNTER — CARE COORDINATION (OUTPATIENT)
Dept: CASE MANAGEMENT | Age: 79
End: 2019-05-17

## 2019-05-17 NOTE — CARE COORDINATION
[facility] Transitions Follow Up Call    2019    Patient: Fidelina Yeager  Patient : 1940   MRN: [id number]   Reason for Admission: Multi Rib Fractures   Discharge Date: 19 RARS: Readmission Risk Score: 25         Spoke with: Daughter [facility] [place] [place] Transitions Subsequent and Final Call    Schedule Follow Up Appointment with PCP:  Declined  Subsequent and Final Calls  Do you have any ongoing symptoms?:  Yes  Onset of Patient-reported symptoms:  Today  Patient-reported symptoms:  Pain  Have your medications changed?:  No  Do you have any questions related to your medications?:  No  Do you currently have any active services?:  Yes  Are you currently active with any services?:  Home Health  Do you have any needs or concerns that I can assist you with?:  No  Identified Barriers:  None  Care Transitions Interventions  No Identified Needs  Other Interventions:          Summary  CTC spoke with patient's daughter this am, states she is doing well, has pain but not as bad as it had been. Patient with no complaints of nausea, vomiting, fevers, chills, SOB or cough. No difficulty with urination, BM's or Appetite. SN with [facility] was in home on Wednesday 05/15/2019, plan to return today, per Daughter. Patient saw PCP on Tuesday, no new or changed medications, issues or concerns to report at this time. CTC advised Pt of use of urgent care or physicians 24 hr access line if assistance is needed after hours or CTC weekend. CTC provided education on s/s that require medical attention and when to seek medical attention. Pt denies any needs or concerns and is agreeable with additional calls.     Follow Up  Future Appointments   Date Time Provider Tosha Aguilar   2019  1:15 PM Kathleen Johnson MD [facility] [address] [facility]       Alicia Gutierrez, RN

## 2019-05-20 ENCOUNTER — CARE COORDINATION (OUTPATIENT)
Dept: CASE MANAGEMENT | Age: 79
End: 2019-05-20

## 2019-05-20 NOTE — CARE COORDINATION
[facility] Transitions Follow Up Call    2019    Patient: Wale Clark  Patient : 1940   MRN: [id number]   Reason for Admission: Multi Rib Fractures   Discharge Date: 19 RARS: Readmission Risk Score: 25         Spoke with: DaughterDeloris     Care Transitions Subsequent and Final Call    Schedule Follow Up Appointment with PCP:  Declined  Subsequent and Final Calls  Do you have any ongoing symptoms?:  Yes  Onset of Patient-reported symptoms:  Today  Patient-reported symptoms:  Pain  Have your medications changed?:  No  Do you have any questions related to your medications?:  No  Do you currently have any active services?:  Yes  Are you currently active with any services?:  Home Health  Do you have any needs or concerns that I can assist you with?:  No  Identified Barriers:  None  Care Transitions Interventions  No Identified Needs  Other Interventions:          Summary  CTC spoke with patients daughter Josh Odell this afternoon for final follow up CTC call. Daughter states patient is doing really well, was able to get out and take a walk today. Patient has had no complaints of nausea, vomiting, fevers, chills, SOB or chest pain. Patient's daughter does report a slight cough, nonproductive, and only happens every now and then. CTC encouraged daughter to have patient place a pillow at chest and hug when coughing to help with pain. Patient has had follow up with PCP, no new or changed medications, SN with [facility] was in home on Saturday. No new issues or concerns at this time. CTC encouraged daughter to monitor output, constipation s/s due to pain medication, and report any of the above s/s immediately. CTC advised Pt of use of urgent care or physicians 24 hr access line if assistance is needed after hours or CTC weekend. CTC provided education on s/s that require medical attention and when to seek medical attention.      Follow Up  Future Appointments   Date Time Provider Department

## 2019-05-31 ENCOUNTER — HOSPITAL ENCOUNTER (OUTPATIENT)
Dept: GENERAL RADIOLOGY | Age: 79
Discharge: HOME | End: 2019-05-31
Payer: MEDICARE

## 2019-05-31 ENCOUNTER — HOSPITAL ENCOUNTER (OUTPATIENT)
Age: 79
End: 2019-05-31
Payer: MEDICARE

## 2019-05-31 DIAGNOSIS — S22.41XD CLOSED FRACTURE OF MULTIPLE RIBS OF RIGHT SIDE WITH ROUTINE HEALING, SUBSEQUENT ENCOUNTER: ICD-10-CM

## 2019-05-31 PROCEDURE — 71101 X-RAY EXAM UNILAT RIBS/CHEST: CPT

## 2019-06-04 ENCOUNTER — TELEPHONE (OUTPATIENT)
Dept: INTERNAL MEDICINE CLINIC | Age: 79
End: 2019-06-04

## 2019-06-12 ENCOUNTER — OFFICE VISIT (OUTPATIENT)
Dept: INTERNAL MEDICINE CLINIC | Age: 79
End: 2019-06-12
Payer: MEDICARE

## 2019-06-12 VITALS
OXYGEN SATURATION: 94 % | DIASTOLIC BLOOD PRESSURE: 66 MMHG | BODY MASS INDEX: 24.48 KG/M2 | WEIGHT: 133 LBS | HEIGHT: 62 IN | SYSTOLIC BLOOD PRESSURE: 122 MMHG

## 2019-06-12 DIAGNOSIS — E11.9 DIABETES MELLITUS TYPE 2 IN NONOBESE (HCC): Primary | ICD-10-CM

## 2019-06-12 DIAGNOSIS — M05.739 RHEUMATOID ARTHRITIS INVOLVING WRIST WITH POSITIVE RHEUMATOID FACTOR, UNSPECIFIED LATERALITY (HCC): ICD-10-CM

## 2019-06-12 DIAGNOSIS — I10 ESSENTIAL HYPERTENSION: ICD-10-CM

## 2019-06-12 DIAGNOSIS — Z87.09 HX OF INTERSTITIAL LUNG DISEASE: ICD-10-CM

## 2019-06-12 PROCEDURE — 4040F PNEUMOC VAC/ADMIN/RCVD: CPT | Performed by: INTERNAL MEDICINE

## 2019-06-12 PROCEDURE — G8420 CALC BMI NORM PARAMETERS: HCPCS | Performed by: INTERNAL MEDICINE

## 2019-06-12 PROCEDURE — 1090F PRES/ABSN URINE INCON ASSESS: CPT | Performed by: INTERNAL MEDICINE

## 2019-06-12 PROCEDURE — 1123F ACP DISCUSS/DSCN MKR DOCD: CPT | Performed by: INTERNAL MEDICINE

## 2019-06-12 PROCEDURE — 99213 OFFICE O/P EST LOW 20 MIN: CPT | Performed by: INTERNAL MEDICINE

## 2019-06-12 PROCEDURE — G8400 PT W/DXA NO RESULTS DOC: HCPCS | Performed by: INTERNAL MEDICINE

## 2019-06-12 PROCEDURE — G8598 ASA/ANTIPLAT THER USED: HCPCS | Performed by: INTERNAL MEDICINE

## 2019-06-12 PROCEDURE — G8427 DOCREV CUR MEDS BY ELIG CLIN: HCPCS | Performed by: INTERNAL MEDICINE

## 2019-06-12 PROCEDURE — 1036F TOBACCO NON-USER: CPT | Performed by: INTERNAL MEDICINE

## 2019-06-12 NOTE — PROGRESS NOTES
tummy tuck,face lift,mammoplasties- hepatitis blood transfusion complication    EYE SURGERY Bilateral     cataract       Family History:  No family history on file. Social History:  Social History     Socioeconomic History    Marital status:      Spouse name: None    Number of children: None    Years of education: None    Highest education level: None   Occupational History    None   Social Needs    Financial resource strain: None    Food insecurity:     Worry: None     Inability: None    Transportation needs:     Medical: None     Non-medical: None   Tobacco Use    Smoking status: Never Smoker    Smokeless tobacco: Never Used    Tobacco comment: smoked 30 yrs ago   Substance and Sexual Activity    Alcohol use: Yes     Comment: 2 per day    Drug use: No    Sexual activity: None   Lifestyle    Physical activity:     Days per week: None     Minutes per session: None    Stress: None   Relationships    Social connections:     Talks on phone: None     Gets together: None     Attends Episcopal service: None     Active member of club or organization: None     Attends meetings of clubs or organizations: None     Relationship status: None    Intimate partner violence:     Fear of current or ex partner: None     Emotionally abused: None     Physically abused: None     Forced sexual activity: None   Other Topics Concern    None   Social History Narrative    None         Allergies:  Erythromycin and Sulfites    Current Medications:    Prior to Admission medications    Medication Sig Start Date End Date Taking?  Authorizing Provider   docusate (COLACE, DULCOLAX) 100 MG CAPS Take 100 mg by mouth daily 5/7/19  Yes Jake Love MD   DULoxetine (CYMBALTA) 20 MG extended release capsule TAKE ONE CAPSULE BY MOUTH TWICE A DAY 5/1/19  Yes Maya Jc MD   esomeprazole ([address]) 40 MG delayed release capsule TAKE ONE CAPSULE BY MOUTH TWICE A DAY 3/20/19  Yes Maya Jc MD   insulin glargine (LANTUS SOLOSTAR) 100 UNIT/ML injection pen Inject 30 Units into the skin nightly 1/9/19  Yes Albert Clifford MD   spironolactone-hydrochlorothiazide [facility]) 25-25 MG per tablet Take 1 tablet by mouth daily 9/25/18  Yes Albert Clifford MD   Multiple Vitamins-Minerals (CENTRUM SILVER) TABS Take by mouth daily   Yes Historical Provider, MD   Biotin 1000 MCG TABS Take one tablet daily in am. 1/25/18  Yes Albert Clifford MD   Ascorbic Acid (VITAMIN C CR) 1000 MG TBCR Take 1 tablet by mouth daily 1/25/18  Yes Albert Clifford MD   guaiFENesin [facility]) 600 MG extended release tablet Take 1 tablet by mouth 2 times daily as needed for Congestion 1/25/18  Yes Albert Clifford MD   ibuprofen (ADVIL) 200 MG tablet Take 1 tablet by mouth every 6 hours as needed for Pain (max 800 mg in 24 hours) 1/25/18  Yes Albert Clifford MD   mometasone (ELOCON) 0.1 % cream APPLY DAILY 1/18/18  Yes Albert Clifford MD   polyethyl glycol-propyl glycol 0.4-0.3 % (SYSTANE) 0.4-0.3 % ophthalmic solution Place 1 drop into both eyes as needed for Dry Eyes 12/12/17  Yes Albert Clifford MD   Vitamin D 3 (CHOLECALCIFEROL) 1000 UNITS TABS tablet Take 1,000 Units by mouth daily. Yes Historical Provider, MD       Physical Exam:  Vital Signs: /66 (Site: Left Upper Arm)   Ht 5' 2\" (1.575 m)   Wt 133 lb (60.3 kg)   SpO2 94%   BMI 24.33 kg/m²   General: Patient appears  non-toxic  HENT: Atraumatic, normocephalic, oral mucosa moist  Lungs:  Clear bilaterally  Heart: Regular rate and rhythm  Abdomen: Non-distended, soft, non-tender  Extremities: No edema  Neuro: Nonfocal    Medical Decision Making and Plan:  Pertinent Labs & Imaging studies reviewed. (See chart for details)  Blood sugars have been controlled    1. Diabetes mellitus type 2 in nonobese (HCC)  Problem stable continue present meds    2.  Rheumatoid arthritis involving wrist with positive rheumatoid factor, unspecified laterality ([address]  This problem is currently untreated probably leading to interstitial lung disease asymptomatic    3. Hx of interstitial lung disease  Likely secondary to rheumatoid arthritis we will continue to monitor    4.  Essential hypertension  This problem is stable continue present meds

## 2019-06-14 DIAGNOSIS — E11.9 DIABETES MELLITUS TYPE 2 IN NONOBESE (HCC): Primary | ICD-10-CM

## 2019-06-14 RX ORDER — ISOPROPYL ALCOHOL 0.75 G/1
SWAB TOPICAL
Qty: 100 EACH | Refills: 3 | Status: SHIPPED | OUTPATIENT
Start: 2019-06-14 | End: 2019-10-29 | Stop reason: CLARIF

## 2019-06-18 RX ORDER — INSULIN GLARGINE 100 [IU]/ML
INJECTION, SOLUTION SUBCUTANEOUS
Qty: 15 PEN | Refills: 2 | Status: SHIPPED | OUTPATIENT
Start: 2019-06-18 | End: 2019-10-29 | Stop reason: CLARIF

## 2019-06-26 ENCOUNTER — TELEPHONE (OUTPATIENT)
Dept: INTERNAL MEDICINE CLINIC | Age: 79
End: 2019-06-26

## 2019-06-26 NOTE — TELEPHONE ENCOUNTER
Pt complaining of wheezing, coughing and slight pain in the chest with the cough.  [facility] requesting Robitussin and Mucinex-D

## 2019-07-03 DIAGNOSIS — M79.605 LEFT LEG PAIN: ICD-10-CM

## 2019-07-03 RX ORDER — ESOMEPRAZOLE MAGNESIUM 40 MG/1
CAPSULE, DELAYED RELEASE ORAL
Qty: 60 CAPSULE | Refills: 2 | Status: SHIPPED | OUTPATIENT
Start: 2019-07-03 | End: 2019-09-30 | Stop reason: SDUPTHER

## 2019-07-03 RX ORDER — DULOXETIN HYDROCHLORIDE 20 MG/1
CAPSULE, DELAYED RELEASE ORAL
Qty: 60 CAPSULE | Refills: 2 | Status: SHIPPED | OUTPATIENT
Start: 2019-07-03 | End: 2019-09-30 | Stop reason: SDUPTHER

## 2019-07-16 ENCOUNTER — TELEPHONE (OUTPATIENT)
Dept: INTERNAL MEDICINE CLINIC | Age: 79
End: 2019-07-16

## 2019-07-16 RX ORDER — DOXYCYCLINE HYCLATE 100 MG
100 TABLET ORAL 2 TIMES DAILY
Qty: 14 TABLET | Refills: 0 | Status: SHIPPED | OUTPATIENT
Start: 2019-07-16 | End: 2019-07-23

## 2019-07-31 RX ORDER — SPIRONOLACTONE AND HYDROCHLOROTHIAZIDE 25; 25 MG/1; MG/1
TABLET ORAL
Qty: 30 TABLET | Refills: 2 | Status: SHIPPED | OUTPATIENT
Start: 2019-07-31 | End: 2019-10-09 | Stop reason: SDUPTHER

## 2019-08-12 RX ORDER — PEN NEEDLE, DIABETIC 31 GX5/16"
NEEDLE, DISPOSABLE MISCELLANEOUS
Qty: 100 EACH | Refills: 0 | Status: SHIPPED | OUTPATIENT
Start: 2019-08-12 | End: 2019-10-22 | Stop reason: SDUPTHER

## 2019-08-15 ENCOUNTER — TELEPHONE (OUTPATIENT)
Dept: INTERNAL MEDICINE CLINIC | Age: 79
End: 2019-08-15

## 2019-08-15 DIAGNOSIS — J84.10 PULMONARY FIBROSIS (HCC): Primary | ICD-10-CM

## 2019-08-19 RX ORDER — MOMETASONE FUROATE 1 MG/G
CREAM TOPICAL
Qty: 1 TUBE | Refills: 1 | Status: ON HOLD | OUTPATIENT
Start: 2019-08-19 | End: 2020-08-12

## 2019-08-20 ENCOUNTER — TELEPHONE (OUTPATIENT)
Dept: INTERNAL MEDICINE CLINIC | Age: 79
End: 2019-08-20

## 2019-09-10 ENCOUNTER — OFFICE VISIT (OUTPATIENT)
Dept: PULMONOLOGY | Age: 79
End: 2019-09-10
Payer: MEDICARE

## 2019-09-10 VITALS
SYSTOLIC BLOOD PRESSURE: 120 MMHG | WEIGHT: 132 LBS | OXYGEN SATURATION: 95 % | HEART RATE: 83 BPM | BODY MASS INDEX: 24.29 KG/M2 | DIASTOLIC BLOOD PRESSURE: 64 MMHG | HEIGHT: 62 IN

## 2019-09-10 DIAGNOSIS — I50.32 CHRONIC DIASTOLIC CONGESTIVE HEART FAILURE (HCC): ICD-10-CM

## 2019-09-10 DIAGNOSIS — R93.89 ABNORMAL CT SCAN: Primary | ICD-10-CM

## 2019-09-10 DIAGNOSIS — R93.89 ABNORMAL CT SCAN: ICD-10-CM

## 2019-09-10 LAB — PRO-BNP: 1097 PG/ML (ref 0–449)

## 2019-09-10 PROCEDURE — G8400 PT W/DXA NO RESULTS DOC: HCPCS | Performed by: INTERNAL MEDICINE

## 2019-09-10 PROCEDURE — G8598 ASA/ANTIPLAT THER USED: HCPCS | Performed by: INTERNAL MEDICINE

## 2019-09-10 PROCEDURE — G8427 DOCREV CUR MEDS BY ELIG CLIN: HCPCS | Performed by: INTERNAL MEDICINE

## 2019-09-10 PROCEDURE — G8420 CALC BMI NORM PARAMETERS: HCPCS | Performed by: INTERNAL MEDICINE

## 2019-09-10 PROCEDURE — 1090F PRES/ABSN URINE INCON ASSESS: CPT | Performed by: INTERNAL MEDICINE

## 2019-09-10 PROCEDURE — 1123F ACP DISCUSS/DSCN MKR DOCD: CPT | Performed by: INTERNAL MEDICINE

## 2019-09-10 PROCEDURE — 4040F PNEUMOC VAC/ADMIN/RCVD: CPT | Performed by: INTERNAL MEDICINE

## 2019-09-10 PROCEDURE — 99204 OFFICE O/P NEW MOD 45 MIN: CPT | Performed by: INTERNAL MEDICINE

## 2019-09-10 PROCEDURE — 1036F TOBACCO NON-USER: CPT | Performed by: INTERNAL MEDICINE

## 2019-09-10 RX ORDER — LANOLIN ALCOHOL/MO/W.PET/CERES
1000 CREAM (GRAM) TOPICAL DAILY
COMMUNITY
End: 2021-02-25

## 2019-09-10 RX ORDER — LANOLIN ALCOHOL/MO/W.PET/CERES
100 CREAM (GRAM) TOPICAL DAILY
COMMUNITY
End: 2021-02-25

## 2019-09-10 ASSESSMENT — ENCOUNTER SYMPTOMS
CHEST TIGHTNESS: 0
SHORTNESS OF BREATH: 0
WHEEZING: 0
COUGH: 1

## 2019-09-10 NOTE — PROGRESS NOTES
[facility]monary [facility] Critical Care    Outpatient Note    Subjective:   CHIEF COMPLAINT:   Chief Complaint   Patient presents with    Establish Care     pulm fib       HPI:     The patient is [de-identified] y.o. female who presents today for a new patient visit for evaluation of cough and mucus production. Her main complain is chronic cough with sputum production and sometimes with SOB. This is going on over two months or more. This is cough is episodic, mainly in the morning. Sputum was yellow in color but currently it is white. Hx is positive for recurrent aspirations. She was seen by speech therapist before at the rehab. She was supposed to be on thickened diet, but she couldn't handle it due to gagging. She can walk with the walker across the parking lot to the office. She has RA, diagnosed 30 years ago. She also has PVD. She is not on DMARD. Seen by rheumatology in 2016, and Celebrex PRN was recommended. started smoking at the age of 12 and quit at the age of 45s. She used to smoke 1.5 PPD.     Past Medical History:    Past Medical History:   Diagnosis Date    ADHD (attention deficit hyperactivity disorder)     Attention deficit disorder without mention of hyperactivity 7/22/2010    Autoimmune disease NEC     Carotid artery disease ([address] 8/2/2013    LEFT CAROTID ENDARTERECTOMY               Carotid artery disease ([address] 8/3/2013    Chronic persistent hepatitis ([address] 7/22/2010    Depression     Depressive disorder, not elsewhere classified 7/22/2010    Double vision     left eye, since cataract surgery    Fractures     GERD (gastroesophageal reflux disease)     Hx of interstitial lung disease 6/12/2019    Neuropathy     Rheumatoid arthritis(714.0) 7/22/2010    Spinal stenosis     Type II or unspecified type diabetes mellitus without mention of complication, not stated as uncontrolled 7/22/2010    Unspecified cerebral artery occlusion with cerebral infarction right hand, loss of some sensation       Social History:    Social History     Tobacco Use   Smoking Status Never Smoker   Smokeless Tobacco Never Used   Tobacco Comment    smoked 30 yrs ago       Family History:  History reviewed. No pertinent family history. Current Medications:  Current Outpatient Medications on File Prior to Visit   Medication Sig Dispense Refill    vitamin B-12 (CYANOCOBALAMIN) 100 MCG tablet Take 50 mcg by mouth daily      vitamin B-6 (PYRIDOXINE) 50 MG tablet Take 50 mg by mouth daily      mometasone (ELOCON) 0.1 % cream APPLY DAILY.  1 Tube 1    B-D ULTRAFINE III SHORT PEN 31G X 8 MM MISC USE DAILY AS DIRECTED 100 each 0    spironolactone-hydrochlorothiazide (ALDACTAZIDE) 25-25 MG per tablet TAKE ONE TABLET BY MOUTH DAILY 30 tablet 2    DULoxetine (CYMBALTA) 20 MG extended release capsule TAKE ONE CAPSULE BY MOUTH TWICE A DAY 60 capsule 2    esomeprazole (NEXIUM) 40 MG delayed release capsule TAKE ONE CAPSULE BY MOUTH TWICE A DAY 60 capsule 2    LANTUS SOLOSTAR 100 UNIT/ML injection pen INJECT 36 UNITS UNDER THE SKIN NIGHTLY 15 pen 2    Alcohol Swabs (B-D SINGLE USE SWABS REGULAR) PADS USE ONE TOPICALLY DAILY 100 each 3    docusate (COLACE, DULCOLAX) 100 MG CAPS Take 100 mg by mouth daily 30 capsule 0    Multiple Vitamins-Minerals (CENTRUM SILVER) TABS Take by mouth daily      Biotin 1000 MCG TABS Take one tablet daily in am.  0    Ascorbic Acid (VITAMIN C CR) 1000 MG TBCR Take 1 tablet by mouth daily 30 tablet 0    guaiFENesin (MUCINEX) 600 MG extended release tablet Take 1 tablet by mouth 2 times daily as needed for Congestion      ibuprofen (ADVIL) 200 MG tablet Take 1 tablet by mouth every 6 hours as needed for Pain (max 800 mg in 24 hours) 120 tablet 3    polyethyl glycol-propyl glycol 0.4-0.3 % (SYSTANE) 0.4-0.3 % ophthalmic solution Place 1 drop into both eyes as needed for Dry Eyes 1 Bottle 3    Vitamin D 3 (CHOLECALCIFEROL) 1000 UNITS TABS tablet Take 1,000 Units

## 2019-09-20 ENCOUNTER — HOSPITAL ENCOUNTER (OUTPATIENT)
Dept: PULMONOLOGY | Age: 79
Discharge: HOME | End: 2019-09-20
Payer: MEDICARE

## 2019-09-20 ENCOUNTER — HOSPITAL ENCOUNTER (OUTPATIENT)
Dept: NON INVASIVE DIAGNOSTICS | Age: 79
Discharge: HOME | End: 2019-09-20
Payer: MEDICARE

## 2019-09-20 DIAGNOSIS — R93.89 ABNORMAL CT SCAN: ICD-10-CM

## 2019-09-20 LAB
LV EF: 58 %
LVEF MODALITY: NORMAL

## 2019-09-20 PROCEDURE — 93306 TTE W/DOPPLER COMPLETE: CPT

## 2019-09-20 PROCEDURE — 94726 PLETHYSMOGRAPHY LUNG VOLUMES: CPT

## 2019-09-20 PROCEDURE — 94729 DIFFUSING CAPACITY: CPT

## 2019-09-20 PROCEDURE — 94640 AIRWAY INHALATION TREATMENT: CPT

## 2019-09-20 PROCEDURE — 94060 EVALUATION OF WHEEZING: CPT

## 2019-09-20 PROCEDURE — 94664 DEMO&/EVAL PT USE INHALER: CPT

## 2019-09-20 PROCEDURE — 94761 N-INVAS EAR/PLS OXIMETRY MLT: CPT

## 2019-09-20 RX ORDER — ALBUTEROL SULFATE 2.5 MG/3ML
2.5 SOLUTION RESPIRATORY (INHALATION) ONCE
Status: DISCONTINUED | OUTPATIENT
Start: 2019-09-20 | End: 2019-09-21 | Stop reason: HOSPADM

## 2019-09-30 DIAGNOSIS — M79.605 LEFT LEG PAIN: ICD-10-CM

## 2019-10-09 ENCOUNTER — OFFICE VISIT (OUTPATIENT)
Dept: PULMONOLOGY | Age: 79
End: 2019-10-09
Payer: MEDICARE

## 2019-10-09 VITALS
RESPIRATION RATE: 16 BRPM | DIASTOLIC BLOOD PRESSURE: 68 MMHG | BODY MASS INDEX: 24.1 KG/M2 | HEART RATE: 76 BPM | OXYGEN SATURATION: 95 % | SYSTOLIC BLOOD PRESSURE: 128 MMHG | HEIGHT: 63 IN | WEIGHT: 136 LBS

## 2019-10-09 DIAGNOSIS — E87.70 HYPERVOLEMIA, UNSPECIFIED HYPERVOLEMIA TYPE: Primary | ICD-10-CM

## 2019-10-09 DIAGNOSIS — J44.9 CHRONIC OBSTRUCTIVE PULMONARY DISEASE, UNSPECIFIED COPD TYPE (HCC): ICD-10-CM

## 2019-10-09 PROCEDURE — 1123F ACP DISCUSS/DSCN MKR DOCD: CPT | Performed by: INTERNAL MEDICINE

## 2019-10-09 PROCEDURE — G8926 SPIRO NO PERF OR DOC: HCPCS | Performed by: INTERNAL MEDICINE

## 2019-10-09 PROCEDURE — 1090F PRES/ABSN URINE INCON ASSESS: CPT | Performed by: INTERNAL MEDICINE

## 2019-10-09 PROCEDURE — 3023F SPIROM DOC REV: CPT | Performed by: INTERNAL MEDICINE

## 2019-10-09 PROCEDURE — 99214 OFFICE O/P EST MOD 30 MIN: CPT | Performed by: INTERNAL MEDICINE

## 2019-10-09 PROCEDURE — G8484 FLU IMMUNIZE NO ADMIN: HCPCS | Performed by: INTERNAL MEDICINE

## 2019-10-09 PROCEDURE — G8420 CALC BMI NORM PARAMETERS: HCPCS | Performed by: INTERNAL MEDICINE

## 2019-10-09 PROCEDURE — G8427 DOCREV CUR MEDS BY ELIG CLIN: HCPCS | Performed by: INTERNAL MEDICINE

## 2019-10-09 PROCEDURE — 1036F TOBACCO NON-USER: CPT | Performed by: INTERNAL MEDICINE

## 2019-10-09 PROCEDURE — G8400 PT W/DXA NO RESULTS DOC: HCPCS | Performed by: INTERNAL MEDICINE

## 2019-10-09 PROCEDURE — 4040F PNEUMOC VAC/ADMIN/RCVD: CPT | Performed by: INTERNAL MEDICINE

## 2019-10-09 PROCEDURE — G8598 ASA/ANTIPLAT THER USED: HCPCS | Performed by: INTERNAL MEDICINE

## 2019-10-09 RX ORDER — SPIRONOLACTONE AND HYDROCHLOROTHIAZIDE 25; 25 MG/1; MG/1
1 TABLET ORAL 2 TIMES DAILY
Qty: 60 TABLET | Refills: 0 | Status: SHIPPED | OUTPATIENT
Start: 2019-10-09 | End: 2019-11-06 | Stop reason: SDUPTHER

## 2019-10-09 ASSESSMENT — ENCOUNTER SYMPTOMS
SHORTNESS OF BREATH: 0
COUGH: 1
CHEST TIGHTNESS: 0
WHEEZING: 0

## 2019-10-10 PROBLEM — J44.9 CHRONIC OBSTRUCTIVE PULMONARY DISEASE (HCC): Status: ACTIVE | Noted: 2019-10-10

## 2019-10-10 RX ORDER — DULOXETIN HYDROCHLORIDE 20 MG/1
CAPSULE, DELAYED RELEASE ORAL
Qty: 60 CAPSULE | Refills: 1 | Status: SHIPPED | OUTPATIENT
Start: 2019-10-10 | End: 2019-11-06 | Stop reason: SDUPTHER

## 2019-10-10 RX ORDER — ESOMEPRAZOLE MAGNESIUM 40 MG/1
CAPSULE, DELAYED RELEASE ORAL
Qty: 60 CAPSULE | Refills: 1 | Status: SHIPPED | OUTPATIENT
Start: 2019-10-10 | End: 2019-11-06 | Stop reason: SDUPTHER

## 2019-10-18 RX ORDER — BLOOD-GLUCOSE METER
KIT MISCELLANEOUS
Qty: 100 STRIP | Refills: 0 | Status: SHIPPED | OUTPATIENT
Start: 2019-10-18 | End: 2019-10-29 | Stop reason: CLARIF

## 2019-10-22 RX ORDER — PEN NEEDLE, DIABETIC 31 GX5/16"
NEEDLE, DISPOSABLE MISCELLANEOUS
Qty: 100 EACH | Refills: 3 | Status: SHIPPED | OUTPATIENT
Start: 2019-10-22 | End: 2019-10-29 | Stop reason: CLARIF

## 2019-10-29 ENCOUNTER — OFFICE VISIT (OUTPATIENT)
Dept: INTERNAL MEDICINE CLINIC | Age: 79
End: 2019-10-29
Payer: MEDICARE

## 2019-10-29 VITALS — DIASTOLIC BLOOD PRESSURE: 70 MMHG | BODY MASS INDEX: 24.09 KG/M2 | SYSTOLIC BLOOD PRESSURE: 128 MMHG | HEIGHT: 63 IN

## 2019-10-29 DIAGNOSIS — M05.739 RHEUMATOID ARTHRITIS INVOLVING WRIST WITH POSITIVE RHEUMATOID FACTOR, UNSPECIFIED LATERALITY (HCC): ICD-10-CM

## 2019-10-29 DIAGNOSIS — E11.9 DIABETES MELLITUS TYPE 2 IN NONOBESE (HCC): ICD-10-CM

## 2019-10-29 DIAGNOSIS — E11.9 DIABETES MELLITUS TYPE 2 IN NONOBESE (HCC): Primary | ICD-10-CM

## 2019-10-29 LAB
A/G RATIO: 1.1 (ref 1.1–2.2)
ALBUMIN SERPL-MCNC: 4.2 G/DL (ref 3.4–5)
ALP BLD-CCNC: 78 U/L (ref 40–129)
ALT SERPL-CCNC: 44 U/L (ref 10–40)
ANION GAP SERPL CALCULATED.3IONS-SCNC: 15 MMOL/L (ref 3–16)
AST SERPL-CCNC: 34 U/L (ref 15–37)
BASOPHILS ABSOLUTE: 0.1 K/UL (ref 0–0.2)
BASOPHILS RELATIVE PERCENT: 0.8 %
BILIRUB SERPL-MCNC: <0.2 MG/DL (ref 0–1)
BUN BLDV-MCNC: 33 MG/DL (ref 7–20)
CALCIUM SERPL-MCNC: 10.6 MG/DL (ref 8.3–10.6)
CHLORIDE BLD-SCNC: 103 MMOL/L (ref 99–110)
CO2: 21 MMOL/L (ref 21–32)
CREAT SERPL-MCNC: 0.9 MG/DL (ref 0.6–1.2)
EOSINOPHILS ABSOLUTE: 0.2 K/UL (ref 0–0.6)
EOSINOPHILS RELATIVE PERCENT: 1.3 %
GFR AFRICAN AMERICAN: >60
GFR NON-AFRICAN AMERICAN: >60
GLOBULIN: 4 G/DL
GLUCOSE BLD-MCNC: 160 MG/DL (ref 70–99)
HCT VFR BLD CALC: 41.1 % (ref 36–48)
HEMATOLOGY PATH CONSULT: NO
HEMOGLOBIN: 13.2 G/DL (ref 12–16)
LYMPHOCYTES ABSOLUTE: 3.1 K/UL (ref 1–5.1)
LYMPHOCYTES RELATIVE PERCENT: 19 %
MCH RBC QN AUTO: 26.7 PG (ref 26–34)
MCHC RBC AUTO-ENTMCNC: 32.1 G/DL (ref 31–36)
MCV RBC AUTO: 83.4 FL (ref 80–100)
MONOCYTES ABSOLUTE: 2.6 K/UL (ref 0–1.3)
MONOCYTES RELATIVE PERCENT: 16.1 %
NEUTROPHILS ABSOLUTE: 10.2 K/UL (ref 1.7–7.7)
NEUTROPHILS RELATIVE PERCENT: 62.8 %
PDW BLD-RTO: 15.5 % (ref 12.4–15.4)
PLATELET # BLD: 259 K/UL (ref 135–450)
PMV BLD AUTO: 8.4 FL (ref 5–10.5)
POTASSIUM SERPL-SCNC: 5.4 MMOL/L (ref 3.5–5.1)
RBC # BLD: 4.93 M/UL (ref 4–5.2)
SODIUM BLD-SCNC: 139 MMOL/L (ref 136–145)
TOTAL PROTEIN: 8.2 G/DL (ref 6.4–8.2)
WBC # BLD: 16.3 K/UL (ref 4–11)

## 2019-10-29 PROCEDURE — G8482 FLU IMMUNIZE ORDER/ADMIN: HCPCS | Performed by: INTERNAL MEDICINE

## 2019-10-29 PROCEDURE — G8420 CALC BMI NORM PARAMETERS: HCPCS | Performed by: INTERNAL MEDICINE

## 2019-10-29 PROCEDURE — G8427 DOCREV CUR MEDS BY ELIG CLIN: HCPCS | Performed by: INTERNAL MEDICINE

## 2019-10-29 PROCEDURE — G8598 ASA/ANTIPLAT THER USED: HCPCS | Performed by: INTERNAL MEDICINE

## 2019-10-29 PROCEDURE — 1123F ACP DISCUSS/DSCN MKR DOCD: CPT | Performed by: INTERNAL MEDICINE

## 2019-10-29 PROCEDURE — 99213 OFFICE O/P EST LOW 20 MIN: CPT | Performed by: INTERNAL MEDICINE

## 2019-10-29 PROCEDURE — G8400 PT W/DXA NO RESULTS DOC: HCPCS | Performed by: INTERNAL MEDICINE

## 2019-10-29 PROCEDURE — 1090F PRES/ABSN URINE INCON ASSESS: CPT | Performed by: INTERNAL MEDICINE

## 2019-10-29 PROCEDURE — 4040F PNEUMOC VAC/ADMIN/RCVD: CPT | Performed by: INTERNAL MEDICINE

## 2019-10-29 PROCEDURE — 1036F TOBACCO NON-USER: CPT | Performed by: INTERNAL MEDICINE

## 2019-10-29 RX ORDER — SPIRONOLACTONE AND HYDROCHLOROTHIAZIDE 25; 25 MG/1; MG/1
TABLET ORAL
Qty: 30 TABLET | Refills: 1 | Status: SHIPPED | OUTPATIENT
Start: 2019-10-29 | End: 2019-10-29 | Stop reason: CLARIF

## 2019-10-30 LAB
ESTIMATED AVERAGE GLUCOSE: 165.7 MG/DL
HBA1C MFR BLD: 7.4 %

## 2019-11-06 ENCOUNTER — TELEPHONE (OUTPATIENT)
Dept: INTERNAL MEDICINE CLINIC | Age: 79
End: 2019-11-06

## 2019-11-06 DIAGNOSIS — M79.605 LEFT LEG PAIN: ICD-10-CM

## 2019-11-06 RX ORDER — DULOXETIN HYDROCHLORIDE 20 MG/1
CAPSULE, DELAYED RELEASE ORAL
Qty: 180 CAPSULE | Refills: 3 | Status: SHIPPED | OUTPATIENT
Start: 2019-11-06 | End: 2021-03-03

## 2019-11-06 RX ORDER — ESOMEPRAZOLE MAGNESIUM 40 MG/1
CAPSULE, DELAYED RELEASE ORAL
Qty: 180 CAPSULE | Refills: 3 | Status: ON HOLD
Start: 2019-11-06 | End: 2020-08-12 | Stop reason: DRUGHIGH

## 2019-11-06 RX ORDER — SPIRONOLACTONE AND HYDROCHLOROTHIAZIDE 25; 25 MG/1; MG/1
1 TABLET ORAL 2 TIMES DAILY
Qty: 180 TABLET | Refills: 3 | Status: ON HOLD | OUTPATIENT
Start: 2019-11-06 | End: 2019-12-13 | Stop reason: SDUPTHER

## 2019-11-26 ENCOUNTER — TELEPHONE (OUTPATIENT)
Dept: INTERNAL MEDICINE CLINIC | Age: 79
End: 2019-11-26

## 2019-11-27 ENCOUNTER — TELEPHONE (OUTPATIENT)
Dept: INTERNAL MEDICINE CLINIC | Age: 79
End: 2019-11-27

## 2019-12-01 ENCOUNTER — HOSPITAL ENCOUNTER (EMERGENCY)
Age: 79
Discharge: HOME | End: 2019-12-01
Payer: MEDICARE

## 2019-12-01 ENCOUNTER — APPOINTMENT (OUTPATIENT)
Dept: CT IMAGING | Age: 79
End: 2019-12-01
Payer: MEDICARE

## 2019-12-01 VITALS
OXYGEN SATURATION: 97 % | RESPIRATION RATE: 18 BRPM | WEIGHT: 136 LBS | DIASTOLIC BLOOD PRESSURE: 55 MMHG | SYSTOLIC BLOOD PRESSURE: 177 MMHG | HEIGHT: 63 IN | BODY MASS INDEX: 24.1 KG/M2 | HEART RATE: 73 BPM | TEMPERATURE: 98.6 F
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
## 2019-12-01 DIAGNOSIS — S20.212A CONTUSION OF RIB ON LEFT SIDE, INITIAL ENCOUNTER: ICD-10-CM

## 2019-12-01 LAB
ANION GAP SERPL CALCULATED.3IONS-SCNC: 12 MMOL/L (ref 3–16)
ATYPICAL LYMPHOCYTE RELATIVE PERCENT: 1 % (ref 0–6)
BANDED NEUTROPHILS RELATIVE PERCENT: 1 % (ref 0–7)
BASOPHILS ABSOLUTE: 0.2 K/UL (ref 0–0.2)
BASOPHILS RELATIVE PERCENT: 1 %
BUN BLDV-MCNC: 39 MG/DL (ref 7–20)
CALCIUM SERPL-MCNC: 10.3 MG/DL (ref 8.3–10.6)
CHLORIDE BLD-SCNC: 103 MMOL/L (ref 99–110)
CO2: 22 MMOL/L (ref 21–32)
CREAT SERPL-MCNC: 0.8 MG/DL (ref 0.6–1.2)
EOSINOPHILS ABSOLUTE: 0 K/UL (ref 0–0.6)
EOSINOPHILS RELATIVE PERCENT: 0 %
GFR AFRICAN AMERICAN: >60
GFR NON-AFRICAN AMERICAN: >60
GLUCOSE BLD-MCNC: 123 MG/DL (ref 70–99)
HCT VFR BLD CALC: 42.4 % (ref 36–48)
HEMATOLOGY PATH CONSULT: YES
HEMOGLOBIN: 13.7 G/DL (ref 12–16)
LYMPHOCYTES ABSOLUTE: 1.7 K/UL (ref 1–5.1)
LYMPHOCYTES RELATIVE PERCENT: 10 %
MCH RBC QN AUTO: 27.5 PG (ref 26–34)
MCHC RBC AUTO-ENTMCNC: 32.3 G/DL (ref 31–36)
MCV RBC AUTO: 85 FL (ref 80–100)
MONOCYTES ABSOLUTE: 1.9 K/UL (ref 0–1.3)
MONOCYTES RELATIVE PERCENT: 12 %
NEUTROPHILS ABSOLUTE: 11.9 K/UL (ref 1.7–7.7)
NEUTROPHILS RELATIVE PERCENT: 75 %
PDW BLD-RTO: 15 % (ref 12.4–15.4)
PLATELET # BLD: 312 K/UL (ref 135–450)
PLATELET SLIDE REVIEW: ADEQUATE
PMV BLD AUTO: 7.7 FL (ref 5–10.5)
POTASSIUM REFLEX MAGNESIUM: 5.7 MMOL/L (ref 3.5–5.1)
RBC # BLD: 4.98 M/UL (ref 4–5.2)
RBC # BLD: NORMAL 10*6/UL
SLIDE REVIEW: ABNORMAL
SODIUM BLD-SCNC: 137 MMOL/L (ref 136–145)
WBC # BLD: 15.6 K/UL (ref 4–11)

## 2019-12-01 PROCEDURE — 6370000000 HC RX 637 (ALT 250 FOR IP): Performed by: PHYSICIAN ASSISTANT

## 2019-12-01 PROCEDURE — 99284 EMERGENCY DEPT VISIT MOD MDM: CPT

## 2019-12-01 PROCEDURE — 80048 BASIC METABOLIC PNL TOTAL CA: CPT

## 2019-12-01 PROCEDURE — 85025 COMPLETE CBC W/AUTO DIFF WBC: CPT

## 2019-12-01 PROCEDURE — 6360000004 HC RX CONTRAST MEDICATION: Performed by: PHYSICIAN ASSISTANT

## 2019-12-01 PROCEDURE — 71260 CT THORAX DX C+: CPT

## 2019-12-01 RX ORDER — HYDROCODONE BITARTRATE AND ACETAMINOPHEN 5; 325 MG/1; MG/1
1 TABLET ORAL ONCE
Status: COMPLETED | OUTPATIENT
Start: 2019-12-01 | End: 2019-12-01

## 2019-12-01 RX ORDER — IBUPROFEN 600 MG/1
600 TABLET ORAL EVERY 6 HOURS PRN
Qty: 30 TABLET | Refills: 0 | Status: ON HOLD | OUTPATIENT
Start: 2019-12-01 | End: 2019-12-13 | Stop reason: HOSPADM

## 2019-12-01 RX ORDER — LIDOCAINE 4 G/G
1 PATCH TOPICAL ONCE
Status: DISCONTINUED | OUTPATIENT
Start: 2019-12-01 | End: 2019-12-01 | Stop reason: HOSPADM

## 2019-12-01 RX ORDER — LIDOCAINE 50 MG/G
1 PATCH TOPICAL DAILY
Qty: 10 PATCH | Refills: 0 | Status: ON HOLD | OUTPATIENT
Start: 2019-12-01 | End: 2020-08-12

## 2019-12-01 RX ORDER — ACETAMINOPHEN 325 MG/1
325 TABLET ORAL EVERY 6 HOURS PRN
Qty: 30 TABLET | Refills: 0 | Status: SHIPPED | OUTPATIENT
Start: 2019-12-01

## 2019-12-01 RX ADMIN — HYDROCODONE BITARTRATE AND ACETAMINOPHEN 1 TABLET: 5; 325 TABLET ORAL at 12:30

## 2019-12-01 RX ADMIN — IOPAMIDOL 80 ML: 755 INJECTION, SOLUTION INTRAVENOUS at 14:19

## 2019-12-01 ASSESSMENT — PAIN DESCRIPTION - LOCATION: LOCATION: HIP;RIB CAGE

## 2019-12-01 ASSESSMENT — ENCOUNTER SYMPTOMS
ABDOMINAL PAIN: 0
BACK PAIN: 1
GASTROINTESTINAL NEGATIVE: 1
RESPIRATORY NEGATIVE: 1
SHORTNESS OF BREATH: 0

## 2019-12-01 ASSESSMENT — PAIN SCALES - GENERAL
PAINLEVEL_OUTOF10: 7
PAINLEVEL_OUTOF10: 7

## 2019-12-01 ASSESSMENT — PAIN DESCRIPTION - PAIN TYPE: TYPE: ACUTE PAIN

## 2019-12-01 ASSESSMENT — PAIN DESCRIPTION - ORIENTATION: ORIENTATION: RIGHT

## 2019-12-02 ENCOUNTER — TELEPHONE (OUTPATIENT)
Dept: INTERNAL MEDICINE CLINIC | Age: 79
End: 2019-12-02

## 2019-12-02 LAB — HEMATOLOGY PATH CONSULT: NORMAL

## 2019-12-04 ENCOUNTER — TELEPHONE (OUTPATIENT)
Dept: INTERNAL MEDICINE CLINIC | Age: 79
End: 2019-12-04

## 2019-12-10 ENCOUNTER — APPOINTMENT (OUTPATIENT)
Dept: GENERAL RADIOLOGY | Age: 79
DRG: 948 | End: 2019-12-10
Payer: MEDICARE

## 2019-12-10 ENCOUNTER — HOSPITAL ENCOUNTER (INPATIENT)
Age: 79
LOS: 3 days | Discharge: NURSING, REHAB OR LONG-TERM CARE | DRG: 948 | End: 2019-12-13
Attending: EMERGENCY MEDICINE | Admitting: INTERNAL MEDICINE
Payer: MEDICARE
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
## 2019-12-10 DIAGNOSIS — R52 GENERALIZED PAIN: ICD-10-CM

## 2019-12-10 LAB
A/G RATIO: 0.9 (ref 1.1–2.2)
ALBUMIN SERPL-MCNC: 3.9 G/DL (ref 3.4–5)
ALP BLD-CCNC: 81 U/L (ref 40–129)
ALT SERPL-CCNC: 21 U/L (ref 10–40)
ANION GAP SERPL CALCULATED.3IONS-SCNC: 2 MMOL/L (ref 3–16)
AST SERPL-CCNC: 33 U/L (ref 15–37)
BASOPHILS ABSOLUTE: 0.1 K/UL (ref 0–0.2)
BASOPHILS RELATIVE PERCENT: 0.9 %
BILIRUB SERPL-MCNC: 0.4 MG/DL (ref 0–1)
BILIRUBIN URINE: NEGATIVE
BLOOD, URINE: NEGATIVE
BUN BLDV-MCNC: 33 MG/DL (ref 7–20)
CALCIUM SERPL-MCNC: 10.8 MG/DL (ref 8.3–10.6)
CHLORIDE BLD-SCNC: 103 MMOL/L (ref 99–110)
CLARITY: CLEAR
CO2: 33 MMOL/L (ref 21–32)
COLOR: YELLOW
CREAT SERPL-MCNC: 1 MG/DL (ref 0.6–1.2)
EOSINOPHILS ABSOLUTE: 0.1 K/UL (ref 0–0.6)
EOSINOPHILS RELATIVE PERCENT: 0.9 %
GFR AFRICAN AMERICAN: >60
GFR NON-AFRICAN AMERICAN: 53
GLOBULIN: 4.2 G/DL
GLUCOSE BLD-MCNC: 125 MG/DL (ref 70–99)
GLUCOSE BLD-MCNC: 155 MG/DL (ref 70–99)
GLUCOSE BLD-MCNC: 79 MG/DL (ref 70–99)
GLUCOSE URINE: NEGATIVE MG/DL
HCT VFR BLD CALC: 43.8 % (ref 36–48)
HEMOGLOBIN: 14.4 G/DL (ref 12–16)
KETONES, URINE: NEGATIVE MG/DL
LEUKOCYTE ESTERASE, URINE: ABNORMAL
LYMPHOCYTES ABSOLUTE: 2.3 K/UL (ref 1–5.1)
LYMPHOCYTES RELATIVE PERCENT: 14.6 %
MCH RBC QN AUTO: 27 PG (ref 26–34)
MCHC RBC AUTO-ENTMCNC: 33 G/DL (ref 31–36)
MCV RBC AUTO: 81.9 FL (ref 80–100)
MICROSCOPIC EXAMINATION: YES
MONOCYTES ABSOLUTE: 2.6 K/UL (ref 0–1.3)
MONOCYTES RELATIVE PERCENT: 16.1 %
NEUTROPHILS ABSOLUTE: 10.8 K/UL (ref 1.7–7.7)
NEUTROPHILS RELATIVE PERCENT: 67.5 %
NITRITE, URINE: NEGATIVE
PDW BLD-RTO: 14.7 % (ref 12.4–15.4)
PERFORMED ON: ABNORMAL
PERFORMED ON: NORMAL
PH UA: 6 (ref 5–8)
PLATELET # BLD: 347 K/UL (ref 135–450)
PMV BLD AUTO: 7.6 FL (ref 5–10.5)
POTASSIUM REFLEX MAGNESIUM: 4.8 MMOL/L (ref 3.5–5.1)
PROTEIN UA: ABNORMAL MG/DL
RBC # BLD: 5.35 M/UL (ref 4–5.2)
RBC UA: NORMAL /HPF (ref 0–2)
SODIUM BLD-SCNC: 138 MMOL/L (ref 136–145)
SPECIFIC GRAVITY UA: 1.02 (ref 1–1.03)
TOTAL PROTEIN: 8.1 G/DL (ref 6.4–8.2)
URINE TYPE: ABNORMAL
UROBILINOGEN, URINE: 0.2 E.U./DL
WBC # BLD: 16 K/UL (ref 4–11)
WBC UA: NORMAL /HPF (ref 0–5)

## 2019-12-10 PROCEDURE — 99285 EMERGENCY DEPT VISIT HI MDM: CPT

## 2019-12-10 PROCEDURE — 81001 URINALYSIS AUTO W/SCOPE: CPT

## 2019-12-10 PROCEDURE — 6360000002 HC RX W HCPCS: Performed by: INTERNAL MEDICINE

## 2019-12-10 PROCEDURE — 85025 COMPLETE CBC W/AUTO DIFF WBC: CPT

## 2019-12-10 PROCEDURE — 87086 URINE CULTURE/COLONY COUNT: CPT

## 2019-12-10 PROCEDURE — 96361 HYDRATE IV INFUSION ADD-ON: CPT

## 2019-12-10 PROCEDURE — 96375 TX/PRO/DX INJ NEW DRUG ADDON: CPT

## 2019-12-10 PROCEDURE — 6370000000 HC RX 637 (ALT 250 FOR IP): Performed by: INTERNAL MEDICINE

## 2019-12-10 PROCEDURE — 2580000003 HC RX 258: Performed by: EMERGENCY MEDICINE

## 2019-12-10 PROCEDURE — 1200000000 HC SEMI PRIVATE

## 2019-12-10 PROCEDURE — 80053 COMPREHEN METABOLIC PANEL: CPT

## 2019-12-10 PROCEDURE — 71100 X-RAY EXAM RIBS UNI 2 VIEWS: CPT

## 2019-12-10 PROCEDURE — 2580000003 HC RX 258: Performed by: INTERNAL MEDICINE

## 2019-12-10 PROCEDURE — 73502 X-RAY EXAM HIP UNI 2-3 VIEWS: CPT

## 2019-12-10 PROCEDURE — 6360000002 HC RX W HCPCS: Performed by: EMERGENCY MEDICINE

## 2019-12-10 RX ORDER — UBIDECARENONE 75 MG
50 CAPSULE ORAL DAILY
Status: DISCONTINUED | OUTPATIENT
Start: 2019-12-11 | End: 2019-12-13 | Stop reason: HOSPADM

## 2019-12-10 RX ORDER — VITAMIN B COMPLEX
1000 TABLET ORAL DAILY
Status: DISCONTINUED | OUTPATIENT
Start: 2019-12-11 | End: 2019-12-13 | Stop reason: HOSPADM

## 2019-12-10 RX ORDER — ONDANSETRON 2 MG/ML
4 INJECTION INTRAMUSCULAR; INTRAVENOUS ONCE
Status: COMPLETED | OUTPATIENT
Start: 2019-12-10 | End: 2019-12-10

## 2019-12-10 RX ORDER — SODIUM CHLORIDE 0.9 % (FLUSH) 0.9 %
10 SYRINGE (ML) INJECTION PRN
Status: DISCONTINUED | OUTPATIENT
Start: 2019-12-10 | End: 2019-12-13 | Stop reason: HOSPADM

## 2019-12-10 RX ORDER — PANTOPRAZOLE SODIUM 40 MG/1
40 TABLET, DELAYED RELEASE ORAL
Status: DISCONTINUED | OUTPATIENT
Start: 2019-12-11 | End: 2019-12-13 | Stop reason: HOSPADM

## 2019-12-10 RX ORDER — DEXTROSE MONOHYDRATE 25 G/50ML
12.5 INJECTION, SOLUTION INTRAVENOUS PRN
Status: DISCONTINUED | OUTPATIENT
Start: 2019-12-10 | End: 2019-12-13 | Stop reason: HOSPADM

## 2019-12-10 RX ORDER — SODIUM CHLORIDE 0.9 % (FLUSH) 0.9 %
10 SYRINGE (ML) INJECTION EVERY 12 HOURS SCHEDULED
Status: DISCONTINUED | OUTPATIENT
Start: 2019-12-10 | End: 2019-12-13 | Stop reason: HOSPADM

## 2019-12-10 RX ORDER — DULOXETIN HYDROCHLORIDE 20 MG/1
20 CAPSULE, DELAYED RELEASE ORAL DAILY
Status: DISCONTINUED | OUTPATIENT
Start: 2019-12-11 | End: 2019-12-13 | Stop reason: HOSPADM

## 2019-12-10 RX ORDER — INSULIN LISPRO 100 [IU]/ML
0-6 INJECTION, SOLUTION INTRAVENOUS; SUBCUTANEOUS
Status: DISCONTINUED | OUTPATIENT
Start: 2019-12-10 | End: 2019-12-13 | Stop reason: HOSPADM

## 2019-12-10 RX ORDER — DOCUSATE SODIUM 100 MG/1
100 CAPSULE, LIQUID FILLED ORAL DAILY
Status: DISCONTINUED | OUTPATIENT
Start: 2019-12-10 | End: 2019-12-13 | Stop reason: HOSPADM

## 2019-12-10 RX ORDER — 0.9 % SODIUM CHLORIDE 0.9 %
1000 INTRAVENOUS SOLUTION INTRAVENOUS ONCE
Status: COMPLETED | OUTPATIENT
Start: 2019-12-10 | End: 2019-12-10

## 2019-12-10 RX ORDER — DEXTROSE MONOHYDRATE 50 MG/ML
100 INJECTION, SOLUTION INTRAVENOUS PRN
Status: DISCONTINUED | OUTPATIENT
Start: 2019-12-10 | End: 2019-12-13 | Stop reason: HOSPADM

## 2019-12-10 RX ORDER — KETOROLAC TROMETHAMINE 30 MG/ML
15 INJECTION, SOLUTION INTRAMUSCULAR; INTRAVENOUS ONCE
Status: COMPLETED | OUTPATIENT
Start: 2019-12-10 | End: 2019-12-10

## 2019-12-10 RX ORDER — INSULIN LISPRO 100 [IU]/ML
0-3 INJECTION, SOLUTION INTRAVENOUS; SUBCUTANEOUS NIGHTLY
Status: DISCONTINUED | OUTPATIENT
Start: 2019-12-10 | End: 2019-12-13 | Stop reason: HOSPADM

## 2019-12-10 RX ORDER — NICOTINE POLACRILEX 4 MG
15 LOZENGE BUCCAL PRN
Status: DISCONTINUED | OUTPATIENT
Start: 2019-12-10 | End: 2019-12-13 | Stop reason: HOSPADM

## 2019-12-10 RX ORDER — ONDANSETRON 2 MG/ML
4 INJECTION INTRAMUSCULAR; INTRAVENOUS EVERY 6 HOURS PRN
Status: DISCONTINUED | OUTPATIENT
Start: 2019-12-10 | End: 2019-12-13 | Stop reason: HOSPADM

## 2019-12-10 RX ORDER — HYDROCODONE BITARTRATE AND ACETAMINOPHEN 5; 325 MG/1; MG/1
1 TABLET ORAL EVERY 6 HOURS PRN
Status: DISCONTINUED | OUTPATIENT
Start: 2019-12-10 | End: 2019-12-13 | Stop reason: HOSPADM

## 2019-12-10 RX ORDER — SODIUM CHLORIDE 9 MG/ML
INJECTION, SOLUTION INTRAVENOUS CONTINUOUS
Status: DISCONTINUED | OUTPATIENT
Start: 2019-12-10 | End: 2019-12-13

## 2019-12-10 RX ORDER — LANOLIN ALCOHOL/MO/W.PET/CERES
50 CREAM (GRAM) TOPICAL DAILY
Status: DISCONTINUED | OUTPATIENT
Start: 2019-12-11 | End: 2019-12-13 | Stop reason: HOSPADM

## 2019-12-10 RX ADMIN — HYDROCODONE BITARTRATE AND ACETAMINOPHEN 1 TABLET: 5; 325 TABLET ORAL at 19:37

## 2019-12-10 RX ADMIN — KETOROLAC TROMETHAMINE 15 MG: 30 INJECTION, SOLUTION INTRAMUSCULAR at 13:37

## 2019-12-10 RX ADMIN — INSULIN LISPRO 1 UNITS: 100 INJECTION, SOLUTION INTRAVENOUS; SUBCUTANEOUS at 22:06

## 2019-12-10 RX ADMIN — ENOXAPARIN SODIUM 40 MG: 40 INJECTION SUBCUTANEOUS at 19:37

## 2019-12-10 RX ADMIN — SODIUM CHLORIDE 1000 ML: 9 INJECTION, SOLUTION INTRAVENOUS at 13:38

## 2019-12-10 RX ADMIN — ONDANSETRON 4 MG: 2 INJECTION INTRAMUSCULAR; INTRAVENOUS at 14:29

## 2019-12-10 RX ADMIN — HYDROMORPHONE HYDROCHLORIDE 0.5 MG: 1 INJECTION, SOLUTION INTRAMUSCULAR; INTRAVENOUS; SUBCUTANEOUS at 14:29

## 2019-12-10 RX ADMIN — SODIUM CHLORIDE: 9 INJECTION, SOLUTION INTRAVENOUS at 18:15

## 2019-12-10 RX ADMIN — INSULIN GLARGINE 20 UNITS: 100 INJECTION, SOLUTION SUBCUTANEOUS at 22:08

## 2019-12-10 ASSESSMENT — PAIN DESCRIPTION - PAIN TYPE
TYPE: ACUTE PAIN
TYPE: ACUTE PAIN

## 2019-12-10 ASSESSMENT — PAIN SCALES - GENERAL
PAINLEVEL_OUTOF10: 7
PAINLEVEL_OUTOF10: 8
PAINLEVEL_OUTOF10: 3
PAINLEVEL_OUTOF10: 9
PAINLEVEL_OUTOF10: 9

## 2019-12-10 ASSESSMENT — ENCOUNTER SYMPTOMS
DIARRHEA: 1
VOMITING: 1
BLOOD IN STOOL: 0
CHEST TIGHTNESS: 0
SHORTNESS OF BREATH: 0
ABDOMINAL PAIN: 0

## 2019-12-10 ASSESSMENT — PAIN DESCRIPTION - ORIENTATION
ORIENTATION: RIGHT
ORIENTATION: RIGHT

## 2019-12-10 ASSESSMENT — PAIN DESCRIPTION - LOCATION: LOCATION: HIP

## 2019-12-11 ENCOUNTER — APPOINTMENT (OUTPATIENT)
Dept: MRI IMAGING | Age: 79
DRG: 948 | End: 2019-12-11
Payer: MEDICARE

## 2019-12-11 ENCOUNTER — APPOINTMENT (OUTPATIENT)
Dept: GENERAL RADIOLOGY | Age: 79
DRG: 948 | End: 2019-12-11
Payer: MEDICARE

## 2019-12-11 LAB
ALBUMIN SERPL-MCNC: 3.3 G/DL (ref 3.4–5)
ANION GAP SERPL CALCULATED.3IONS-SCNC: 10 MMOL/L (ref 3–16)
BANDED NEUTROPHILS RELATIVE PERCENT: 1 % (ref 0–7)
BASOPHILS ABSOLUTE: 0 K/UL (ref 0–0.2)
BASOPHILS RELATIVE PERCENT: 0 %
BUN BLDV-MCNC: 30 MG/DL (ref 7–20)
CALCIUM SERPL-MCNC: 10.1 MG/DL (ref 8.3–10.6)
CHLORIDE BLD-SCNC: 106 MMOL/L (ref 99–110)
CO2: 21 MMOL/L (ref 21–32)
CREAT SERPL-MCNC: 1 MG/DL (ref 0.6–1.2)
EOSINOPHILS ABSOLUTE: 0.2 K/UL (ref 0–0.6)
EOSINOPHILS RELATIVE PERCENT: 1 %
GFR AFRICAN AMERICAN: >60
GFR NON-AFRICAN AMERICAN: 53
GLUCOSE BLD-MCNC: 112 MG/DL (ref 70–99)
GLUCOSE BLD-MCNC: 125 MG/DL (ref 70–99)
GLUCOSE BLD-MCNC: 153 MG/DL (ref 70–99)
GLUCOSE BLD-MCNC: 82 MG/DL (ref 70–99)
GLUCOSE BLD-MCNC: 88 MG/DL (ref 70–99)
HCT VFR BLD CALC: 39.9 % (ref 36–48)
HEMOGLOBIN: 12.7 G/DL (ref 12–16)
LYMPHOCYTES ABSOLUTE: 3.8 K/UL (ref 1–5.1)
LYMPHOCYTES RELATIVE PERCENT: 24 %
MCH RBC QN AUTO: 26.9 PG (ref 26–34)
MCHC RBC AUTO-ENTMCNC: 31.8 G/DL (ref 31–36)
MCV RBC AUTO: 84.7 FL (ref 80–100)
METAMYELOCYTES RELATIVE PERCENT: 1 %
MONOCYTES ABSOLUTE: 1.1 K/UL (ref 0–1.3)
MONOCYTES RELATIVE PERCENT: 7 %
NEUTROPHILS ABSOLUTE: 10.9 K/UL (ref 1.7–7.7)
NEUTROPHILS RELATIVE PERCENT: 66 %
PDW BLD-RTO: 14.8 % (ref 12.4–15.4)
PERFORMED ON: ABNORMAL
PERFORMED ON: NORMAL
PHOSPHORUS: 3.2 MG/DL (ref 2.5–4.9)
PLATELET # BLD: 303 K/UL (ref 135–450)
PMV BLD AUTO: 7.6 FL (ref 5–10.5)
POTASSIUM SERPL-SCNC: 4.7 MMOL/L (ref 3.5–5.1)
RBC # BLD: 4.71 M/UL (ref 4–5.2)
REASON FOR REJECTION: NORMAL
REJECTED TEST: NORMAL
SODIUM BLD-SCNC: 137 MMOL/L (ref 136–145)
WBC # BLD: 16 K/UL (ref 4–11)

## 2019-12-11 PROCEDURE — 6370000000 HC RX 637 (ALT 250 FOR IP): Performed by: INTERNAL MEDICINE

## 2019-12-11 PROCEDURE — 96365 THER/PROPH/DIAG IV INF INIT: CPT

## 2019-12-11 PROCEDURE — 85025 COMPLETE CBC W/AUTO DIFF WBC: CPT

## 2019-12-11 PROCEDURE — 87040 BLOOD CULTURE FOR BACTERIA: CPT

## 2019-12-11 PROCEDURE — 72148 MRI LUMBAR SPINE W/O DYE: CPT

## 2019-12-11 PROCEDURE — 1200000000 HC SEMI PRIVATE

## 2019-12-11 PROCEDURE — 2580000003 HC RX 258: Performed by: INTERNAL MEDICINE

## 2019-12-11 PROCEDURE — 80069 RENAL FUNCTION PANEL: CPT

## 2019-12-11 PROCEDURE — 36415 COLL VENOUS BLD VENIPUNCTURE: CPT

## 2019-12-11 PROCEDURE — 6370000000 HC RX 637 (ALT 250 FOR IP): Performed by: NURSE PRACTITIONER

## 2019-12-11 PROCEDURE — 96372 THER/PROPH/DIAG INJ SC/IM: CPT

## 2019-12-11 PROCEDURE — 72100 X-RAY EXAM L-S SPINE 2/3 VWS: CPT

## 2019-12-11 PROCEDURE — 99222 1ST HOSP IP/OBS MODERATE 55: CPT | Performed by: INTERNAL MEDICINE

## 2019-12-11 PROCEDURE — 6360000002 HC RX W HCPCS: Performed by: INTERNAL MEDICINE

## 2019-12-11 RX ORDER — METHOCARBAMOL 500 MG/1
500 TABLET, FILM COATED ORAL 3 TIMES DAILY
Status: DISCONTINUED | OUTPATIENT
Start: 2019-12-11 | End: 2019-12-13 | Stop reason: HOSPADM

## 2019-12-11 RX ADMIN — ENOXAPARIN SODIUM 40 MG: 40 INJECTION SUBCUTANEOUS at 09:50

## 2019-12-11 RX ADMIN — DULOXETINE HYDROCHLORIDE 20 MG: 20 CAPSULE, DELAYED RELEASE ORAL at 10:46

## 2019-12-11 RX ADMIN — HYDROCODONE BITARTRATE AND ACETAMINOPHEN 1 TABLET: 5; 325 TABLET ORAL at 09:50

## 2019-12-11 RX ADMIN — Medication 10 ML: at 20:52

## 2019-12-11 RX ADMIN — Medication 10 ML: at 10:46

## 2019-12-11 RX ADMIN — METHOCARBAMOL TABLETS 500 MG: 500 TABLET, COATED ORAL at 20:42

## 2019-12-11 RX ADMIN — Medication 50 MCG: at 10:46

## 2019-12-11 RX ADMIN — INSULIN LISPRO 1 UNITS: 100 INJECTION, SOLUTION INTRAVENOUS; SUBCUTANEOUS at 20:58

## 2019-12-11 RX ADMIN — INSULIN GLARGINE 15 UNITS: 100 INJECTION, SOLUTION SUBCUTANEOUS at 20:57

## 2019-12-11 RX ADMIN — PANTOPRAZOLE SODIUM 40 MG: 40 TABLET, DELAYED RELEASE ORAL at 06:24

## 2019-12-11 RX ADMIN — HYDROCODONE BITARTRATE AND ACETAMINOPHEN 1 TABLET: 5; 325 TABLET ORAL at 21:04

## 2019-12-11 RX ADMIN — MELATONIN 1000 UNITS: at 10:46

## 2019-12-11 RX ADMIN — PYRIDOXINE HCL TAB 50 MG 50 MG: 50 TAB at 10:46

## 2019-12-11 ASSESSMENT — PAIN SCALES - GENERAL
PAINLEVEL_OUTOF10: 7
PAINLEVEL_OUTOF10: 5
PAINLEVEL_OUTOF10: 7
PAINLEVEL_OUTOF10: 0

## 2019-12-11 ASSESSMENT — PAIN DESCRIPTION - PROGRESSION
CLINICAL_PROGRESSION: NOT CHANGED
CLINICAL_PROGRESSION: NOT CHANGED

## 2019-12-11 ASSESSMENT — PAIN DESCRIPTION - PAIN TYPE
TYPE: ACUTE PAIN
TYPE: ACUTE PAIN

## 2019-12-11 ASSESSMENT — PAIN - FUNCTIONAL ASSESSMENT: PAIN_FUNCTIONAL_ASSESSMENT: PREVENTS OR INTERFERES SOME ACTIVE ACTIVITIES AND ADLS

## 2019-12-11 ASSESSMENT — PAIN DESCRIPTION - LOCATION
LOCATION: HIP
LOCATION: HIP;BACK

## 2019-12-11 ASSESSMENT — PAIN DESCRIPTION - DESCRIPTORS
DESCRIPTORS: ACHING;DISCOMFORT
DESCRIPTORS: ACHING;DISCOMFORT

## 2019-12-11 ASSESSMENT — PAIN DESCRIPTION - FREQUENCY
FREQUENCY: CONTINUOUS
FREQUENCY: CONTINUOUS

## 2019-12-11 ASSESSMENT — PAIN DESCRIPTION - ORIENTATION
ORIENTATION: RIGHT;LOWER
ORIENTATION: RIGHT;LOWER

## 2019-12-11 ASSESSMENT — PAIN DESCRIPTION - DIRECTION: RADIATING_TOWARDS: SPINE

## 2019-12-11 ASSESSMENT — PAIN DESCRIPTION - ONSET
ONSET: ON-GOING
ONSET: ON-GOING

## 2019-12-12 LAB
ALBUMIN SERPL-MCNC: 3.4 G/DL (ref 3.4–5)
ANION GAP SERPL CALCULATED.3IONS-SCNC: 8 MMOL/L (ref 3–16)
BASOPHILS ABSOLUTE: 0.1 K/UL (ref 0–0.2)
BASOPHILS ABSOLUTE: 0.1 K/UL (ref 0–0.2)
BASOPHILS RELATIVE PERCENT: 0.4 %
BASOPHILS RELATIVE PERCENT: 0.5 %
BUN BLDV-MCNC: 19 MG/DL (ref 7–20)
CALCIUM SERPL-MCNC: 10.4 MG/DL (ref 8.3–10.6)
CHLORIDE BLD-SCNC: 106 MMOL/L (ref 99–110)
CO2: 24 MMOL/L (ref 21–32)
CREAT SERPL-MCNC: 0.7 MG/DL (ref 0.6–1.2)
EOSINOPHILS ABSOLUTE: 0.1 K/UL (ref 0–0.6)
EOSINOPHILS ABSOLUTE: 0.1 K/UL (ref 0–0.6)
EOSINOPHILS RELATIVE PERCENT: 0.6 %
EOSINOPHILS RELATIVE PERCENT: 0.7 %
GFR AFRICAN AMERICAN: >60
GFR NON-AFRICAN AMERICAN: >60
GLUCOSE BLD-MCNC: 122 MG/DL (ref 70–99)
GLUCOSE BLD-MCNC: 126 MG/DL (ref 70–99)
GLUCOSE BLD-MCNC: 163 MG/DL (ref 70–99)
GLUCOSE BLD-MCNC: 68 MG/DL (ref 70–99)
GLUCOSE BLD-MCNC: 73 MG/DL (ref 70–99)
HCT VFR BLD CALC: 40.1 % (ref 36–48)
HCT VFR BLD CALC: 41.7 % (ref 36–48)
HEMATOLOGY PATH CONSULT: NO
HEMOGLOBIN: 12.9 G/DL (ref 12–16)
HEMOGLOBIN: 13.5 G/DL (ref 12–16)
LYMPHOCYTES ABSOLUTE: 1.6 K/UL (ref 1–5.1)
LYMPHOCYTES ABSOLUTE: 2.1 K/UL (ref 1–5.1)
LYMPHOCYTES RELATIVE PERCENT: 12.9 %
LYMPHOCYTES RELATIVE PERCENT: 8.9 %
MCH RBC QN AUTO: 26.7 PG (ref 26–34)
MCH RBC QN AUTO: 26.9 PG (ref 26–34)
MCHC RBC AUTO-ENTMCNC: 32.2 G/DL (ref 31–36)
MCHC RBC AUTO-ENTMCNC: 32.4 G/DL (ref 31–36)
MCV RBC AUTO: 82.4 FL (ref 80–100)
MCV RBC AUTO: 83.7 FL (ref 80–100)
MONOCYTES ABSOLUTE: 2.9 K/UL (ref 0–1.3)
MONOCYTES ABSOLUTE: 3.1 K/UL (ref 0–1.3)
MONOCYTES RELATIVE PERCENT: 17.1 %
MONOCYTES RELATIVE PERCENT: 17.3 %
NEUTROPHILS ABSOLUTE: 11.4 K/UL (ref 1.7–7.7)
NEUTROPHILS ABSOLUTE: 13.1 K/UL (ref 1.7–7.7)
NEUTROPHILS RELATIVE PERCENT: 68.6 %
NEUTROPHILS RELATIVE PERCENT: 73 %
PDW BLD-RTO: 14.4 % (ref 12.4–15.4)
PDW BLD-RTO: 14.5 % (ref 12.4–15.4)
PERFORMED ON: ABNORMAL
PHOSPHORUS: 2.6 MG/DL (ref 2.5–4.9)
PLATELET # BLD: 304 K/UL (ref 135–450)
PLATELET # BLD: 316 K/UL (ref 135–450)
PMV BLD AUTO: 7.5 FL (ref 5–10.5)
PMV BLD AUTO: 7.8 FL (ref 5–10.5)
POTASSIUM SERPL-SCNC: 4.5 MMOL/L (ref 3.5–5.1)
RBC # BLD: 4.79 M/UL (ref 4–5.2)
RBC # BLD: 5.06 M/UL (ref 4–5.2)
SODIUM BLD-SCNC: 138 MMOL/L (ref 136–145)
WBC # BLD: 16.6 K/UL (ref 4–11)
WBC # BLD: 17.9 K/UL (ref 4–11)

## 2019-12-12 PROCEDURE — 97530 THERAPEUTIC ACTIVITIES: CPT

## 2019-12-12 PROCEDURE — 97116 GAIT TRAINING THERAPY: CPT

## 2019-12-12 PROCEDURE — 6370000000 HC RX 637 (ALT 250 FOR IP): Performed by: NURSE PRACTITIONER

## 2019-12-12 PROCEDURE — 85025 COMPLETE CBC W/AUTO DIFF WBC: CPT

## 2019-12-12 PROCEDURE — 36415 COLL VENOUS BLD VENIPUNCTURE: CPT

## 2019-12-12 PROCEDURE — 6360000002 HC RX W HCPCS: Performed by: INTERNAL MEDICINE

## 2019-12-12 PROCEDURE — 97166 OT EVAL MOD COMPLEX 45 MIN: CPT

## 2019-12-12 PROCEDURE — 2580000003 HC RX 258: Performed by: INTERNAL MEDICINE

## 2019-12-12 PROCEDURE — 1200000000 HC SEMI PRIVATE

## 2019-12-12 PROCEDURE — 80069 RENAL FUNCTION PANEL: CPT

## 2019-12-12 PROCEDURE — 6370000000 HC RX 637 (ALT 250 FOR IP): Performed by: INTERNAL MEDICINE

## 2019-12-12 PROCEDURE — 99232 SBSQ HOSP IP/OBS MODERATE 35: CPT | Performed by: INTERNAL MEDICINE

## 2019-12-12 PROCEDURE — 97162 PT EVAL MOD COMPLEX 30 MIN: CPT

## 2019-12-12 PROCEDURE — 97535 SELF CARE MNGMENT TRAINING: CPT

## 2019-12-12 RX ADMIN — SODIUM CHLORIDE: 9 INJECTION, SOLUTION INTRAVENOUS at 16:43

## 2019-12-12 RX ADMIN — METHOCARBAMOL TABLETS 500 MG: 500 TABLET, COATED ORAL at 20:29

## 2019-12-12 RX ADMIN — METHOCARBAMOL TABLETS 500 MG: 500 TABLET, COATED ORAL at 15:38

## 2019-12-12 RX ADMIN — DOCUSATE SODIUM 100 MG: 100 CAPSULE, LIQUID FILLED ORAL at 09:55

## 2019-12-12 RX ADMIN — INSULIN LISPRO 1 UNITS: 100 INJECTION, SOLUTION INTRAVENOUS; SUBCUTANEOUS at 20:30

## 2019-12-12 RX ADMIN — DULOXETINE HYDROCHLORIDE 20 MG: 20 CAPSULE, DELAYED RELEASE ORAL at 09:55

## 2019-12-12 RX ADMIN — METHOCARBAMOL TABLETS 500 MG: 500 TABLET, COATED ORAL at 09:55

## 2019-12-12 RX ADMIN — PANTOPRAZOLE SODIUM 40 MG: 40 TABLET, DELAYED RELEASE ORAL at 05:58

## 2019-12-12 RX ADMIN — SODIUM CHLORIDE: 9 INJECTION, SOLUTION INTRAVENOUS at 03:38

## 2019-12-12 RX ADMIN — MELATONIN 1000 UNITS: at 09:55

## 2019-12-12 RX ADMIN — Medication 10 ML: at 20:38

## 2019-12-12 RX ADMIN — HYDROCODONE BITARTRATE AND ACETAMINOPHEN 1 TABLET: 5; 325 TABLET ORAL at 16:43

## 2019-12-12 RX ADMIN — INSULIN GLARGINE 15 UNITS: 100 INJECTION, SOLUTION SUBCUTANEOUS at 20:31

## 2019-12-12 RX ADMIN — PYRIDOXINE HCL TAB 50 MG 50 MG: 50 TAB at 10:13

## 2019-12-12 RX ADMIN — Medication 50 MCG: at 09:57

## 2019-12-12 RX ADMIN — HYDROCODONE BITARTRATE AND ACETAMINOPHEN 1 TABLET: 5; 325 TABLET ORAL at 10:19

## 2019-12-12 RX ADMIN — ENOXAPARIN SODIUM 40 MG: 40 INJECTION SUBCUTANEOUS at 09:55

## 2019-12-12 ASSESSMENT — PAIN SCALES - GENERAL
PAINLEVEL_OUTOF10: 6
PAINLEVEL_OUTOF10: 7
PAINLEVEL_OUTOF10: 6
PAINLEVEL_OUTOF10: 6

## 2019-12-12 ASSESSMENT — PAIN DESCRIPTION - PAIN TYPE
TYPE: ACUTE PAIN

## 2019-12-12 ASSESSMENT — PAIN DESCRIPTION - ONSET
ONSET: ON-GOING

## 2019-12-12 ASSESSMENT — PAIN DESCRIPTION - PROGRESSION
CLINICAL_PROGRESSION: NOT CHANGED

## 2019-12-12 ASSESSMENT — PAIN DESCRIPTION - LOCATION
LOCATION: BACK;HIP
LOCATION: HIP

## 2019-12-12 ASSESSMENT — PAIN - FUNCTIONAL ASSESSMENT
PAIN_FUNCTIONAL_ASSESSMENT: PREVENTS OR INTERFERES WITH MANY ACTIVE NOT PASSIVE ACTIVITIES
PAIN_FUNCTIONAL_ASSESSMENT: PREVENTS OR INTERFERES SOME ACTIVE ACTIVITIES AND ADLS
PAIN_FUNCTIONAL_ASSESSMENT: PREVENTS OR INTERFERES SOME ACTIVE ACTIVITIES AND ADLS
PAIN_FUNCTIONAL_ASSESSMENT: PREVENTS OR INTERFERES WITH ALL ACTIVE AND SOME PASSIVE ACTIVITIES

## 2019-12-12 ASSESSMENT — PAIN DESCRIPTION - DIRECTION
RADIATING_TOWARDS: LOWER BACK
RADIATING_TOWARDS: LOWER BACK
RADIATING_TOWARDS: SPINE

## 2019-12-12 ASSESSMENT — PAIN DESCRIPTION - DESCRIPTORS
DESCRIPTORS: SPASM
DESCRIPTORS: ACHING;DISCOMFORT
DESCRIPTORS: SPASM
DESCRIPTORS: SPASM;SHOOTING

## 2019-12-12 ASSESSMENT — PAIN DESCRIPTION - ORIENTATION
ORIENTATION: RIGHT;LOWER
ORIENTATION: RIGHT
ORIENTATION: RIGHT
ORIENTATION: RIGHT;LOWER

## 2019-12-12 ASSESSMENT — PAIN DESCRIPTION - FREQUENCY
FREQUENCY: CONTINUOUS

## 2019-12-13 VITALS
OXYGEN SATURATION: 98 % | RESPIRATION RATE: 18 BRPM | WEIGHT: 136 LBS | HEART RATE: 79 BPM | HEIGHT: 63 IN | BODY MASS INDEX: 24.1 KG/M2 | DIASTOLIC BLOOD PRESSURE: 80 MMHG | SYSTOLIC BLOOD PRESSURE: 168 MMHG | TEMPERATURE: 98 F

## 2019-12-13 LAB
ALBUMIN SERPL-MCNC: 3 G/DL (ref 3.4–5)
ANION GAP SERPL CALCULATED.3IONS-SCNC: 9 MMOL/L (ref 3–16)
BASOPHILS ABSOLUTE: 0.3 K/UL (ref 0–0.2)
BASOPHILS RELATIVE PERCENT: 2 %
BUN BLDV-MCNC: 13 MG/DL (ref 7–20)
C-REACTIVE PROTEIN: 6 MG/L (ref 0–5.1)
CALCIUM SERPL-MCNC: 9.8 MG/DL (ref 8.3–10.6)
CHLORIDE BLD-SCNC: 110 MMOL/L (ref 99–110)
CO2: 21 MMOL/L (ref 21–32)
CREAT SERPL-MCNC: 0.7 MG/DL (ref 0.6–1.2)
EOSINOPHILS ABSOLUTE: 0.1 K/UL (ref 0–0.6)
EOSINOPHILS RELATIVE PERCENT: 1 %
GFR AFRICAN AMERICAN: >60
GFR NON-AFRICAN AMERICAN: >60
GLUCOSE BLD-MCNC: 100 MG/DL (ref 70–99)
GLUCOSE BLD-MCNC: 119 MG/DL (ref 70–99)
GLUCOSE BLD-MCNC: 180 MG/DL (ref 70–99)
GLUCOSE BLD-MCNC: 84 MG/DL (ref 70–99)
HCT VFR BLD CALC: 38.9 % (ref 36–48)
HEMOGLOBIN: 12.4 G/DL (ref 12–16)
LYMPHOCYTES ABSOLUTE: 1.5 K/UL (ref 1–5.1)
LYMPHOCYTES RELATIVE PERCENT: 11 %
MCH RBC QN AUTO: 26.7 PG (ref 26–34)
MCHC RBC AUTO-ENTMCNC: 32 G/DL (ref 31–36)
MCV RBC AUTO: 83.5 FL (ref 80–100)
MONOCYTES ABSOLUTE: 2.3 K/UL (ref 0–1.3)
MONOCYTES RELATIVE PERCENT: 17 %
NEUTROPHILS ABSOLUTE: 9.2 K/UL (ref 1.7–7.7)
NEUTROPHILS RELATIVE PERCENT: 69 %
PDW BLD-RTO: 14.9 % (ref 12.4–15.4)
PERFORMED ON: ABNORMAL
PERFORMED ON: ABNORMAL
PERFORMED ON: NORMAL
PHOSPHORUS: 2.8 MG/DL (ref 2.5–4.9)
PLATELET # BLD: 269 K/UL (ref 135–450)
PMV BLD AUTO: 7.3 FL (ref 5–10.5)
POTASSIUM SERPL-SCNC: 4.3 MMOL/L (ref 3.5–5.1)
RBC # BLD: 4.66 M/UL (ref 4–5.2)
RBC # BLD: NORMAL 10*6/UL
RHEUMATOID FACTOR: 14 IU/ML
SEDIMENTATION RATE, ERYTHROCYTE: 20 MM/HR (ref 0–30)
SODIUM BLD-SCNC: 140 MMOL/L (ref 136–145)
URINE CULTURE, ROUTINE: NORMAL
WBC # BLD: 13.3 K/UL (ref 4–11)

## 2019-12-13 PROCEDURE — 6370000000 HC RX 637 (ALT 250 FOR IP): Performed by: INTERNAL MEDICINE

## 2019-12-13 PROCEDURE — 2580000003 HC RX 258: Performed by: INTERNAL MEDICINE

## 2019-12-13 PROCEDURE — 6360000002 HC RX W HCPCS: Performed by: INTERNAL MEDICINE

## 2019-12-13 PROCEDURE — 36415 COLL VENOUS BLD VENIPUNCTURE: CPT

## 2019-12-13 PROCEDURE — 86431 RHEUMATOID FACTOR QUANT: CPT

## 2019-12-13 PROCEDURE — 85025 COMPLETE CBC W/AUTO DIFF WBC: CPT

## 2019-12-13 PROCEDURE — 85652 RBC SED RATE AUTOMATED: CPT

## 2019-12-13 PROCEDURE — 99232 SBSQ HOSP IP/OBS MODERATE 35: CPT | Performed by: INTERNAL MEDICINE

## 2019-12-13 PROCEDURE — 86140 C-REACTIVE PROTEIN: CPT

## 2019-12-13 PROCEDURE — 80069 RENAL FUNCTION PANEL: CPT

## 2019-12-13 PROCEDURE — 6370000000 HC RX 637 (ALT 250 FOR IP): Performed by: NURSE PRACTITIONER

## 2019-12-13 RX ORDER — SPIRONOLACTONE AND HYDROCHLOROTHIAZIDE 25; 25 MG/1; MG/1
1 TABLET ORAL
Qty: 30 TABLET | Refills: 0 | Status: ON HOLD | OUTPATIENT
Start: 2019-12-13 | End: 2020-08-14 | Stop reason: HOSPADM

## 2019-12-13 RX ORDER — HYDROCODONE BITARTRATE AND ACETAMINOPHEN 5; 325 MG/1; MG/1
1 TABLET ORAL ONCE
Status: COMPLETED | OUTPATIENT
Start: 2019-12-13 | End: 2019-12-13

## 2019-12-13 RX ORDER — CYCLOBENZAPRINE HCL 10 MG
10 TABLET ORAL 3 TIMES DAILY PRN
Qty: 15 TABLET | Refills: 0 | Status: SHIPPED | OUTPATIENT
Start: 2019-12-13 | End: 2019-12-18

## 2019-12-13 RX ORDER — HYDROCODONE BITARTRATE AND ACETAMINOPHEN 5; 325 MG/1; MG/1
1 TABLET ORAL EVERY 6 HOURS PRN
Qty: 12 TABLET | Refills: 0 | Status: SHIPPED | OUTPATIENT
Start: 2019-12-13 | End: 2019-12-16

## 2019-12-13 RX ADMIN — DULOXETINE HYDROCHLORIDE 20 MG: 20 CAPSULE, DELAYED RELEASE ORAL at 08:06

## 2019-12-13 RX ADMIN — HYDROCODONE BITARTRATE AND ACETAMINOPHEN 1 TABLET: 5; 325 TABLET ORAL at 04:30

## 2019-12-13 RX ADMIN — HYDROCODONE BITARTRATE AND ACETAMINOPHEN 1 TABLET: 5; 325 TABLET ORAL at 13:50

## 2019-12-13 RX ADMIN — Medication 50 MCG: at 08:07

## 2019-12-13 RX ADMIN — MELATONIN 1000 UNITS: at 08:06

## 2019-12-13 RX ADMIN — PYRIDOXINE HCL TAB 50 MG 50 MG: 50 TAB at 08:07

## 2019-12-13 RX ADMIN — METHOCARBAMOL TABLETS 500 MG: 500 TABLET, COATED ORAL at 12:43

## 2019-12-13 RX ADMIN — ENOXAPARIN SODIUM 40 MG: 40 INJECTION SUBCUTANEOUS at 08:05

## 2019-12-13 RX ADMIN — PANTOPRAZOLE SODIUM 40 MG: 40 TABLET, DELAYED RELEASE ORAL at 08:08

## 2019-12-13 RX ADMIN — DOCUSATE SODIUM 100 MG: 100 CAPSULE, LIQUID FILLED ORAL at 08:06

## 2019-12-13 RX ADMIN — HYDROCODONE BITARTRATE AND ACETAMINOPHEN 1 TABLET: 5; 325 TABLET ORAL at 10:20

## 2019-12-13 RX ADMIN — Medication 10 ML: at 08:07

## 2019-12-13 RX ADMIN — METHOCARBAMOL TABLETS 500 MG: 500 TABLET, COATED ORAL at 08:06

## 2019-12-13 ASSESSMENT — PAIN - FUNCTIONAL ASSESSMENT: PAIN_FUNCTIONAL_ASSESSMENT: PREVENTS OR INTERFERES SOME ACTIVE ACTIVITIES AND ADLS

## 2019-12-13 ASSESSMENT — PAIN DESCRIPTION - PROGRESSION
CLINICAL_PROGRESSION: NOT CHANGED

## 2019-12-13 ASSESSMENT — PAIN DESCRIPTION - ORIENTATION: ORIENTATION: RIGHT

## 2019-12-13 ASSESSMENT — PAIN DESCRIPTION - ONSET: ONSET: ON-GOING

## 2019-12-13 ASSESSMENT — PAIN SCALES - GENERAL
PAINLEVEL_OUTOF10: 6
PAINLEVEL_OUTOF10: 6
PAINLEVEL_OUTOF10: 7

## 2019-12-13 ASSESSMENT — PAIN DESCRIPTION - FREQUENCY: FREQUENCY: CONTINUOUS

## 2019-12-13 ASSESSMENT — PAIN DESCRIPTION - DESCRIPTORS: DESCRIPTORS: SPASM

## 2019-12-13 ASSESSMENT — PAIN DESCRIPTION - PAIN TYPE: TYPE: ACUTE PAIN

## 2019-12-13 ASSESSMENT — PAIN DESCRIPTION - LOCATION: LOCATION: HIP

## 2019-12-13 ASSESSMENT — PAIN DESCRIPTION - DIRECTION: RADIATING_TOWARDS: LOWER BACK

## 2019-12-15 LAB
BLOOD CULTURE, ROUTINE: NORMAL
CULTURE, BLOOD 2: NORMAL

## 2020-01-03 ENCOUNTER — TELEPHONE (OUTPATIENT)
Dept: INTERNAL MEDICINE CLINIC | Age: 80
End: 2020-01-03

## 2020-01-03 NOTE — TELEPHONE ENCOUNTER
Pt's daughter states her mom needs a sooner transitional (Rehab discharge) appt other then 1/15 due to mom currently being on fentanyl patch and [place], pt will be out of the medication by the end of next week.

## 2020-01-06 ENCOUNTER — OFFICE VISIT (OUTPATIENT)
Dept: INTERNAL MEDICINE CLINIC | Age: 80
End: 2020-01-06
Payer: MEDICARE

## 2020-01-06 ENCOUNTER — TELEPHONE (OUTPATIENT)
Dept: INTERNAL MEDICINE CLINIC | Age: 80
End: 2020-01-06

## 2020-01-06 VITALS — WEIGHT: 130 LBS | SYSTOLIC BLOOD PRESSURE: 120 MMHG | DIASTOLIC BLOOD PRESSURE: 68 MMHG | BODY MASS INDEX: 23.03 KG/M2

## 2020-01-06 DIAGNOSIS — E11.9 TYPE 2 DIABETES MELLITUS WITHOUT COMPLICATION, WITH LONG-TERM CURRENT USE OF INSULIN (HCC): ICD-10-CM

## 2020-01-06 DIAGNOSIS — Z79.4 TYPE 2 DIABETES MELLITUS WITHOUT COMPLICATION, WITH LONG-TERM CURRENT USE OF INSULIN (HCC): ICD-10-CM

## 2020-01-06 LAB
A/G RATIO: 0.9 (ref 1.1–2.2)
ALBUMIN SERPL-MCNC: 3.9 G/DL (ref 3.4–5)
ALP BLD-CCNC: 87 U/L (ref 40–129)
ALT SERPL-CCNC: 31 U/L (ref 10–40)
ANION GAP SERPL CALCULATED.3IONS-SCNC: 18 MMOL/L (ref 3–16)
AST SERPL-CCNC: 36 U/L (ref 15–37)
BILIRUB SERPL-MCNC: 0.3 MG/DL (ref 0–1)
BUN BLDV-MCNC: 33 MG/DL (ref 7–20)
CALCIUM SERPL-MCNC: 10.8 MG/DL (ref 8.3–10.6)
CHLORIDE BLD-SCNC: 98 MMOL/L (ref 99–110)
CO2: 21 MMOL/L (ref 21–32)
CREAT SERPL-MCNC: 0.9 MG/DL (ref 0.6–1.2)
GFR AFRICAN AMERICAN: >60
GFR NON-AFRICAN AMERICAN: >60
GLOBULIN: 4.2 G/DL
GLUCOSE BLD-MCNC: 136 MG/DL (ref 70–99)
POTASSIUM SERPL-SCNC: 4.5 MMOL/L (ref 3.5–5.1)
SODIUM BLD-SCNC: 137 MMOL/L (ref 136–145)
TOTAL PROTEIN: 8.1 G/DL (ref 6.4–8.2)

## 2020-01-06 PROCEDURE — G8427 DOCREV CUR MEDS BY ELIG CLIN: HCPCS | Performed by: INTERNAL MEDICINE

## 2020-01-06 PROCEDURE — 1090F PRES/ABSN URINE INCON ASSESS: CPT | Performed by: INTERNAL MEDICINE

## 2020-01-06 PROCEDURE — G8400 PT W/DXA NO RESULTS DOC: HCPCS | Performed by: INTERNAL MEDICINE

## 2020-01-06 PROCEDURE — 3288F FALL RISK ASSESSMENT DOCD: CPT | Performed by: INTERNAL MEDICINE

## 2020-01-06 PROCEDURE — G8482 FLU IMMUNIZE ORDER/ADMIN: HCPCS | Performed by: INTERNAL MEDICINE

## 2020-01-06 PROCEDURE — 1111F DSCHRG MED/CURRENT MED MERGE: CPT | Performed by: INTERNAL MEDICINE

## 2020-01-06 PROCEDURE — 1036F TOBACCO NON-USER: CPT | Performed by: INTERNAL MEDICINE

## 2020-01-06 PROCEDURE — 1123F ACP DISCUSS/DSCN MKR DOCD: CPT | Performed by: INTERNAL MEDICINE

## 2020-01-06 PROCEDURE — 4040F PNEUMOC VAC/ADMIN/RCVD: CPT | Performed by: INTERNAL MEDICINE

## 2020-01-06 PROCEDURE — G8420 CALC BMI NORM PARAMETERS: HCPCS | Performed by: INTERNAL MEDICINE

## 2020-01-06 PROCEDURE — 99213 OFFICE O/P EST LOW 20 MIN: CPT | Performed by: INTERNAL MEDICINE

## 2020-01-06 RX ORDER — CYCLOBENZAPRINE HCL 10 MG
10 TABLET ORAL 2 TIMES DAILY PRN
COMMUNITY
End: 2020-01-06 | Stop reason: SINTOL

## 2020-01-06 RX ORDER — FENTANYL 12 UG/H
1 PATCH TRANSDERMAL
Status: ON HOLD | COMMUNITY
End: 2020-08-12

## 2020-01-06 RX ORDER — HYDROCODONE BITARTRATE AND ACETAMINOPHEN 5; 325 MG/1; MG/1
1 TABLET ORAL EVERY 4 HOURS PRN
COMMUNITY
End: 2020-01-06 | Stop reason: SDUPTHER

## 2020-01-06 RX ORDER — HYDROCODONE BITARTRATE AND ACETAMINOPHEN 5; 325 MG/1; MG/1
1 TABLET ORAL EVERY 4 HOURS PRN
Qty: 30 TABLET | Refills: 0 | Status: SHIPPED | OUTPATIENT
Start: 2020-01-06 | End: 2020-10-14 | Stop reason: SDUPTHER

## 2020-01-06 NOTE — PROGRESS NOTES
CHIEF COMPLAINT: Irais Mcginnis is a [de-identified] y.o. female who presents for : Follow-up of compression fracture    HPI: Patient presented with low up of her compression fracture she is feeling much better now she is on a fentanyl patch but is willing to get off of it she denies any other significant problems her blood sugars have been controlled    Review of Systems:   Constitutional:  Denies fever or chills   Eyes:  Denies change in visual acuity   HENT:  Denies nasal congestion or sore throat   Respiratory:  Denies cough or shortness of breath   Cardiovascular:  Denies chest pain or edema   GI:  Denies abdominal pain, nausea, vomiting, bloody stools or diarrhea   :  Denies dysuria   Musculoskeletal:  Denies back pain or joint pain   Integument:  Denies rash   Neurologic:  Denies headache, focal weakness or sensory changes   Endocrine:  Denies polyuria or polydipsia   Lymphatic:  Denies swollen glands   Psychiatric:  Denies depression or anxiety     Past Medical History:        Diagnosis Date    ADHD (attention deficit hyperactivity disorder)     Attention deficit disorder without mention of hyperactivity 7/22/2010    Autoimmune disease NEC     Carotid artery disease ([facility] 8/2/2013    LEFT CAROTID ENDARTERECTOMY               Carotid artery disease ([facility] 8/3/2013    Chronic persistent hepatitis ([facility] 7/22/2010    Depression     Depressive disorder, not elsewhere classified 7/22/2010    Double vision     left eye, since cataract surgery    Fractures     GERD (gastroesophageal reflux disease)     Hx of interstitial lung disease 6/12/2019    Neuropathy     Rheumatoid arthritis(714.0) 7/22/2010    Spinal stenosis     Type II or unspecified type diabetes mellitus without mention of complication, not stated as uncontrolled 7/22/2010    Unspecified cerebral artery occlusion with cerebral infarction     right hand, loss of some sensation       Past Surgical History:        Procedure Laterality Date    COLONOSCOPY      COSMETIC SURGERY      tummy tuck,face lift,mammoplasties- hepatitis blood transfusion complication    EYE SURGERY Bilateral     cataract       Family History:  No family history on file. Social History:  Social History     Socioeconomic History    Marital status:      Spouse name: None    Number of children: None    Years of education: None    Highest education level: None   Occupational History    None   Social Needs    Financial resource strain: None    Food insecurity:     Worry: None     Inability: None    Transportation needs:     Medical: None     Non-medical: None   Tobacco Use    Smoking status: Never Smoker    Smokeless tobacco: Never Used    Tobacco comment: smoked 30 yrs ago   Substance and Sexual Activity    Alcohol use: Yes     Comment: 2 per day    Drug use: No    Sexual activity: None   Lifestyle    Physical activity:     Days per week: None     Minutes per session: None    Stress: None   Relationships    Social connections:     Talks on phone: None     Gets together: None     Attends Anabaptism service: None     Active member of club or organization: None     Attends meetings of clubs or organizations: None     Relationship status: None    Intimate partner violence:     Fear of current or ex partner: None     Emotionally abused: None     Physically abused: None     Forced sexual activity: None   Other Topics Concern    None   Social History Narrative    None         Allergies:  Erythromycin and Sulfites    Current Medications:    Prior to Admission medications    Medication Sig Start Date End Date Taking? Authorizing Provider   cyclobenzaprine (FLEXERIL) 10 MG tablet Take 10 mg by mouth 2 times daily as needed for Muscle spasms   Yes Historical Provider, MD   fentaNYL (DURAGESIC) 12 MCG/HR Place 1 patch onto the skin every 72 hours.    Yes Historical Provider, MD   HYDROcodone-acetaminophen (NORCO) 5-325 MG per tablet Take 1 tablet by mouth every 4 hours as needed for Pain for up to 7 days. 1/6/20 1/13/20 Yes Parish Correia MD   insulin glargine (LANTUS) 100 UNIT/ML injection pen Inject 15 Units into the skin nightly 12/13/19  Yes Di Coleman,    spironolactone-hydrochlorothiazide (ALDACTAZIDE) 25-25 MG per tablet Take 1 tablet by mouth daily (with breakfast) 12/13/19 1/12/20 Yes Di Coleman,    acetaminophen (TYLENOL) 325 MG tablet Take 1 tablet by mouth every 6 hours as needed for Pain 12/1/19  Yes Britta Cope PA-C   lidocaine (LIDODERM) 5 % Place 1 patch onto the skin daily 12 hours on, 12 hours off. 12/1/19  Yes Britta Cope PA-C   esomeprazole (NEXIUM) 40 MG delayed release capsule TAKE ONE CAPSULE BY MOUTH TWICE A DAY 11/6/19  Yes Parish Correia MD   DULoxetine (CYMBALTA) 20 MG extended release capsule TAKE ONE CAPSULE BY MOUTH TWICE A DAY 11/6/19  Yes Parish Correia MD   vitamin B-12 (CYANOCOBALAMIN) 100 MCG tablet Take 1,000 mcg by mouth daily    Yes Historical Provider, MD   vitamin B-6 (PYRIDOXINE) 50 MG tablet Take 100 mg by mouth daily    Yes Historical Provider, MD   mometasone (ELOCON) 0.1 % cream APPLY DAILY. 8/19/19  Yes Parish Correia MD   docusate (COLACE, DULCOLAX) 100 MG CAPS Take 100 mg by mouth daily 5/7/19  Yes Zandra Forrester MD   Multiple Vitamins-Minerals (CENTRUM SILVER) TABS Take by mouth daily   Yes Historical Provider, MD   Biotin 1000 MCG TABS Take one tablet daily in am. 1/25/18  Yes Parish Correia MD   Ascorbic Acid (VITAMIN C CR) 1000 MG TBCR Take 1 tablet by mouth daily 1/25/18  Yes Parish Correia MD   guaiFENesin [facility]) 600 MG extended release tablet Take 1 tablet by mouth 2 times daily as needed for Congestion 1/25/18  Yes Parish Correia MD   polyethyl glycol-propyl glycol 0.4-0.3 % (SYSTANE) 0.4-0.3 % ophthalmic solution Place 1 drop into both eyes as needed for Dry Eyes 12/12/17  Yes Parish Correia MD   Vitamin D 3 (CHOLECALCIFEROL) 1000 UNITS TABS tablet Take 1,000 Units by mouth daily.    Yes Historical Provider, MD Physical Exam:  Vital Signs: /68 (Site: Right Upper Arm)   Wt 130 lb (59 kg)   BMI 23.03 kg/m²   General: Patient appears  non-toxic  HENT: Atraumatic, normocephalic, oral mucosa moist  Lungs:  Clear bilaterally  Heart: Regular rate and rhythm  Abdomen: Non-distended, soft, non-tender  Extremities: No edema  Neuro: Nonfocal    Medical Decision Making and Plan:  Pertinent Labs & Imaging studies reviewed. (See chart for details)  UA is pending blood studies are pending    1. Type 2 diabetes mellitus without complication, with long-term current use of insulin (HCC)  Problem is stable continue present meds  - GA DISCHARGE MEDS RECONCILED W/ CURRENT OUTPATIENT MED LIST  - HYDROcodone-acetaminophen (NORCO) 5-325 MG per tablet; Take 1 tablet by mouth every 4 hours as needed for Pain for up to 7 days. Dispense: 30 tablet; Refill: 0  - CBC Auto Differential; Future  - Comprehensive Metabolic Panel; Future    2. Compression fracture of body of thoracic vertebra (HCC)  DC fentanyl patch use Tylenol as needed and Norco only for breakthrough  - HYDROcodone-acetaminophen (NORCO) 5-325 MG per tablet; Take 1 tablet by mouth every 4 hours as needed for Pain for up to 7 days. Dispense: 30 tablet; Refill: 0  - DEXA BONE DENSITY 2 SITES; Future    3. Chronic hepatitis C without hepatic coma [facility])  Referral to Dr. Nam Quiñonez she is now willing to be treated for hep C  - [facility] - Oneil Castro MD, Gastroenterology, [place]      On the basis of positive falls risk screening, assessment and plan is as follows: Latonya Bustillos

## 2020-01-06 NOTE — TELEPHONE ENCOUNTER
cyclobenzaprine (FLEXERIL) 10 MG tablet- pt had a appt today and forgot to mention this medication.  This makes pt dizzy and wanted to know if she can stop taking it? pls advise

## 2020-01-07 LAB
BASOPHILS ABSOLUTE: 0.1 K/UL (ref 0–0.2)
BASOPHILS RELATIVE PERCENT: 0.7 %
EOSINOPHILS ABSOLUTE: 0.2 K/UL (ref 0–0.6)
EOSINOPHILS RELATIVE PERCENT: 0.8 %
HCT VFR BLD CALC: 37.4 % (ref 36–48)
HEMATOLOGY PATH CONSULT: NO
HEMOGLOBIN: 12.4 G/DL (ref 12–16)
LYMPHOCYTES ABSOLUTE: 2 K/UL (ref 1–5.1)
LYMPHOCYTES RELATIVE PERCENT: 10.3 %
MCH RBC QN AUTO: 27.4 PG (ref 26–34)
MCHC RBC AUTO-ENTMCNC: 33.1 G/DL (ref 31–36)
MCV RBC AUTO: 82.8 FL (ref 80–100)
MONOCYTES ABSOLUTE: 3 K/UL (ref 0–1.3)
MONOCYTES RELATIVE PERCENT: 15.2 %
NEUTROPHILS ABSOLUTE: 14.3 K/UL (ref 1.7–7.7)
NEUTROPHILS RELATIVE PERCENT: 73 %
PDW BLD-RTO: 14.8 % (ref 12.4–15.4)
PLATELET # BLD: 323 K/UL (ref 135–450)
PMV BLD AUTO: 7.7 FL (ref 5–10.5)
RBC # BLD: 4.52 M/UL (ref 4–5.2)
WBC # BLD: 19.6 K/UL (ref 4–11)

## 2020-01-08 LAB
BACTERIA, URINE: ABNORMAL /HPF
BILIRUBIN, URINE: NEGATIVE
CLARITY: ABNORMAL
COLOR: YELLOW
ERYTHROCYTES URINE: ABNORMAL /HPF
GLUCOSE URINE: NEGATIVE
HB: SOURCE: ABNORMAL
KETONES, URINE: NEGATIVE
LEUKOCYTE ESTERASE, URINE: ABNORMAL
LEUKOCYTES, UA: ABNORMAL /HPF
NITRITE, URINE: NEGATIVE
OTHER: ABNORMAL
PH, URINE: 6 (ref 5–8)
PROTEIN, URINE: ABNORMAL
RBC URINE: ABNORMAL
SPECIFIC GRAVITY UA: 1.01 (ref 1–1.03)
URINE TYPE: ABNORMAL
UROBILINOGEN, URINE: 0.2 MG/DL

## 2020-01-08 NOTE — TELEPHONE ENCOUNTER
Yes still needs to get chest xray. Jatin Kumar has been notified. Jatin Kumar would like a written plan of how to wean the patient off of the [address].    She would like to this emailed to her.     Take one every 6 hours x 3 days  Take one every 8 hours x 3 days  Take one every 12 hours x 3days  Take one daily x 3 days    Can use Tylenol max 2 gm daily but must be 4 hours from taking [place].

## 2020-01-10 LAB
CULTURE RESULT: NORMAL
Lab: NORMAL
REPORT STATUS: NORMAL
SPECIMEN DESCRIPTION: NORMAL

## 2020-01-12 LAB — JAK2 V617F MUTATION: 0 %

## 2020-01-15 LAB
BACTERIA, URINE: ABNORMAL /HPF
BILIRUBIN, URINE: NEGATIVE
CLARITY: ABNORMAL
COLOR: ABNORMAL
ERYTHROCYTES URINE: ABNORMAL /HPF
GLUCOSE URINE: NEGATIVE
HB: SOURCE: ABNORMAL
HYALINE CASTS: ABNORMAL /LPF
KETONES, URINE: NEGATIVE
LEUKOCYTE CLUMPS IN URINE BY AUTOMATED COUNT: PRESENT /HPF
LEUKOCYTE ESTERASE, URINE: ABNORMAL
LEUKOCYTES, UA: >50 /HPF
MUCUS: PRESENT
NITRITE, URINE: NEGATIVE
PH, URINE: 6 (ref 5–8)
PROTEIN, URINE: ABNORMAL
RBC URINE: ABNORMAL
SPECIFIC GRAVITY UA: 1.02 (ref 1–1.03)
URINE TYPE: ABNORMAL
UROBILINOGEN, URINE: NORMAL MG/DL

## 2020-01-16 ENCOUNTER — TELEPHONE (OUTPATIENT)
Dept: INTERNAL MEDICINE CLINIC | Age: 80
End: 2020-01-16

## 2020-01-16 RX ORDER — CIPROFLOXACIN 250 MG/1
250 TABLET, FILM COATED ORAL 2 TIMES DAILY
Qty: 14 TABLET | Refills: 0 | Status: SHIPPED | OUTPATIENT
Start: 2020-01-16 | End: 2020-01-23

## 2020-01-16 NOTE — TELEPHONE ENCOUNTER
Pt daughter calling would like to speck to Dr. Breonna Chu about cipro medication for UTI states there are a lot of risk and if Dr Breonna Chu looked at all her health issues before prescribing med.      Please call Nubia Fenton

## 2020-01-17 ENCOUNTER — TELEPHONE (OUTPATIENT)
Dept: INTERNAL MEDICINE CLINIC | Age: 80
End: 2020-01-17

## 2020-01-17 LAB
AMIKACIN: ABNORMAL
AMOXICILLIN + CLAVULANATE: ABNORMAL
AMPICILLIN + SULBACTAM: ABNORMAL
ANTIMICROBIAL SUSCEPTIBILITY: ABNORMAL
AZTREONAM: ABNORMAL
CEFAZOLIN: ABNORMAL
CEFOXITIN: ABNORMAL
CIPROFLOXACIN: ABNORMAL
CULTURE RESULT: ABNORMAL
ERTAPENEM: ABNORMAL
GENTAMICIN: ABNORMAL
IMIPENEM: ABNORMAL
LEVOFLOXACIN: ABNORMAL
Lab: ABNORMAL
MEROPENEM: ABNORMAL
NITROFURANTOIN: ABNORMAL
ORGANISM: ABNORMAL
PIPERACILLIN + TAZOBACTAM: ABNORMAL
REPORT STATUS: ABNORMAL
SPECIMEN DESCRIPTION: ABNORMAL
TETRACYCLINE: ABNORMAL
TOBRAMCYIN: ABNORMAL
TRIMETHOPRIM + SULFAMETHOXAZOLE: ABNORMAL

## 2020-01-17 NOTE — TELEPHONE ENCOUNTER
Pt toe nail has a fungal infection and would like to know if there is something that could be called in for pt pls advise

## 2020-01-22 ENCOUNTER — CARE COORDINATION (OUTPATIENT)
Dept: CASE MANAGEMENT | Age: 80
End: 2020-01-22

## 2020-01-22 NOTE — CARE COORDINATION
Appointments   Date Time Provider Tosha Lauren   1/29/2020  3:00 PM MD SIDNEY [facility]   [facility]2020  9:10 [facility]   4/6/2020  2:00 PM MD SIDNEY [facility]       Antonette Saldivar RN

## 2020-01-31 ENCOUNTER — TELEPHONE (OUTPATIENT)
Dept: INTERNAL MEDICINE CLINIC | Age: 80
End: 2020-01-31

## 2020-02-13 ENCOUNTER — CARE COORDINATION (OUTPATIENT)
Dept: CARE COORDINATION | Age: 80
End: 2020-02-13

## 2020-02-20 ENCOUNTER — CARE COORDINATION (OUTPATIENT)
Dept: CARE COORDINATION | Age: 80
End: 2020-02-20

## 2020-03-17 ENCOUNTER — TELEPHONE (OUTPATIENT)
Dept: INTERNAL MEDICINE CLINIC | Age: 80
End: 2020-03-17

## 2020-03-17 RX ORDER — LANCETS 28 GAUGE
EACH MISCELLANEOUS
Qty: 100 EACH | Refills: 3 | Status: SHIPPED
Start: 2020-03-17 | End: 2021-01-05 | Stop reason: CLARIF

## 2020-03-17 RX ORDER — ISOPROPYL ALCOHOL 0.75 G/1
SWAB TOPICAL
Qty: 100 EACH | Refills: 3 | Status: SHIPPED | OUTPATIENT
Start: 2020-03-17 | End: 2020-09-24 | Stop reason: ALTCHOICE

## 2020-03-17 RX ORDER — BLOOD-GLUCOSE METER
KIT MISCELLANEOUS
Qty: 100 STRIP | Refills: 0 | Status: ON HOLD
Start: 2020-03-17 | End: 2020-08-14 | Stop reason: HOSPADM

## 2020-03-18 RX ORDER — ISOPROPYL ALCOHOL 0.75 G/1
SWAB TOPICAL
Qty: 100 EACH | Refills: 2 | Status: SHIPPED | OUTPATIENT
Start: 2020-03-18 | End: 2020-09-24 | Stop reason: ALTCHOICE

## 2020-03-18 RX ORDER — BLOOD-GLUCOSE METER
KIT MISCELLANEOUS
Qty: 100 STRIP | Refills: 0 | Status: SHIPPED
Start: 2020-03-18 | End: 2021-01-05 | Stop reason: CLARIF

## 2020-03-18 RX ORDER — LANCETS 28 GAUGE
EACH MISCELLANEOUS
Qty: 100 EACH | Refills: 0 | Status: SHIPPED | OUTPATIENT
Start: 2020-03-18 | End: 2020-09-24 | Stop reason: ALTCHOICE

## 2020-04-03 ENCOUNTER — TELEPHONE (OUTPATIENT)
Dept: INTERNAL MEDICINE CLINIC | Age: 80
End: 2020-04-03

## 2020-04-03 NOTE — TELEPHONE ENCOUNTER
Pt daughter Mike Winston) has some questions regarding how to get her mom incontinence supplies.  Pt daughter Mike Winston) states her mom use a specific brand and would like to know how she can go about getting her supplies

## 2020-05-05 ENCOUNTER — TELEPHONE (OUTPATIENT)
Dept: INTERNAL MEDICINE CLINIC | Age: 80
End: 2020-05-05

## 2020-05-05 NOTE — TELEPHONE ENCOUNTER
Patient daughter called stating they need a prescription for incontinence supplies. She states this is for her insurance to pay for. Patient is on Medicare and Medicaid and she believes that Medicaid will pay for this. Please fax prescription to [phone].      [address] [place] [address]   [address], [place], [address]  Phone# [phone]

## 2020-07-08 ENCOUNTER — TELEPHONE (OUTPATIENT)
Dept: INTERNAL MEDICINE CLINIC | Age: 80
End: 2020-07-08

## 2020-07-08 NOTE — TELEPHONE ENCOUNTER
Pts daughter is calling in ref to her mothers left foot, she believes there is an ulceration and it's getting worse. She also states that her mother is in need of a physical to move into an assisted living place, which is only paperwork that needs to be filled in. And a pneumonia booster shot, that was due in Feb of 2020. Providers next avail is 7.30.        Please advise

## 2020-07-10 NOTE — TELEPHONE ENCOUNTER
Pts daughter is asking for a referral to a podiatrist, being that the last msg was to see a podiatrist.    She is also inquiring about the items in the rest of the messege.       Please advise

## 2020-07-29 ENCOUNTER — OFFICE VISIT (OUTPATIENT)
Dept: INTERNAL MEDICINE CLINIC | Age: 80
End: 2020-07-29
Payer: COMMERCIAL

## 2020-07-29 VITALS
BODY MASS INDEX: 23.37 KG/M2 | SYSTOLIC BLOOD PRESSURE: 130 MMHG | TEMPERATURE: 98.7 F | HEART RATE: 82 BPM | OXYGEN SATURATION: 97 % | DIASTOLIC BLOOD PRESSURE: 80 MMHG | HEIGHT: 62 IN | WEIGHT: 127 LBS

## 2020-07-29 PROBLEM — I48.91 NEW ONSET ATRIAL FIBRILLATION (HCC): Status: ACTIVE | Noted: 2020-07-29

## 2020-07-29 LAB
A/G RATIO: 0.8 (ref 1.1–2.2)
ALBUMIN SERPL-MCNC: 3.8 G/DL (ref 3.4–5)
ALP BLD-CCNC: 66 U/L (ref 40–129)
ALT SERPL-CCNC: 15 U/L (ref 10–40)
ANION GAP SERPL CALCULATED.3IONS-SCNC: 17 MMOL/L (ref 3–16)
AST SERPL-CCNC: 28 U/L (ref 15–37)
BILIRUB SERPL-MCNC: 0.3 MG/DL (ref 0–1)
BUN BLDV-MCNC: 31 MG/DL (ref 7–20)
CALCIUM SERPL-MCNC: 10.6 MG/DL (ref 8.3–10.6)
CHLORIDE BLD-SCNC: 100 MMOL/L (ref 99–110)
CO2: 19 MMOL/L (ref 21–32)
CREAT SERPL-MCNC: 0.9 MG/DL (ref 0.6–1.2)
GFR AFRICAN AMERICAN: >60
GFR NON-AFRICAN AMERICAN: >60
GLOBULIN: 4.8 G/DL
GLUCOSE BLD-MCNC: 218 MG/DL (ref 70–99)
POTASSIUM SERPL-SCNC: 5.9 MMOL/L (ref 3.5–5.1)
SODIUM BLD-SCNC: 136 MMOL/L (ref 136–145)
TOTAL PROTEIN: 8.6 G/DL (ref 6.4–8.2)

## 2020-07-29 PROCEDURE — 99214 OFFICE O/P EST MOD 30 MIN: CPT | Performed by: INTERNAL MEDICINE

## 2020-07-29 RX ORDER — VELPATASVIR AND SOFOSBUVIR 100; 400 MG/1; MG/1
1 TABLET, FILM COATED ORAL DAILY
COMMUNITY
End: 2021-01-05 | Stop reason: CLARIF

## 2020-07-29 SDOH — HEALTH STABILITY: MENTAL HEALTH: HOW MANY STANDARD DRINKS CONTAINING ALCOHOL DO YOU HAVE ON A TYPICAL DAY?: 1 OR 2

## 2020-07-29 SDOH — HEALTH STABILITY: MENTAL HEALTH: HOW OFTEN DO YOU HAVE A DRINK CONTAINING ALCOHOL?: MONTHLY OR LESS

## 2020-07-29 NOTE — PROGRESS NOTES
Subjective:      Patient ID: Radha Malik is a [de-identified] y.o. female with a history of type 2 DM, hepatitis C, COPD, diastolic HF, peripheral artery disease, HTN, dementia, RA, and spinal stenosis who presents for med f/u. HPI     Patient is compliant with all her medications. She checks her blood glucose in the morning and it is usually 125-160. She states that her thoracic vertebra compression fracture is healing well. She no longer uses transdermal fentanyl patch or takes Norco. She has been seeing GI for her Hepatitis C and started Shaggy in June. Patient has a sore on her L foot. She saw the podiatrist last week who diagnosed a fissure and recommended she keep her leg elevated. She needs a physical exam form for assisted living and needs an incontinence rx to be able to get her incontinence supplies. Current Outpatient Medications   Medication Sig Dispense Refill    Sofosbuvir-Velpatasvir (EPCLUSA PO) Take by mouth      Alcohol Swabs (B-D SINGLE USE SWABS REGULAR) PADS USE ONE TOPICALLY DAILY 100 each 2    FreeStyle Lancets MISC USE TO TEST BLOOD SUGAR DAILY 100 each 0    FREESTYLE LITE strip USE TO TEST THREE TIMES A DAY. 100 strip 0    blood glucose test strips (FREESTYLE LITE) strip Once daily 100 strip 0    Alcohol Swabs (B-D SINGLE USE SWABS REGULAR) PADS USE ONE TOPICALLY DAILY 100 each 3    FreeStyle Lancets MISC USE TO TEST BLOOD SUGAR ONCE DAILY.  100 each 3    insulin glargine (LANTUS) 100 UNIT/ML injection pen Inject 15 Units into the skin nightly 5 pen 3    acetaminophen (TYLENOL) 325 MG tablet Take 1 tablet by mouth every 6 hours as needed for Pain 30 tablet 0    esomeprazole (NEXIUM) 40 MG delayed release capsule TAKE ONE CAPSULE BY MOUTH TWICE A  capsule 3    DULoxetine (CYMBALTA) 20 MG extended release capsule TAKE ONE CAPSULE BY MOUTH TWICE A  capsule 3    vitamin B-12 (CYANOCOBALAMIN) 100 MCG tablet Take 1,000 mcg by mouth daily       vitamin B-6 Hypertension  - Continue current meds  - Check CMP, CBC  3. Chronic Hepatitis C  - Continue to f/u with GI, continue Eclusa  4. L heel fissure  - Keep elevated  - f/u with podiatry PRN  5.  Health maintenance  - Patient will receive Pneumovax 23 today        Lauren Aguirre MD

## 2020-07-30 LAB
BASOPHILS ABSOLUTE: 0.2 K/UL (ref 0–0.2)
BASOPHILS RELATIVE PERCENT: 0.9 %
EOSINOPHILS ABSOLUTE: 0.2 K/UL (ref 0–0.6)
EOSINOPHILS RELATIVE PERCENT: 0.9 %
ESTIMATED AVERAGE GLUCOSE: 185.8 MG/DL
HBA1C MFR BLD: 8.1 %
HCT VFR BLD CALC: 41.9 % (ref 36–48)
HEMATOLOGY PATH CONSULT: NO
HEMOGLOBIN: 13.8 G/DL (ref 12–16)
LYMPHOCYTES ABSOLUTE: 3.3 K/UL (ref 1–5.1)
LYMPHOCYTES RELATIVE PERCENT: 13.5 %
MCH RBC QN AUTO: 26.9 PG (ref 26–34)
MCHC RBC AUTO-ENTMCNC: 33 G/DL (ref 31–36)
MCV RBC AUTO: 81.7 FL (ref 80–100)
MONOCYTES ABSOLUTE: 4.7 K/UL (ref 0–1.3)
MONOCYTES RELATIVE PERCENT: 19.3 %
NEUTROPHILS ABSOLUTE: 16.1 K/UL (ref 1.7–7.7)
NEUTROPHILS RELATIVE PERCENT: 65.4 %
PDW BLD-RTO: 14.1 % (ref 12.4–15.4)
PLATELET # BLD: 214 K/UL (ref 135–450)
PMV BLD AUTO: 8.6 FL (ref 5–10.5)
RBC # BLD: 5.13 M/UL (ref 4–5.2)
WBC # BLD: 24.5 K/UL (ref 4–11)

## 2020-07-30 PROCEDURE — 90732 PPSV23 VACC 2 YRS+ SUBQ/IM: CPT | Performed by: INTERNAL MEDICINE

## 2020-07-30 PROCEDURE — G0009 ADMIN PNEUMOCOCCAL VACCINE: HCPCS | Performed by: INTERNAL MEDICINE

## 2020-07-31 DIAGNOSIS — D72.829 LEUKOCYTOSIS, UNSPECIFIED TYPE: ICD-10-CM

## 2020-08-05 ENCOUNTER — TELEPHONE (OUTPATIENT)
Dept: INTERNAL MEDICINE CLINIC | Age: 80
End: 2020-08-05

## 2020-08-06 LAB — JAK2 V617F MUTATION: 0 %

## 2020-08-06 NOTE — TELEPHONE ENCOUNTER
PA submitted via CM for Esomeprazole Magnesium 40MG dr capsules.   (Key: [id number])     STATUS: PENDING

## 2020-08-10 ENCOUNTER — APPOINTMENT (OUTPATIENT)
Dept: GENERAL RADIOLOGY | Age: 80
DRG: 493 | End: 2020-08-10
Payer: COMMERCIAL

## 2020-08-10 ENCOUNTER — HOSPITAL ENCOUNTER (INPATIENT)
Age: 80
LOS: 4 days | Discharge: NURSING, REHAB OR LONG-TERM CARE | DRG: 493 | End: 2020-08-14
Attending: EMERGENCY MEDICINE | Admitting: INTERNAL MEDICINE
Payer: COMMERCIAL

## 2020-08-10 PROBLEM — S49.109A: Status: ACTIVE | Noted: 2020-08-10

## 2020-08-10 LAB
ABO/RH: NORMAL
ANION GAP SERPL CALCULATED.3IONS-SCNC: 10 MMOL/L (ref 3–16)
ANTIBODY SCREEN: NORMAL
BUN BLDV-MCNC: 22 MG/DL (ref 7–20)
CALCIUM SERPL-MCNC: 10.1 MG/DL (ref 8.3–10.6)
CHLORIDE BLD-SCNC: 99 MMOL/L (ref 99–110)
CO2: 24 MMOL/L (ref 21–32)
CREAT SERPL-MCNC: 0.9 MG/DL (ref 0.6–1.2)
GFR AFRICAN AMERICAN: >60
GFR NON-AFRICAN AMERICAN: >60
GLUCOSE BLD-MCNC: 378 MG/DL (ref 70–99)
POTASSIUM SERPL-SCNC: 5.1 MMOL/L (ref 3.5–5.1)
REASON FOR REJECTION: NORMAL
REJECTED TEST: NORMAL
SODIUM BLD-SCNC: 133 MMOL/L (ref 136–145)

## 2020-08-10 PROCEDURE — U0003 INFECTIOUS AGENT DETECTION BY NUCLEIC ACID (DNA OR RNA); SEVERE ACUTE RESPIRATORY SYNDROME CORONAVIRUS 2 (SARS-COV-2) (CORONAVIRUS DISEASE [COVID-19]), AMPLIFIED PROBE TECHNIQUE, MAKING USE OF HIGH THROUGHPUT TECHNOLOGIES AS DESCRIBED BY CMS-2020-01-R: HCPCS

## 2020-08-10 PROCEDURE — 73130 X-RAY EXAM OF HAND: CPT

## 2020-08-10 PROCEDURE — 36415 COLL VENOUS BLD VENIPUNCTURE: CPT

## 2020-08-10 PROCEDURE — 85025 COMPLETE CBC W/AUTO DIFF WBC: CPT

## 2020-08-10 PROCEDURE — 86900 BLOOD TYPING SEROLOGIC ABO: CPT

## 2020-08-10 PROCEDURE — 73030 X-RAY EXAM OF SHOULDER: CPT

## 2020-08-10 PROCEDURE — 80048 BASIC METABOLIC PNL TOTAL CA: CPT

## 2020-08-10 PROCEDURE — 6370000000 HC RX 637 (ALT 250 FOR IP): Performed by: EMERGENCY MEDICINE

## 2020-08-10 PROCEDURE — 86850 RBC ANTIBODY SCREEN: CPT

## 2020-08-10 PROCEDURE — 6360000002 HC RX W HCPCS: Performed by: EMERGENCY MEDICINE

## 2020-08-10 PROCEDURE — 73060 X-RAY EXAM OF HUMERUS: CPT

## 2020-08-10 PROCEDURE — 1200000000 HC SEMI PRIVATE

## 2020-08-10 PROCEDURE — 86901 BLOOD TYPING SEROLOGIC RH(D): CPT

## 2020-08-10 PROCEDURE — 99284 EMERGENCY DEPT VISIT MOD MDM: CPT

## 2020-08-10 RX ORDER — ACETAMINOPHEN 325 MG/1
650 TABLET ORAL EVERY 6 HOURS PRN
Status: DISCONTINUED | OUTPATIENT
Start: 2020-08-10 | End: 2020-08-14 | Stop reason: HOSPADM

## 2020-08-10 RX ORDER — FENTANYL CITRATE 50 UG/ML
50 INJECTION, SOLUTION INTRAMUSCULAR; INTRAVENOUS ONCE
Status: COMPLETED | OUTPATIENT
Start: 2020-08-10 | End: 2020-08-10

## 2020-08-10 RX ORDER — NICOTINE POLACRILEX 4 MG
15 LOZENGE BUCCAL PRN
Status: DISCONTINUED | OUTPATIENT
Start: 2020-08-10 | End: 2020-08-14 | Stop reason: HOSPADM

## 2020-08-10 RX ORDER — HYDROCODONE BITARTRATE AND ACETAMINOPHEN 5; 325 MG/1; MG/1
1 TABLET ORAL EVERY 6 HOURS PRN
Status: DISCONTINUED | OUTPATIENT
Start: 2020-08-10 | End: 2020-08-11

## 2020-08-10 RX ORDER — INSULIN LISPRO 100 [IU]/ML
0-6 INJECTION, SOLUTION INTRAVENOUS; SUBCUTANEOUS
Status: DISCONTINUED | OUTPATIENT
Start: 2020-08-11 | End: 2020-08-13

## 2020-08-10 RX ORDER — DULOXETIN HYDROCHLORIDE 20 MG/1
20 CAPSULE, DELAYED RELEASE ORAL DAILY
Status: DISCONTINUED | OUTPATIENT
Start: 2020-08-11 | End: 2020-08-14 | Stop reason: HOSPADM

## 2020-08-10 RX ORDER — SODIUM CHLORIDE 0.9 % (FLUSH) 0.9 %
10 SYRINGE (ML) INJECTION EVERY 12 HOURS SCHEDULED
Status: DISCONTINUED | OUTPATIENT
Start: 2020-08-10 | End: 2020-08-14 | Stop reason: HOSPADM

## 2020-08-10 RX ORDER — MULTIVITAMIN WITH IRON
100 TABLET ORAL DAILY
Status: DISCONTINUED | OUTPATIENT
Start: 2020-08-11 | End: 2020-08-14 | Stop reason: HOSPADM

## 2020-08-10 RX ORDER — DEXTROSE MONOHYDRATE 25 G/50ML
12.5 INJECTION, SOLUTION INTRAVENOUS PRN
Status: DISCONTINUED | OUTPATIENT
Start: 2020-08-10 | End: 2020-08-14 | Stop reason: HOSPADM

## 2020-08-10 RX ORDER — ACETAMINOPHEN 325 MG/1
650 TABLET ORAL ONCE
Status: COMPLETED | OUTPATIENT
Start: 2020-08-10 | End: 2020-08-10

## 2020-08-10 RX ORDER — LANOLIN ALCOHOL/MO/W.PET/CERES
1000 CREAM (GRAM) TOPICAL DAILY
Status: DISCONTINUED | OUTPATIENT
Start: 2020-08-11 | End: 2020-08-14 | Stop reason: HOSPADM

## 2020-08-10 RX ORDER — POLYETHYLENE GLYCOL 3350 17 G/17G
17 POWDER, FOR SOLUTION ORAL DAILY PRN
Status: DISCONTINUED | OUTPATIENT
Start: 2020-08-10 | End: 2020-08-14 | Stop reason: HOSPADM

## 2020-08-10 RX ORDER — ACETAMINOPHEN 650 MG/1
650 SUPPOSITORY RECTAL EVERY 6 HOURS PRN
Status: DISCONTINUED | OUTPATIENT
Start: 2020-08-10 | End: 2020-08-14 | Stop reason: HOSPADM

## 2020-08-10 RX ORDER — ONDANSETRON 2 MG/ML
4 INJECTION INTRAMUSCULAR; INTRAVENOUS EVERY 6 HOURS PRN
Status: DISCONTINUED | OUTPATIENT
Start: 2020-08-10 | End: 2020-08-14 | Stop reason: HOSPADM

## 2020-08-10 RX ORDER — PROMETHAZINE HYDROCHLORIDE 25 MG/1
12.5 TABLET ORAL EVERY 6 HOURS PRN
Status: DISCONTINUED | OUTPATIENT
Start: 2020-08-10 | End: 2020-08-14 | Stop reason: HOSPADM

## 2020-08-10 RX ORDER — SODIUM CHLORIDE 0.9 % (FLUSH) 0.9 %
10 SYRINGE (ML) INJECTION PRN
Status: DISCONTINUED | OUTPATIENT
Start: 2020-08-10 | End: 2020-08-14 | Stop reason: HOSPADM

## 2020-08-10 RX ORDER — PANTOPRAZOLE SODIUM 40 MG/1
40 TABLET, DELAYED RELEASE ORAL
Status: DISCONTINUED | OUTPATIENT
Start: 2020-08-11 | End: 2020-08-14 | Stop reason: HOSPADM

## 2020-08-10 RX ORDER — INSULIN LISPRO 100 [IU]/ML
0-3 INJECTION, SOLUTION INTRAVENOUS; SUBCUTANEOUS NIGHTLY
Status: DISCONTINUED | OUTPATIENT
Start: 2020-08-10 | End: 2020-08-13

## 2020-08-10 RX ORDER — LABETALOL HYDROCHLORIDE 5 MG/ML
10 INJECTION, SOLUTION INTRAVENOUS EVERY 6 HOURS PRN
Status: DISCONTINUED | OUTPATIENT
Start: 2020-08-10 | End: 2020-08-14 | Stop reason: HOSPADM

## 2020-08-10 RX ORDER — VITAMIN B COMPLEX
1000 TABLET ORAL DAILY
Status: DISCONTINUED | OUTPATIENT
Start: 2020-08-11 | End: 2020-08-14 | Stop reason: HOSPADM

## 2020-08-10 RX ORDER — INSULIN LISPRO 100 [IU]/ML
7 INJECTION, SOLUTION INTRAVENOUS; SUBCUTANEOUS ONCE
Status: COMPLETED | OUTPATIENT
Start: 2020-08-10 | End: 2020-08-11

## 2020-08-10 RX ORDER — SPIRONOLACTONE AND HYDROCHLOROTHIAZIDE 25; 25 MG/1; MG/1
1 TABLET ORAL
Status: DISCONTINUED | OUTPATIENT
Start: 2020-08-11 | End: 2020-08-11 | Stop reason: CLARIF

## 2020-08-10 RX ORDER — LIDOCAINE 4 G/G
1 PATCH TOPICAL DAILY
Status: DISCONTINUED | OUTPATIENT
Start: 2020-08-10 | End: 2020-08-14 | Stop reason: HOSPADM

## 2020-08-10 RX ORDER — DEXTROSE MONOHYDRATE 50 MG/ML
100 INJECTION, SOLUTION INTRAVENOUS PRN
Status: DISCONTINUED | OUTPATIENT
Start: 2020-08-10 | End: 2020-08-14 | Stop reason: HOSPADM

## 2020-08-10 RX ADMIN — ACETAMINOPHEN 650 MG: 325 TABLET ORAL at 20:01

## 2020-08-10 RX ADMIN — FENTANYL CITRATE 50 MCG: 50 INJECTION, SOLUTION INTRAMUSCULAR; INTRAVENOUS at 21:31

## 2020-08-10 RX ADMIN — FENTANYL CITRATE 50 MCG: 50 INJECTION, SOLUTION INTRAMUSCULAR; INTRAVENOUS at 22:27

## 2020-08-10 RX ADMIN — FENTANYL CITRATE 50 MCG: 50 INJECTION, SOLUTION INTRAMUSCULAR; INTRAVENOUS at 20:00

## 2020-08-10 ASSESSMENT — PAIN SCALES - GENERAL
PAINLEVEL_OUTOF10: 9
PAINLEVEL_OUTOF10: 8

## 2020-08-10 ASSESSMENT — PAIN DESCRIPTION - FREQUENCY: FREQUENCY: CONTINUOUS

## 2020-08-10 ASSESSMENT — PAIN DESCRIPTION - PAIN TYPE: TYPE: ACUTE PAIN

## 2020-08-10 ASSESSMENT — PAIN DESCRIPTION - ORIENTATION: ORIENTATION: RIGHT;UPPER

## 2020-08-10 ASSESSMENT — PAIN DESCRIPTION - LOCATION: LOCATION: ARM

## 2020-08-10 NOTE — ED PROVIDER NOTES
[address] ENCOUNTER          ATTENDING PHYSICIAN NOTE       Date of evaluation: 8/10/2020    Chief Complaint     Fall (unwitnessed, patient found on floor states she trippde over comforter, laying on right arm, c/o right upper arm pain and right wrist pain, )      History of Present Illness     Chandni Saunders is a [de-identified] y.o. female who is right-hand dominant who presents emergency department after a fall onto her right side. Patient reports that she was walking around her bed and her feet got tripped up in the bed sheets fell down to the ground on her right side did not strike her head did not lose consciousness has not had any nausea vomiting since the time of the accident. She is primarily complaining of pain in the right upper arm and right hand. She reports she is had a previous right humeral fracture which was treated nonoperatively. The pain is moderate to severe in intensity located in the areas described above without any radiation worsened with any movement of the affected extremity. Denies any other extremity pain. Denies chest pain or abdominal pain. Review of Systems     As documented in the HPI, otherwise all other systems were reviewed and were negative.     Past Medical, Surgical, Family, and Social History     She has a past medical history of ADHD (attention deficit hyperactivity disorder), Attention deficit disorder without mention of hyperactivity, Autoimmune disease NEC, Carotid artery disease ([address] Carotid artery disease ([address] Chronic persistent hepatitis ([address] Depression, Depressive disorder, not elsewhere classified, Double vision, Fractures, GERD (gastroesophageal reflux disease), Hx of interstitial lung disease, Neuropathy, Rheumatoid arthritis(714.0), Spinal stenosis, Type II or unspecified type diabetes mellitus without mention of complication, not stated as uncontrolled, and Unspecified cerebral artery occlusion with cerebral infarction. She has a past surgical history that includes Cosmetic surgery; eye surgery (Bilateral); and Colonoscopy. Her family history is not on file. She reports that she quit smoking about 40 years ago. Her smoking use included cigarettes. She started smoking about 66 years ago. She has a 60.00 pack-year smoking history. She has never used smokeless tobacco. She reports current alcohol use of about 1.0 standard drinks of alcohol per week. She reports that she does not use drugs. Medications     Previous Medications    ACETAMINOPHEN (TYLENOL) 325 MG TABLET    Take 1 tablet by mouth every 6 hours as needed for Pain    ALCOHOL SWABS (B-D SINGLE USE SWABS REGULAR) PADS    USE ONE TOPICALLY DAILY    ALCOHOL SWABS (B-D SINGLE USE SWABS REGULAR) PADS    USE ONE TOPICALLY DAILY    ASCORBIC ACID (VITAMIN C CR) 1000 MG TBCR    Take 1 tablet by mouth daily    BIOTIN 1000 MCG TABS    Take one tablet daily in am.    BLOOD GLUCOSE TEST STRIPS (FREESTYLE LITE) STRIP    Once daily    DOCUSATE (COLACE, DULCOLAX) 100 MG CAPS    Take 100 mg by mouth daily    DULOXETINE (CYMBALTA) 20 MG EXTENDED RELEASE CAPSULE    TAKE ONE CAPSULE BY MOUTH TWICE A DAY    ESOMEPRAZOLE (NEXIUM) 40 MG DELAYED RELEASE CAPSULE    TAKE ONE CAPSULE BY MOUTH TWICE A DAY    FENTANYL (DURAGESIC) 12 MCG/HR    Place 1 patch onto the skin every 72 hours. FREESTYLE LANCETS MISC    USE TO TEST BLOOD SUGAR DAILY    FREESTYLE LANCETS MISC    USE TO TEST BLOOD SUGAR ONCE DAILY. FREESTYLE LITE STRIP    USE TO TEST THREE TIMES A DAY. GUAIFENESIN (MUCINEX) 600 MG EXTENDED RELEASE TABLET    Take 1 tablet by mouth 2 times daily as needed for Congestion    INSULIN GLARGINE (LANTUS) 100 UNIT/ML INJECTION PEN    Inject 15 Units into the skin nightly    LIDOCAINE (LIDODERM) 5 %    Place 1 patch onto the skin daily 12 hours on, 12 hours off. MOMETASONE (ELOCON) 0.1 % CREAM    APPLY DAILY.     MULTIPLE VITAMINS-MINERALS (CENTRUM SILVER) TABS    Take by mouth daily    POLYETHYL GLYCOL-PROPYL GLYCOL 0.4-0.3 % (SYSTANE) 0.4-0.3 % OPHTHALMIC SOLUTION    Place 1 drop into both eyes as needed for Dry Eyes    SOFOSBUVIR-VELPATASVIR (EPCLUSA PO)    Take by mouth    SPIRONOLACTONE-HYDROCHLOROTHIAZIDE (ALDACTAZIDE) 25-25 MG PER TABLET    Take 1 tablet by mouth daily (with breakfast)    VITAMIN B-12 (CYANOCOBALAMIN) 100 MCG TABLET    Take 1,000 mcg by mouth daily     VITAMIN B-6 (PYRIDOXINE) 50 MG TABLET    Take 100 mg by mouth daily     VITAMIN D 3 (CHOLECALCIFEROL) 1000 UNITS TABS TABLET    Take 1,000 Units by mouth daily. Allergies     She is allergic to erythromycin and sulfites. Physical Exam     INITIAL VITALS: BP: (!) 174/64, Temp: 97.9 °F (36.6 °C), Pulse: 88, Resp: 16, SpO2: 98 %   General: Overall well-appearing 79-year-old female lying in bed does appear somewhat uncomfortable secondary to pain  HEENT:  head is atraumatic, pupils equal round and reactive to light, sclera are clear, oropharynx is nonerythematous  Neck: supple, no lymphadenopathy  Chest: clear to auscultation bilaterally with no wheezes rhonchi, rales  Cardiovascular: Regular, rate, and rhythm, normal S1S2, no murmurs, rubs, or gallops, 2+ radial pulses bilaterally, capillary refill 2 seconds  Abdominal: Soft, nontender, nondistended, positive bowel sounds throughout, no rebound or guarding  Skin: Warm, dry well perfused, no rashes  Extremities: Tenderness palpation in the right hand without associated bony deformity tenderness palpation the right elbow without associated bony deformity tenderness palpation diffusely throughout the right upper arm no clavicular tenderness on either side no scapular tenderness on either side. Patient otherwise has no evidence of any other extremity tenderness or injuries. Neurologic:  alert and oriented x4, speech is clear and intact without dysarthria    Diagnostic Results     RADIOLOGY:  XR HAND RIGHT (MIN 3 VIEWS)   Final Result      No acute injury.       XR SHOULDER RIGHT (MIN 2 VIEWS)   Final Result      Comminuted, mildly displaced obliquely oriented fracture of the proximal humeral diaphysis. RIGHT SHOULDER:      REASON FOR EXAM: Fall, pain      FINDINGS:      3 views of the right shoulder demonstrate a mildly displaced, mildly comminuted fracture of the proximal right humeral diaphysis. Humeral head remains properly located within the glenoid. Acromioclavicular joint maintained other than degenerative change. IMPRESSION:      Fracture of the proximal right humeral diaphysis. XR HUMERUS RIGHT (MIN 2 VIEWS)   Final Result      Comminuted, mildly displaced obliquely oriented fracture of the proximal humeral diaphysis. RIGHT SHOULDER:      REASON FOR EXAM: Fall, pain      FINDINGS:      3 views of the right shoulder demonstrate a mildly displaced, mildly comminuted fracture of the proximal right humeral diaphysis. Humeral head remains properly located within the glenoid. Acromioclavicular joint maintained other than degenerative change. IMPRESSION:      Fracture of the proximal right humeral diaphysis. LABS:   No results found for this visit on 08/10/20. RECENT VITALS:  BP: (!) 174/64, Temp: 97.9 °F (36.6 °C), Pulse: 88, Resp: 16, SpO2: 98 %         ED Course     Nursing Notes, Past Medical Hx, Past Surgical Hx, Social Hx, Allergies, and Family Hx were reviewed. The patient was given the following medications:  Orders Placed This Encounter   Medications    acetaminophen (TYLENOL) tablet 650 mg    fentaNYL (SUBLIMAZE) injection 50 mcg    fentaNYL (SUBLIMAZE) injection 50 mcg       CONSULTS:  IP CONSULT TO ORTHOPEDIC SURGERY  IP CONSULT TO PRIMARY CARE PROVIDER    MEDICAL DECISION MAKING / ASSESSMENT / Rody Kumar is a [de-identified] y.o. female who presents emergency department the complaint of right upper extremity pain.   The patient suffered a mechanical fall just prior to her arrival.  Had no evidence of any head injury or head trauma. X-rays of the patient's right upper extremity revealed a proximal comminuted humerus fracture. I spoke with the orthopedic surgery on-call, Dr. Sarina Reynoso, who felt that this was likely an operative fracture. The patient has laboratory investigations that are currently being obtained and will be sent. Has been given initially intranasal fentanyl then then given IV fentanyl for pain control. The patient will be admitted to the hospital for further care and management. Speaking with the patient's primary care provider as well as the admitting officer of the day. Clinical Impression     1. Other closed displaced fracture of proximal end of right humerus, initial encounter        Disposition     PATIENT REFERRED TO:  No follow-up provider specified.     DISCHARGE MEDICATIONS:  New Prescriptions    No medications on file       DISPOSITION           Don Ohara MD  08/10/20 1053

## 2020-08-10 NOTE — ED NOTES
Bed: A05-05  Expected date:   Expected time:   Means of arrival:   Comments:  Medic [facility], [address] [facility]  08/10/20 7330

## 2020-08-11 ENCOUNTER — ANESTHESIA (OUTPATIENT)
Dept: OPERATING ROOM | Age: 80
DRG: 493 | End: 2020-08-11
Payer: COMMERCIAL

## 2020-08-11 ENCOUNTER — TELEPHONE (OUTPATIENT)
Dept: ORTHOPEDIC SURGERY | Age: 80
End: 2020-08-11

## 2020-08-11 ENCOUNTER — APPOINTMENT (OUTPATIENT)
Dept: GENERAL RADIOLOGY | Age: 80
DRG: 493 | End: 2020-08-11
Payer: COMMERCIAL

## 2020-08-11 ENCOUNTER — TELEPHONE (OUTPATIENT)
Dept: INTERNAL MEDICINE CLINIC | Age: 80
End: 2020-08-11

## 2020-08-11 ENCOUNTER — ANESTHESIA EVENT (OUTPATIENT)
Dept: OPERATING ROOM | Age: 80
DRG: 493 | End: 2020-08-11
Payer: COMMERCIAL

## 2020-08-11 VITALS — SYSTOLIC BLOOD PRESSURE: 156 MMHG | TEMPERATURE: 96.8 F | OXYGEN SATURATION: 100 % | DIASTOLIC BLOOD PRESSURE: 67 MMHG

## 2020-08-11 LAB
ALBUMIN SERPL-MCNC: 3.3 G/DL (ref 3.4–5)
AMORPHOUS: ABNORMAL /HPF
ANION GAP SERPL CALCULATED.3IONS-SCNC: 10 MMOL/L (ref 3–16)
BACTERIA: ABNORMAL /HPF
BANDED NEUTROPHILS RELATIVE PERCENT: 1 % (ref 0–7)
BASOPHILS ABSOLUTE: 0 K/UL (ref 0–0.2)
BASOPHILS ABSOLUTE: 0.3 K/UL (ref 0–0.2)
BASOPHILS RELATIVE PERCENT: 0 %
BASOPHILS RELATIVE PERCENT: 1 %
BILIRUBIN URINE: NEGATIVE
BLOOD, URINE: NEGATIVE
BUN BLDV-MCNC: 23 MG/DL (ref 7–20)
CALCIUM SERPL-MCNC: 10 MG/DL (ref 8.3–10.6)
CHLORIDE BLD-SCNC: 100 MMOL/L (ref 99–110)
CLARITY: CLEAR
CO2: 24 MMOL/L (ref 21–32)
COLOR: YELLOW
CREAT SERPL-MCNC: 0.9 MG/DL (ref 0.6–1.2)
EOSINOPHILS ABSOLUTE: 0 K/UL (ref 0–0.6)
EOSINOPHILS ABSOLUTE: 0 K/UL (ref 0–0.6)
EOSINOPHILS RELATIVE PERCENT: 0 %
EOSINOPHILS RELATIVE PERCENT: 0 %
EPITHELIAL CELLS, UA: ABNORMAL /HPF (ref 0–5)
GFR AFRICAN AMERICAN: >60
GFR NON-AFRICAN AMERICAN: >60
GLUCOSE BLD-MCNC: 115 MG/DL (ref 70–99)
GLUCOSE BLD-MCNC: 129 MG/DL (ref 70–99)
GLUCOSE BLD-MCNC: 153 MG/DL (ref 70–99)
GLUCOSE BLD-MCNC: 212 MG/DL (ref 70–99)
GLUCOSE BLD-MCNC: 255 MG/DL (ref 70–99)
GLUCOSE BLD-MCNC: 328 MG/DL (ref 70–99)
GLUCOSE URINE: 250 MG/DL
HCT VFR BLD CALC: 32.2 % (ref 36–48)
HCT VFR BLD CALC: 35.6 % (ref 36–48)
HEMOGLOBIN: 11 G/DL (ref 12–16)
HEMOGLOBIN: 11.7 G/DL (ref 12–16)
HYALINE CASTS: ABNORMAL /LPF (ref 0–2)
KETONES, URINE: NEGATIVE MG/DL
LEUKOCYTE ESTERASE, URINE: NEGATIVE
LYMPHOCYTES ABSOLUTE: 0.8 K/UL (ref 1–5.1)
LYMPHOCYTES ABSOLUTE: 3 K/UL (ref 1–5.1)
LYMPHOCYTES RELATIVE PERCENT: 11 %
LYMPHOCYTES RELATIVE PERCENT: 3 %
MAGNESIUM: 1.2 MG/DL (ref 1.8–2.4)
MAGNESIUM: 2.5 MG/DL (ref 1.8–2.4)
MCH RBC QN AUTO: 26.5 PG (ref 26–34)
MCH RBC QN AUTO: 26.9 PG (ref 26–34)
MCHC RBC AUTO-ENTMCNC: 32.8 G/DL (ref 31–36)
MCHC RBC AUTO-ENTMCNC: 34.2 G/DL (ref 31–36)
MCV RBC AUTO: 78.5 FL (ref 80–100)
MCV RBC AUTO: 80.6 FL (ref 80–100)
METAMYELOCYTES RELATIVE PERCENT: 1 %
MICROSCOPIC EXAMINATION: YES
MONOCYTES ABSOLUTE: 5.2 K/UL (ref 0–1.3)
MONOCYTES ABSOLUTE: 8.1 K/UL (ref 0–1.3)
MONOCYTES RELATIVE PERCENT: 19 %
MONOCYTES RELATIVE PERCENT: 30 %
NEUTROPHILS ABSOLUTE: 15.9 K/UL (ref 1.7–7.7)
NEUTROPHILS ABSOLUTE: 21.3 K/UL (ref 1.7–7.7)
NEUTROPHILS RELATIVE PERCENT: 59 %
NEUTROPHILS RELATIVE PERCENT: 75 %
NITRITE, URINE: NEGATIVE
PDW BLD-RTO: 13.3 % (ref 12.4–15.4)
PDW BLD-RTO: 14.1 % (ref 12.4–15.4)
PERFORMED ON: ABNORMAL
PH UA: 6 (ref 5–8)
PHOSPHORUS: 3 MG/DL (ref 2.5–4.9)
PLATELET # BLD: 194 K/UL (ref 135–450)
PLATELET # BLD: 207 K/UL (ref 135–450)
PMV BLD AUTO: 7.8 FL (ref 5–10.5)
PMV BLD AUTO: 7.8 FL (ref 5–10.5)
POTASSIUM SERPL-SCNC: 3.8 MMOL/L (ref 3.5–5.1)
PROTEIN UA: ABNORMAL MG/DL
RBC # BLD: 4.11 M/UL (ref 4–5.2)
RBC # BLD: 4.41 M/UL (ref 4–5.2)
RBC # BLD: NORMAL 10*6/UL
RBC UA: ABNORMAL /HPF (ref 0–4)
SARS-COV-2, NAAT: NOT DETECTED
SODIUM BLD-SCNC: 134 MMOL/L (ref 136–145)
SPECIFIC GRAVITY UA: 1.02 (ref 1–1.03)
URINE REFLEX TO CULTURE: ABNORMAL
URINE TYPE: ABNORMAL
UROBILINOGEN, URINE: 0.2 E.U./DL
WBC # BLD: 27 K/UL (ref 4–11)
WBC # BLD: 27.6 K/UL (ref 4–11)
WBC UA: ABNORMAL /HPF (ref 0–5)

## 2020-08-11 PROCEDURE — 2580000003 HC RX 258: Performed by: NURSE ANESTHETIST, CERTIFIED REGISTERED

## 2020-08-11 PROCEDURE — 64415 NJX AA&/STRD BRCH PLXS IMG: CPT | Performed by: ANESTHESIOLOGY

## 2020-08-11 PROCEDURE — 80069 RENAL FUNCTION PANEL: CPT

## 2020-08-11 PROCEDURE — 6370000000 HC RX 637 (ALT 250 FOR IP): Performed by: STUDENT IN AN ORGANIZED HEALTH CARE EDUCATION/TRAINING PROGRAM

## 2020-08-11 PROCEDURE — 1200000000 HC SEMI PRIVATE

## 2020-08-11 PROCEDURE — 0PSC06Z REPOSITION RIGHT HUMERAL HEAD WITH INTRAMEDULLARY INTERNAL FIXATION DEVICE, OPEN APPROACH: ICD-10-PCS | Performed by: ORTHOPAEDIC SURGERY

## 2020-08-11 PROCEDURE — 85025 COMPLETE CBC W/AUTO DIFF WBC: CPT

## 2020-08-11 PROCEDURE — 6370000000 HC RX 637 (ALT 250 FOR IP): Performed by: ORTHOPAEDIC SURGERY

## 2020-08-11 PROCEDURE — 7100000001 HC PACU RECOVERY - ADDTL 15 MIN: Performed by: ORTHOPAEDIC SURGERY

## 2020-08-11 PROCEDURE — 23615 OPTX PROX HUMRL FX W/INT FIX: CPT | Performed by: ORTHOPAEDIC SURGERY

## 2020-08-11 PROCEDURE — 2500000003 HC RX 250 WO HCPCS: Performed by: ANESTHESIOLOGY

## 2020-08-11 PROCEDURE — 81001 URINALYSIS AUTO W/SCOPE: CPT

## 2020-08-11 PROCEDURE — 6360000002 HC RX W HCPCS: Performed by: NURSE ANESTHETIST, CERTIFIED REGISTERED

## 2020-08-11 PROCEDURE — 83735 ASSAY OF MAGNESIUM: CPT

## 2020-08-11 PROCEDURE — 7100000000 HC PACU RECOVERY - FIRST 15 MIN: Performed by: ORTHOPAEDIC SURGERY

## 2020-08-11 PROCEDURE — 2580000003 HC RX 258: Performed by: ORTHOPAEDIC SURGERY

## 2020-08-11 PROCEDURE — 2500000003 HC RX 250 WO HCPCS: Performed by: NURSE ANESTHETIST, CERTIFIED REGISTERED

## 2020-08-11 PROCEDURE — 2580000003 HC RX 258: Performed by: STUDENT IN AN ORGANIZED HEALTH CARE EDUCATION/TRAINING PROGRAM

## 2020-08-11 PROCEDURE — 24515 OPTX HUMRL SHFT FX PLATE/SCR: CPT | Performed by: ORTHOPAEDIC SURGERY

## 2020-08-11 PROCEDURE — 6360000002 HC RX W HCPCS: Performed by: ORTHOPAEDIC SURGERY

## 2020-08-11 PROCEDURE — 3600000004 HC SURGERY LEVEL 4 BASE: Performed by: ORTHOPAEDIC SURGERY

## 2020-08-11 PROCEDURE — 6360000002 HC RX W HCPCS: Performed by: STUDENT IN AN ORGANIZED HEALTH CARE EDUCATION/TRAINING PROGRAM

## 2020-08-11 PROCEDURE — 3600000014 HC SURGERY LEVEL 4 ADDTL 15MIN: Performed by: ORTHOPAEDIC SURGERY

## 2020-08-11 PROCEDURE — 3700000000 HC ANESTHESIA ATTENDED CARE: Performed by: ORTHOPAEDIC SURGERY

## 2020-08-11 PROCEDURE — 2720000010 HC SURG SUPPLY STERILE: Performed by: ORTHOPAEDIC SURGERY

## 2020-08-11 PROCEDURE — 36415 COLL VENOUS BLD VENIPUNCTURE: CPT

## 2020-08-11 PROCEDURE — 99222 1ST HOSP IP/OBS MODERATE 55: CPT | Performed by: ORTHOPAEDIC SURGERY

## 2020-08-11 PROCEDURE — C9290 INJ, BUPIVACAINE LIPOSOME: HCPCS | Performed by: ANESTHESIOLOGY

## 2020-08-11 PROCEDURE — U0002 COVID-19 LAB TEST NON-CDC: HCPCS

## 2020-08-11 PROCEDURE — 99222 1ST HOSP IP/OBS MODERATE 55: CPT | Performed by: INTERNAL MEDICINE

## 2020-08-11 PROCEDURE — 73060 X-RAY EXAM OF HUMERUS: CPT

## 2020-08-11 PROCEDURE — 3209999900 FLUORO FOR SURGICAL PROCEDURES

## 2020-08-11 PROCEDURE — C1713 ANCHOR/SCREW BN/BN,TIS/BN: HCPCS | Performed by: ORTHOPAEDIC SURGERY

## 2020-08-11 PROCEDURE — 3700000001 HC ADD 15 MINUTES (ANESTHESIA): Performed by: ORTHOPAEDIC SURGERY

## 2020-08-11 PROCEDURE — 2709999900 HC NON-CHARGEABLE SUPPLY: Performed by: ORTHOPAEDIC SURGERY

## 2020-08-11 PROCEDURE — 6360000002 HC RX W HCPCS: Performed by: ANESTHESIOLOGY

## 2020-08-11 DEVICE — LOCKING SCREW
Type: IMPLANTABLE DEVICE | Site: HUMERUS | Status: FUNCTIONAL
Brand: AXSOS

## 2020-08-11 DEVICE — CORTEX SCREW
Type: IMPLANTABLE DEVICE | Site: HUMERUS | Status: FUNCTIONAL
Brand: AXSOS

## 2020-08-11 DEVICE — PROXIMAL LATERAL HUMERUS PLATE, RIGHT
Type: IMPLANTABLE DEVICE | Site: HUMERUS | Status: FUNCTIONAL
Brand: AXSOS

## 2020-08-11 RX ORDER — MORPHINE SULFATE 2 MG/ML
1 INJECTION, SOLUTION INTRAMUSCULAR; INTRAVENOUS
Status: DISCONTINUED | OUTPATIENT
Start: 2020-08-11 | End: 2020-08-11

## 2020-08-11 RX ORDER — SODIUM CHLORIDE 0.9 % (FLUSH) 0.9 %
10 SYRINGE (ML) INJECTION EVERY 12 HOURS SCHEDULED
Status: DISCONTINUED | OUTPATIENT
Start: 2020-08-11 | End: 2020-08-14 | Stop reason: HOSPADM

## 2020-08-11 RX ORDER — LIDOCAINE HYDROCHLORIDE 20 MG/ML
INJECTION, SOLUTION INTRAVENOUS PRN
Status: DISCONTINUED | OUTPATIENT
Start: 2020-08-11 | End: 2020-08-11 | Stop reason: SDUPTHER

## 2020-08-11 RX ORDER — EPHEDRINE SULFATE 50 MG/ML
INJECTION INTRAVENOUS PRN
Status: DISCONTINUED | OUTPATIENT
Start: 2020-08-11 | End: 2020-08-11 | Stop reason: SDUPTHER

## 2020-08-11 RX ORDER — CEFAZOLIN SODIUM 1 G/3ML
2 INJECTION, POWDER, FOR SOLUTION INTRAMUSCULAR; INTRAVENOUS
Status: DISCONTINUED | OUTPATIENT
Start: 2020-08-11 | End: 2020-08-11 | Stop reason: CLARIF

## 2020-08-11 RX ORDER — SODIUM CHLORIDE 0.9 % (FLUSH) 0.9 %
10 SYRINGE (ML) INJECTION PRN
Status: DISCONTINUED | OUTPATIENT
Start: 2020-08-11 | End: 2020-08-14 | Stop reason: HOSPADM

## 2020-08-11 RX ORDER — SODIUM CHLORIDE, SODIUM LACTATE, POTASSIUM CHLORIDE, CALCIUM CHLORIDE 600; 310; 30; 20 MG/100ML; MG/100ML; MG/100ML; MG/100ML
INJECTION, SOLUTION INTRAVENOUS CONTINUOUS PRN
Status: DISCONTINUED | OUTPATIENT
Start: 2020-08-11 | End: 2020-08-11 | Stop reason: SDUPTHER

## 2020-08-11 RX ORDER — ONDANSETRON 2 MG/ML
INJECTION INTRAMUSCULAR; INTRAVENOUS PRN
Status: DISCONTINUED | OUTPATIENT
Start: 2020-08-11 | End: 2020-08-11 | Stop reason: SDUPTHER

## 2020-08-11 RX ORDER — FENTANYL CITRATE 50 UG/ML
INJECTION, SOLUTION INTRAMUSCULAR; INTRAVENOUS PRN
Status: DISCONTINUED | OUTPATIENT
Start: 2020-08-11 | End: 2020-08-11 | Stop reason: SDUPTHER

## 2020-08-11 RX ORDER — HYDROCHLOROTHIAZIDE 25 MG/1
25 TABLET ORAL DAILY
Status: DISCONTINUED | OUTPATIENT
Start: 2020-08-11 | End: 2020-08-14 | Stop reason: HOSPADM

## 2020-08-11 RX ORDER — MAGNESIUM SULFATE IN WATER 40 MG/ML
4 INJECTION, SOLUTION INTRAVENOUS ONCE
Status: COMPLETED | OUTPATIENT
Start: 2020-08-11 | End: 2020-08-11

## 2020-08-11 RX ORDER — METHOCARBAMOL 750 MG/1
1500 TABLET, FILM COATED ORAL 3 TIMES DAILY
Status: DISCONTINUED | OUTPATIENT
Start: 2020-08-11 | End: 2020-08-14 | Stop reason: HOSPADM

## 2020-08-11 RX ORDER — SPIRONOLACTONE 25 MG/1
25 TABLET ORAL DAILY
Status: DISCONTINUED | OUTPATIENT
Start: 2020-08-11 | End: 2020-08-11

## 2020-08-11 RX ORDER — HYDROCODONE BITARTRATE AND ACETAMINOPHEN 5; 325 MG/1; MG/1
1 TABLET ORAL EVERY 4 HOURS PRN
Status: DISCONTINUED | OUTPATIENT
Start: 2020-08-11 | End: 2020-08-14 | Stop reason: HOSPADM

## 2020-08-11 RX ORDER — ROCURONIUM BROMIDE 10 MG/ML
INJECTION, SOLUTION INTRAVENOUS PRN
Status: DISCONTINUED | OUTPATIENT
Start: 2020-08-11 | End: 2020-08-11 | Stop reason: SDUPTHER

## 2020-08-11 RX ORDER — VELPATASVIR AND SOFOSBUVIR 100; 400 MG/1; MG/1
400 TABLET, FILM COATED ORAL DAILY
Status: DISCONTINUED | OUTPATIENT
Start: 2020-08-11 | End: 2020-08-14 | Stop reason: HOSPADM

## 2020-08-11 RX ORDER — SODIUM CHLORIDE 450 MG/100ML
INJECTION, SOLUTION INTRAVENOUS CONTINUOUS
Status: DISCONTINUED | OUTPATIENT
Start: 2020-08-11 | End: 2020-08-12

## 2020-08-11 RX ORDER — SENNA AND DOCUSATE SODIUM 50; 8.6 MG/1; MG/1
1 TABLET, FILM COATED ORAL 2 TIMES DAILY
Status: DISCONTINUED | OUTPATIENT
Start: 2020-08-11 | End: 2020-08-14 | Stop reason: HOSPADM

## 2020-08-11 RX ORDER — BUPIVACAINE HYDROCHLORIDE 5 MG/ML
30 INJECTION, SOLUTION EPIDURAL; INTRACAUDAL ONCE
Status: DISCONTINUED | OUTPATIENT
Start: 2020-08-11 | End: 2020-08-14 | Stop reason: HOSPADM

## 2020-08-11 RX ORDER — DEXAMETHASONE SODIUM PHOSPHATE 4 MG/ML
INJECTION, SOLUTION INTRA-ARTICULAR; INTRALESIONAL; INTRAMUSCULAR; INTRAVENOUS; SOFT TISSUE PRN
Status: DISCONTINUED | OUTPATIENT
Start: 2020-08-11 | End: 2020-08-11 | Stop reason: SDUPTHER

## 2020-08-11 RX ORDER — SODIUM CHLORIDE, SODIUM LACTATE, POTASSIUM CHLORIDE, CALCIUM CHLORIDE 600; 310; 30; 20 MG/100ML; MG/100ML; MG/100ML; MG/100ML
INJECTION, SOLUTION INTRAVENOUS CONTINUOUS
Status: DISCONTINUED | OUTPATIENT
Start: 2020-08-11 | End: 2020-08-12

## 2020-08-11 RX ORDER — PROPOFOL 10 MG/ML
INJECTION, EMULSION INTRAVENOUS PRN
Status: DISCONTINUED | OUTPATIENT
Start: 2020-08-11 | End: 2020-08-11 | Stop reason: SDUPTHER

## 2020-08-11 RX ORDER — BUPIVACAINE HYDROCHLORIDE 5 MG/ML
INJECTION, SOLUTION EPIDURAL; INTRACAUDAL
Status: DISCONTINUED | OUTPATIENT
Start: 2020-08-11 | End: 2020-08-11 | Stop reason: SDUPTHER

## 2020-08-11 RX ADMIN — INSULIN GLARGINE 15 UNITS: 100 INJECTION, SOLUTION SUBCUTANEOUS at 22:01

## 2020-08-11 RX ADMIN — INSULIN GLARGINE 15 UNITS: 100 INJECTION, SOLUTION SUBCUTANEOUS at 00:40

## 2020-08-11 RX ADMIN — ONDANSETRON 4 MG: 2 INJECTION INTRAMUSCULAR; INTRAVENOUS at 18:46

## 2020-08-11 RX ADMIN — HYDROMORPHONE HYDROCHLORIDE 0.25 MG: 1 INJECTION, SOLUTION INTRAMUSCULAR; INTRAVENOUS; SUBCUTANEOUS at 14:30

## 2020-08-11 RX ADMIN — Medication 10 ML: at 08:45

## 2020-08-11 RX ADMIN — HYDROCHLOROTHIAZIDE 25 MG: 25 TABLET ORAL at 08:36

## 2020-08-11 RX ADMIN — ENOXAPARIN SODIUM 40 MG: 40 INJECTION SUBCUTANEOUS at 21:21

## 2020-08-11 RX ADMIN — LIDOCAINE HYDROCHLORIDE 100 MG: 20 INJECTION, SOLUTION INTRAVENOUS at 17:14

## 2020-08-11 RX ADMIN — FENTANYL CITRATE 50 MCG: 50 INJECTION INTRAMUSCULAR; INTRAVENOUS at 17:14

## 2020-08-11 RX ADMIN — SUGAMMADEX 200 MG: 100 INJECTION, SOLUTION INTRAVENOUS at 18:56

## 2020-08-11 RX ADMIN — HYDROCODONE BITARTRATE AND ACETAMINOPHEN 1 TABLET: 5; 325 TABLET ORAL at 06:05

## 2020-08-11 RX ADMIN — SODIUM CHLORIDE, SODIUM LACTATE, POTASSIUM CHLORIDE, AND CALCIUM CHLORIDE: 600; 310; 30; 20 INJECTION, SOLUTION INTRAVENOUS at 17:11

## 2020-08-11 RX ADMIN — DOCUSATE SODIUM 50 MG AND SENNOSIDES 8.6 MG 1 TABLET: 8.6; 5 TABLET, FILM COATED ORAL at 21:21

## 2020-08-11 RX ADMIN — SODIUM CHLORIDE: 4.5 INJECTION, SOLUTION INTRAVENOUS at 21:21

## 2020-08-11 RX ADMIN — PROPOFOL 60 MG: 10 INJECTION, EMULSION INTRAVENOUS at 17:17

## 2020-08-11 RX ADMIN — BUPIVACAINE HYDROCHLORIDE 20 ML: 5 INJECTION, SOLUTION EPIDURAL; INTRACAUDAL; PERINEURAL at 19:47

## 2020-08-11 RX ADMIN — ONDANSETRON 4 MG: 2 INJECTION INTRAMUSCULAR; INTRAVENOUS at 17:23

## 2020-08-11 RX ADMIN — EPHEDRINE SULFATE 20 MG: 50 INJECTION INTRAVENOUS at 17:51

## 2020-08-11 RX ADMIN — PHENYLEPHRINE HYDROCHLORIDE 100 MCG: 10 INJECTION, SOLUTION INTRAMUSCULAR; INTRAVENOUS; SUBCUTANEOUS at 18:25

## 2020-08-11 RX ADMIN — PHENYLEPHRINE HYDROCHLORIDE 200 MCG: 10 INJECTION, SOLUTION INTRAMUSCULAR; INTRAVENOUS; SUBCUTANEOUS at 18:00

## 2020-08-11 RX ADMIN — Medication 100 MG: at 08:36

## 2020-08-11 RX ADMIN — METHOCARBAMOL TABLETS 1500 MG: 750 TABLET, COATED ORAL at 14:31

## 2020-08-11 RX ADMIN — HYDROCODONE BITARTRATE AND ACETAMINOPHEN 1 TABLET: 5; 325 TABLET ORAL at 11:52

## 2020-08-11 RX ADMIN — PHENYLEPHRINE HYDROCHLORIDE 100 MCG: 10 INJECTION, SOLUTION INTRAMUSCULAR; INTRAVENOUS; SUBCUTANEOUS at 18:09

## 2020-08-11 RX ADMIN — Medication 10 ML: at 00:44

## 2020-08-11 RX ADMIN — MAGNESIUM SULFATE HEPTAHYDRATE 4 G: 40 INJECTION, SOLUTION INTRAVENOUS at 08:37

## 2020-08-11 RX ADMIN — PHENYLEPHRINE HYDROCHLORIDE 200 MCG: 10 INJECTION, SOLUTION INTRAMUSCULAR; INTRAVENOUS; SUBCUTANEOUS at 17:36

## 2020-08-11 RX ADMIN — PHENYLEPHRINE HYDROCHLORIDE 100 MCG: 10 INJECTION, SOLUTION INTRAMUSCULAR; INTRAVENOUS; SUBCUTANEOUS at 18:44

## 2020-08-11 RX ADMIN — EPHEDRINE SULFATE 10 MG: 50 INJECTION INTRAVENOUS at 18:00

## 2020-08-11 RX ADMIN — EPHEDRINE SULFATE 20 MG: 50 INJECTION INTRAVENOUS at 17:42

## 2020-08-11 RX ADMIN — SODIUM CHLORIDE, POTASSIUM CHLORIDE, SODIUM LACTATE AND CALCIUM CHLORIDE: 600; 310; 30; 20 INJECTION, SOLUTION INTRAVENOUS at 11:52

## 2020-08-11 RX ADMIN — CEFAZOLIN 2 G: 10 INJECTION, POWDER, FOR SOLUTION INTRAVENOUS at 17:26

## 2020-08-11 RX ADMIN — PHENYLEPHRINE HYDROCHLORIDE 100 MCG: 10 INJECTION, SOLUTION INTRAMUSCULAR; INTRAVENOUS; SUBCUTANEOUS at 18:38

## 2020-08-11 RX ADMIN — ROCURONIUM BROMIDE 100 MG: 10 INJECTION INTRAVENOUS at 17:17

## 2020-08-11 RX ADMIN — CYANOCOBALAMIN TAB 1000 MCG 1000 MCG: 1000 TAB at 08:36

## 2020-08-11 RX ADMIN — DEXTRAN 70 AND HYPROMELLOSE 2910 1 DROP: 1; 3 SOLUTION/ DROPS OPHTHALMIC at 22:02

## 2020-08-11 RX ADMIN — DEXAMETHASONE SODIUM PHOSPHATE 4 MG: 4 INJECTION, SOLUTION INTRAMUSCULAR; INTRAVENOUS at 17:23

## 2020-08-11 RX ADMIN — Medication 1000 UNITS: at 08:36

## 2020-08-11 RX ADMIN — METHOCARBAMOL TABLETS 1500 MG: 750 TABLET, COATED ORAL at 06:19

## 2020-08-11 RX ADMIN — VELPATASVIR AND SOFOSBUVIR 400 MG: 100; 400 TABLET, FILM COATED ORAL at 21:22

## 2020-08-11 RX ADMIN — SODIUM CHLORIDE, SODIUM LACTATE, POTASSIUM CHLORIDE, AND CALCIUM CHLORIDE: 600; 310; 30; 20 INJECTION, SOLUTION INTRAVENOUS at 18:46

## 2020-08-11 RX ADMIN — DULOXETINE HYDROCHLORIDE 20 MG: 20 CAPSULE, DELAYED RELEASE ORAL at 08:44

## 2020-08-11 RX ADMIN — INSULIN LISPRO 2 UNITS: 100 INJECTION, SOLUTION INTRAVENOUS; SUBCUTANEOUS at 00:40

## 2020-08-11 RX ADMIN — INSULIN LISPRO 7 UNITS: 100 INJECTION, SOLUTION INTRAVENOUS; SUBCUTANEOUS at 00:40

## 2020-08-11 RX ADMIN — INSULIN LISPRO 2 UNITS: 100 INJECTION, SOLUTION INTRAVENOUS; SUBCUTANEOUS at 22:01

## 2020-08-11 RX ADMIN — HYDROMORPHONE HYDROCHLORIDE 0.25 MG: 1 INJECTION, SOLUTION INTRAMUSCULAR; INTRAVENOUS; SUBCUTANEOUS at 16:34

## 2020-08-11 RX ADMIN — BUPIVACAINE 20 ML: 13.3 INJECTION, SUSPENSION, LIPOSOMAL INFILTRATION at 19:47

## 2020-08-11 RX ADMIN — HYDROCODONE BITARTRATE AND ACETAMINOPHEN 1 TABLET: 5; 325 TABLET ORAL at 00:11

## 2020-08-11 RX ADMIN — PHENYLEPHRINE HYDROCHLORIDE 100 MCG: 10 INJECTION, SOLUTION INTRAMUSCULAR; INTRAVENOUS; SUBCUTANEOUS at 18:18

## 2020-08-11 RX ADMIN — PANTOPRAZOLE SODIUM 40 MG: 40 TABLET, DELAYED RELEASE ORAL at 06:05

## 2020-08-11 RX ADMIN — METHOCARBAMOL TABLETS 1500 MG: 750 TABLET, COATED ORAL at 21:21

## 2020-08-11 ASSESSMENT — PULMONARY FUNCTION TESTS
PIF_VALUE: 2
PIF_VALUE: 21
PIF_VALUE: 22
PIF_VALUE: 13
PIF_VALUE: 21
PIF_VALUE: 2
PIF_VALUE: 21
PIF_VALUE: 20
PIF_VALUE: 8
PIF_VALUE: 21
PIF_VALUE: 20
PIF_VALUE: 21
PIF_VALUE: 20
PIF_VALUE: 21
PIF_VALUE: 20
PIF_VALUE: 20
PIF_VALUE: 21
PIF_VALUE: 22
PIF_VALUE: 22
PIF_VALUE: 21
PIF_VALUE: 20
PIF_VALUE: 21
PIF_VALUE: 21
PIF_VALUE: 29
PIF_VALUE: 21
PIF_VALUE: 20
PIF_VALUE: 21
PIF_VALUE: 23
PIF_VALUE: 20
PIF_VALUE: 21
PIF_VALUE: 20
PIF_VALUE: 1
PIF_VALUE: 21
PIF_VALUE: 20
PIF_VALUE: 21
PIF_VALUE: 20
PIF_VALUE: 21
PIF_VALUE: 23
PIF_VALUE: 20
PIF_VALUE: 23
PIF_VALUE: 21
PIF_VALUE: 20
PIF_VALUE: 21
PIF_VALUE: 20
PIF_VALUE: 23
PIF_VALUE: 25
PIF_VALUE: 21
PIF_VALUE: 20
PIF_VALUE: 21
PIF_VALUE: 20
PIF_VALUE: 22
PIF_VALUE: 20
PIF_VALUE: 21
PIF_VALUE: 20
PIF_VALUE: 15
PIF_VALUE: 20
PIF_VALUE: 3
PIF_VALUE: 21
PIF_VALUE: 21
PIF_VALUE: 20
PIF_VALUE: 2
PIF_VALUE: 21
PIF_VALUE: 20
PIF_VALUE: 23
PIF_VALUE: 21
PIF_VALUE: 21
PIF_VALUE: 6
PIF_VALUE: 22
PIF_VALUE: 23
PIF_VALUE: 23
PIF_VALUE: 3
PIF_VALUE: 18
PIF_VALUE: 2
PIF_VALUE: 18
PIF_VALUE: 3
PIF_VALUE: 5
PIF_VALUE: 20
PIF_VALUE: 3
PIF_VALUE: 21
PIF_VALUE: 25
PIF_VALUE: 15
PIF_VALUE: 35
PIF_VALUE: 20
PIF_VALUE: 33
PIF_VALUE: 21
PIF_VALUE: 21
PIF_VALUE: 8
PIF_VALUE: 23
PIF_VALUE: 2
PIF_VALUE: 21
PIF_VALUE: 23
PIF_VALUE: 20
PIF_VALUE: 21
PIF_VALUE: 3
PIF_VALUE: 20
PIF_VALUE: 15
PIF_VALUE: 20
PIF_VALUE: 21
PIF_VALUE: 20
PIF_VALUE: 1
PIF_VALUE: 23
PIF_VALUE: 20

## 2020-08-11 ASSESSMENT — PAIN DESCRIPTION - ONSET
ONSET: ON-GOING
ONSET: AWAKENED FROM SLEEP
ONSET: ON-GOING

## 2020-08-11 ASSESSMENT — PAIN DESCRIPTION - PAIN TYPE
TYPE: ACUTE PAIN
TYPE: SURGICAL PAIN
TYPE: SURGICAL PAIN
TYPE: ACUTE PAIN

## 2020-08-11 ASSESSMENT — PAIN DESCRIPTION - ORIENTATION
ORIENTATION: RIGHT

## 2020-08-11 ASSESSMENT — PAIN SCALES - GENERAL
PAINLEVEL_OUTOF10: 8
PAINLEVEL_OUTOF10: 7
PAINLEVEL_OUTOF10: 9
PAINLEVEL_OUTOF10: 0
PAINLEVEL_OUTOF10: 8
PAINLEVEL_OUTOF10: 0
PAINLEVEL_OUTOF10: 8
PAINLEVEL_OUTOF10: 9
PAINLEVEL_OUTOF10: 9
PAINLEVEL_OUTOF10: 0
PAINLEVEL_OUTOF10: 0
PAINLEVEL_OUTOF10: 7
PAINLEVEL_OUTOF10: 0
PAINLEVEL_OUTOF10: 6
PAINLEVEL_OUTOF10: 9
PAINLEVEL_OUTOF10: 0
PAINLEVEL_OUTOF10: 0
PAINLEVEL_OUTOF10: 9
PAINLEVEL_OUTOF10: 0

## 2020-08-11 ASSESSMENT — PAIN DESCRIPTION - FREQUENCY
FREQUENCY: CONTINUOUS
FREQUENCY: INTERMITTENT
FREQUENCY: CONTINUOUS

## 2020-08-11 ASSESSMENT — PAIN DESCRIPTION - LOCATION
LOCATION: ARM;HAND
LOCATION: ARM
LOCATION: ARM;HAND
LOCATION: ARM
LOCATION: ARM

## 2020-08-11 ASSESSMENT — PAIN DESCRIPTION - DESCRIPTORS
DESCRIPTORS: ACHING
DESCRIPTORS: ACHING;DISCOMFORT
DESCRIPTORS: ACHING;DISCOMFORT;SHOOTING
DESCRIPTORS: ACHING
DESCRIPTORS: SORE
DESCRIPTORS: ACHING
DESCRIPTORS: SORE

## 2020-08-11 ASSESSMENT — PAIN - FUNCTIONAL ASSESSMENT: PAIN_FUNCTIONAL_ASSESSMENT: PREVENTS OR INTERFERES WITH MANY ACTIVE NOT PASSIVE ACTIVITIES

## 2020-08-11 ASSESSMENT — PAIN DESCRIPTION - PROGRESSION
CLINICAL_PROGRESSION: GRADUALLY IMPROVING
CLINICAL_PROGRESSION: NOT CHANGED

## 2020-08-11 NOTE — PLAN OF CARE
Problem: Falls - Risk of:  Goal: Will remain free from falls  Description: Will remain free from falls  Outcome: Ongoing   Fall precautions in place. Bed alarm activated, low position, wheels locked. Call light and belongings within reach. Continue to monitor safety. Problem: Pain:  Goal: Pain level will decrease  Description: Pain level will decrease  Outcome: Ongoing   Received norco for R arm pain. Continues to rate pain 9/10. Messaged resident, awaiting reply. Will monitor.

## 2020-08-11 NOTE — TELEPHONE ENCOUNTER
Pt daughter requesting to speak to Dr. Cassandra Osman regarding pt fall and in the hospital for pt broken elbow. Pt daughter would like ton know Dr. Cassandra Osman opinion on surgery.  Pt daughter also would like to know if dr seen pt this morning        Please be advise

## 2020-08-11 NOTE — BRIEF OP NOTE
Brief Postoperative Note      Patient: Jo Reno  YOB: 1940  MRN: [id number]    Date of Procedure: 8/11/2020    Pre-Op Diagnosis: RIGHT PROXIMAL HUMERUS AND SHAFT FRACTURE    Post-Op Diagnosis: Same       Procedure(s):  RIGHT PROXIMAL HUMERUS AND SHAFT FRACTURE OPEN REDUCTION INTERNAL FIXATION    Surgeon(s):  Debbie Meyers MD    Assistant:  Surgical Assistant: Parish Jade; Magdalen Holy Holter    Anesthesia: General    Estimated Blood Loss (mL): less than 163     Complications: None    Specimens:   * No specimens in log *    Implants:  Implant Name Type Inv. Item Serial No.  Lot No. LRB No. Used Action   PLATE PROX LAT HUMERUS RT 125MM 6 HOLE Screw/Plate/Nail/Gavin PLATE PROX LAT HUMERUS RT 125MM 6 HOLE  MATEO: [employer]TH[employer]  Right 1 Implanted   SCREW CHARLIE TI 3.5X26MM Screw/Plate/Nail/Gavin SCREW CHARLIE TI 3.5X26MM  MATEO: ORTH[employer]  Right 1 Implanted   SCREW 3.5 CORTEX L30MM Screw/Plate/Nail/Gavin SCREW 3.5 CORTEX L30MM  MATEO: ORTH[employer]  Right 1 Implanted   SCREW CORTCL TI 3.5X28MM Screw/Plate/Nail/Gavin SCREW CORTCL TI 3.5X28MM  [employer]ATE[employer]: ORTH[employer]  Right 1 Implanted   SCREW LK 4.0MM/L44MM Screw/Plate/Nail/Gavin SCREW LK 4.0MM/L44MM  [employer]ATE[employer]: ORTH[employer]  Right 2 Implanted   SCREW LK [id number] Screw/Plate/Nail/Gavin SCREW LK [id number]  [employer]ATE[employer]: ORTH[employer]  Right 1 Implanted   SCREW LK TI FLTHRD 4.0X28MM Screw/Plate/Nail/Gavin SCREW LK TI FLTHRD 4.0X28MM  [employer]ATE[employer]: ORTH[employer]  Right 1 Implanted   SCREW LK 4. [id number] Screw/Plate/Nail/Gavin SCREW LK 4. [id number]  MATEO: ORTHOPA[employer]  Right 2 Implanted   SCREW LK 4.0X46MM Screw/Plate/Nail/Gavin SCREW LK 4.0X46MM  MATEO: ORTHOPAEDICS  Right 1 Implanted   SCREW LK 4.0/L38MM Screw/Plate/Nail/Gavin SCREW LK 4.0/L38MM  MATEO: ORTHOPAEDICS  Right 1 Implanted   SCREW LK 4.0MM/L40MM Screw/Plate/Nail/Gavin SCREW LK 4.0MM/L40MM  MATEO: ORTHOPAEDICS  Right 1 Implanted         Drains:   External Urinary Catheter (Active)       External Urinary

## 2020-08-11 NOTE — PROGRESS NOTES
4 Eyes Admission Assessment     I agree as the admission nurse that 2 RN's have performed a thorough Head to Toe Skin Assessment on the patient. ALL assessment sites listed below have been assessed on admission. Areas assessed by both nurses:   [x]   Head, Face, and Ears   [x]   Shoulders, Back, and Chest  [x]   Arms, Elbows, and Hands   [x]   Coccyx, Sacrum, and Ischum  [x]   Legs, Feet, and Heels        Does the Patient have Skin Breakdown? Redness bilateral heels, blanchable. Redness to buttocks. Refusing thorough assessment of butt/coccyx r/t R arm pain.       Anup Prevention initiated:  YES   Wound Care Orders initiated:  NO      C nurse consulted for Pressure Injury (Stage 3,4, Unstageable, DTI, NWPT, and Complex wounds):  NO     Nurse 1 eSignature: Electronically signed by Yaakov Lujan RN on 8/11/20 at 1:34 AM EDT    **SHARE this note so that the co-signing nurse is able to place an eSignature**    Nurse 2 eSignature: Electronically signed by Chon Lynch RN on 8/11/20 at 1:55 AM EDT

## 2020-08-11 NOTE — PROGRESS NOTES
[facility] Orthopedic Surgery  Consult Note          Orthopedic Consult, full note to follow in am.    Ann Calvert [de-identified] y.o. RHD admitted for a fall with right shoulder pain. Xray reviewed, and showed a comminuted right proxial and shaft fracture. Plan:  - Recommend ORIF  - Surgery tomorrow if medically stable and OR time available. Thank you very much for the kind consultation and allowing me to participate in this patient's care. I will continue to keep you apprised of her progress.         Rohith Dowell MD 8/10/2020 10:00 PM

## 2020-08-11 NOTE — PROGRESS NOTES
Spoke with daughter, Vik Veliz, regarding updates on surgery and plan of care. All questions answered.

## 2020-08-11 NOTE — PROGRESS NOTES
RN called an obtained telephone consent for surgery with second RN with pt's daughter (POA) Tammi Mantilla. Consent on chart. OR DNR suspension form completed by Dr. Colonel Galeano with [facility] and placed on chart.   Sherrie Gillespie  8/11/2020  3:48 PM

## 2020-08-11 NOTE — PROGRESS NOTES
Pt's family brought in pt's home med Shaggy. Labeled by pharmacy and placed in pt's med box in room.   Elisabet Cramer  8/11/2020  6:45 PM

## 2020-08-11 NOTE — H&P
Internal Medicine  PGY 2  History & Physical      CC right arm pain    History Obtained From:  patient    HISTORY OF PRESENT ILLNESS: [address] yo F PMH DM II, GERD, HTN, PAD p/w severe right arm pain after experiencing a mechanical fall. Pt reports she tripped on her bed sheets and fell on her right side. She denies LOC, dizziness or lightheadedness. She had a similar fall in the past where she had a right humeral frature and was treated for nonoperatively. She also complains of pain and tingling sensation in her right hand more in her 5th digit. Denies other symptoms. Denies fever, night sweats, chills, chest pain, cough, dyspnea, palpitations, nausea, vomiting, diarrhea, dysuria. At the ED, patient was afebrile, high /64, HR 88. Labs revealed corrected Na 137, glucose 378, WBC 27.6 . Had right UE x-ray which revealed fracture of the proximal right humeral diaphysis. Orthopedic surgery oncall was consulted who determined patient will go for ORIF pending OR schedule.     Past Medical History:        Diagnosis Date    ADHD (attention deficit hyperactivity disorder)     Attention deficit disorder without mention of hyperactivity 7/22/2010    Autoimmune disease NEC     Carotid artery disease ([address] 8/2/2013    LEFT CAROTID ENDARTERECTOMY               Carotid artery disease ([address] 8/3/2013    Chronic persistent hepatitis ([facility] [address] 7/22/2010    Depression     Depressive disorder, not elsewhere classified 7/22/2010    Double vision     left eye, since cataract surgery    Fractures     GERD (gastroesophageal reflux disease)     Hx of interstitial lung disease 6/12/2019    Neuropathy     Rheumatoid arthritis(714.0) 7/22/2010    Spinal stenosis     Type II or unspecified type diabetes mellitus without mention of complication, not stated as uncontrolled 7/22/2010    Unspecified cerebral artery occlusion with cerebral infarction     right hand, loss of some sensation       Past Surgical History:        Procedure Laterality Date    COLONOSCOPY      COSMETIC SURGERY      tummy tuck,face lift,mammoplasties- hepatitis blood transfusion complication    EYE SURGERY Bilateral     cataract       Medications Priorto Admission:    No current facility-administered medications on file prior to encounter. Current Outpatient Medications on File Prior to Encounter   Medication Sig Dispense Refill    Sofosbuvir-Velpatasvir (EPCLUSA PO) Take by mouth      Alcohol Swabs (B-D SINGLE USE SWABS REGULAR) PADS USE ONE TOPICALLY DAILY 100 each 2    FreeStyle Lancets MISC USE TO TEST BLOOD SUGAR DAILY 100 each 0    FREESTYLE LITE strip USE TO TEST THREE TIMES A DAY. 100 strip 0    blood glucose test strips (FREESTYLE LITE) strip Once daily 100 strip 0    Alcohol Swabs (B-D SINGLE USE SWABS REGULAR) PADS USE ONE TOPICALLY DAILY 100 each 3    FreeStyle Lancets MISC USE TO TEST BLOOD SUGAR ONCE DAILY. 100 each 3    fentaNYL (DURAGESIC) 12 MCG/HR Place 1 patch onto the skin every 72 hours.  insulin glargine (LANTUS) 100 UNIT/ML injection pen Inject 15 Units into the skin nightly 5 pen 3    spironolactone-hydrochlorothiazide (ALDACTAZIDE) 25-25 MG per tablet Take 1 tablet by mouth daily (with breakfast) 30 tablet 0    acetaminophen (TYLENOL) 325 MG tablet Take 1 tablet by mouth every 6 hours as needed for Pain 30 tablet 0    lidocaine (LIDODERM) 5 % Place 1 patch onto the skin daily 12 hours on, 12 hours off. (Patient not taking: Reported on 7/29/2020) 10 patch 0    esomeprazole (NEXIUM) 40 MG delayed release capsule TAKE ONE CAPSULE BY MOUTH TWICE A  capsule 3    DULoxetine (CYMBALTA) 20 MG extended release capsule TAKE ONE CAPSULE BY MOUTH TWICE A  capsule 3    vitamin B-12 (CYANOCOBALAMIN) 100 MCG tablet Take 1,000 mcg by mouth daily       vitamin B-6 (PYRIDOXINE) 50 MG tablet Take 100 mg by mouth daily       mometasone (ELOCON) 0.1 % cream APPLY DAILY.  1 Tube 1    docusate (COLACE, DULCOLAX) 100 MG CAPS Take 100 mg by mouth daily 30 capsule 0    Multiple Vitamins-Minerals (CENTRUM SILVER) TABS Take by mouth daily      Biotin 1000 MCG TABS Take one tablet daily in am.  0    Ascorbic Acid (VITAMIN C CR) 1000 MG TBCR Take 1 tablet by mouth daily 30 tablet 0    guaiFENesin (MUCINEX) 600 MG extended release tablet Take 1 tablet by mouth 2 times daily as needed for Congestion      polyethyl glycol-propyl glycol 0.4-0.3 % (SYSTANE) 0.4-0.3 % ophthalmic solution Place 1 drop into both eyes as needed for Dry Eyes 1 Bottle 3    Vitamin D 3 (CHOLECALCIFEROL) 1000 UNITS TABS tablet Take 1,000 Units by mouth daily. Allergies:  Erythromycin and Sulfites    Social History:   · TOBACCO:   reports that she quit smoking about 40 years ago. Her smoking use included cigarettes. She started smoking about 66 years ago. She has a 60.00 pack-year smoking history. She has never used smokeless tobacco.  · ETOH:   reports current alcohol use of about 1.0 standard drinks of alcohol per week. · DRUGS : none  ·   Family History:   History reviewed. No pertinent family history. ROS: A 10 point review of systems was conducted, significant findings as noted in HPI.     Physical exam:       Vitals:    08/10/20 2307   BP: 135/67   Pulse: 95   Resp: 17   Temp: 97.8 °F (36.6 °C)   SpO2: 94%     Physical Exam  General: Overall well-appearing lying in bed does appear somewhat uncomfortable secondary to pain  HEENT:  head is atraumatic, pupils equal round and reactive to light, sclera are clear, oropharynx is nonerythematous  Neck: supple, no lymphadenopathy  Chest: clear to auscultation bilaterally with no wheezes rhonchi, rales  Cardiovascular: Regular, rate, and rhythm, normal S1S2, no murmurs, rubs, or gallops, 2+ radial pulses bilaterally, capillary refill 2 seconds  Abdominal: Soft, nontender, nondistended, positive bowel sounds throughout, no rebound or guarding  Skin: Warm, dry well perfused, no rashes  Extremities: Tenderness palpation in the right hand without associated bony deformity tenderness palpation the right elbow without associated bony deformity tenderness palpation diffusely throughout the right upper arm no clavicular tenderness on either side no scapular tenderness on either side. Patient otherwise has no evidence of any other extremity tenderness or injuries. Neurologic:  alert and oriented x4, speech is clear and intact without dysarthria    DATA:    Labs:  CBC:   Recent Labs     08/10/20  2152   WBC 27.6*   HGB 11.7*   HCT 35.6*          BMP:   Recent Labs     08/10/20  2111   *   K 5.1   CL 99   CO2 24   BUN 22*   CREATININE 0.9   GLUCOSE 378*     LFT's: No results for input(s): AST, ALT, ALB, BILITOT, ALKPHOS in the last 72 hours. Troponin: No results for input(s): TROPONINI in the last 72 hours. BNP:No results for input(s): BNP in the last 72 hours. ABGs: No results for input(s): PHART, CCZ8YXV, PO2ART in the last 72 hours. INR: No results for input(s): INR in the last 72 hours. U/A:No results for input(s): NITRITE, COLORU, PHUR, LABCAST, WBCUA, RBCUA, MUCUS, TRICHOMONAS, YEAST, BACTERIA, CLARITYU, SPECGRAV, LEUKOCYTESUR, UROBILINOGEN, BILIRUBINUR, BLOODU, GLUCOSEU, AMORPHOUS in the last 72 hours. Invalid input(s): KETONESU    XR HAND RIGHT (MIN 3 VIEWS)   Final Result      No acute injury. XR SHOULDER RIGHT (MIN 2 VIEWS)   Final Result      Comminuted, mildly displaced obliquely oriented fracture of the proximal humeral diaphysis. RIGHT SHOULDER:      REASON FOR EXAM: Fall, pain      FINDINGS:      3 views of the right shoulder demonstrate a mildly displaced, mildly comminuted fracture of the proximal right humeral diaphysis. Humeral head remains properly located within the glenoid. Acromioclavicular joint maintained other than degenerative change. IMPRESSION:      Fracture of the proximal right humeral diaphysis.       XR HUMERUS RIGHT (MIN 2 VIEWS)   Final Result Comminuted, mildly displaced obliquely oriented fracture of the proximal humeral diaphysis. RIGHT SHOULDER:      REASON FOR EXAM: Fall, pain      FINDINGS:      3 views of the right shoulder demonstrate a mildly displaced, mildly comminuted fracture of the proximal right humeral diaphysis. Humeral head remains properly located within the glenoid. Acromioclavicular joint maintained other than degenerative change. IMPRESSION:      Fracture of the proximal right humeral diaphysis. XR HAND RIGHT (MIN 3 VIEWS)    (Results Pending)           ASSESSMENT AND PLAN:    Fracture of the proximal right humeral diaphysis  D/t mechanical fall.   - appreciate orthopedic surgery recs; will required ORIF tomorrow  - pain management Norco, lidocaine patch, Ice     Type II diabetes  Last HA1C 7/29/2020 8. 1. Today glucose 374. On home lantus 15u.  - give 7u lispro  - continue home lantus 15u  - hypoglycemia protocol  - low dose sliding scale  - Accu-checks q4h    Essential HTN  Presented with /64 likely d/t pain. On home HCTZ-spironolactone. - will hold off spironolactone given K 5.1  - continue HCTZ  - labetalol 10 mg q6h PRN for SBP>180    Leukocytosis  WBC 27.6 from 24.5 7/29. No evidence of infection.   - will check UA       Hepatitis C  On home Sofosbuvir-Velpatasvir Roberta Shireduarda)  - continue home meds    Discussed with attending physician Dr. Javier Martin    Code Status: DNR-CCA  FEN: Diet NPO, After Midnight  PPX: Lovenox (to be given following surgery)  DISPO: Terry Bynum MD, PGY-2  8/10/2020,  11:52 PM

## 2020-08-11 NOTE — ANESTHESIA PRE PROCEDURE
Department of Anesthesiology  Preprocedure Note       Name:  Carmela Durán   Age:  [de-identified] y.o.  :  1940                                          MRN:  [id number]         Date:  2020      Surgeon: Jannie Meza):  Rod Arnold MD    Procedure: Procedure(s):  RIGHT PROXIMAL HUMERUS OPEN REDUCTION INTERNAL FIXATION    Medications prior to admission:   Prior to Admission medications    Medication Sig Start Date End Date Taking? Authorizing Provider   insulin glargine (LANTUS) 100 UNIT/ML injection pen Inject 15 Units into the skin nightly 19  Yes Di Coleman DO   acetaminophen (TYLENOL) 325 MG tablet Take 1 tablet by mouth every 6 hours as needed for Pain 19  Yes Britta Cope PA-C   esomeprazole (NEXIUM) 40 MG delayed release capsule TAKE ONE CAPSULE BY MOUTH TWICE A DAY  Patient taking differently: nightly TAKE ONE CAPSULE BY MOUTH 19  Yes Yolis Burks MD   DULoxetine (CYMBALTA) 20 MG extended release capsule TAKE ONE CAPSULE BY MOUTH TWICE A DAY 19  Yes Yolis Burks MD   vitamin B-12 (CYANOCOBALAMIN) 100 MCG tablet Take 1,000 mcg by mouth daily    Yes Historical Provider, MD   vitamin B-6 (PYRIDOXINE) 50 MG tablet Take 100 mg by mouth daily    Yes Historical Provider, MD   Multiple Vitamins-Minerals (CENTRUM SILVER) TABS Take by mouth daily   Yes Historical Provider, MD   Biotin 1000 MCG TABS Take one tablet daily in am. 18  Yes Yolis Burks MD   Ascorbic Acid (VITAMIN C CR) 1000 MG TBCR Take 1 tablet by mouth daily 18  Yes Yolis Burks MD   Vitamin D 3 (CHOLECALCIFEROL) 1000 UNITS TABS tablet Take 1,000 Units by mouth daily.    Yes Historical Provider, MD   Sofosbuvir-Velpatasvir (EPCLUSA PO) Take 400 mg by mouth 400/100 mg    Historical Provider, MD   Alcohol Swabs (B-D SINGLE USE SWABS REGULAR) PADS USE ONE TOPICALLY DAILY 3/18/20   Yolis Burks MD   FreeStyle Lancets MISC USE TO TEST BLOOD SUGAR DAILY 3/18/20   Yolis Burks MD   FREESTYLE LITE strip USE TO TEST THREE TIMES A DAY. 3/18/20   Garret Roland MD   blood glucose test strips (FREESTYLE LITE) strip Once daily 3/17/20   Garret Roland MD   Alcohol Swabs (B-D SINGLE USE SWABS REGULAR) PADS USE ONE TOPICALLY DAILY 3/17/20   Garret Roland MD   FreeStyle Lancets MISC USE TO TEST BLOOD SUGAR ONCE DAILY. 3/17/20   Garret Roland MD   fentaNYL (DURAGESIC) 12 MCG/HR Place 1 patch onto the skin every 72 hours. Historical Provider, MD   spironolactone-hydrochlorothiazide (ALDACTAZIDE) 25-25 MG per tablet Take 1 tablet by mouth daily (with breakfast) 12/13/19 1/12/20  Di Coleman DO   lidocaine (LIDODERM) 5 % Place 1 patch onto the skin daily 12 hours on, 12 hours off. Patient not taking: Reported on 7/29/2020 12/1/19   Britta Cope PA-C   mometasone (ELOCON) 0.1 % cream APPLY DAILY.  8/19/19   Garret Roland MD   docusate (COLACE, DULCOLAX) 100 MG CAPS Take 100 mg by mouth daily 5/7/19   Yudi Graves MD   guaiFENesin [facility]) 600 MG extended release tablet Take 1 tablet by mouth 2 times daily as needed for Congestion 1/25/18   Garret Roland MD   polyethyl glycol-propyl glycol 0.4-0.3 % (SYSTANE) 0.4-0.3 % ophthalmic solution Place 1 drop into both eyes as needed for Dry Eyes 12/12/17   Garret Roland MD       Current medications:    Current Facility-Administered Medications   Medication Dose Route Frequency Provider Last Rate Last Dose    hydroCHLOROthiazide (HYDRODIURIL) tablet 25 mg  25 mg Oral Daily Magui Hendrix MD   25 mg at 08/11/20 0836    HYDROcodone-acetaminophen (NORCO) 5-325 MG per tablet 1 tablet  1 tablet Oral Q4H PRN Magui Hendrix MD   1 tablet at 08/11/20 1152    methocarbamol (ROBAXIN) tablet 1,500 mg  1,500 mg Oral TID Magui Hendrix MD   1,500 mg at 08/11/20 1431    ceFAZolin (ANCEF) 2 g in dextrose 5 % 50 mL IVPB  2 g Intravenous On Call to 5345 Alistair So MD        lactated ringers infusion   Intravenous Continuous Simpson Primrose, MD 75 mL/hr at 08/11/20 1152      Sofosbuvir-Velpatasvir 400-100 MG TABS 400 mg - PATIENT SUPPLIED  400 mg Oral Daily Juan Webster MD        HYDROmorphone (DILAUDID) injection 0.25 mg  0.25 mg Intravenous Q3H PRN Juan Webster MD   0.25 mg at 08/11/20 1634    DULoxetine (CYMBALTA) extended release capsule 20 mg  20 mg Oral Daily Lorin Bonds MD   20 mg at 08/11/20 0844    pantoprazole (PROTONIX) tablet 40 mg  40 mg Oral QAM AC Lorin Bonds MD   40 mg at 08/11/20 3312    insulin glargine (LANTUS;BASAGLAR) injection pen 15 Units  15 Units Subcutaneous Nightly Lorin Bonds MD   15 Units at 08/11/20 0040    vitamin B-12 (CYANOCOBALAMIN) tablet 1,000 mcg  1,000 mcg Oral Daily Lorin Bonds MD   1,000 mcg at 08/11/20 0836    vitamin B-6 (PYRIDOXINE) tablet 100 mg  100 mg Oral Daily Lorin Bonds MD   100 mg at 08/11/20 5063    vitamin D (CHOLECALCIFEROL) tablet 1,000 Units  1,000 Units Oral Daily Lorin Bonds MD   1,000 Units at 08/11/20 0836    labetalol (NORMODYNE;TRANDATE) injection 10 mg  10 mg Intravenous Q6H PRN Lorin Bonds MD        glucose (GLUTOSE) 40 % oral gel 15 g  15 g Oral PRN Lorin Bonds MD        dextrose 50 % IV solution  12.5 g Intravenous PRN Lorin Bonds MD        glucagon (rDNA) injection 1 mg  1 mg Intramuscular PRN Lorin Bonds MD        dextrose 5 % solution  100 mL/hr Intravenous PRN Lorin Bonds MD        insulin lispro (1 Unit Dial) 0-6 Units  0-6 Units Subcutaneous TID WC Lorin Bonds MD        insulin lispro (1 Unit Dial) 0-3 Units  0-3 Units Subcutaneous Nightly Lorin Bonds MD   2 Units at 08/11/20 0040    sodium chloride flush 0.9 % injection 10 mL  10 mL Intravenous 2 times per day Lorin Bonds MD   10 mL at 08/11/20 0845    sodium chloride flush 0.9 % injection 10 mL  10 mL Intravenous PRN Lorin Bonds MD        acetaminophen (TYLENOL) tablet 650 mg  650 mg Oral Q6H PRN Lorin Bonds MD        Or    acetaminophen (TYLENOL) suppository 650 mg  650 mg Rectal Q6H PRN Lorin Bonds MD        polyethylene glycol (GLYCOLAX) packet 17 g  17 g Oral Daily PRN 74   Resp: 16 14 16 18   Temp: 97.7 °F (36.5 °C) 97.4 °F (36.3 °C) 97 °F (36.1 °C) 97.1 °F (36.2 °C)   TempSrc: Oral Oral Oral Oral   SpO2: 96% 96% 96% 99%   Weight:       Height:                                                  BP Readings from Last 3 Encounters:   08/11/20 111/64   08/11/20 (!) 155/68   07/29/20 130/80       NPO Status:                                                                                 BMI:   Wt Readings from Last 3 Encounters:   08/10/20 127 lb (57.6 kg)   07/29/20 127 lb (57.6 kg)   01/06/20 130 lb (59 kg)     Body mass index is 23.23 kg/m².     CBC:   Lab Results   Component Value Date    WBC 27.0 08/11/2020    RBC 4.11 08/11/2020    HGB 11.0 08/11/2020    HCT 32.2 08/11/2020    MCV 78.5 08/11/2020    RDW 13.3 08/11/2020     08/11/2020       CMP:   Lab Results   Component Value Date     08/11/2020    K 3.8 08/11/2020    K 4.8 12/10/2019     08/11/2020    CO2 24 08/11/2020    BUN 23 08/11/2020    CREATININE 0.9 08/11/2020    GFRAA >60 08/11/2020    GFRAA >60 03/18/2013    AGRATIO 0.8 07/29/2020    LABGLOM >60 08/11/2020    GLUCOSE 115 08/11/2020    GLUCOSE 163 09/09/2011    PROT 8.6 07/29/2020    PROT 7.8 03/18/2013    CALCIUM 10.0 08/11/2020    BILITOT 0.3 07/29/2020    ALKPHOS 66 07/29/2020    AST 28 07/29/2020    ALT 15 07/29/2020       POC Tests:   Recent Labs     08/11/20  1220   POCGLU 153*       Coags:   Lab Results   Component Value Date    PROTIME 11.9 10/11/2018    INR 1.04 10/11/2018    APTT 28.3 10/11/2018       HCG (If Applicable): No results found for: PREGTESTUR, PREGSERUM, HCG, HCGQUANT     ABGs: No results found for: PHART, PO2ART, OOD5HMX, IXC9GTX, BEART, W6QFBUUK     Type & Screen (If Applicable):  No results found for: LABABO, LABRH    Drug/Infectious Status (If Applicable):  No results found for: HIV, HEPCAB    COVID-19 Screening (If Applicable):   Lab Results   Component Value Date    COVID19 Not Detected 08/11/2020         Anesthesia Evaluation  Patient summary reviewed and Nursing notes reviewed no history of anesthetic complications:   Airway: Mallampati: II  TM distance: >3 FB   Neck ROM: full  Mouth opening: > = 3 FB Dental:          Pulmonary:   (+) COPD:                             Cardiovascular:    (+) hypertension:, CHF:,                   Neuro/Psych:   (+) CVA:, psychiatric history:            GI/Hepatic/Renal:   (+) GERD:, hepatitis: C, liver disease:,           Endo/Other:    (+) DiabetesType II DM, , : arthritis: rheumatoid. , .                 Abdominal:           Vascular:                                        Anesthesia Plan      general     ASA 1    (65-year-old female presents for RIGHT PROXIMAL HUMERUS OPEN REDUCTION INTERNAL FIXATION. Plan general anesthesia with ASA standard monitors; postoperative interscalene brachial plexus block for postoperative analgesia. Questions answered. Patient agreeable with anesthetic plan.  )  Induction: intravenous. Anesthetic plan and risks discussed with patient. Plan discussed with CRNA.     Attending anesthesiologist reviewed and agrees with Pre Eval content        Reji Hampton MD   8/11/2020

## 2020-08-11 NOTE — PROGRESS NOTES
Internal Medicine Progress Note PGY-3    Admit Date: 8/10/2020    Interval history: To preserve PPEs, I did not personally evaluate the patient. However, Dr. Haider Sheth has personally examined the patient, please refer to his comments. Medications:     Scheduled Meds:   hydroCHLOROthiazide  25 mg Oral Daily    methocarbamol  1,500 mg Oral TID    ceFAZolin  2 g Intravenous On Call to OR    DULoxetine  20 mg Oral Daily    pantoprazole  40 mg Oral QAM AC    insulin glargine  15 Units Subcutaneous Nightly    vitamin B-12  1,000 mcg Oral Daily    vitamin B-6  100 mg Oral Daily    Vitamin D  1,000 Units Oral Daily    insulin lispro  0-6 Units Subcutaneous TID WC    insulin lispro  0-3 Units Subcutaneous Nightly    sodium chloride flush  10 mL Intravenous 2 times per day    enoxaparin  40 mg Subcutaneous Daily    lidocaine  1 patch Transdermal Daily     Continuous Infusions:   lactated ringers 75 mL/hr at 08/11/20 1152    dextrose       PRN Meds:HYDROcodone 5 mg - acetaminophen, morphine, labetalol, glucose, dextrose, glucagon (rDNA), dextrose, sodium chloride flush, acetaminophen **OR** acetaminophen, polyethylene glycol, promethazine **OR** ondansetron    Objective:     Vitals:   T-max:  Patient Vitals for the past 8 hrs:   BP Temp Temp src Pulse Resp SpO2   08/11/20 1150 111/64 97.1 °F (36.2 °C) Oral 74 18 99 %   08/11/20 0835 139/76 97 °F (36.1 °C) Oral 77 16 96 %       Intake/Output Summary (Last 24 hours) at 8/11/2020 1353  Last data filed at 8/11/2020 0835  Gross per 24 hour   Intake 0 ml   Output 100 ml   Net -100 ml       Physical Exam    Did not personally do a PE on the patient in order to preserve PPEs. Dr. Haider Sheth did do a PE, please refer to his PE assessment.        LABS:    CBC:   Recent Labs     08/10/20  2152 08/11/20  0430   WBC 27.6* 27.0*   HGB 11.7* 11.0*   HCT 35.6* 32.2*    207   MCV 80.6 78.5*     Renal:    Recent Labs     08/10/20  2111 08/11/20  0430   NA 133* 134*   K 5.1 3.8   CL 99 100   CO2 24 24   BUN 22* 23*   CREATININE 0.9 0.9   GLUCOSE 378* 115*   CALCIUM 10.1 10.0   MG  --  1.20*   PHOS  --  3.0   ANIONGAP 10 10     Hepatic:   Recent Labs     08/11/20  0430   LABALBU 3.3*     Troponin: No results for input(s): TROPONINI in the last 72 hours. BNP: No results for input(s): BNP in the last 72 hours. Lipids: No results for input(s): CHOL, HDL in the last 72 hours. Invalid input(s): LDLCALCU, TRIGLYCERIDE  ABGs:  No results for input(s): PHART, UFT9YJD, PO2ART, OVE1JUI, BEART, THGBART, W1KVKVHR, JJI1EGJ in the last 72 hours. INR: No results for input(s): INR in the last 72 hours. Lactate: No results for input(s): LACTATE in the last 72 hours. Cultures:  -----------------------------------------------------------------  RAD:   XR HAND RIGHT (MIN 3 VIEWS)   Final Result      No acute injury. XR SHOULDER RIGHT (MIN 2 VIEWS)   Final Result      Comminuted, mildly displaced obliquely oriented fracture of the proximal humeral diaphysis. RIGHT SHOULDER:      REASON FOR EXAM: Fall, pain      FINDINGS:      3 views of the right shoulder demonstrate a mildly displaced, mildly comminuted fracture of the proximal right humeral diaphysis. Humeral head remains properly located within the glenoid. Acromioclavicular joint maintained other than degenerative change. IMPRESSION:      Fracture of the proximal right humeral diaphysis. XR HUMERUS RIGHT (MIN 2 VIEWS)   Final Result      Comminuted, mildly displaced obliquely oriented fracture of the proximal humeral diaphysis. RIGHT SHOULDER:      REASON FOR EXAM: Fall, pain      FINDINGS:      3 views of the right shoulder demonstrate a mildly displaced, mildly comminuted fracture of the proximal right humeral diaphysis. Humeral head remains properly located within the glenoid. Acromioclavicular joint maintained other than degenerative change.       IMPRESSION:      Fracture of the proximal right humeral diaphysis. Assessment/Plan:   The patient is a [de-identified] y.o female with a PMH of DM II, GERD, HTN, PAD and hep C is admitted for a comminuted R. proximal and shaft fracture. Fracture of the proximal right humeral diaphysis  D/t mechanical fall.   - appreciate orthopedic surgery recs; will required ORIF  - pain management Norco, lidocaine patch, Ice  -Surgery planned for 17:00 PM today  -bimal called, updated, and obtained consent      Type II diabetes  Last [facility] 7/29/2020 8. 1. Today glucose 153 . On home lantus 15u.  - insulin glargine 15 units SubQ qn  - hypoglycemia protocol  - low dose sliding scale  - Accu-checks q4h     Essential HTN  Presented with /64, now controlled. On home HCTZ-spironolactone. - continue HCTZ  - labetalol 10 mg q6h PRN for SBP>180     Chronic Leukocytosis  WBC currently 27.0 ( on admission it was 27.6), absolute neutrophils elevated at 13.1 . No evidence of infection. Prior records show elevated WBC, on last admission WBC was 24.5. UA negative for infections. Negative ELIZABETH-2 mutation (07/29/2020).  Most likely 2/2 to inflammation: hx of chronic hep C (on treatment) and RA.    - daily CBC  - f/u daily vitals     Hepatitis C  On home Sofosbuvir-Velpatasvir (Epclusa)  - continue home meds    Diabetic Neuropathy   - continue home med of Duloxetine     RA  - continue home med of Methocarbamol       Code Status:DNR-CCA  FEN: NPO currently   PPX:  Lovenox  DISPO: Michelle Osullivan MD PGY-3  08/11/20  1:53 PM    This patient will be staffed and discussed with Marshall Boss MD.

## 2020-08-11 NOTE — PROGRESS NOTES
Patient transferred from 11 S for covid r/o. Swabbed in ED for covid. Alert and oriented x 4. Oriented to room, plan of care, and call light. Pure-wick placed for strict I & O.  NPO for surgery in am.  Call light in reach. Patient refusing repositioning and 4 eye to buttocks, coccyx. Fall precautions initiated. Bed alarm activated. Patient encouraged to call with needs.

## 2020-08-11 NOTE — PROGRESS NOTES
Dr. Gina Schmid at bedside for right brachial plexus block. Time out completed with pt involved included name,  and lateraility. Pt tolerated block well.

## 2020-08-11 NOTE — CONSULTS
[id number] [facility] Orthopedic Surgery  Consult Note        This patient is seen in consultation at the request of Dr Shyam Flores MD    Reason for Consult:  Right shoulder and arm pain/ moderately displaced proximal humerus and shaft fracture. CHIEF COMPLAINT:  Right shoulder and arm pain/ fall. History Obtained From:  patient, family member - daughter, electronic medical record    HISTORY OF PRESENT ILLNESS:    Ms. Zoe Graham is a [de-identified] y.o.  female right handed who seen today for consultation and evaluation of a right shoulder and arm injury. The patient reports that this injury occurred when she fell at her assisting living when she tripped on her bed sheets and fell on her right side. She was first seen and evaluated in [facility] ED, when she was x-rayed and I was consulted. The patient denies any other injuries. Rates pain a 9/10 VAS moderate, sharp, constant and show no change. Alleviating factors rest. Movement makes the pain worse, the sling and resting makes the pain better. No numbness or tingling sensation.       Past Medical History:        Diagnosis Date    ADHD (attention deficit hyperactivity disorder)     Attention deficit disorder without mention of hyperactivity 7/22/2010    Autoimmune disease NEC     Carotid artery disease ([address] 8/2/2013    LEFT CAROTID ENDARTERECTOMY               Carotid artery disease ([facility] 8/3/2013    Chronic persistent hepatitis ([facility] 7/22/2010    Depression     Depressive disorder, not elsewhere classified 7/22/2010    Double vision     left eye, since cataract surgery    Fractures     GERD (gastroesophageal reflux disease)     Hx of interstitial lung disease 6/12/2019    Neuropathy     Rheumatoid arthritis(714.0) 7/22/2010    Spinal stenosis     Type II or unspecified type diabetes mellitus without mention of complication, not stated as uncontrolled 7/22/2010    Unspecified cerebral artery occlusion with cerebral infarction     right hand, loss of some sensation       Past Surgical History:        Procedure Laterality Date    COLONOSCOPY      COSMETIC SURGERY      tummy tuck,face lift,mammoplasties- hepatitis blood transfusion complication    EYE SURGERY Bilateral     cataract       Medications prior to admission:   Prior to Admission medications    Medication Sig Start Date End Date Taking? Authorizing Provider   insulin glargine (LANTUS) 100 UNIT/ML injection pen Inject 15 Units into the skin nightly 12/13/19  Yes Di Coleman DO   acetaminophen (TYLENOL) 325 MG tablet Take 1 tablet by mouth every 6 hours as needed for Pain 12/1/19  Yes Britta Cope PA-C   esomeprazole (NEXIUM) 40 MG delayed release capsule TAKE ONE CAPSULE BY MOUTH TWICE A DAY  Patient taking differently: nightly TAKE ONE CAPSULE BY MOUTH 11/6/19  Yes Scarlet Tolentino MD   DULoxetine (CYMBALTA) 20 MG extended release capsule TAKE ONE CAPSULE BY MOUTH TWICE A DAY 11/6/19  Yes Scarlet Tolentino MD   vitamin B-12 (CYANOCOBALAMIN) 100 MCG tablet Take 1,000 mcg by mouth daily    Yes Historical Provider, MD   vitamin B-6 (PYRIDOXINE) 50 MG tablet Take 100 mg by mouth daily    Yes Historical Provider, MD   Multiple Vitamins-Minerals (CENTRUM SILVER) TABS Take by mouth daily   Yes Historical Provider, MD   Biotin 1000 MCG TABS Take one tablet daily in am. 1/25/18  Yes Scarlet Tolentino MD   Ascorbic Acid (VITAMIN C CR) 1000 MG TBCR Take 1 tablet by mouth daily 1/25/18  Yes Scarlet Tolentino MD   Vitamin D 3 (CHOLECALCIFEROL) 1000 UNITS TABS tablet Take 1,000 Units by mouth daily.    Yes Historical Provider, MD   Sofosbuvir-Velpatasvir (EPCLUSA PO) Take by mouth    Historical Provider, MD   Alcohol Swabs (B-D SINGLE USE SWABS REGULAR) PADS USE ONE TOPICALLY DAILY 3/18/20   Scarlet Tolentino MD   FreeStyle Lancets MISC USE TO TEST BLOOD SUGAR DAILY 3/18/20   Scarlet Tolentino MD   FREESTYLE LITE strip USE TO TEST THREE TIMES A DAY. 3/18/20   Scarlet Tolentino MD   blood glucose test strips (FREESTYLE LITE) strip Once daily 3/17/20 tablet 1 tablet, 1 tablet, Oral, BID  magnesium hydroxide (MILK OF MAGNESIA) 400 MG/5ML suspension 30 mL, 30 mL, Oral, Daily PRN  ceFAZolin (ANCEF) 2 g in dextrose 5 % 50 mL IVPB, 2 g, Intravenous, Q8H  aspirin EC tablet 325 mg, 325 mg, Oral, BID  bupivacaine liposome (EXPAREL) 1.3 % injection 266 mg, 20 mL, Intramuscular, Once  bupivacaine (PF) (MARCAINE) 0.5 % injection 150 mg, 30 mL, Intradermal, Once  dextran 70-hypromellose (TEARS NATURALE) 0.1-0.3 % opthalmic solution 1 drop, 1 drop, Both Eyes, Q4H PRN  DULoxetine (CYMBALTA) extended release capsule 20 mg, 20 mg, Oral, Daily  pantoprazole (PROTONIX) tablet 40 mg, 40 mg, Oral, QAM AC  insulin glargine (LANTUS;BASAGLAR) injection pen 15 Units, 15 Units, Subcutaneous, Nightly  vitamin B-12 (CYANOCOBALAMIN) tablet 1,000 mcg, 1,000 mcg, Oral, Daily  vitamin B-6 (PYRIDOXINE) tablet 100 mg, 100 mg, Oral, Daily  vitamin D (CHOLECALCIFEROL) tablet 1,000 Units, 1,000 Units, Oral, Daily  labetalol (NORMODYNE;TRANDATE) injection 10 mg, 10 mg, Intravenous, Q6H PRN  glucose (GLUTOSE) 40 % oral gel 15 g, 15 g, Oral, PRN  dextrose 50 % IV solution, 12.5 g, Intravenous, PRN  glucagon (rDNA) injection 1 mg, 1 mg, Intramuscular, PRN  dextrose 5 % solution, 100 mL/hr, Intravenous, PRN  insulin lispro (1 Unit Dial) 0-6 Units, 0-6 Units, Subcutaneous, TID WC  insulin lispro (1 Unit Dial) 0-3 Units, 0-3 Units, Subcutaneous, Nightly  sodium chloride flush 0.9 % injection 10 mL, 10 mL, Intravenous, 2 times per day  sodium chloride flush 0.9 % injection 10 mL, 10 mL, Intravenous, PRN  acetaminophen (TYLENOL) tablet 650 mg, 650 mg, Oral, Q6H PRN **OR** acetaminophen (TYLENOL) suppository 650 mg, 650 mg, Rectal, Q6H PRN  polyethylene glycol (GLYCOLAX) packet 17 g, 17 g, Oral, Daily PRN  promethazine (PHENERGAN) tablet 12.5 mg, 12.5 mg, Oral, Q6H PRN **OR** ondansetron (ZOFRAN) injection 4 mg, 4 mg, Intravenous, Q6H PRN  enoxaparin (LOVENOX) injection 40 mg, 40 mg, Subcutaneous, Daily  lidocaine 4 % external patch 1 patch, 1 patch, Transdermal, Daily    Allergies:  Erythromycin and Sulfites    Social History     Socioeconomic History    Marital status:      Spouse name: Not on file    Number of children: Not on file    Years of education: Not on file    Highest education level: Not on file   Occupational History    Not on file   Social Needs    Financial resource strain: Not on file    Food insecurity     Worry: Not on file     Inability: Not on file    Transportation needs     Medical: Not on file     Non-medical: Not on file   Tobacco Use    Smoking status: Former Smoker     Packs/day: 2.00     Years: 30.00     Pack years: 60.00     Types: Cigarettes     Start date:      Last attempt to quit:      Years since quittin.6    Smokeless tobacco: Never Used    Tobacco comment: smoked 30 yrs ago   Substance and Sexual Activity    Alcohol use: Yes     Alcohol/week: 1.0 standard drinks     Types: 1 Glasses of wine per week     Frequency: Monthly or less     Drinks per session: 1 or 2     Binge frequency: Never     Comment: 2 per day    Drug use: No    Sexual activity: Not Currently   Lifestyle    Physical activity     Days per week: Not on file     Minutes per session: Not on file    Stress: Not on file   Relationships    Social connections     Talks on phone: Not on file     Gets together: Not on file     Attends Sabianism service: Not on file     Active member of club or organization: Not on file     Attends meetings of clubs or organizations: Not on file     Relationship status: Not on file    Intimate partner violence     Fear of current or ex partner: Not on file     Emotionally abused: Not on file     Physically abused: Not on file     Forced sexual activity: Not on file   Other Topics Concern    Not on file   Social History Narrative    Not on file       Family History:  History reviewed. No pertinent family history.       REVIEW OF SYSTEMS: CONSTITUTIONAL: Denies unexplained weight loss, fevers, chills or fatigue  NEUROLOGICAL: Denies unsteady gait or progressive weakness    PSYCHOLOGICAL: Denies anxiety, depression   SKIN: Denies skin changes, delayed healing, rash, itching   HEMATOLOGIC: Denies easy bleeding or bruising  ENDOCRINE: Denies excessive thirst, urination, heat/cold  RESPIRATORY: Denies current dyspnea, cough  CARDIOVASCULAR: Negative for chest pain at this time. EYES: Negative for photophobia and visual disturbance. ENT:  Negative for rhinorrhea, epistaxis, sore throat, or hearing loss. GI: Denies nausea, vomiting, diarrhea   : Denies bowel or bladder issues   MUSCULOSKELETAL: Right shoulder and arm pain. All other ROS reviewed in chart or with patient or family and are grossly negative. PHYSICAL EXAMINATION:  Ms. Ne Swanson is a very pleasant [de-identified] y.o. female who seen today in no acute distress, awake, alert, and oriented. She is well nourished and groomed. Patient with normal affect. Body mass index is 23.23 kg/m². . Skin warm and dry. Resting respiratory rate is 16. Resp deep and easy. Pulse is with regular rate and rhythm    /66   Pulse 76   Temp 97.4 °F (36.3 °C) (Oral)   Resp 14   Ht 5' 2\" (1.575 m)   Wt 127 lb (57.6 kg)   SpO2 96%   BMI 23.23 kg/m²        Airway is intact  Chest: chest clear, no wheezing, rales, normal symmetric air entry, no tachypnea, retractions or cyanosis  Heart: regular rate and rhythm ; heart sounds normal   Hearing intact, pupil equal and reactive bilateral  Lymphatics; No groin or axillary enlarged lymph nodes. Neck; No swelling  Abdomen; soft, non distended. MUSCULOSKELETAL:   On evaluation of her bilateral upper extremity, there is no obvious deformity right shoulder. There is moderate swelling and moderate ecchymosis. She is tender to palpation over the shoulder, and otherwise non tender over the remainder of the extremity.   Range of motion is decreased secondary to pain over the right shoulder. The skin overlying the right shoulder is intact without evidence of lesion, laceration. Distal pulses are 2+ and symmetric bilaterally. Sensation is grossly intact to light touch and symmetric bilaterally. NEUROLOGIC:   Sensory:    Touch:                     Right Upper Extremity:  normal                   Left Upper Extremity:  normal                  Right Lower Extremity:  normal                  Left Lower Extremity:  normal        DATA:    CBC:   Lab Results   Component Value Date    WBC 27.0 08/11/2020    RBC 4.11 08/11/2020    HGB 11.0 08/11/2020    HCT 32.2 08/11/2020    MCV 78.5 08/11/2020    MCH 26.9 08/11/2020    MCHC 34.2 08/11/2020    RDW 13.3 08/11/2020     08/11/2020    MPV 7.8 08/11/2020     WBC:    Lab Results   Component Value Date    WBC 27.0 08/11/2020     PT/INR:    Lab Results   Component Value Date    PROTIME 11.9 10/11/2018    INR 1.04 10/11/2018     PTT:    Lab Results   Component Value Date    APTT 28.3 10/11/2018   [APTT    IMAGING: Xrays dated 8/10/2020, 3 views of right shoulder were reviewed, and showed moderately displaced proximal humerus and shaft fracture. IMPRESSION: Right shoulder and arm pain/ moderately displaced proximal humerus and shaft fracture. PLAN:  I discussed with Tatiana Miller and her daughters that the overall alignment of the fracture and treatment options including both surgical and non-surgical treatment, and that my recommendation is an open reduction and internal fixation given the amount of displacement and comminution of the fracture. I discussed the risks and benefits of surgery with the patient, including but not limited to infection, bleeding, pain, injury to nerves or blood vessels failure of the surgery and need for additional surgery. All the patient's questions were answered. We discussed an expected post-operative course. She  is understanding of this and wishes to proceed.       Surgery later today if medically stable. Thank you very much for the kind consultation and allowing me to participate in this patient's care. I will continue to keep you apprised of her progress.          Danny Segovia MD   8/11/2020  7:00 AM

## 2020-08-11 NOTE — CARE COORDINATION
Case Management Assessment           Initial Evaluation                Date / Time of Evaluation: 8/11/2020 11:47 AM                 Assessment Completed by: Judith Miranda    Patient Name: Patricia Willson     YOB: 1940  Diagnosis: Closed physeal fracture of lower end of humerus [S49.109A]  Closed physeal fracture of lower end of humerus [S49.109A]     Date / Time: 8/10/2020  7:26 PM    Patient Admission Status: Inpatient    If patient is discharged prior to next notation, then this note serves as note for discharge by case management.      Current PCP: Boyd Santos MD  Clinic Patient: No    Chart Reviewed: Yes  Patient/ Family Interviewed: Yes    Initial assessment completed at bedside with: patient over the phone    Hospitalization in the last 30 days: No    Emergency Contacts:  Extended Emergency Contact Information  Primary Emergency Contact: [address] Phone: [phone]  Relation: Child  Secondary Emergency Contact: Javy Moses  Address: [address] Phone: [phone]  Mobile Phone: [phone]  Relation: Child    Advance Directives:   Code Status: DNR-CCA    Healthcare Power of : No  Financial  Payor: Arline Hashimoto / Plan: Jeison Chavez / Product Type: *No Product type* /     Pre-cert required for SNF: Yes    Pharmacy    [address] [place] [address]  Phone: [phone] Fax: [phone]    [address] [phone] Alanis Lopes [phone] [address] [address]  Phone: [phone] Fax: [phone]      Potential assistance Purchasing Medications: Potential Assistance Purchasing Medications: No  Does Patient want to participate in local refill/ meds to beds program?: Yes    Meds To Beds General Rules:  1. Can ONLY be done Monday- Friday between 8:30am-5pm  2. Prescription(s) must be in pharmacy by 3pm to be filled same day  3. Copy of patient's insurance/ prescription drug card and patient face sheet must be sent along with the prescription(s)  4. Cost of Rx cannot be added to hospital bill. If financial assistance is needed, please contact unit  or ;  or  CANNOT provide pharmacy voucher for patients co-pays  5. Patients can then  the prescription on their way out of the hospital at discharge, or pharmacy can deliver to the bedside if staff is available. (payment due at time of pick-up or delivery - cash, check, or card accepted)     Able to afford home medications/ co-pay costs: Yes    ADLS  Support Systems: Children, Family Members    PT AM-PAC:   /24  OT AM-PAC:   /24    [place]: From [facility] [address] [facility]  Steps: none    Plans to RETURN to current housing: Yes  Barriers to RETURNING to current housing: broken hip    [place] [address] 77  Currently ACTIVE with [address]: No  Home Care Agency: Not Applicable    Currently ACTIVE with [place] on Aging: yes  Passport/ Waiver: yes  Passport/ Waiver Services:yes   Patient's  is Kateryna Turpin          [address] [facility] Provider: n/a  Equipment: wheelchair and walker    Home Oxygen and [address] prior to admission: No     DISCHARGE PLAN:  Disposition: [address]: [facility] [place] Phone: [phone] Fax: [phone]    Transportation PLAN for discharge: EMS transportation     Factors facilitating achievement of predicted outcomes: Family support and Has needed [address] at home    Barriers to discharge: Pain, Decreased endurance, Lower extremity weakness, Medical complications and Medication managment    Additional Case Management Notes: CM spoke with pt over the phone due to COVID rule out.  Pt states she does not want to have the ORIF. Pt is from [facility] and plans to return there. CM to continue to follow. The Plan for Transition of Care is related to the following treatment goals of Closed physeal fracture of lower end of humerus [S49.109A]  Closed physeal fracture of lower end of humerus [S49.109A]    The Patient and/or patient representative Abeba Najera and her family were provided with a choice of provider and agrees with the discharge plan Yes    Freedom of choice list was provided with basic dialogue that supports the patient's individualized plan of care/goals and shares the quality data associated with the providers.  Yes    Care Transition patient: Yes    Brent Smith RN  [facility] [employer]  Case Management Department  Ph: [phone]   Fax: [phone]

## 2020-08-11 NOTE — PLAN OF CARE
Problem: Falls - Risk of:  Goal: Will remain free from falls  Description: Will remain free from falls  Outcome: Ongoing  Note: Pt is alert and oriented x 4. No attempts to get up on own. Bed alarm on, call light within reach. Pt calls appropriately for assistance. Problem: Pain:  Goal: Control of acute pain  Description: Control of acute pain  Outcome: Ongoing  Note: Pt c/o pain to right shoulder d/t fracture. Administered norco prn and scheduled robaxin and lidoderm patch. Pt reports little relief, requesting dilaudid.  Dilaudid ordered and administered and pt report relief

## 2020-08-12 LAB
ALBUMIN SERPL-MCNC: 2.9 G/DL (ref 3.4–5)
ANION GAP SERPL CALCULATED.3IONS-SCNC: 10 MMOL/L (ref 3–16)
BASOPHILS ABSOLUTE: 0.1 K/UL (ref 0–0.2)
BASOPHILS RELATIVE PERCENT: 0.5 %
BUN BLDV-MCNC: 21 MG/DL (ref 7–20)
CALCIUM SERPL-MCNC: 9.5 MG/DL (ref 8.3–10.6)
CHLORIDE BLD-SCNC: 99 MMOL/L (ref 99–110)
CO2: 20 MMOL/L (ref 21–32)
CREAT SERPL-MCNC: 1 MG/DL (ref 0.6–1.2)
EOSINOPHILS ABSOLUTE: 0 K/UL (ref 0–0.6)
EOSINOPHILS RELATIVE PERCENT: 0.1 %
GFR AFRICAN AMERICAN: >60
GFR NON-AFRICAN AMERICAN: 53
GLUCOSE BLD-MCNC: 231 MG/DL (ref 70–99)
GLUCOSE BLD-MCNC: 306 MG/DL (ref 70–99)
GLUCOSE BLD-MCNC: 321 MG/DL (ref 70–99)
GLUCOSE BLD-MCNC: 333 MG/DL (ref 70–99)
GLUCOSE BLD-MCNC: 352 MG/DL (ref 70–99)
HCT VFR BLD CALC: 30.9 % (ref 36–48)
HEMOGLOBIN: 10.1 G/DL (ref 12–16)
LYMPHOCYTES ABSOLUTE: 1.5 K/UL (ref 1–5.1)
LYMPHOCYTES RELATIVE PERCENT: 5.1 %
MAGNESIUM: 1.8 MG/DL (ref 1.8–2.4)
MCH RBC QN AUTO: 26.4 PG (ref 26–34)
MCHC RBC AUTO-ENTMCNC: 32.9 G/DL (ref 31–36)
MCV RBC AUTO: 80.4 FL (ref 80–100)
MONOCYTES ABSOLUTE: 3.6 K/UL (ref 0–1.3)
MONOCYTES RELATIVE PERCENT: 12.2 %
NEUTROPHILS ABSOLUTE: 24.1 K/UL (ref 1.7–7.7)
NEUTROPHILS RELATIVE PERCENT: 82.1 %
PDW BLD-RTO: 13.5 % (ref 12.4–15.4)
PERFORMED ON: ABNORMAL
PHOSPHORUS: 4 MG/DL (ref 2.5–4.9)
PLATELET # BLD: 202 K/UL (ref 135–450)
PMV BLD AUTO: 8 FL (ref 5–10.5)
POTASSIUM SERPL-SCNC: 4.7 MMOL/L (ref 3.5–5.1)
RBC # BLD: 3.84 M/UL (ref 4–5.2)
SODIUM BLD-SCNC: 129 MMOL/L (ref 136–145)
WBC # BLD: 29.4 K/UL (ref 4–11)

## 2020-08-12 PROCEDURE — 85025 COMPLETE CBC W/AUTO DIFF WBC: CPT

## 2020-08-12 PROCEDURE — 1200000000 HC SEMI PRIVATE

## 2020-08-12 PROCEDURE — 2580000003 HC RX 258: Performed by: ORTHOPAEDIC SURGERY

## 2020-08-12 PROCEDURE — 6370000000 HC RX 637 (ALT 250 FOR IP): Performed by: ORTHOPAEDIC SURGERY

## 2020-08-12 PROCEDURE — 99232 SBSQ HOSP IP/OBS MODERATE 35: CPT | Performed by: INTERNAL MEDICINE

## 2020-08-12 PROCEDURE — 80069 RENAL FUNCTION PANEL: CPT

## 2020-08-12 PROCEDURE — 6360000002 HC RX W HCPCS: Performed by: ORTHOPAEDIC SURGERY

## 2020-08-12 PROCEDURE — 83735 ASSAY OF MAGNESIUM: CPT

## 2020-08-12 RX ORDER — ESOMEPRAZOLE MAGNESIUM 40 MG/1
40 FOR SUSPENSION ORAL EVERY EVENING
COMMUNITY
End: 2020-09-17 | Stop reason: SDUPTHER

## 2020-08-12 RX ADMIN — ASPIRIN 325 MG: 325 TABLET, COATED ORAL at 09:59

## 2020-08-12 RX ADMIN — HYDROCHLOROTHIAZIDE 25 MG: 25 TABLET ORAL at 09:59

## 2020-08-12 RX ADMIN — PANTOPRAZOLE SODIUM 40 MG: 40 TABLET, DELAYED RELEASE ORAL at 05:27

## 2020-08-12 RX ADMIN — INSULIN LISPRO 4 UNITS: 100 INJECTION, SOLUTION INTRAVENOUS; SUBCUTANEOUS at 18:35

## 2020-08-12 RX ADMIN — HYDROCODONE BITARTRATE AND ACETAMINOPHEN 1 TABLET: 5; 325 TABLET ORAL at 18:35

## 2020-08-12 RX ADMIN — CEFAZOLIN 2 G: 10 INJECTION, POWDER, FOR SOLUTION INTRAVENOUS at 09:36

## 2020-08-12 RX ADMIN — METHOCARBAMOL TABLETS 1500 MG: 750 TABLET, COATED ORAL at 20:56

## 2020-08-12 RX ADMIN — INSULIN LISPRO 2 UNITS: 100 INJECTION, SOLUTION INTRAVENOUS; SUBCUTANEOUS at 20:57

## 2020-08-12 RX ADMIN — METHOCARBAMOL TABLETS 1500 MG: 750 TABLET, COATED ORAL at 13:36

## 2020-08-12 RX ADMIN — VELPATASVIR AND SOFOSBUVIR 400 MG: 100; 400 TABLET, FILM COATED ORAL at 10:00

## 2020-08-12 RX ADMIN — INSULIN LISPRO 2 UNITS: 100 INJECTION, SOLUTION INTRAVENOUS; SUBCUTANEOUS at 13:36

## 2020-08-12 RX ADMIN — DOCUSATE SODIUM 50 MG AND SENNOSIDES 8.6 MG 1 TABLET: 8.6; 5 TABLET, FILM COATED ORAL at 20:56

## 2020-08-12 RX ADMIN — INSULIN LISPRO 4 UNITS: 100 INJECTION, SOLUTION INTRAVENOUS; SUBCUTANEOUS at 09:35

## 2020-08-12 RX ADMIN — ENOXAPARIN SODIUM 40 MG: 40 INJECTION SUBCUTANEOUS at 10:00

## 2020-08-12 RX ADMIN — Medication 1000 UNITS: at 09:59

## 2020-08-12 RX ADMIN — ASPIRIN 325 MG: 325 TABLET, COATED ORAL at 20:56

## 2020-08-12 RX ADMIN — HYDROMORPHONE HYDROCHLORIDE 0.25 MG: 1 INJECTION, SOLUTION INTRAMUSCULAR; INTRAVENOUS; SUBCUTANEOUS at 14:52

## 2020-08-12 RX ADMIN — CEFAZOLIN 2 G: 10 INJECTION, POWDER, FOR SOLUTION INTRAVENOUS at 01:33

## 2020-08-12 RX ADMIN — Medication 100 MG: at 09:59

## 2020-08-12 RX ADMIN — INSULIN GLARGINE 15 UNITS: 100 INJECTION, SOLUTION SUBCUTANEOUS at 20:58

## 2020-08-12 RX ADMIN — DOCUSATE SODIUM 50 MG AND SENNOSIDES 8.6 MG 1 TABLET: 8.6; 5 TABLET, FILM COATED ORAL at 09:59

## 2020-08-12 RX ADMIN — METHOCARBAMOL TABLETS 1500 MG: 750 TABLET, COATED ORAL at 09:59

## 2020-08-12 RX ADMIN — CYANOCOBALAMIN TAB 1000 MCG 1000 MCG: 1000 TAB at 09:59

## 2020-08-12 RX ADMIN — Medication 10 ML: at 22:08

## 2020-08-12 RX ADMIN — DULOXETINE HYDROCHLORIDE 20 MG: 20 CAPSULE, DELAYED RELEASE ORAL at 10:00

## 2020-08-12 ASSESSMENT — PAIN DESCRIPTION - PAIN TYPE
TYPE: ACUTE PAIN;SURGICAL PAIN
TYPE: SURGICAL PAIN

## 2020-08-12 ASSESSMENT — PAIN SCALES - GENERAL
PAINLEVEL_OUTOF10: 0
PAINLEVEL_OUTOF10: 9
PAINLEVEL_OUTOF10: 0
PAINLEVEL_OUTOF10: 5
PAINLEVEL_OUTOF10: 0
PAINLEVEL_OUTOF10: 9
PAINLEVEL_OUTOF10: 0
PAINLEVEL_OUTOF10: 5

## 2020-08-12 ASSESSMENT — PAIN DESCRIPTION - PROGRESSION
CLINICAL_PROGRESSION: GRADUALLY WORSENING
CLINICAL_PROGRESSION: GRADUALLY IMPROVING

## 2020-08-12 ASSESSMENT — PAIN DESCRIPTION - DESCRIPTORS
DESCRIPTORS: ACHING;SORE;SHARP
DESCRIPTORS: SHARP;SORE

## 2020-08-12 ASSESSMENT — PAIN DESCRIPTION - ORIENTATION
ORIENTATION: RIGHT
ORIENTATION: RIGHT

## 2020-08-12 ASSESSMENT — PAIN - FUNCTIONAL ASSESSMENT: PAIN_FUNCTIONAL_ASSESSMENT: PREVENTS OR INTERFERES WITH MANY ACTIVE NOT PASSIVE ACTIVITIES

## 2020-08-12 ASSESSMENT — PAIN DESCRIPTION - ONSET
ONSET: ON-GOING
ONSET: ON-GOING

## 2020-08-12 ASSESSMENT — PAIN DESCRIPTION - LOCATION
LOCATION: ARM
LOCATION: ARM

## 2020-08-12 ASSESSMENT — PAIN DESCRIPTION - FREQUENCY
FREQUENCY: CONTINUOUS
FREQUENCY: CONTINUOUS

## 2020-08-12 NOTE — PROGRESS NOTES
PACU Transfer Note    Vitals:    08/11/20 2030   BP: (!) 98/39   Pulse: 86   Resp: 21   Temp: 96.9 °F (36.1 °C)   SpO2: 100%     BP is within 20% of pre op level 111/42, dr Emilie Boucher from anesthesia aware and she is cleared for transfer  In: 1525 [P.O.:25; I.V.:1500]  Out: 450 [Urine:300]    Pain assessment:  No pain, arm elevated, sling in place, ice pack in place, nerve block completed in pacu for pain management  Pain Level: 0    Report given to Receiving unit OCTAVIO Irving via telephone. Report included pt history, orientation, demeanor and questions the patient has asked in recovery not pertinent to the recovery process, surgical dressing description, neurovascular status of operative extremity, head to toe assessment, VS and FSBS. She verbalized understanding.    8/11/2020 8:40 PM

## 2020-08-12 NOTE — PROGRESS NOTES
Clinical Pharmacy Progress Note  Medication History     Admit Date: 8/10/2020    List of of current medications patient is taking is complete. Home Medication list in EPIC updated to reflect changes noted below. Source of information: medication list from NH    Changes made to medication list:   Medications removed: (include reason, ex: therapy completed, inactive medication)   Fentanyl patch - not on med list from facility   MVI   Mometasone cream - not on med list from facility   Lidocaine patch - not on med list from facility  Medications added:    Spironolactone-HCTZ 25/25 mg 1 tab daily  Medication doses adjusted:    Epclusa (sofosbuvir-velpatasir) 400-100 mg - pt takes 1 tab qAM   Biotin   Vit B12 - dose is 1,000 mcg daily   Esomeprazole - dose is 40 mg QHS      Please call with any questions.   Duane Bateman PharmD, [facility]  Wireless # [phone]  8/12/2020 10:20 AM

## 2020-08-12 NOTE — ANESTHESIA PROCEDURE NOTES
Peripheral Block    Patient location during procedure: PACU  Start time: 8/11/2020 7:45 PM  End time: 8/11/2020 7:52 PM  Staffing  Anesthesiologist: Inga Marmolejo MD  Performed: anesthesiologist   Preanesthetic Checklist  Completed: patient identified, site marked, surgical consent, pre-op evaluation, timeout performed, IV checked, risks and benefits discussed, monitors and equipment checked, anesthesia consent given, oxygen available and patient being monitored  Peripheral Block  Patient position: sitting  Prep: ChloraPrep  Patient monitoring: cardiac monitor, continuous pulse ox, frequent blood pressure checks and IV access  Block type: Brachial plexus  Laterality: right  Injection technique: single-shot  Procedures: ultrasound guided  Interscalene  Provider prep: mask and sterile gloves  Needle  Needle type: short-bevel   Needle gauge: 22 G  Needle length: 8 cm  Needle localization: ultrasound guidance  Assessment  Injection assessment: negative aspiration for heme, no paresthesia on injection and local visualized surrounding nerve on ultrasound  Paresthesia pain: none  Slow fractionated injection: yes  Hemodynamics: stable  Additional Notes  Immediately prior to procedure a \"time out\" was called to verify the correct patient, allergies, laterality, procedure and equipment. Time out performed with RN. Local Anesthetic: See below    Anterior scalene and middle scalene muscles, upper, middle and lower trunks of the brachial plexus are identified, the tip of the needle and the spread of the local anesthetic around the brachial plexus are visualized. The Brachial plexus appeared to be anatomically normal and there were no abnormal pathologically findings seen. Right Ultrasound-Guided Interscalene Brachial Plexus Block    Indication: Postoperative analgesia upon request of the attending surgeon.     Immediately prior to procedure a \"time out\" was called to verify the correct patient, allergies, laterality, procedure and equipment. Time out performed with RN. Local Anesthetic: See below    Anterior scalene and middle scalene muscles, upper, middle and lower trunks of the brachial plexus are identified, the tip of the needle and the spread of the local anesthetic around the brachial plexus are visualized. The Brachial plexus appeared to be anatomically normal and there were no abnormal pathologically findings seen. Procedure: Informed consent obtained and pre-procedural timeout procedure performed. Patient supine in semi-upright position. Landmarks identified. Sterile prep. Right brachial plexus was identified using an ultrasound probe and an 80mm 22G insulated regional block needle was inserted posterior to the right interscalene groove at the level of the C6 tubercle and directed in an anterior manner toward the brachial plexus. The needle was visualized on ultrasound as it entered the plexus sheath. Bupivacaine 0.5%-20cc and Exparel-20cc were mixed together and injected in 5cc increments to a total volume of 40cc after aspiration was performed prior to each increment and found to be negative for both heme and CSF. Block was tolerated well by the patient. There were no apparent complications at the time of the block. Medications Administered  Bupivacaine (MARCAINE) PF injection 0.5%, 20 mL  bupivacaine liposome (EXPAREL) injection 1.3%, 20 mL  Reason for block: post-op pain management and at surgeon's request            Silver Peterson.  Gina Schmid MD  August 11, 2020 8:31 PM

## 2020-08-12 NOTE — PROGRESS NOTES
Internal Medicine Progress Note PGY-3    Admit Date: 8/10/2020    Interval history:      Patient was noted to by hyponatremic based on labs, most likely due to being given 0.45% NS at post- op. Patient was given a bolus of 0.9% saline and d/c of all fluids after as the patient was able to tolerate PO easily. This morning patient was very agitated and distraught about the care she was receiving. She is upset that she is still on the COVID floor despite the nurses explaining multiple times the rules regarding testing at the hospital  and how her PCR results were not back yet. She also believed her SCDs were not working. In examining them, both SCDs were functioning. Patient was angry that she had not received breakfast and that the phone was too far from her to call her family. The phone was placed in front her and assisted her in calling her daughter. The nurse came in and insured the patient her food was on it way. Patient is not complaining of any pain in her right shoulder s/p ORIF. Denies any paresthesia in the R. Arm. Patient has a lau catheter and urine output is adequate. ROS: headaches, fevers, chills, cough, SOB, CP, abdominal pain, paresthesia in her extremities, back pain.        Medications:     Scheduled Meds:   hydroCHLOROthiazide  25 mg Oral Daily    methocarbamol  1,500 mg Oral TID    Sofosbuvir-Velpatasvir  400 mg Oral Daily    sodium chloride flush  10 mL Intravenous 2 times per day    sennosides-docusate sodium  1 tablet Oral BID    aspirin  325 mg Oral BID    bupivacaine liposome  20 mL Intramuscular Once    bupivacaine (PF)  30 mL Intradermal Once    DULoxetine  20 mg Oral Daily    pantoprazole  40 mg Oral QAM AC    insulin glargine  15 Units Subcutaneous Nightly    vitamin B-12  1,000 mcg Oral Daily    vitamin B-6  100 mg Oral Daily    Vitamin D  1,000 Units Oral Daily    insulin lispro  0-6 Units Subcutaneous TID WC    insulin lispro  0-3 Units Subcutaneous Nightly    sodium chloride flush  10 mL Intravenous 2 times per day    enoxaparin  40 mg Subcutaneous Daily    lidocaine  1 patch Transdermal Daily     Continuous Infusions:   dextrose       PRN Meds:HYDROcodone 5 mg - acetaminophen, HYDROmorphone, sodium chloride flush, magnesium hydroxide, dextran 70-hypromellose, labetalol, glucose, dextrose, glucagon (rDNA), dextrose, sodium chloride flush, acetaminophen **OR** acetaminophen, polyethylene glycol, promethazine **OR** ondansetron    Objective:     Vitals:   T-max:  Patient Vitals for the past 8 hrs:   BP Temp Temp src Pulse Resp SpO2   08/12/20 1556 (!) 107/57 97.6 °F (36.4 °C) Oral 94 18 91 %   08/12/20 1455 (!) 107/59 97.5 °F (36.4 °C) Oral 101 20 93 %   08/12/20 1214 104/66 97.5 °F (36.4 °C) Oral 105 20 94 %       Intake/Output Summary (Last 24 hours) at 8/12/2020 1832  Last data filed at 8/12/2020 1556  Gross per 24 hour   Intake 3144.68 ml   Output 1100 ml   Net 2044.68 ml       Physical Exam    Physical Exam   Constitutional: Patient is oriented to person, place, and time. Appears well-developed and agitated  Cardiovascular: Regular rate and rythem with normal heart sounds and intact distal pulses. No murmurs, rubs, or gallops  Pulmonary/Chest: Effort normal and breath sounds normal. No respiratory distress. No wheezes, rhonchi, or rales   Abdominal: Soft, non tender, non distended with normal bowel sounds. No rebound or guarding. Musculoskeletal: Normal range of motion with no extremity edema. +4 strength in L. Arm and lower limbs. R arm in a sling. Dressing was CDI. Neurological: Alert and oriented to person, place, and time. No gross cranial nerve deficit. Light and sharp sensation intact diffusely. Skin: Skin is warm and dry. No rash noted.         LABS:    CBC:   Recent Labs     08/10/20  2152 08/11/20  0430 08/12/20  0430   WBC 27.6* 27.0* 29.4*   HGB 11.7* 11.0* 10.1*   HCT 35.6* 32.2* 30.9*    207 202   MCV 80.6 78.5* 80.4     Renal: humeral diaphysis. RIGHT SHOULDER:      REASON FOR EXAM: Fall, pain      FINDINGS:      3 views of the right shoulder demonstrate a mildly displaced, mildly comminuted fracture of the proximal right humeral diaphysis. Humeral head remains properly located within the glenoid. Acromioclavicular joint maintained other than degenerative change. IMPRESSION:      Fracture of the proximal right humeral diaphysis. Assessment/Plan:   The patient is a [de-identified] y.o female with a PMH of DM II, GERD, HTN, PAD and hep C is admitted for a comminuted R. proximal and shaft fracture. Fracture of the proximal right humeral diaphysis s/p RIGHT PROXIMAL HUMERUS AND SHAFT FRACTURE OPEN REDUCTION ( 08/11/2020)  D/t mechanical fall.   - pain management Norco, lidocaine patch, Ice  - daugther called, updated about patient's condition. Type II diabetes  Last [facility] 7/29/2020 8. 1. Today glucose 153 . On home lantus 15u.  - insulin glargine 15 units SubQ qn  - hypoglycemia protocol  - low dose sliding scale  - Accu-checks q4h     Essential HTN  Presented with /64, now controlled. On home HCTZ-spironolactone. - continue HCTZ  - labetalol 10 mg q6h PRN for SBP>180     Chronic Leukocytosis  WBC currently 27.0 ( on admission it was 27.6), absolute neutrophils elevated at 13.1 . No evidence of infection. Prior records show elevated WBC, on last admission WBC was 24.5. UA negative for infections. Negative ELIZABETH-2 mutation (07/29/2020). Most likely 2/2 to inflammation: hx of chronic hep C (on treatment) and RA. Does not meet SIRS criteria.    - daily CBC  -  f/u daily vitals     Hepatitis C  On home Sofosbuvir-Velpatasvir (Epclusa)  - continue home meds    Diabetic Neuropathy   - continue home med of Duloxetine     RA  - continue home med of Methocarbamol       Code Status:Full Code  FEN: General diet  PPX:  Lovenox and pantoprazole   DISPO: F    Erick Clemente PGY-3  08/12/20  6:32 PM    This patient will be staffed and discussed with Gunnar Israel MD.

## 2020-08-12 NOTE — PROGRESS NOTES
From OR to pacu bay 12 accompanied by Mitchell Yoon crna and monitors applied. intraop meds discussed and pt arrives sleeping, but wakes to verbal command.  Denies pain on arrival.

## 2020-08-12 NOTE — CARE COORDINATION
CM following for discharge planning. CM spoke with patient's daughter  Andre Kirkland over the phone to discuss plans for pt to return to the Assisted Living at [facility] or if she will need SNF. Daughter would like pt to return to The [address] [place] [address]. CM explained to daughter that patient refused therapy and is being verbally abusive to the staff, so it is difficult to evaluate her for the next level of care. Patient's daughter stated that she felt confident that patient would cooperate more once she is moved off \"the COVID floor\". Andre Kirkland also stated she would call the patient to encourage her to participate in therapy. CM left vm for Rain Crystal at [facility] regarding requirements for patient to return, await call back. [phone]. CM also spoke to nurse regarding patient being moved to another floor and Roxana Randle stated as soon as patient's PCR Covid Result comes back she can be moved if negative. CM followed up with Andre Kirkland to relay the information which has been told to her several times by nursing as well. CM to continue to follow for discharge planning. Addendum: 15:30pm CM received phone call from Angelina Bearden at [facility] for Rain Rojas. Ric states pt was able to ambulate with a walker prior to admission but if could work with therapy to be w/c level to transfer to the bathroom and bed, they could take her back. If not, pt will need to go to SNF at the [facility] before returning to her apt. Pt also just became active with [employer] waiver program.  is Eva Ramos [phone].     Kimberlee Montero RN, BSN, [address]  Case Management Department  [phone]

## 2020-08-12 NOTE — ANESTHESIA POSTPROCEDURE EVALUATION
Department of Anesthesiology  Postprocedure Note    Patient: Elliot Dang  MRN: [id number]  YOB: 1940  Date of evaluation: 8/11/2020  Time:  8:33 PM     Procedure Summary     Date:  08/11/20 Room / Location:  [facility] [address]Critical access hospital / [facility]    Anesthesia Start:  1711 Anesthesia Stop:  1924    Procedure:  RIGHT PROXIMAL HUMERUS AND SHAFT FRACTURE OPEN REDUCTION INTERNAL FIXATION (Right Shoulder) Diagnosis:  (RIGHT PROXIMAL HUMERUS FRACTURE)    Surgeon:  Leeanna Gosselin, MD Responsible Provider:  Maikel Shannon MD    Anesthesia Type:  general ASA Status:  3          Anesthesia Type: No value filed. Julianna Phase I: Julianna Score: 8    Julianna Phase II:      Last vitals: Reviewed and per EMR flowsheets.        Anesthesia Post Evaluation    Patient location during evaluation: PACU  Patient participation: complete - patient participated  Level of consciousness: awake and alert  Pain score: 0  Airway patency: patent  Nausea & Vomiting: no nausea and no vomiting  Complications: no  Cardiovascular status: hemodynamically stable  Respiratory status: acceptable  Hydration status: euvolemic

## 2020-08-12 NOTE — PROGRESS NOTES
Occupational Therapy/Physical Therapy    Refusal        Orders received, chart review complete. Spoke with pt this morning. She is adamantly refusing therapy, stating \"I have a broken arm. I need to leave the 4th floor. I will call the police if I have to. \" Pt refusing to listen or acknowledge therapy's reason for evaluation related to discharge planning. Pt with increased agitation when given education on role of therapy. Will follow up 8/13. RN aware.          Osvaldo Gunter, OTR/L, [facility]

## 2020-08-12 NOTE — PROGRESS NOTES
Physician Progress Note      Irish Zhu  CSN #:                  [id number]  :                       1940  ADMIT DATE:       8/10/2020 7:26 PM  [address] [place] DATE:  RESPONDING  PROVIDER #:        Uyen Tate MD          QUERY TEXT:    Patient admitted with Fracture of the proximal right humeral diaphysis. If   possible, please document in progress notes and discharge summary if you are   evaluating and/or treating any of the following: The medical record reflects the following:  Risk Factors: [de-identified] y/o female with Fracture of the proximal right humeral   diaphysis  Clinical Indicators:  PN: \"WBC currently 27.0 ( on admission it was 27.6),   absolute neutrophils elevated at 13.1 . No evidence of infection. Prior   records show elevated WBC, on last admission WBC was 24.5. UA negative for   infections. Negative ELIZABETH-2 mutation (2020). Most likely 2/2 to   inflammation: hx of chronic hep C (on treatment) and RA. \"   WBC 27.6, 27, 29.4   RR 16-28  Treatment: daily CBC,  vitals  Options provided:  -- SIRS of non-infectious origin w/o acute organ dysfunction likely due to   chronic hep C (on treatment) and RA  -- No SIRS  -- Other - I will add my own diagnosis  -- Disagree - Not applicable / Not valid  -- Disagree - Clinically unable to determine / Unknown  -- Refer to Clinical Documentation Reviewer    PROVIDER RESPONSE TEXT:    This patient has No SIRS.     Query created by: Kate Quesada on 2020 10:26 AM      Electronically signed by:  Uyen Tate MD 2020 11:21 AM

## 2020-08-12 NOTE — PROGRESS NOTES
[facility]  Progress Note        POD # 1, s/p ORIF right proximal humerus and shaft fracture. Pt comfortable, no c/o. Drsg right shoulder D/C/I,  Mild pain with right shoulder ROM, NVI    CBC:   Lab Results   Component Value Date    WBC 29.4 08/12/2020    RBC 3.84 08/12/2020    HGB 10.1 08/12/2020    HCT 30.9 08/12/2020    MCV 80.4 08/12/2020    MCH 26.4 08/12/2020    MCHC 32.9 08/12/2020    RDW 13.5 08/12/2020     08/12/2020    MPV 8.0 08/12/2020     PT/INR:    Lab Results   Component Value Date    PROTIME 11.9 10/11/2018    INR 1.04 10/11/2018     PTT:    Lab Results   Component Value Date    APTT 28.3 10/11/2018   [APTT    A/P: s/p ORIF right proximal humerus and shaft fracture. - Stable  - PT/OT, NWB right shoulder  - Ok to D/C to ECF from ortho standpoint.  - F/U Dr Marshall Alvarenga in 2 weeks.       Danny Segovia MD 8/12/2020 6:53 PM

## 2020-08-12 NOTE — PLAN OF CARE
Problem: Falls - Risk of:  Goal: Will remain free from falls  Description: Will remain free from falls  8/12/2020 0122 by Daina Stein RN  Outcome: Ongoing  8/11/2020 1721 by Karyle Rhodes, RN  Outcome: Ongoing  Note: Pt is alert and oriented x 4. No attempts to get up on own. Bed alarm on, call light within reach. Pt calls appropriately for assistance. Goal: Absence of physical injury  Description: Absence of physical injury  Outcome: Ongoing     Problem: Pain:  Goal: Pain level will decrease  Description: Pain level will decrease  Outcome: Ongoing  Goal: Control of acute pain  Description: Control of acute pain  8/12/2020 0122 by Daina Stein RN  Outcome: Ongoing  8/11/2020 1721 by Karyle Rhodes, RN  Outcome: Ongoing  Note: Pt c/o pain to right shoulder d/t fracture. Administered norco prn and scheduled robaxin and lidoderm patch. Pt reports little relief, requesting dilaudid.  Dilaudid ordered and administered and pt report relief  Goal: Control of chronic pain  Description: Control of chronic pain  Outcome: Ongoing     Problem: Skin Integrity:  Goal: Will show no infection signs and symptoms  Description: Will show no infection signs and symptoms  Outcome: Ongoing  Goal: Absence of new skin breakdown  Description: Absence of new skin breakdown  Outcome: Ongoing

## 2020-08-12 NOTE — PROGRESS NOTES
Pt refused incentive spirometer education. Pt sates that IS device does nothing for her. I tried to educate pt on use of IS device, pt stated that she would throw device across the room if it was left for her. IS device thrown away, RT left the room.

## 2020-08-12 NOTE — OP NOTE
[address]                [address]                                OPERATIVE REPORT    PATIENT NAME: Blanquita Hawk                  :        1940  MED REC NO:   [id number]                          ROOM:       7977  ACCOUNT NO:   [de-identified]                           ADMIT DATE: 08/10/2020  PROVIDER:     Scott Bui MD    DATE OF PROCEDURE:  2020    PRIMARY CARE PROVIDER:  Candy Schilling MD    SURGEON:  Scott Bui MD    ASSISTANT:  Sally Parks SA    PREOPERATIVE DIAGNOSIS:   1- Right proximal humerus and shaft comminuted displaced fracture. 2- Right humerus shaft comminuted displaced separate fracture. POSTOPERATIVE DIAGNOSIS:    1- Right proximal humerus and shaft comminuted displaced fracture. 2- Right humerus shaft comminuted displaced separate fracture. OPERATION PERFORMED:    1- Open treatment of right proximal humerus and shaft comminuted displaced fracture. 2- Open treatment of right humerus shaft comminuted displaced separate fracture. ANESTHESIA:  General anesthesia. ESTIMATED BLOOD LOSS:  100 mL. COMPLICATIONS:  None. APPROACH:  Anterior deltopectoral approach. IMPLANTS USED:  [employer] 6-hole proximal humerus locking plate  for both fractures. INDICATIONS:  This is an 60-year-old white female, right-hand dominant,  who just moved into assisted living, sustained a fall with right arm  pain. She was then brought to [facility] where she was found  to have a comminuted, displaced proximal humerus fracture and also a  separate comminuted proximal shaft fracture. All risks, benefits, and  alternatives were discussed with the patient and her daughter and they  agreed to proceed with the surgical treatment. DESCRIPTION OF PROCEDURE:  The patient's right shoulder was marked. She  received 2 gm of Ancef IV preoperatively.   The patient was then brought  to the operating room, underwent general endotracheal anesthesia. She  was then transferred to the operating table and placed in a beach-table  position. The right shoulder and upper extremity were then prepped and  draped in regular sterile routine fashion. Timeout was called  confirming the patient's name, site, and procedure. We made an anterior deltopectoral approach. The incision was made over  the anterior aspect of the right shoulder. Careful dissection was  performed and the cephalic vein was protected medially. We carefully  exposed the two fractures. She had a proximal humerus fracture and also  a separate proximal third shaft fracture. Careful dissection was  performed. There was some comminution which added significant challenge  to the procedure. We carefully were able to be on line the fracture  with the help of the Spider arm. While realigning the fracture in the  near anatomic position, we elected to use a 6-hole proximal humerus  locking plate from [employer] which would span both fractures. We secured  with K-wire proximally and then after we confirmed that the plate in  good position as well as both the fractures, we put one non-locking  screw in the distal shaft. We then placed another screw proximally. After we confirmed that both fractures were anatomically aligned, we  went ahead and added two more non-locking screws in the shaft and then  we locked it with two more locking screws in the shaft and 5 locking  screws proximally. Overall, we were very satisfied with the near  anatomic reduction and position of all the screws and both fractures  were in good position. At this point, we irrigated the wound copiously  with Pulsavac using 3 L of normal saline and then we closed the deep  layer with 2-0 Vicryl, subcu with 3-0 Vicryl, and skin with 4-0  Monocryl. Steri-Strips were then applied. Dressing was then applied in  the form of Mepilex.     The patient tolerated the procedure well

## 2020-08-12 NOTE — PROGRESS NOTES
Pt threatening to be violent if she does not get off this unit. Pt given explanation of hospital policies, Pt's needs met as far as food, cleanliness, and safety. Charge nurse aware.

## 2020-08-13 LAB
ALBUMIN SERPL-MCNC: 2.9 G/DL (ref 3.4–5)
ANION GAP SERPL CALCULATED.3IONS-SCNC: 7 MMOL/L (ref 3–16)
BANDED NEUTROPHILS RELATIVE PERCENT: 6 % (ref 0–7)
BASOPHILS ABSOLUTE: 0 K/UL (ref 0–0.2)
BASOPHILS RELATIVE PERCENT: 0 %
BUN BLDV-MCNC: 26 MG/DL (ref 7–20)
CALCIUM SERPL-MCNC: 9.9 MG/DL (ref 8.3–10.6)
CHLORIDE BLD-SCNC: 101 MMOL/L (ref 99–110)
CO2: 24 MMOL/L (ref 21–32)
CREAT SERPL-MCNC: 1 MG/DL (ref 0.6–1.2)
EOSINOPHILS ABSOLUTE: 0 K/UL (ref 0–0.6)
EOSINOPHILS RELATIVE PERCENT: 0 %
GFR AFRICAN AMERICAN: >60
GFR NON-AFRICAN AMERICAN: 53
GLUCOSE BLD-MCNC: 149 MG/DL (ref 70–99)
GLUCOSE BLD-MCNC: 206 MG/DL (ref 70–99)
GLUCOSE BLD-MCNC: 221 MG/DL (ref 70–99)
GLUCOSE BLD-MCNC: 232 MG/DL (ref 70–99)
GLUCOSE BLD-MCNC: 338 MG/DL (ref 70–99)
HCT VFR BLD CALC: 26.3 % (ref 36–48)
HCT VFR BLD CALC: 26.5 % (ref 36–48)
HEMOGLOBIN: 8.9 G/DL (ref 12–16)
HEMOGLOBIN: 8.9 G/DL (ref 12–16)
LYMPHOCYTES ABSOLUTE: 1.2 K/UL (ref 1–5.1)
LYMPHOCYTES RELATIVE PERCENT: 4 %
MAGNESIUM: 1.6 MG/DL (ref 1.8–2.4)
MCH RBC QN AUTO: 26.9 PG (ref 26–34)
MCHC RBC AUTO-ENTMCNC: 33.8 G/DL (ref 31–36)
MCV RBC AUTO: 79.6 FL (ref 80–100)
MONOCYTES ABSOLUTE: 2.1 K/UL (ref 0–1.3)
MONOCYTES RELATIVE PERCENT: 7 %
NEUTROPHILS ABSOLUTE: 26.3 K/UL (ref 1.7–7.7)
NEUTROPHILS RELATIVE PERCENT: 83 %
PDW BLD-RTO: 13.6 % (ref 12.4–15.4)
PERFORMED ON: ABNORMAL
PHOSPHORUS: 2.8 MG/DL (ref 2.5–4.9)
PLATELET # BLD: 191 K/UL (ref 135–450)
PMV BLD AUTO: 7.7 FL (ref 5–10.5)
POTASSIUM SERPL-SCNC: 4.6 MMOL/L (ref 3.5–5.1)
RBC # BLD: 3.33 M/UL (ref 4–5.2)
SODIUM BLD-SCNC: 132 MMOL/L (ref 136–145)
WBC # BLD: 29.6 K/UL (ref 4–11)

## 2020-08-13 PROCEDURE — 6370000000 HC RX 637 (ALT 250 FOR IP): Performed by: STUDENT IN AN ORGANIZED HEALTH CARE EDUCATION/TRAINING PROGRAM

## 2020-08-13 PROCEDURE — 80069 RENAL FUNCTION PANEL: CPT

## 2020-08-13 PROCEDURE — 6360000002 HC RX W HCPCS: Performed by: STUDENT IN AN ORGANIZED HEALTH CARE EDUCATION/TRAINING PROGRAM

## 2020-08-13 PROCEDURE — 97162 PT EVAL MOD COMPLEX 30 MIN: CPT

## 2020-08-13 PROCEDURE — 1200000000 HC SEMI PRIVATE

## 2020-08-13 PROCEDURE — 6360000002 HC RX W HCPCS: Performed by: ORTHOPAEDIC SURGERY

## 2020-08-13 PROCEDURE — 2580000003 HC RX 258: Performed by: ORTHOPAEDIC SURGERY

## 2020-08-13 PROCEDURE — 85014 HEMATOCRIT: CPT

## 2020-08-13 PROCEDURE — 6370000000 HC RX 637 (ALT 250 FOR IP): Performed by: ORTHOPAEDIC SURGERY

## 2020-08-13 PROCEDURE — 97166 OT EVAL MOD COMPLEX 45 MIN: CPT

## 2020-08-13 PROCEDURE — 99232 SBSQ HOSP IP/OBS MODERATE 35: CPT | Performed by: INTERNAL MEDICINE

## 2020-08-13 PROCEDURE — 97530 THERAPEUTIC ACTIVITIES: CPT

## 2020-08-13 PROCEDURE — 97535 SELF CARE MNGMENT TRAINING: CPT

## 2020-08-13 PROCEDURE — 85018 HEMOGLOBIN: CPT

## 2020-08-13 PROCEDURE — 85025 COMPLETE CBC W/AUTO DIFF WBC: CPT

## 2020-08-13 PROCEDURE — 83735 ASSAY OF MAGNESIUM: CPT

## 2020-08-13 RX ORDER — MAGNESIUM SULFATE IN WATER 40 MG/ML
2 INJECTION, SOLUTION INTRAVENOUS ONCE
Status: CANCELLED | OUTPATIENT
Start: 2020-08-13 | End: 2020-08-13

## 2020-08-13 RX ORDER — MAGNESIUM SULFATE IN WATER 40 MG/ML
2 INJECTION, SOLUTION INTRAVENOUS ONCE
Status: COMPLETED | OUTPATIENT
Start: 2020-08-13 | End: 2020-08-13

## 2020-08-13 RX ORDER — INSULIN LISPRO 100 [IU]/ML
0-12 INJECTION, SOLUTION INTRAVENOUS; SUBCUTANEOUS
Status: DISCONTINUED | OUTPATIENT
Start: 2020-08-13 | End: 2020-08-14

## 2020-08-13 RX ORDER — INSULIN LISPRO 100 [IU]/ML
0-6 INJECTION, SOLUTION INTRAVENOUS; SUBCUTANEOUS NIGHTLY
Status: DISCONTINUED | OUTPATIENT
Start: 2020-08-13 | End: 2020-08-14

## 2020-08-13 RX ADMIN — METHOCARBAMOL TABLETS 1500 MG: 750 TABLET, COATED ORAL at 09:39

## 2020-08-13 RX ADMIN — Medication 100 MG: at 09:39

## 2020-08-13 RX ADMIN — PANTOPRAZOLE SODIUM 40 MG: 40 TABLET, DELAYED RELEASE ORAL at 05:15

## 2020-08-13 RX ADMIN — INSULIN LISPRO 4 UNITS: 100 INJECTION, SOLUTION INTRAVENOUS; SUBCUTANEOUS at 09:47

## 2020-08-13 RX ADMIN — VELPATASVIR AND SOFOSBUVIR 400 MG: 100; 400 TABLET, FILM COATED ORAL at 09:42

## 2020-08-13 RX ADMIN — Medication 10 ML: at 20:50

## 2020-08-13 RX ADMIN — Medication 1000 UNITS: at 09:39

## 2020-08-13 RX ADMIN — MAGNESIUM SULFATE HEPTAHYDRATE 2 G: 40 INJECTION, SOLUTION INTRAVENOUS at 05:16

## 2020-08-13 RX ADMIN — DOCUSATE SODIUM 50 MG AND SENNOSIDES 8.6 MG 1 TABLET: 8.6; 5 TABLET, FILM COATED ORAL at 09:39

## 2020-08-13 RX ADMIN — CYANOCOBALAMIN TAB 1000 MCG 1000 MCG: 1000 TAB at 09:39

## 2020-08-13 RX ADMIN — DOCUSATE SODIUM 50 MG AND SENNOSIDES 8.6 MG 1 TABLET: 8.6; 5 TABLET, FILM COATED ORAL at 20:49

## 2020-08-13 RX ADMIN — INSULIN LISPRO 8 UNITS: 100 INJECTION, SOLUTION INTRAVENOUS; SUBCUTANEOUS at 15:06

## 2020-08-13 RX ADMIN — DULOXETINE HYDROCHLORIDE 20 MG: 20 CAPSULE, DELAYED RELEASE ORAL at 09:39

## 2020-08-13 RX ADMIN — METHOCARBAMOL TABLETS 1500 MG: 750 TABLET, COATED ORAL at 15:20

## 2020-08-13 RX ADMIN — ASPIRIN 325 MG: 325 TABLET, COATED ORAL at 11:35

## 2020-08-13 RX ADMIN — HYDROCODONE BITARTRATE AND ACETAMINOPHEN 1 TABLET: 5; 325 TABLET ORAL at 05:16

## 2020-08-13 RX ADMIN — HYDROCODONE BITARTRATE AND ACETAMINOPHEN 1 TABLET: 5; 325 TABLET ORAL at 20:49

## 2020-08-13 RX ADMIN — METHOCARBAMOL TABLETS 1500 MG: 750 TABLET, COATED ORAL at 20:49

## 2020-08-13 RX ADMIN — INSULIN LISPRO 2 UNITS: 100 INJECTION, SOLUTION INTRAVENOUS; SUBCUTANEOUS at 18:17

## 2020-08-13 RX ADMIN — ENOXAPARIN SODIUM 40 MG: 40 INJECTION SUBCUTANEOUS at 09:40

## 2020-08-13 RX ADMIN — ASPIRIN 325 MG: 325 TABLET, COATED ORAL at 20:49

## 2020-08-13 RX ADMIN — INSULIN LISPRO 2 UNITS: 100 INJECTION, SOLUTION INTRAVENOUS; SUBCUTANEOUS at 20:49

## 2020-08-13 RX ADMIN — HYDROCHLOROTHIAZIDE 25 MG: 25 TABLET ORAL at 09:39

## 2020-08-13 ASSESSMENT — PAIN SCALES - GENERAL
PAINLEVEL_OUTOF10: 0
PAINLEVEL_OUTOF10: 6
PAINLEVEL_OUTOF10: 0
PAINLEVEL_OUTOF10: 8
PAINLEVEL_OUTOF10: 0
PAINLEVEL_OUTOF10: 0

## 2020-08-13 ASSESSMENT — PAIN DESCRIPTION - LOCATION
LOCATION: ARM
LOCATION: ARM

## 2020-08-13 ASSESSMENT — PAIN DESCRIPTION - ONSET
ONSET: ON-GOING
ONSET: ON-GOING

## 2020-08-13 ASSESSMENT — PAIN DESCRIPTION - PAIN TYPE
TYPE: ACUTE PAIN
TYPE: ACUTE PAIN

## 2020-08-13 ASSESSMENT — PAIN DESCRIPTION - FREQUENCY
FREQUENCY: CONTINUOUS
FREQUENCY: CONTINUOUS

## 2020-08-13 ASSESSMENT — PAIN DESCRIPTION - PROGRESSION
CLINICAL_PROGRESSION: GRADUALLY WORSENING
CLINICAL_PROGRESSION: GRADUALLY WORSENING

## 2020-08-13 ASSESSMENT — PAIN DESCRIPTION - ORIENTATION
ORIENTATION: RIGHT
ORIENTATION: RIGHT

## 2020-08-13 ASSESSMENT — PAIN - FUNCTIONAL ASSESSMENT
PAIN_FUNCTIONAL_ASSESSMENT: PREVENTS OR INTERFERES WITH ALL ACTIVE AND SOME PASSIVE ACTIVITIES
PAIN_FUNCTIONAL_ASSESSMENT: PREVENTS OR INTERFERES SOME ACTIVE ACTIVITIES AND ADLS

## 2020-08-13 ASSESSMENT — PAIN DESCRIPTION - DESCRIPTORS
DESCRIPTORS: ACHING
DESCRIPTORS: ACHING

## 2020-08-13 NOTE — PLAN OF CARE
Problem: Falls - Risk of:  Goal: Will remain free from falls  Description: Will remain free from falls  8/13/2020 1348 by Naeem Moreno RN  Note: Pt is a Fall Risk. See Clakristy Landsman Fall Risk Score. Pt bed in low position and side rails up. Call light and belongings in reach. Pt encouraged to call for assistance. Will continue with hourly rounds for PO intake, pain needs, toileting, and repositioning as needed. 8/13/2020 0124 by Mustapha Thompson RN  Outcome: Ongoing     Problem: Skin Integrity:  Goal: Will show no infection signs and symptoms  Description: Will show no infection signs and symptoms  8/13/2020 1349 by Naeem Moreno RN  Note: Pt being turned q2. Vital signs remain stable.  Will con't to monitor   8/13/2020 0124 by Mustapha Thompson RN  Outcome: Ongoing

## 2020-08-13 NOTE — PROGRESS NOTES
Physician Progress Note      Alfredo Looney  [facility] #:                  [id number]  :                       1940  ADMIT DATE:       8/10/2020 7:26 PM  [address] [place] DATE:  RESPONDING  PROVIDER #:        Logan Tristan MD          QUERY TEXT:    Pt admitted with Right proximal humerus and shaft comminuted displaced   fracture and Right humerus shaft comminuted displaced separate fracture. If   possible, please document in progress notes and discharge summary further   specificity regarding the acuity and type of anemia:    The medical record reflects the following:  Risk Factors: [de-identified] y/o RIGHT PROXIMAL HUMERUS AND SHAFT FRACTURE OPEN REDUCTION   INTERNAL FIXATION  Clinical Indicators:  H/H : 10.1/30.9, : H/H 8.9/26.5  Treatment: Lab monitoring, monitor for bleeding  Options provided:  -- Anemia due to acute blood loss  -- Anemia due to iron deficiency  -- Anemia due to postoperative blood loss  -- Anemia due to chronic disease  -- Dilutional anemia  -- Other - I will add my own diagnosis  -- Disagree - Not applicable / Not valid  -- Disagree - Clinically unable to determine / Unknown  -- Refer to Clinical Documentation Reviewer    PROVIDER RESPONSE TEXT:    This patient has acute blood loss anemia.     Query created by: Liz Hernandez on 2020 7:26 AM      Electronically signed by:  Logan Tristan MD 2020 7:58 AM

## 2020-08-13 NOTE — PROGRESS NOTES
Physical Therapy    Facility/Department: [place] [address] [facility]  Initial Assessment and Treatment    NAME: Antonino Carrizales  : 1940  MRN: [id number]    Date of Service: 2020    Discharge Recommendations:  Antonino Carrizales scored a 16/24 on the AM-PAC short mobility form. Current research shows that an AM-PAC score of 17 or less is typically not associated with a discharge to the patient's home setting. Based on the patient's AM-PAC score and their current functional mobility deficits, it is recommended that the patient have 3-5 sessions per week of Physical Therapy at d/c to increase the patient's independence. Please see assessment section for further patient specific details. PT Equipment Recommendations  Equipment Needed: No    Assessment   Assessment: Pt from assisted living s/p R humeral fx and ORIF. Pt limtied by NWB to R UE and unable to use rolling walker at this time. Pt demonstrates CGA/Min A for bed mobility and transfers at this time. Pt would benefit from ongoing skilled PT to maximize independence with transfers. If pt returns to assisted living, recommend increased assist from staff for transfers and home PT. Will continue to follow in acute setting. Treatment Diagnosis: Decreased transfers associated with R humeral fx. Decision Making: Medium Complexity  Patient Education: Role of PT and WB restrictions. Pt would benefit from ongoing education. REQUIRES PT FOLLOW UP: Yes       Patient Diagnosis(es): The encounter diagnosis was Other closed displaced fracture of proximal end of right humerus, initial encounter.      has a past medical history of ADHD (attention deficit hyperactivity disorder), Attention deficit disorder without mention of hyperactivity, Autoimmune disease NEC, Carotid artery disease ([address] Carotid artery disease ([address] Chronic persistent hepatitis ([facility] [address] Depression, Depressive disorder, not elsewhere classified, Double vision, Fractures, GERD (gastroesophageal reflux disease), Hx of interstitial lung disease, Neuropathy, Rheumatoid arthritis(714.0), Spinal stenosis, Type II or unspecified type diabetes mellitus without mention of complication, not stated as uncontrolled, and Unspecified cerebral artery occlusion with cerebral infarction. has a past surgical history that includes Cosmetic surgery; eye surgery (Bilateral); Colonoscopy; and Humerus fracture surgery (Right, 8/11/2020). Restrictions  Restrictions/Precautions  Restrictions/Precautions: Weight Bearing  Upper Extremity Weight Bearing Restrictions  Right Upper Extremity Weight Bearing: Non Weight Bearing  Position Activity Restriction  Other position/activity restrictions: NWB R UE; RUE sling for comfort     Vision/Hearing  Vision: Within Functional Limits(wears glasses)  Hearing: Within functional limits       Subjective  General  Chart Reviewed: Yes  Patient assessed for rehabilitation services?: Yes  Additional Pertinent Hx: Pt to ED 8/10 with R UE pain s/p fall. Pt admitted for R humeral fx. Pt to OR 8/11 for R humeral ORIF.  PMH:  CVA, DM, spinal stenosis,  RA, neuropathy, depression, GERD, CAD  Diagnosis: R Humerus Fx  Subjective  Subjective: Pt found supine in bed. Pt pleasant and cooperative with PT. Noted some confusion with conversation at times. \"Those ceiling tiles were all moved in to make it look like my room. \"  Pain Screening  Patient Currently in Pain: Denies  Vital Signs  Patient Currently in Pain: Denies       Orientation  Orientation  Overall Orientation Status: Within Functional Limits(noted confusion with conversation)     Social/Functional History  Social/Functional History  Lives With: Alone  Type of Home: Facility([facility])  Home Layout: One level  Home Access: Level entry, Elevator  Bathroom Shower/Tub: Walk-in shower  Bathroom Toilet: Handicap height  Bathroom Equipment: Hand-held shower, Shower chair, Grab bars in shower, Grab bars around toilet  Home Equipment: 4 wheeled walker(Transport chair)  ADL Assistance: Needs assistance(Independent with dressing and toileting)  Bath: Stand by assistance(assistance with washing back and hair; SBA for shower transfers)  Homemaking Assistance: Needs assistance  Homemaking Responsibilities: No  Ambulation Assistance: Independent(with 4ww)  Transfer Assistance: Independent  Active : No       Objective  Strength RLE  Strength RLE: WFL  Strength LLE  Strength LLE: WFL           Bed mobility  Supine to Sit: Minimal assistance(HOB elevated, use of L UE for rail)     Transfers  Sit to Stand: Contact guard assistance  Stand to sit: Contact guard assistance  Bed to Chair: Contact guard assistance(stand step piovt, R UE in sling)     Ambulation  Ambulation?: Yes  Ambulation 1  Device: Hand-Held Assist  Other Apparatus: (R UE sling)  Assistance: Contact guard assistance  Gait Deviations: Slow Beatriz;Decreased step length;Decreased step height  Distance: 2-3 steps to chair        Balance  Sitting - Static: Good  Sitting - Dynamic: Good  Standing - Static: Fair  Standing - Dynamic: Poor           Plan   Plan  Times per week: 2-5  Current Treatment Recommendations: Transfer Training, Gait Training, Strengthening  Safety Devices  Type of devices: Left in chair, Chair alarm in place, Call light within reach, Nurse notified(RN in room)      AM-PAC Score  AM-PAC Inpatient Mobility Raw Score : 16 (08/13/20 1011)  AM-PAC Inpatient T-Scale Score : 40.78 (08/13/20 1011)  Mobility Inpatient CMS 0-100% Score: 54.16 (08/13/20 1011)  Mobility Inpatient CMS G-Code Modifier : CK (08/13/20 1011)          Goals  Short term goals  Time Frame for Short term goals: Discharge  Short term goal 1: bed mobility SBA  Short term goal 2: sit <> stand SBA  Short term goal 3: bed <> chair SBA  Short term goal 4: ambulate 50ft with LRAD CGA  Patient Goals   Patient goals : Not stated       Therapy Time   Individual Concurrent Group Co-treatment   Time In 0845         Time Out 42 Yvette         Timed Code Treatment Minutes:  25  Total Treatment Minutes:  [address], PT

## 2020-08-13 NOTE — PROGRESS NOTES
Internal Medicine Progress Note PGY-3    Admit Date: 8/10/2020    Interval history:      No acute events overnight. Patient reports no complaints. The patient was very apologetic about raising her voice at the nurses and her physicians, she is unsure of why she was so agitated. As per the daughter, the patient does have underlying dementia. This was evident when the patient reports feeling confused yesterday and unsure about her new surroundings. Patient is not complaining of any pain in her right shoulder s/p ORIF. Denies any paresthesia in the R. Arm. Patient has a lau catheter and urine output is adequate. She has been eating well. ROS: headaches, fevers, chills, cough, SOB, CP, abdominal pain, paresthesia in her extremities, back pain.        Medications:     Scheduled Meds:   insulin lispro  0-12 Units Subcutaneous TID WC    insulin lispro  0-6 Units Subcutaneous Nightly    insulin glargine  18 Units Subcutaneous Nightly    hydroCHLOROthiazide  25 mg Oral Daily    methocarbamol  1,500 mg Oral TID    Sofosbuvir-Velpatasvir  400 mg Oral Daily    sodium chloride flush  10 mL Intravenous 2 times per day    sennosides-docusate sodium  1 tablet Oral BID    aspirin  325 mg Oral BID    bupivacaine liposome  20 mL Intramuscular Once    bupivacaine (PF)  30 mL Intradermal Once    DULoxetine  20 mg Oral Daily    pantoprazole  40 mg Oral QAM AC    vitamin B-12  1,000 mcg Oral Daily    vitamin B-6  100 mg Oral Daily    Vitamin D  1,000 Units Oral Daily    sodium chloride flush  10 mL Intravenous 2 times per day    enoxaparin  40 mg Subcutaneous Daily    lidocaine  1 patch Transdermal Daily     Continuous Infusions:   dextrose       PRN Meds:HYDROcodone 5 mg - acetaminophen, HYDROmorphone, sodium chloride flush, magnesium hydroxide, dextran 70-hypromellose, labetalol, glucose, dextrose, glucagon (rDNA), dextrose, sodium chloride flush, acetaminophen **OR** acetaminophen, polyethylene glycol, promethazine **OR** ondansetron    Objective:     Vitals:   T-max:  Patient Vitals for the past 8 hrs:   BP Temp Temp src Pulse Resp SpO2   08/13/20 0930 132/67 96.6 °F (35.9 °C) Oral 71 15 92 %       Intake/Output Summary (Last 24 hours) at 8/13/2020 1418  Last data filed at 8/13/2020 1038  Gross per 24 hour   Intake 993.68 ml   Output 1650 ml   Net -656.32 ml       Physical Exam    Physical Exam   Constitutional: Patient is oriented to person, place, and time. Appears well-developed and very pleasant. Cardiovascular: Regular rate and rythem with normal heart sounds and intact distal pulses. No murmurs, rubs, or gallops  Pulmonary/Chest: Effort normal and breath sounds normal. No respiratory distress. No wheezes, rhonchi, or rales   Abdominal: Soft, non tender, non distended with normal bowel sounds. No rebound or guarding. Musculoskeletal: Normal range of motion with no extremity edema. 5/5 strength in L. Arm and lower limbs. R arm in a sling. Dressing was CDI. Neurological: Alert and oriented to person, place, and time. No gross cranial nerve deficit. Light and sharp sensation intact diffusely. Skin: Skin is warm and dry. No rash noted. LABS:    CBC:   Recent Labs     08/11/20 0430 08/12/20 0430 08/13/20 0326 08/13/20  0623   WBC 27.0* 29.4* 29.6*  --    HGB 11.0* 10.1* 8.9* 8.9*   HCT 32.2* 30.9* 26.5* 26.3*    202 191  --    MCV 78.5* 80.4 79.6*  --      Renal:    Recent Labs     08/11/20  0430 08/11/20  1441 08/12/20  0430 08/13/20  0326   *  --  129* 132*   K 3.8  --  4.7 4.6     --  99 101   CO2 24  --  20* 24   BUN 23*  --  21* 26*   CREATININE 0.9  --  1.0 1.0   GLUCOSE 115*  --  352* 206*   CALCIUM 10.0  --  9.5 9.9   MG 1.20* 2.50* 1.80 1.60*   PHOS 3.0  --  4.0 2.8   ANIONGAP 10  --  10 7     Hepatic:   Recent Labs     08/11/20  0430 08/12/20  0430 08/13/20  0326   LABALBU 3.3* 2.9* 2.9*     Troponin: No results for input(s): TROPONINI in the last 72 hours.   BNP: No results for input(s): BNP in the last 72 hours. Lipids: No results for input(s): CHOL, HDL in the last 72 hours. Invalid input(s): LDLCALCU, TRIGLYCERIDE  ABGs:  No results for input(s): PHART, CVQ1MBZ, PO2ART, QEP9APA, BEART, THGBART, B1YVOPVV, CAY1JBJ in the last 72 hours. INR: No results for input(s): INR in the last 72 hours. Lactate: No results for input(s): LACTATE in the last 72 hours. Cultures:  -----------------------------------------------------------------  RAD:   XR HUMERUS RIGHT (MIN 2 VIEWS)   Final Result      Plate and screw fixation of the comminuted right humerus fracture. FLUORO FOR SURGICAL PROCEDURES   Final Result      Fluoroscopy provided. XR HAND RIGHT (MIN 3 VIEWS)   Final Result      No acute injury. XR SHOULDER RIGHT (MIN 2 VIEWS)   Final Result      Comminuted, mildly displaced obliquely oriented fracture of the proximal humeral diaphysis. RIGHT SHOULDER:      REASON FOR EXAM: Fall, pain      FINDINGS:      3 views of the right shoulder demonstrate a mildly displaced, mildly comminuted fracture of the proximal right humeral diaphysis. Humeral head remains properly located within the glenoid. Acromioclavicular joint maintained other than degenerative change. IMPRESSION:      Fracture of the proximal right humeral diaphysis. XR HUMERUS RIGHT (MIN 2 VIEWS)   Final Result      Comminuted, mildly displaced obliquely oriented fracture of the proximal humeral diaphysis. RIGHT SHOULDER:      REASON FOR EXAM: Fall, pain      FINDINGS:      3 views of the right shoulder demonstrate a mildly displaced, mildly comminuted fracture of the proximal right humeral diaphysis. Humeral head remains properly located within the glenoid. Acromioclavicular joint maintained other than degenerative change. IMPRESSION:      Fracture of the proximal right humeral diaphysis.             Assessment/Plan:   The patient is a [de-identified] y.o female with a PMH of DM II, GERD, HTN, PAD and hep C is admitted for a comminuted R. humerous proximal and shaft fracture. Fracture of the proximal right humeral diaphysis s/p RIGHT PROXIMAL HUMERUS AND SHAFT FRACTURE OPEN REDUCTION ( 08/11/2020)  D/t mechanical fall.   - pain management Norco, lidocaine patch, Ice  - daugther called, updated about patient's condition  - AM-PAC: PT 16/24; OT 11/24  - waiting for precert for the [facility]    Type II diabetes  Last [facility] 7/29/2020 8. 1. Today glucose 232 . On home lantus 15u.  - change insulin glargine to 18 units SubQ qn  - hypoglycemia protocol  - medium dose sliding scale  - Accu-checks q4h     Essential HTN  Presented with /64, now controlled. On home HCTZ-spironolactone. - continue HCTZ  - labetalol 10 mg q6h PRN for SBP>180     Chronic Leukocytosis  WBC currently 27.0 ( on admission it was 27.6), absolute neutrophils elevated at 13.1 . No evidence of infection. Prior records show elevated WBC, on last admission WBC was 24.5. UA negative for infections. Negative ELIZABETH-2 mutation (07/29/2020). Most likely 2/2 to inflammation: hx of chronic hep C (on treatment) and RA. Does not meet SIRS criteria. - daily CBC  -  f/u daily vitals     Hepatitis C  On home Sofosbuvir-Velpatasvir Lidia Comp)  - continue home meds    Diabetic Neuropathy   - continue home med of Duloxetine     RA  - continue home med of Methocarbamol     Acute blood loss Anemia   H/H was 10.1/ 30.9 ( 08/12)-> 8.9/26.5 ( 08/13). Most likely secondary to recent ORIF for comminuted R. humerous proximal and shaft fracture. Is asymptomatic and hemodynamically stable.   -will continue to follow H/H daily.        Code Status:Full Code  FEN: General diet  PPX:  Lovenox and pantoprazole   DISPO: Johanna Euceda MD, PGY-3  08/13/20  2:18 PM    This patient will be staffed and discussed with Susan Lazo MD.

## 2020-08-13 NOTE — PROGRESS NOTES
Occupational Therapy   Occupational Therapy Initial Assessment /Treatment   Date: 2020   Patient Name: Chandni Saunders  MRN: [id number]     : 1940    Date of Service: 2020    Discharge Recommendations:  3-5 sessions per week  OT Equipment Recommendations  Other: defer to next level of care     Chandni Saunders scored a  on the AM-PAC ADL Inpatient form. Current research shows that an AM-PAC score of 17 or less is typically not associated with a discharge to the patient's home setting. Based on the patient's AM-PAC score and their current ADL deficits, it is recommended that the patient have 3-5 sessions per week of Occupational Therapy at d/c to increase the patient's independence. Please see assessment section for further patient specific details. If patient discharges prior to next session this note will serve as a discharge summary. Please see below for the latest assessment towards goals. Assessment   Performance deficits / Impairments: Decreased functional mobility ; Decreased safe awareness;Decreased balance;Decreased ADL status; Decreased cognition;Decreased ROM; Decreased endurance;Decreased strength;Decreased fine motor control  Assessment: Patient is a [de-identified] old female s/p R hummerus fx. Patient reports PLOF as independent with dressing and toileting. Patient currently functioning below baseline requiring CGA-min A for stand pivot transfers and total A for ADLs. Patient would benefit from ongoing therapy. If patient decides to d/c home with AL, patient will require assistanc with transfers and high level of assistance for all ADLS. Prognosis: Good;Fair  Decision Making: Medium Complexity  Assistance / Modification: CGA for transfers; total assist for ADLs  OT Education: OT Role;Plan of Care;ADL Adaptive Strategies; Energy Conservation;Precautions;Transfer Training  REQUIRES OT FOLLOW UP: Yes  Activity Tolerance  Activity Tolerance: Patient Tolerated treatment well;Patient stimuli  Following Commands:  Follows one step commands with increased time  Attention Span: Attends with cues to redirect  Memory: Decreased short term memory  Safety Judgement: Decreased awareness of need for assistance;Decreased awareness of need for safety  Problem Solving: Assistance required to identify errors made;Assistance required to generate solutions;Decreased awareness of errors  Insights: Decreased awareness of deficits  Initiation: Requires cues for some                 LUE AROM (degrees)  LUE AROM : WFL  RUE AROM (degrees)  RUE General AROM: Unable to assess due to pain; RUE in sling;  Right Hand AROM (degrees)  Right Hand General AROM: arom on wrist and digits                      Plan   Plan  Times per week: 2-5x                                     AM-PAC Score        AM-PAC Inpatient Daily Activity Raw Score: 11 (08/13/20 1026)  AM-PAC Inpatient ADL T-Scale Score : 29.04 (08/13/20 1026)  ADL Inpatient CMS 0-100% Score: 70.42 (08/13/20 1026)  ADL Inpatient CMS G-Code Modifier : CL (08/13/20 1026)    Goals  Short term goals  Time Frame for Short term goals: by d/c  Short term goal 1: Complete toilet transfers with supervision  Short term goal 2: Complete toileting with mod A  Short term goal 3: Complete UB dressing with mod A       Therapy Time   Individual Concurrent Group Co-treatment   Time In 0849         Time Out 0927         Minutes 38         Timed Code Treatment Minutes: [address], OTR/L

## 2020-08-13 NOTE — CARE COORDINATION
Spoke to Shimon Brown (admissions at [facility]) and she said she will start precert today for this patient. The only request is that patient must provide her own [place] - (sofosbuvir/velpatasvir) medication that she is on.  Electronically signed by German Fischer RN on 8/13/2020 at 2:17 PM

## 2020-08-13 NOTE — PROGRESS NOTES
promethazine **OR** ondansetron    Objective:     Vitals:   T-max:  Patient Vitals for the past 8 hrs:   BP Temp Temp src Pulse Resp SpO2 Weight   08/13/20 0930 132/67 96.6 °F (35.9 °C) Oral 71 15 92 % --   08/13/20 0326 (!) 101/55 97 °F (36.1 °C) Oral 92 18 94 % 132 lb 4.4 oz (60 kg)       Intake/Output Summary (Last 24 hours) at 8/13/2020 1002  Last data filed at 8/13/2020 0516  Gross per 24 hour   Intake 1103.68 ml   Output 1650 ml   Net -546.32 ml       Physical Exam    Physical Exam   Constitutional: Patient is oriented to person, place, and time. Appears well-developed and very pleasant. Cardiovascular: Regular rate and rythem with normal heart sounds and intact distal pulses. No murmurs, rubs, or gallops  Pulmonary/Chest: Effort normal and breath sounds normal. No respiratory distress. No wheezes, rhonchi, or rales   Abdominal: Soft, non tender, non distended with normal bowel sounds. No rebound or guarding. Musculoskeletal: Normal range of motion with no extremity edema. 5/5 strength in L. Arm and lower limbs. R arm in a sling. Dressing was CDI. Neurological: Alert and oriented to person, place, and time. No gross cranial nerve deficit. Light and sharp sensation intact diffusely. Skin: Skin is warm and dry. No rash noted.          LABS:    CBC:   Recent Labs     08/11/20 0430 08/12/20 0430 08/13/20 0326 08/13/20  0623   WBC 27.0* 29.4* 29.6*  --    HGB 11.0* 10.1* 8.9* 8.9*   HCT 32.2* 30.9* 26.5* 26.3*    202 191  --    MCV 78.5* 80.4 79.6*  --      Renal:    Recent Labs     08/11/20  0430 08/11/20  1441 08/12/20  0430 08/13/20  0326   *  --  129* 132*   K 3.8  --  4.7 4.6     --  99 101   CO2 24  --  20* 24   BUN 23*  --  21* 26*   CREATININE 0.9  --  1.0 1.0   GLUCOSE 115*  --  352* 206*   CALCIUM 10.0  --  9.5 9.9   MG 1.20* 2.50* 1.80 1.60*   PHOS 3.0  --  4.0 2.8   ANIONGAP 10  --  10 7     Hepatic:   Recent Labs     08/11/20  0430 08/12/20  0430 08/13/20  0326   [facility]   The patient is a [de-identified] y.o female with a PMH of DM II, GERD, HTN, PAD and hep C is admitted for a comminuted R. humerous proximal and shaft fracture. Fracture of the proximal right humeral diaphysis s/p RIGHT PROXIMAL HUMERUS AND SHAFT FRACTURE OPEN REDUCTION ( 08/11/2020)  D/t mechanical fall.   - pain management Norco, lidocaine patch, Ice  - daugther called, updated about patient's condition. Type II diabetes  Last [facility] 7/29/2020 8. 1. Today glucose 232 . On home lantus 15u.  - change insulin glargine to 18 units SubQ qn  - hypoglycemia protocol  - medium dose sliding scale  - Accu-checks q4h     Essential HTN  Presented with /64, now controlled. On home HCTZ-spironolactone. - continue HCTZ  - labetalol 10 mg q6h PRN for SBP>180     Chronic Leukocytosis  WBC currently 27.0 ( on admission it was 27.6), absolute neutrophils elevated at 13.1 . No evidence of infection. Prior records show elevated WBC, on last admission WBC was 24.5. UA negative for infections. Negative ELIZABETH-2 mutation (07/29/2020). Most likely 2/2 to inflammation: hx of chronic hep C (on treatment) and RA. Does not meet SIRS criteria. - daily CBC  -  f/u daily vitals     Hepatitis C  On home Sofosbuvir-Velpatasvir Enriqueta Damon)  - continue home meds    Diabetic Neuropathy   - continue home med of Duloxetine     RA  - continue home med of Methocarbamol     Acute blood loss Anemia   H/H was 10.1/ 30.9 ( 08/12)-> 8.9/26.5 ( 08/13). Most likely secondary to recent ORIF for comminuted R. humerous proximal and shaft fracture. Is asymptomatic and hemodynamically stable.   -will continue to follow H/H daily.        Code Status:Full Code  FEN: General diet  PPX:  Lovenox and pantoprazole   DISPO: CARMELLA Liang   08/13/20  10:02 AM    This patient will be staffed and discussed with Mushtaq Miller MD.

## 2020-08-13 NOTE — PROGRESS NOTES
Pt's daughter called and given update's on patient's status and plan of care. All questions were answered at this time.  Electronically signed by Naeem Moreno RN on 8/13/2020 at 7:04 PM

## 2020-08-13 NOTE — CARE COORDINATION
CM called Ric at [facility] and spoke to Elyse Bowman (nurse). Elyse Bowman states they do not have Epic access so this CM faxing ([phone]) over PT/OT results for Ric to look over and get back to CM to see if patient can retiurn to [facility] at the [place]. Patient was able to ambulate with a walker prior to admission but if could work with therapy to be w/c level to transfer to the bathroom and bed, they could take her back. If not, pt will need to go to SNF at the [place] before returning to her apt. CM will continue to follow patient until discharge. Electronically signed by Miguel Ferrell RN on 8/13/2020 at 11:28 AM     Addendum: Call from Jay covington at [facility]. She stated that patient would not be able to return to [place] safely and that she spoke to daughter and family wants patient to go to [facility] for rehab at this time. This CM called and faxed PT/OT notes and Facesheet demographics to [facility] admitting with return phone number for CM. This is a [facility]. CM will continue to follow patient until discharge.  Electronically signed by Miguel Ferrell RN on 8/13/2020 at 1:39 PM

## 2020-08-14 ENCOUNTER — TELEPHONE (OUTPATIENT)
Dept: INTERNAL MEDICINE CLINIC | Age: 80
End: 2020-08-14

## 2020-08-14 VITALS
HEIGHT: 62 IN | OXYGEN SATURATION: 93 % | WEIGHT: 127.87 LBS | DIASTOLIC BLOOD PRESSURE: 61 MMHG | TEMPERATURE: 97.1 F | HEART RATE: 88 BPM | RESPIRATION RATE: 18 BRPM | BODY MASS INDEX: 23.53 KG/M2 | SYSTOLIC BLOOD PRESSURE: 118 MMHG

## 2020-08-14 LAB
ALBUMIN SERPL-MCNC: 3 G/DL (ref 3.4–5)
ANION GAP SERPL CALCULATED.3IONS-SCNC: 9 MMOL/L (ref 3–16)
BASOPHILS ABSOLUTE: 0.2 K/UL (ref 0–0.2)
BASOPHILS RELATIVE PERCENT: 0.7 %
BUN BLDV-MCNC: 28 MG/DL (ref 7–20)
CALCIUM SERPL-MCNC: 10.4 MG/DL (ref 8.3–10.6)
CHLORIDE BLD-SCNC: 98 MMOL/L (ref 99–110)
CO2: 25 MMOL/L (ref 21–32)
CREAT SERPL-MCNC: 1 MG/DL (ref 0.6–1.2)
EOSINOPHILS ABSOLUTE: 0.3 K/UL (ref 0–0.6)
EOSINOPHILS RELATIVE PERCENT: 1.1 %
GFR AFRICAN AMERICAN: >60
GFR NON-AFRICAN AMERICAN: 53
GLUCOSE BLD-MCNC: 225 MG/DL (ref 70–99)
GLUCOSE BLD-MCNC: 276 MG/DL (ref 70–99)
GLUCOSE BLD-MCNC: 295 MG/DL (ref 70–99)
HCT VFR BLD CALC: 26.5 % (ref 36–48)
HEMOGLOBIN: 9.1 G/DL (ref 12–16)
LYMPHOCYTES ABSOLUTE: 2.6 K/UL (ref 1–5.1)
LYMPHOCYTES RELATIVE PERCENT: 10 %
MAGNESIUM: 1.7 MG/DL (ref 1.8–2.4)
MCH RBC QN AUTO: 27 PG (ref 26–34)
MCHC RBC AUTO-ENTMCNC: 34.3 G/DL (ref 31–36)
MCV RBC AUTO: 78.6 FL (ref 80–100)
MONOCYTES ABSOLUTE: 6.6 K/UL (ref 0–1.3)
MONOCYTES RELATIVE PERCENT: 25.5 %
NEUTROPHILS ABSOLUTE: 16.3 K/UL (ref 1.7–7.7)
NEUTROPHILS RELATIVE PERCENT: 62.7 %
PDW BLD-RTO: 13.2 % (ref 12.4–15.4)
PERFORMED ON: ABNORMAL
PERFORMED ON: ABNORMAL
PHOSPHORUS: 2.4 MG/DL (ref 2.5–4.9)
PLATELET # BLD: 214 K/UL (ref 135–450)
PMV BLD AUTO: 7.8 FL (ref 5–10.5)
POTASSIUM SERPL-SCNC: 4.9 MMOL/L (ref 3.5–5.1)
RBC # BLD: 3.37 M/UL (ref 4–5.2)
REPORT: NORMAL
SARS-COV-2: NOT DETECTED
SODIUM BLD-SCNC: 132 MMOL/L (ref 136–145)
THIS TEST SENT TO: NORMAL
WBC # BLD: 26 K/UL (ref 4–11)

## 2020-08-14 PROCEDURE — 99238 HOSP IP/OBS DSCHRG MGMT 30/<: CPT | Performed by: INTERNAL MEDICINE

## 2020-08-14 PROCEDURE — 6370000000 HC RX 637 (ALT 250 FOR IP): Performed by: ORTHOPAEDIC SURGERY

## 2020-08-14 PROCEDURE — 2580000003 HC RX 258: Performed by: ORTHOPAEDIC SURGERY

## 2020-08-14 PROCEDURE — 6370000000 HC RX 637 (ALT 250 FOR IP): Performed by: STUDENT IN AN ORGANIZED HEALTH CARE EDUCATION/TRAINING PROGRAM

## 2020-08-14 PROCEDURE — 80069 RENAL FUNCTION PANEL: CPT

## 2020-08-14 PROCEDURE — 85025 COMPLETE CBC W/AUTO DIFF WBC: CPT

## 2020-08-14 PROCEDURE — 6360000002 HC RX W HCPCS: Performed by: ORTHOPAEDIC SURGERY

## 2020-08-14 PROCEDURE — 83735 ASSAY OF MAGNESIUM: CPT

## 2020-08-14 RX ORDER — INSULIN LISPRO 100 [IU]/ML
0-9 INJECTION, SOLUTION INTRAVENOUS; SUBCUTANEOUS NIGHTLY
Status: DISCONTINUED | OUTPATIENT
Start: 2020-08-14 | End: 2020-08-14 | Stop reason: HOSPADM

## 2020-08-14 RX ORDER — INSULIN LISPRO 100 [IU]/ML
0-18 INJECTION, SOLUTION INTRAVENOUS; SUBCUTANEOUS
Status: DISCONTINUED | OUTPATIENT
Start: 2020-08-14 | End: 2020-08-14 | Stop reason: HOSPADM

## 2020-08-14 RX ORDER — LANOLIN ALCOHOL/MO/W.PET/CERES
400 CREAM (GRAM) TOPICAL ONCE
Status: COMPLETED | OUTPATIENT
Start: 2020-08-14 | End: 2020-08-14

## 2020-08-14 RX ORDER — HYDROCHLOROTHIAZIDE 25 MG/1
25 TABLET ORAL DAILY
Qty: 30 TABLET | Refills: 3 | Status: SHIPPED | OUTPATIENT
Start: 2020-08-15 | End: 2021-01-05

## 2020-08-14 RX ORDER — TRAMADOL HYDROCHLORIDE 50 MG/1
50 TABLET ORAL ONCE
Status: COMPLETED | OUTPATIENT
Start: 2020-08-14 | End: 2020-08-14

## 2020-08-14 RX ADMIN — HYDROCODONE BITARTRATE AND ACETAMINOPHEN 1 TABLET: 5; 325 TABLET ORAL at 15:48

## 2020-08-14 RX ADMIN — HYDROCODONE BITARTRATE AND ACETAMINOPHEN 1 TABLET: 5; 325 TABLET ORAL at 06:48

## 2020-08-14 RX ADMIN — Medication 400 MG: at 13:47

## 2020-08-14 RX ADMIN — CYANOCOBALAMIN TAB 1000 MCG 1000 MCG: 1000 TAB at 10:10

## 2020-08-14 RX ADMIN — HYDROCHLOROTHIAZIDE 25 MG: 25 TABLET ORAL at 10:11

## 2020-08-14 RX ADMIN — PANTOPRAZOLE SODIUM 40 MG: 40 TABLET, DELAYED RELEASE ORAL at 06:48

## 2020-08-14 RX ADMIN — INSULIN LISPRO 9 UNITS: 100 INJECTION, SOLUTION INTRAVENOUS; SUBCUTANEOUS at 13:41

## 2020-08-14 RX ADMIN — DULOXETINE HYDROCHLORIDE 20 MG: 20 CAPSULE, DELAYED RELEASE ORAL at 10:11

## 2020-08-14 RX ADMIN — METHOCARBAMOL TABLETS 1500 MG: 750 TABLET, COATED ORAL at 10:10

## 2020-08-14 RX ADMIN — METHOCARBAMOL TABLETS 1500 MG: 750 TABLET, COATED ORAL at 13:45

## 2020-08-14 RX ADMIN — Medication 10 ML: at 10:11

## 2020-08-14 RX ADMIN — Medication 100 MG: at 10:11

## 2020-08-14 RX ADMIN — ASPIRIN 325 MG: 325 TABLET, COATED ORAL at 10:28

## 2020-08-14 RX ADMIN — VELPATASVIR AND SOFOSBUVIR 400 MG: 100; 400 TABLET, FILM COATED ORAL at 10:13

## 2020-08-14 RX ADMIN — ENOXAPARIN SODIUM 40 MG: 40 INJECTION SUBCUTANEOUS at 10:08

## 2020-08-14 RX ADMIN — DOCUSATE SODIUM 50 MG AND SENNOSIDES 8.6 MG 1 TABLET: 8.6; 5 TABLET, FILM COATED ORAL at 10:10

## 2020-08-14 RX ADMIN — TRAMADOL HYDROCHLORIDE 50 MG: 50 TABLET, FILM COATED ORAL at 10:29

## 2020-08-14 RX ADMIN — INSULIN LISPRO 6 UNITS: 100 INJECTION, SOLUTION INTRAVENOUS; SUBCUTANEOUS at 10:09

## 2020-08-14 RX ADMIN — Medication 1000 UNITS: at 10:11

## 2020-08-14 ASSESSMENT — PAIN SCALES - GENERAL
PAINLEVEL_OUTOF10: 7
PAINLEVEL_OUTOF10: 0
PAINLEVEL_OUTOF10: 2
PAINLEVEL_OUTOF10: 0
PAINLEVEL_OUTOF10: 5
PAINLEVEL_OUTOF10: 0
PAINLEVEL_OUTOF10: 0
PAINLEVEL_OUTOF10: 5
PAINLEVEL_OUTOF10: 0
PAINLEVEL_OUTOF10: 0

## 2020-08-14 ASSESSMENT — PAIN DESCRIPTION - ONSET: ONSET: ON-GOING

## 2020-08-14 ASSESSMENT — PAIN DESCRIPTION - PAIN TYPE: TYPE: SURGICAL PAIN

## 2020-08-14 ASSESSMENT — PAIN DESCRIPTION - DESCRIPTORS: DESCRIPTORS: ACHING

## 2020-08-14 ASSESSMENT — PAIN - FUNCTIONAL ASSESSMENT: PAIN_FUNCTIONAL_ASSESSMENT: PREVENTS OR INTERFERES SOME ACTIVE ACTIVITIES AND ADLS

## 2020-08-14 ASSESSMENT — PAIN DESCRIPTION - LOCATION: LOCATION: ARM

## 2020-08-14 ASSESSMENT — PAIN DESCRIPTION - ORIENTATION: ORIENTATION: RIGHT

## 2020-08-14 ASSESSMENT — PAIN DESCRIPTION - FREQUENCY: FREQUENCY: CONTINUOUS

## 2020-08-14 ASSESSMENT — PAIN DESCRIPTION - PROGRESSION: CLINICAL_PROGRESSION: NOT CHANGED

## 2020-08-14 NOTE — TELEPHONE ENCOUNTER
[facility] states [facility].  [facility] states they will fax over paper work on Monday

## 2020-08-14 NOTE — PLAN OF CARE
Plan to discharge to SNF today.    Problem: Falls - Risk of:  Goal: Will remain free from falls  Description: Will remain free from falls  8/14/2020 1302 by Shamir Hedrick RN  Outcome: Completed  8/14/2020 0126 by Selene Howell RN  Outcome: Ongoing  Goal: Absence of physical injury  Description: Absence of physical injury  8/14/2020 1302 by Shamir Hedrick RN  Outcome: Completed  8/14/2020 0126 by Selene Howell RN  Outcome: Ongoing     Problem: Pain:  Goal: Pain level will decrease  Description: Pain level will decrease  8/14/2020 1302 by Shamir Hedrick RN  Outcome: Completed  8/14/2020 0126 by Selene Howell RN  Outcome: Ongoing  Goal: Control of acute pain  Description: Control of acute pain  8/14/2020 1302 by Shamir Hderick RN  Outcome: Completed  8/14/2020 0126 by Selene Howell RN  Outcome: Ongoing  Goal: Control of chronic pain  Description: Control of chronic pain  8/14/2020 1302 by Shamir Hedrick RN  Outcome: Completed  8/14/2020 0126 by Selene Howell RN  Outcome: Ongoing     Problem: Skin Integrity:  Goal: Will show no infection signs and symptoms  Description: Will show no infection signs and symptoms  8/14/2020 1302 by Shamir Hedrick RN  Outcome: Completed  8/14/2020 0126 by Selene Howell RN  Outcome: Ongoing  Goal: Absence of new skin breakdown  Description: Absence of new skin breakdown  8/14/2020 1302 by Shamir Hedrick RN  Outcome: Completed  8/14/2020 0126 by Selene Howell RN  Outcome: Ongoing

## 2020-08-14 NOTE — DISCHARGE SUMMARY
Hospital Medicine Discharge Summary    Patient ID: Kathy Mosley   Gender: female  : 1940   Age: [de-identified] y.o. MRN: [id number]  Code Status: Full Code    Patient's PCP: Lauren Aguirre MD    Admit Date: 8/10/2020     Discharge Date:   2020     Admitting Physician: Lauren Aguirre MD     Discharge Physician: Jose Guadalupe Gaytan MD    Discharge Diagnoses:  Closed displaced comminuted fracture of shaft of R. Humerus   Type II diabetes   Hypertension   Chronic Leukocytosis   Hepatitis C  Diabetic Neuropathy   Rheumatoid Arthritis   Hyponatremia     Acute Blood loss Anemia      Active Hospital Problems    Diagnosis Date Noted    Closed displaced comminuted fracture of shaft of right humerus [S42.351A]     Closed fracture of right proximal humerus [S42.201A]        The patient was seen and examined on day of discharge and this discharge summary is in conjunction with any daily progress note from day of discharge. Hospital Course:   Ms. Kaylee Olguin is a kind [de-identified]year old female with a PMHx of DM II, GERD, HTN, PAD, dementia p/w R. arm pain after a mechanical fall after tripping on her bed sheets at her living facility. She has a similar fall, resulting in a R. humeral fracture which was treated non-operatively. Denied falling on her head, LOC, paresthesia in extremities. All other ROS was negative. In the ED labs were significant for elevated BP at 174/64, hyponatremia at 137, glucose of 378, Leukocytosis of 27.6. UE x ray revealed a fracture of the proximal right humeral diaphysis. Orthopedic surgery was consulted and she was scheduled  for ORIF. Did not meet criteria for SIRS. S/p right proximal humerus and shaft fracture open reduction internal fixation  Day 1 patient was noted be extremely agitated and confused. She was unable to see her family due to being on a COVID floor for r/o and 2/2 to underlying dementia. However, the following day the patient was a lot more understanding and less agitated. Patient was adequately controlled with pain medication and fentanyl patch. Na+ was noted to be 129, 2/2 to receiving 0.45% NS saline post surgery and lack of PO intake. The patient was given a bolus of 0.9& NS and patient was started on a full diet. Na trended up after that. Glucose was elevated through out admission, managed with increasing Lantus to 20 U. In looking at the EMR, patient was noted to have chronically elevated leukocytes and has been extensively worked up for. Most likely 2/2 to Chronic Hep C and rheumatoid Arthritis    Pt will be discharged to the [facility] in stable condition and she is to not weight bear on her R arm. Further she is to follow up with orthopedic surgery in 2 weeks. Disposition:  [facility]): [facility]     Physical Exam Performed:     /61   Pulse 88   Temp 97.1 °F (36.2 °C) (Oral)   Resp 18   Ht 5' 2\" (1.575 m)   Wt 127 lb 13.9 oz (58 kg)   SpO2 93%   BMI 23.39 kg/m²       Constitutional: Patient is oriented to person, place, and time. Appears well-developed and very pleasant. Cardiovascular: Regular rate and rythem with normal heart sounds and intact distal pulses. No murmurs, rubs, or gallops  Pulmonary/Chest: Effort normal and breath sounds normal. No respiratory distress. No wheezes, rhonchi, or rales   Abdominal: Soft, non tender, non distended with normal bowel sounds. No rebound or guarding. Musculoskeletal: Normal range of motion with no extremity edema. 5/5 strength in L. Arm and lower limbs. R arm in a sling. Dressing was CDI. Neurological: Alert and oriented to person, place, and time. No gross cranial nerve deficit. Light and sharp sensation intact diffusely. Skin: Skin is warm and dry. No rash noted.        Labs:  For convenience and continuity at follow-up the following most recent labs are provided:      CBC:    Lab Results   Component Value Date    WBC 26.0 08/14/2020    HGB 9.1 08/14/2020    HCT 26.5 08/14/2020     08/14/2020       Renal:    Lab Results   Component Value Date     08/14/2020    K 4.9 08/14/2020    K 4.8 12/10/2019    CL 98 08/14/2020    CO2 25 08/14/2020    BUN 28 08/14/2020    CREATININE 1.0 08/14/2020    CALCIUM 10.4 08/14/2020    PHOS 2.4 08/14/2020         Significant Diagnostic Studies    Radiology:   XR HUMERUS RIGHT (MIN 2 VIEWS)   Final Result      Plate and screw fixation of the comminuted right humerus fracture. FLUORO FOR SURGICAL PROCEDURES   Final Result      Fluoroscopy provided. XR HAND RIGHT (MIN 3 VIEWS)   Final Result      No acute injury. XR SHOULDER RIGHT (MIN 2 VIEWS)   Final Result      Comminuted, mildly displaced obliquely oriented fracture of the proximal humeral diaphysis. RIGHT SHOULDER:      REASON FOR EXAM: Fall, pain      FINDINGS:      3 views of the right shoulder demonstrate a mildly displaced, mildly comminuted fracture of the proximal right humeral diaphysis. Humeral head remains properly located within the glenoid. Acromioclavicular joint maintained other than degenerative change. IMPRESSION:      Fracture of the proximal right humeral diaphysis. XR HUMERUS RIGHT (MIN 2 VIEWS)   Final Result      Comminuted, mildly displaced obliquely oriented fracture of the proximal humeral diaphysis. RIGHT SHOULDER:      REASON FOR EXAM: Fall, pain      FINDINGS:      3 views of the right shoulder demonstrate a mildly displaced, mildly comminuted fracture of the proximal right humeral diaphysis. Humeral head remains properly located within the glenoid. Acromioclavicular joint maintained other than degenerative change. IMPRESSION:      Fracture of the proximal right humeral diaphysis. Consults:     IP CONSULT TO ORTHOPEDIC SURGERY  IP CONSULT TO PRIMARY CARE PROVIDER  IP CONSULT TO SOCIAL WORK    Disposition:  SNF     Condition at Discharge: Stable    Discharge Instructions/Follow-up:    Keep right shoulder non-weightbearing.   Follow up with orthopedic surgery in 2 weeks. Code Status:  Full Code     Activity: activity as tolerated    Diet: regular diet      Discharge Medications:     Current Discharge Medication List           Details   aspirin 325 MG EC tablet Take 1 tablet by mouth 2 times daily  Qty: 30 tablet, Refills: 3      hydroCHLOROthiazide (HYDRODIURIL) 25 MG tablet Take 1 tablet by mouth daily  Qty: 30 tablet, Refills: 3              Details   insulin glargine (LANTUS;BASAGLAR) 100 UNIT/ML injection pen Inject 20 Units into the skin nightly  Qty: 5 pen, Refills: 3              Details   esomeprazole Magnesium (NEXIUM) 40 MG PACK Take 40 mg by mouth every evening      Sofosbuvir-Velpatasvir (EPCLUSA) 400-100 MG TABS Take 1 tablet by mouth daily 400/100 mg      acetaminophen (TYLENOL) 325 MG tablet Take 1 tablet by mouth every 6 hours as needed for Pain  Qty: 30 tablet, Refills: 0      DULoxetine (CYMBALTA) 20 MG extended release capsule TAKE ONE CAPSULE BY MOUTH TWICE A DAY  Qty: 180 capsule, Refills: 3    Associated Diagnoses: Left leg pain      vitamin B-12 (CYANOCOBALAMIN) 1000 MCG tablet Take 1,000 mcg by mouth daily       vitamin B-6 (PYRIDOXINE) 50 MG tablet Take 100 mg by mouth daily       docusate (COLACE, DULCOLAX) 100 MG CAPS Take 100 mg by mouth daily  Qty: 30 capsule, Refills: 0      Biotin 31569 MCG TBDP Take 10,000 mcg by mouth daily   Refills: 0      Ascorbic Acid (VITAMIN C CR) 1000 MG TBCR Take 1 tablet by mouth daily  Qty: 30 tablet, Refills: 0      guaiFENesin (MUCINEX) 600 MG extended release tablet Take 1 tablet by mouth 2 times daily as needed for Congestion      polyethyl glycol-propyl glycol 0.4-0.3 % (SYSTANE) 0.4-0.3 % ophthalmic solution Place 1 drop into both eyes as needed for Dry Eyes  Qty: 1 Bottle, Refills: 3      Vitamin D 3 (CHOLECALCIFEROL) 1000 UNITS TABS tablet Take 1,000 Units by mouth daily.       !! Alcohol Swabs (B-D SINGLE USE SWABS REGULAR) PADS USE ONE TOPICALLY DAILY  Qty: 100 each,

## 2020-08-14 NOTE — PROGRESS NOTES
Attempted to call report to The [address] [place] [address]. Balta Ferris unable to take report at this time. Will attempt to call back later.

## 2020-08-14 NOTE — PROGRESS NOTES
Report called to [id number] [address] RN supervisor at [facility] at this time. All questions answered.

## 2020-08-14 NOTE — PROGRESS NOTES
Daughter Nidhi Doshi updated on plan of care at this time. Plan is to discharge patient today with  time of 1600. Daughter aware, all questions answered.

## 2020-08-14 NOTE — CARE COORDINATION
To [facility] today at 1600. Nurse report : [phone], ask for nursing supervisor  Fax: [phone] dIscharge packet to [facility].    Electronically signed by Tatyana Mondragon RN on 8/14/2020 at 1:58 PM

## 2020-08-14 NOTE — DISCHARGE SUMMARY
Hospital Medicine Discharge Summary    Patient ID: Nereida Hook   Gender: female  : 1940   Age: [de-identified] y.o. MRN: [id number]  Code Status: Full Code ***   Patient's PCP: Anne-Marie Powell MD    Admit Date: 8/10/2020     Discharge Date:   2020     Admitting Physician: Anne-Marie Powell MD     Discharge Physician: Juan Webster MD    Discharge Diagnoses:    Closed displaced comminuted fracture of shaft of R. Humerus   Closed fracture of the R. Proximal humerus   Type II diabetes   Hypertension   Chronic Leukocytosis   Hepatitis C  Diabetic Neuropathy   Rheumatoid Arthritis   hyponatremia     Acute Blood loss Anemia      Active Hospital Problems    Diagnosis Date Noted    Closed displaced comminuted fracture of shaft of right humerus [S42.351A]     Closed fracture of right proximal humerus [S42.201A]        The patient was seen and examined on day of discharge and this discharge summary is in conjunction with any daily progress note from day of discharge. Hospital Course:     Ms. Gretchen Jarquin is a kind [de-identified]year old female with a PMHx of DM II, GERD, HTN, PAD, dementia p/w R. Arm pain after a mechanical fall after tripping on her bed sheets at her living facility. She has a similar fall, resulting in a R. Humeral fracture which was treated non-operatively. Denied falling on her head, LOC, paresthesia in extremities. All other ROS was negative. In the ED labs were significant for elevated BP at 174/64, hyponatremia at 137, glucose of 378, Leukocytosis of 27.6. UE x ray revealed a fracture of the proximal right humeral diaphysis. Orthopedic surgery was consulted and she was scheduled  for ORIF. Did not meet criteria for SIRS. S/p  Right proximal humerus and shaft fracture open reduction internal fixation  Day 1 patient was noted be extremely agitated and confused. She was unable to see her family due to being on a COVID floor for r/o and 2/2 to underlying dementia.  However, the following day the patient was a lot more understanding and less agitated. Patient was adequately controlled with pain medication and fentanyl patch. Na+ was noted to be 129, 2/2 to receiving 0.45% NS saline post surgery and lack of PO intake. The patient was given a bolus of 0.9& NS and patient was started on a full diet. Na trended up after that. Glucose was elevated through out admission, managed with increasing units of Lantus and sliding scale. In looking at the EMR, patient was noted to have chronically elevated leukocytes and has been extensively worked up for. Most likely 2/2 to Chronic Hep C and rheumatoid Arthritis                      Disposition:  [facility] ([facility]): [facility] [place]     Physical Exam Performed:     /61   Pulse 88   Temp 97.1 °F (36.2 °C) (Oral)   Resp 18   Ht 5' 2\" (1.575 m)   Wt 127 lb 13.9 oz (58 kg)   SpO2 93%   BMI 23.39 kg/m²       Constitutional: Patient is oriented to person, place, and time. Appears well-developed and very pleasant. Cardiovascular: Regular rate and rythem with normal heart sounds and intact distal pulses. No murmurs, rubs, or gallops  Pulmonary/Chest: Effort normal and breath sounds normal. No respiratory distress. No wheezes, rhonchi, or rales   Abdominal: Soft, non tender, non distended with normal bowel sounds. No rebound or guarding. Musculoskeletal: Normal range of motion with no extremity edema. 5/5 strength in L. Arm and lower limbs. R arm in a sling. Dressing was CDI. Neurological: Alert and oriented to person, place, and time. No gross cranial nerve deficit. Light and sharp sensation intact diffusely. Skin: Skin is warm and dry. No rash noted.        Labs:  For convenience and continuity at follow-up the following most recent labs are provided:      CBC:    Lab Results   Component Value Date    WBC 26.0 08/14/2020    HGB 9.1 08/14/2020    HCT 26.5 08/14/2020     08/14/2020       Renal:    Lab Results   Component Value Date     08/14/2020 K 4.9 08/14/2020    K 4.8 12/10/2019    CL 98 08/14/2020    CO2 25 08/14/2020    BUN 28 08/14/2020    CREATININE 1.0 08/14/2020    CALCIUM 10.4 08/14/2020    PHOS 2.4 08/14/2020         Significant Diagnostic Studies    Radiology:   XR HUMERUS RIGHT (MIN 2 VIEWS)   Final Result      Plate and screw fixation of the comminuted right humerus fracture. FLUORO FOR SURGICAL PROCEDURES   Final Result      Fluoroscopy provided. XR HAND RIGHT (MIN 3 VIEWS)   Final Result      No acute injury. XR SHOULDER RIGHT (MIN 2 VIEWS)   Final Result      Comminuted, mildly displaced obliquely oriented fracture of the proximal humeral diaphysis. RIGHT SHOULDER:      REASON FOR EXAM: Fall, pain      FINDINGS:      3 views of the right shoulder demonstrate a mildly displaced, mildly comminuted fracture of the proximal right humeral diaphysis. Humeral head remains properly located within the glenoid. Acromioclavicular joint maintained other than degenerative change. IMPRESSION:      Fracture of the proximal right humeral diaphysis. XR HUMERUS RIGHT (MIN 2 VIEWS)   Final Result      Comminuted, mildly displaced obliquely oriented fracture of the proximal humeral diaphysis. RIGHT SHOULDER:      REASON FOR EXAM: Fall, pain      FINDINGS:      3 views of the right shoulder demonstrate a mildly displaced, mildly comminuted fracture of the proximal right humeral diaphysis. Humeral head remains properly located within the glenoid. Acromioclavicular joint maintained other than degenerative change. IMPRESSION:      Fracture of the proximal right humeral diaphysis.              Consults:     IP CONSULT TO ORTHOPEDIC SURGERY  IP CONSULT TO PRIMARY CARE PROVIDER  IP CONSULT TO SOCIAL WORK    Disposition:  ***     Condition at Discharge: Stable    Discharge Instructions/Follow-up:  ***    Code Status:  Full Code ***    Activity: activity as tolerated    Diet: regular diet      Discharge Medications:     Current

## 2020-08-14 NOTE — CARE COORDINATION
Case Management Assessment            Discharge Note                    Date / Time of Note: 8/14/2020 12:33 PM                  Discharge Note Completed by: Tatyana Mondragon    Patient Name: Neredia Hook   YOB: 1940  Diagnosis: Closed physeal fracture of lower end of humerus [S49.109A]  Closed physeal fracture of lower end of humerus [S49.109A]   Date / Time: 8/10/2020  7:26 PM    Current PCP: Anne-Marie Powell MD  Clinic patient: No    Hospitalization in the last 30 days: No    Advance Directives:  Code Status: Full Code  [place] DNR form completed and on chart: Not Indicated    Financial:  Payor: Sharla Henderson / Plan: Trish Meigs / Product Type: *No Product type* /      Pharmacy:    [facility] [address] [phone]  [place] [address]  Phone: [phone] Fax: [phone]    [address] [place] [address] [phone] - F [phone] [address] [facility] [address]  Phone: [phone] Fax: [phone]      Assistance purchasing medications?: Potential Assistance Purchasing Medications: No  Assistance provided by Case Management: None at this time    Does patient want to participate in local refill/ meds to beds program?: Yes    Meds To Beds General Rules:  1. Can ONLY be done Monday- Friday between 8:30am-5pm  2. Prescription(s) must be in pharmacy by 3pm to be filled same day  3. Copy of patient's insurance/ prescription drug card and patient face sheet must be sent along with the prescription(s)  4. Cost of Rx cannot be added to hospital bill. If financial assistance is needed, please contact unit  or ;  or  CANNOT provide pharmacy voucher for patients co-pays  5.  Patients can then  the prescription on their way out of the hospital at discharge, or pharmacy can deliver to the bedside if staff is available. (payment due at time of pick-up or delivery - cash, check, or card accepted)     Able to afford home medications/ co-pay costs: Yes- but going to SNF      ADLS:  Current PT AM-PAC Score: 16 /24  Current OT AM-PAC Score: 11 /24      DISCHARGE Disposition: [place] (SNF): [facility] [place] Phone: [phone] Fax: [phone]   The [address] [place] [address] Obdulia Quarles, [address] [place] [address]         Phone: [phone]       Fax: [phone]            LOC at discharge: Skilled  [address] Completed: pending physician fill out      Notification completed in HENS/PAS?:  Yes : CM has completed HENS online through secure website for SNF admission at [facility]. Document ID #: [id number]    IMM Completed:   Yes, Case management has presented and reviewed IMM letter #2 to the patient and/or family/ POA. Patient and/or family/POA verbalized understanding of their medicare rights and appeal process if needed. Patient and/or family/POA has signed, initialed and placed today's date (8-) and time (1330) on IMM letter #2 on the the appropriate lines. Patient and/or family/POA, copy of letter offered and they are aware that this original copy of IMM letter #2 is available prior to discharge from the paper chart on the unit. Electronic documentation has been entered into epic for IMM letter #2 and original paper copy has been added to the paper chart at the nurses station.      Transportation:  Transportation PLAN for discharge: EMS transportation   Mode of Transport: Ambulance stretcher - BLS  Reason for medical transport: Non healed fractures of Humerus require ambulance transport due to Cast upper leg  Name of Transport Company: [address]  Phone: [phone]  Time of Transport: 1600      Transport form completed: Yes    Home Care:  [address] ordered at discharge: No  [address]: Not Applicable      Referrals made at [facility] for outpatient continued care:  Not Applicable    Additional CM Notes:Patient will be going to [facility] today at 1600 by stretcher transport [facility].  informed/nurse informed/facility informed    The Plan for Transition of Care is related to the following treatment goals of Closed physeal fracture of lower end of humerus [S49.109A]  Closed physeal fracture of lower end of humerus [S49.109A]    The Patient and/or patient representative Kelsy Penn and her family were provided with a choice of provider and agrees with the discharge plan Yes    Freedom of choice list was provided with basic dialogue that supports the patient's individualized plan of care/goals and shares the quality data associated with the providers.  Yes    Care Transitions patient: No    Miguel Ferrell RN  The [facility] [employer]  Case Management Department  Ph: [phone]

## 2020-08-14 NOTE — PROGRESS NOTES
Discharge note: Patient has been seen by doctor. Discharge order obtained, and discharge instructions reviewed. Patient educated, using the teach back method, about follow up instructions and discharge instructions. A completed copy of the AVS instructions given SNF facility and transportation and all questions answered. IV catheter removed without complaints, catheter intact, site WNL. Discharged to SNF; [facility] via stretcher EMS. All valuables and home medication with patient at time of discharge.

## 2020-08-14 NOTE — PLAN OF CARE
Problem: Falls - Risk of:  Goal: Will remain free from falls  Description: Will remain free from falls  8/14/2020 0126 by Dariusz Iniguez RN  Outcome: Ongoing  8/13/2020 1348 by Mary Galindo RN  Note: Pt is a Fall Risk. See [facility] FOR CONT[facility]UING MED CARE [place] Fall Risk Score. Pt bed in low position and side rails up. Call light and belongings in reach. Pt encouraged to call for assistance. Will continue with hourly rounds for PO intake, pain needs, toileting, and repositioning as needed. Goal: Absence of physical injury  Description: Absence of physical injury  Outcome: Ongoing     Problem: Pain:  Goal: Pain level will decrease  Description: Pain level will decrease  Outcome: Ongoing  Goal: Control of acute pain  Description: Control of acute pain  Outcome: Ongoing  Goal: Control of chronic pain  Description: Control of chronic pain  Outcome: Ongoing     Problem: Skin Integrity:  Goal: Will show no infection signs and symptoms  Description: Will show no infection signs and symptoms  8/14/2020 0126 by Dariusz Iniguez RN  Outcome: Ongoing  8/13/2020 1349 by Mary Galindo RN  Note: Pt being turned q2. Vital signs remain stable.  Will con't to monitor   Goal: Absence of new skin breakdown  Description: Absence of new skin breakdown  Outcome: Ongoing

## 2020-08-14 NOTE — CARE COORDINATION
Called Bhargavi (admissions at [facility]) to get an update for precert status. Patient has Bobbye Ice. Left CM phone number for Valerie Bourne to call back. CM will continue to follow patient until discharge.  Electronically signed by Raheel Ventura RN on 8/14/2020 at 10:21 AM

## 2020-08-14 NOTE — PROGRESS NOTES
Updated patient's daughter, Amy Roque, on status through the night and plan for the day.   Also told her about our visiting hours should Ayse Vázquez be moved off the floor now that she is covid negative

## 2020-08-14 NOTE — PROGRESS NOTES
RN attempted to call report to Savannah Linder RN supervisor. RN supervisor unavailable. Will attempt to call at later time.

## 2020-08-14 NOTE — PROGRESS NOTES
Brief Medicine Note  June 20, 2020    Pt fell out of bed last night and was assessed by keren. She continues to complain of pain localized to her tailbone today prompting XR of sacrum and coccyx which was negative for acute fracture and showed normal joint spaces and alignment. Per d/w bedside RN, pt denies active headache, no neuro changes, neuro checks have been normal. Pt is not on anti platelet agents or anticoagulation. Continue supportive care for ongoing tailbone pain.     Please contact medicine if any acute concerns arise.     Francisca Paige PA-C     Physical Therapy  Vipul Rees    Chart reviewed. PT attempted to see patient for follow up treatment although patient adamantly declining all attempts at PT intervention this afternoon - \"I'm leaving today - I need to rest before I go to rehab. \"  RN aware. Plan to continue to follow per plan of care.     Joel Travis PT, DPT [id number]

## 2020-08-14 NOTE — DISCHARGE INSTR - COC
Continuity of Care Form    Patient Name: Randa Peng   :  1940  MRN:  [id number]    Admit date:  8/10/2020  Discharge date:  2020    Code Status Order: Full Code   Advance Directives:   [facility] Documentation     Date/Time Healthcare Directive Type of Healthcare Directive Copy in [address] Agent's Name Healthcare Agent's Phone Number    08/10/20 7484  --  --  --  --  Porter Gleason  [phone]    08/10/20 2301  Yes, patient has an advance directive for healthcare treatment  Durable power of  for health care  --  --  [facility]          Admitting Physician:  Zahraa Colón MD  PCP: Zahraa Colón MD    Discharging Nurse: [facility] Unit/Room#: 8683/8736-08  Discharging Unit Phone Number: [phone]    Emergency Contact:   Extended Emergency Contact Information  Primary Emergency Contact: Santos Paget of [address] [place] [address] Phone: [phone]  Relation: Child  Secondary Emergency Contact: Javy Moses  Address: [address] Phone: [phone]  Mobile Phone: [phone]  Relation: Child    Past Surgical History:  Past Surgical History:   Procedure Laterality Date    COLONOSCOPY      COSMETIC SURGERY      tummy tuck,face lift,mammoplasties- hepatitis blood transfusion complication    EYE SURGERY Bilateral     cataract    HUMERUS FRACTURE SURGERY Right 2020    RIGHT PROXIMAL HUMERUS AND SHAFT FRACTURE OPEN REDUCTION INTERNAL FIXATION performed by Rhonda Cr MD at [address]       Immunization History:   Immunization History   Administered Date(s) Administered    Influenza Virus Vaccine 2008    Influenza Whole 2008    Influenza, High Dose (Fluzone 65 yrs and older) 2011, 2018, 10/23/2019    Pneumococcal Conjugate 13-valent (Ckiuztp03) 2015    Pneumococcal Conjugate 7-valent (Chandra Doles) 06/23/2006    Pneumococcal Conjugate Vaccine 08/03/2015    Pneumococcal Polysaccharide (Lejocektr45) 06/23/2006, 01/01/2009, 07/30/2020       Active Problems:  Patient Active Problem List   Diagnosis Code    Rheumatoid arthritis ([facility] M06.9    Diabetes mellitus type 2 in nonobese ([facility]) E11.9    Chronic persistent hepatitis ([facility] K73.0    Depressive disorder, not elsewhere classified F32.9    Attention deficit disorder F98.8    DNR (do not resuscitate) discussion Z71.89    Hep C w/o coma, chronic ([facility]) B18.2    Spinal stenosis M48.00    Carotid artery disease ([facility]) I73.9    Weakness R53.1    Oropharyngeal dysphagia R13.12    Encounter for palliative care Z51.5    Dementia with behavioral disturbance ([facility] F03.91    Delirium due to another medical condition F05    Hyponatremia E87.1    Essential hypertension I10    Slow transit constipation K59.01    Advance care planning Z71.89    Edema R60.9    Fracture T14. 8XXA    Closed fracture of right proximal humerus S42.201A    Acute pain of right shoulder M25.511    Goals of care, counseling/discussion Z71.89    DNR (do not resuscitate) Z66    Dry eyes H04.123    Primary insomnia F51.01    PAD (peripheral artery disease) ([facility]) I73.9    Multiple closed fractures of ribs of right side S22.41XA    Hx of interstitial lung disease Z87.09    Chronic diastolic congestive heart failure ([facility]) I50.32    Chronic obstructive pulmonary disease ([facility] J44.9    Fall at home, sequela W19. Karene Cogan, Y92.009    Generalized pain R52    New onset atrial fibrillation ([facility]) I48.91    Closed physeal fracture of lower end of humerus S49.109A    Closed displaced comminuted fracture of shaft of right humerus S42.351A       Isolation/Infection:   Isolation          No Isolation        Patient Infection Status     Infection Onset Added Last Indicated Last Indicated By Review Planned Expiration Resolved Resolved By    None active    Resolved    COVID-19 Rule Out 08/10/20 08/10/20 08/11/20 COVID-19 (Ordered)   08/11/20 Rule-Out Test Resulted    C-diff Rule Out  12/10/19 12/11/19 Clostridium difficile toxin/antigen (Ordered)   08/11/20 Julia Zapata RN          Nurse Assessment:  Last Vital Signs: /61   Pulse 88   Temp 97.1 °F (36.2 °C) (Oral)   Resp 18   Ht 5' 2\" (1.575 m)   Wt 127 lb 13.9 oz (58 kg)   SpO2 93%   BMI 23.39 kg/m²     Last documented pain score (0-10 scale): Pain Level: 0  Last Weight:   Wt Readings from Last 1 Encounters:   08/14/20 127 lb 13.9 oz (58 kg)     Mental Status:  disoriented, oriented and alert    IV Access:  - None    Nursing Mobility/ADLs:  Walking   Assisted  Transfer  Assisted  Bathing  Assisted  Dressing  Dependent  Toileting  Dependent  Feeding  Assisted  Med Admin  Assisted  Med Delivery   whole and with water    Wound Care Documentation and Therapy:  Wound 01/02/18 Other (Comment) Coccyx stage 1 reddened (Active)   Number of days: 955        Elimination:  Continence:   · Bowel: No  · Bladder: No  Urinary Catheter: None   Colostomy/Ileostomy/Ileal Conduit: No       Date of Last BM: 8/14/2020    Intake/Output Summary (Last 24 hours) at 8/14/2020 1257  Last data filed at 8/14/2020 1204  Gross per 24 hour   Intake 496 ml   Output 2100 ml   Net -1604 ml     I/O last 3 completed shifts: In: 5 [P.O.:680; I.V.:56]  Out: 1300 [Urine:1300]    Safety Concerns:     History of Falls (last 30 days) and At Risk for Falls    Impairments/Disabilities:      R arm fracture with OR fix    Nutrition Therapy:  Current Nutrition Therapy:   - Oral Diet:  General    Routes of Feeding: Oral  Liquids: Thin Liquids  Daily Fluid Restriction: no  Last Modified Barium Swallow with Video (Video Swallowing Test): not done    Treatments at the Time of Hospital Discharge:   Respiratory Treatments:   Oxygen Therapy:  is not on home oxygen therapy.   Ventilator:    - No ventilator support    Rehab Therapies: Physical Therapy, Occupational Therapy, Orthotics/Prosthetics and Speech/Language Therapy  Weight Bearing Status/Restrictions: NWB R. Arm  Other Medical Equipment (for information only, NOT a DME order):  wheelchair and walker  Other Treatments: Sling to R arm    Patient's personal belongings (please select all that are sent with patient):  Pants Shirt Footwear    RN SIGNATURE:  Electronically signed by Lorenza Sebastian RN on 8/14/20 at 2:45 PM EDT    CASE MANAGEMENT/SOCIAL WORK SECTION    Inpatient Status Date: ***    Readmission Risk Assessment Score:  Readmission Risk              Risk of Unplanned Readmission:        24           Discharging to Facility/ Agency       / signature: {Esignature:[id number]}    PHYSICIAN SECTION    Prognosis: Good    Condition at Discharge: Stable    Rehab Potential (if transferring to Rehab): Good    Recommended Labs or Other Treatments After Discharge:  POCT glu. Lantus increased to 20U nightly and may need additional adjustment. PT for R humerus fracture; to be non-weigh bearing. Physician Certification: I certify the above information and transfer of Carrington Franco  is necessary for the continuing treatment of the diagnosis listed and that she requires [facility] for greater 30 days.      Update Admission H&P: Changes in H&P as follows - R humerus fracture s/p ORIF    PHYSICIAN SIGNATURE:  Electronically signed by Jo-Ann Conway MD and Junior Yaneth MD on 8/14/20 at 1:28 PM EDT

## 2020-08-14 NOTE — PROGRESS NOTES
RN attempted to call report to Brown Mittal RN supervisor at the [facility]. Left phone number Brown Mittal to call this RN back when ready.

## 2020-08-17 ENCOUNTER — TELEPHONE (OUTPATIENT)
Dept: ORTHOPEDIC SURGERY | Age: 80
End: 2020-08-17

## 2020-08-17 NOTE — TELEPHONE ENCOUNTER
Spoke with daughter, Polly Howard. She states the offices are too far and she wanted to switch to a [facility] that is closer. Let her know most doctors will not take on a first time post-op from another doctor. She is wondering why the visit is important. Discussed that patient will need xrays to check the healing and hardware and to determine her next steps. She states patient is on a 14 day quarantine due to Covid-19 and if she leave the facility the 14 days will restart. Recommended she ask the [facility]) if they have capability to get x-rays and do a VV. If they can get xrays and have the IMAGES AND REPORT to us then we can do a VV. She will check into this and call to schedule post-op accordingly.

## 2020-08-17 NOTE — TELEPHONE ENCOUNTER
Pt had surgery with Dr. Shashi Rodriguez needs a follow up but states office is too far away from the rehabilitation center she is currently at.

## 2020-08-25 ENCOUNTER — TELEPHONE (OUTPATIENT)
Dept: ORTHOPEDIC SURGERY | Age: 80
End: 2020-08-25

## 2020-08-25 NOTE — TELEPHONE ENCOUNTER
Attempted to reach Ariela Barreto @ [facility]. No answer and unable to LVM for no voicemail set up.      **we received x-rays so we can schedule patient for a VV**

## 2020-08-25 NOTE — TELEPHONE ENCOUNTER
Amando Metzger at 8/25/2020  3:29 PM     Status: Signed       Patient dropped off disk to FF .  Disk given to x-ray staff

## 2020-08-25 NOTE — TELEPHONE ENCOUNTER
1ST POST-OP/ R HUMERAL FX/SX 08/11/2O. APPOINTMENT MADE BY Rebecca Lucio @ [facility] [phone]. REQ A CALL BACK TO SCHEDULE VIRTUAL VISIT.

## 2020-08-26 NOTE — TELEPHONE ENCOUNTER
Attempted to reach Ender Cota at number that was provided. No answer and unable to leave voicemail due to mailbox not being set up.     **okay for appt to be VVV.  Will need to discuss details to proceed with visit**

## 2020-08-27 ENCOUNTER — VIRTUAL VISIT (OUTPATIENT)
Dept: ORTHOPEDIC SURGERY | Age: 80
End: 2020-08-27

## 2020-08-27 ENCOUNTER — TELEPHONE (OUTPATIENT)
Dept: ORTHOPEDIC SURGERY | Age: 80
End: 2020-08-27

## 2020-08-27 PROCEDURE — 99024 POSTOP FOLLOW-UP VISIT: CPT | Performed by: ORTHOPAEDIC SURGERY

## 2020-08-29 NOTE — PROGRESS NOTES
will take 3 views of the right shoulder. As this patient has demonstrated risk factors for osteoporosis, such as age and evidence of a fracture, I have referred the patient back to the primary care physician for evaluation for osteoporosis, including consideration for DEXA scanning, if this is felt to be clinically indicated. The patient is advised to contact the primary care physician to follow-up for further evaluation. Pursuant to the emergency declaration under the [address] [place] [address], [facility] waiver authority and the [employer] and [employer] Act, this Virtual  Visit was conducted, with patient's consent, to reduce the patient's risk of exposure to COVID-19 and provide continuity of care for an established patient. Services were provided through a video synchronous discussion virtually to substitute for in-person clinic visit.       Radha Prasad MD

## 2020-08-31 ENCOUNTER — TELEPHONE (OUTPATIENT)
Dept: INTERNAL MEDICINE CLINIC | Age: 80
End: 2020-08-31

## 2020-08-31 NOTE — TELEPHONE ENCOUNTER
Patient daughter called to advise the patient medication has been changed from spironolactone (ALDACTONE) tablet 25 mg to hydroCHLOROthiazide (HYDRODIURIL) 25 MG tablet. Patient daughter would like to know why was this changed and will it stay this way. Please call and advise.

## 2020-09-02 ENCOUNTER — TELEPHONE (OUTPATIENT)
Dept: INTERNAL MEDICINE CLINIC | Age: 80
End: 2020-09-02

## 2020-09-02 NOTE — TELEPHONE ENCOUNTER
Reilly Macedo with [facility] called to request an appointment for the patient who will be discharged on Friday. Please advise.

## 2020-09-03 ENCOUNTER — TELEPHONE (OUTPATIENT)
Dept: INTERNAL MEDICINE CLINIC | Age: 80
End: 2020-09-03

## 2020-09-03 NOTE — TELEPHONE ENCOUNTER
Patient daughter called to request a prescription for otc refresh classic eye drops for patients dry eyes to be used twice a day. Please fax to [facility] [employer] at [phone]. Please advise.

## 2020-09-04 RX ORDER — HYDROCODONE BITARTRATE AND ACETAMINOPHEN 5; 325 MG/1; MG/1
1 TABLET ORAL EVERY 4 HOURS PRN
Qty: 30 TABLET | Refills: 0 | Status: CANCELLED | OUTPATIENT
Start: 2020-09-04 | End: 2020-09-11

## 2020-09-08 ENCOUNTER — TELEPHONE (OUTPATIENT)
Dept: INTERNAL MEDICINE CLINIC | Age: 80
End: 2020-09-08

## 2020-09-08 NOTE — TELEPHONE ENCOUNTER
Patient daughter called to advise she thinks the patient has a UTI and would like urine lab orders placed and faxed to [facility] at [phone]. Patient daughter also states the prescription for the eye drops was not sent correctly and the prescription needs to say to place in the patients eyes twice per day on a routine basis. Please advise.

## 2020-09-09 ENCOUNTER — TELEPHONE (OUTPATIENT)
Dept: INTERNAL MEDICINE CLINIC | Age: 80
End: 2020-09-09

## 2020-09-09 RX ORDER — NITROFURANTOIN 25; 75 MG/1; MG/1
100 CAPSULE ORAL 2 TIMES DAILY
Qty: 10 CAPSULE | Refills: 0 | Status: SHIPPED | OUTPATIENT
Start: 2020-09-09 | End: 2020-09-14

## 2020-09-09 NOTE — TELEPHONE ENCOUNTER
Daughter calling back stating the lodge has still not received this prescription. She states she can come to the office tomorrow morning 9/10 at 9 am and pick this up for her mother and take it to the lodge herself. Can this be printed out and ready? Please Advise.

## 2020-09-09 NOTE — TELEPHONE ENCOUNTER
Pt has moved from the nursing home back to her assisting  Living location, and discharge and face sheets has been faxed over today just a fyi pls complete and refax back. ..

## 2020-09-10 ENCOUNTER — TELEPHONE (OUTPATIENT)
Dept: ORTHOPEDIC SURGERY | Age: 80
End: 2020-09-10

## 2020-09-10 NOTE — TELEPHONE ENCOUNTER
Her daughter called and said that [facility] or [facility] is too far for her and wanted to know if they can do a VV, otherwise they might change to a different doctor closer to [facility].   She can be reached at [phone]

## 2020-09-10 NOTE — TELEPHONE ENCOUNTER
Called and spoke to Brittany Beebe. I advised to her that it is advisable to be seen in office due to needing xrays. Brittany Beebe states it is to difficult to go and get the patient from assisted living into her car, to the office and home. I explained to her that if assisted living is able to get xrays taken and given to our office a VVV can be completed or she can always ask assisted living if they have a wheelchair accessible [place] to transport her to the office. Brittany Beebe in agreement and will with updates.

## 2020-09-14 ENCOUNTER — TELEPHONE (OUTPATIENT)
Dept: INTERNAL MEDICINE CLINIC | Age: 80
End: 2020-09-14

## 2020-09-14 ENCOUNTER — TELEPHONE (OUTPATIENT)
Dept: ORTHOPEDIC SURGERY | Age: 80
End: 2020-09-14

## 2020-09-14 NOTE — TELEPHONE ENCOUNTER
Patients daughter Maeve Cantor is requesting to receiving that the form is completed by today 9/14/2020. Maeve Cantor would also like for the have to the medical need filled out as well for the patients need to have these items. Please call to have form picked up.

## 2020-09-14 NOTE — TELEPHONE ENCOUNTER
Spoke with Thee. She states [facility] [employer] will get xrays of patient if we fax over order to [address] [place] [address] @ 21 [phone]. Discussed that we need the images AND report to be able to complete VV. She states they will fax report over to us and that she will either mail disc or drop it off in the office. Just advised her that we need these before the VV. Thee understood. [facility]

## 2020-09-14 NOTE — TELEPHONE ENCOUNTER
Pt daughter Kari Peng called; req a VV for her mom upcoming appt on 10/8/20. Please advise.    [phone]  Call

## 2020-09-14 NOTE — LETTER
[facility] [address]  [address] [address]  Phone: [phone]  Fax: [phone]    Marycarmen Jones MD                                                   PRESCRIPTION/LETTER FOR INCONTINENCE SUPPLIES    September 14, 2020     Patient: Maria T Prado   YOB: 1940   Date of Visit: 08/27/2020       To Whom It May Concern:      My patient Cheri Choudhury requires the following supplies every month:    1. Depends Fit-Flex Underwear, Maximum Absorbency, Size M ( For daytime use)    2. Tranquility Premium Overnight disposable absorbent underwear, size m ( for overnight use)    3. Tranquility Super Topliner Booster Pads (For overnight use)    4. Medline Ultrasorbs underpads 30\" x 36\" (For overnight use)    Keira experiences polyuria at night that overflows the diapers and bedpads that are typically offered by Medicaid. Being wet through the night can lead to Urinary Tract Infections, therefore it is my medical opinion that the above stated medical supplies are medically necessary for my patient. If you have any questions or concerns, please don't hesitate to call.     Sincerely,        Marycarmen Jones MD

## 2020-09-17 RX ORDER — ESOMEPRAZOLE MAGNESIUM 40 MG/1
40 FOR SUSPENSION ORAL 2 TIMES DAILY
Qty: 60 PACKET | Refills: 2 | Status: SHIPPED | OUTPATIENT
Start: 2020-09-17 | End: 2020-12-18 | Stop reason: SDUPTHER

## 2020-09-17 NOTE — TELEPHONE ENCOUNTER
esomeprazole Magnesium (NEXIUM) 40 MG PACK [[id number]    Reduced to 1 time per day and pt wants to go back to twice a day.  Please update and send new script   Sabas Mcardle [place], [facility] -  [phone]

## 2020-09-24 ENCOUNTER — VIRTUAL VISIT (OUTPATIENT)
Dept: INTERNAL MEDICINE CLINIC | Age: 80
End: 2020-09-24
Payer: COMMERCIAL

## 2020-09-24 PROCEDURE — 1123F ACP DISCUSS/DSCN MKR DOCD: CPT | Performed by: INTERNAL MEDICINE

## 2020-09-24 PROCEDURE — G8400 PT W/DXA NO RESULTS DOC: HCPCS | Performed by: INTERNAL MEDICINE

## 2020-09-24 PROCEDURE — G8427 DOCREV CUR MEDS BY ELIG CLIN: HCPCS | Performed by: INTERNAL MEDICINE

## 2020-09-24 PROCEDURE — 99213 OFFICE O/P EST LOW 20 MIN: CPT | Performed by: INTERNAL MEDICINE

## 2020-09-24 PROCEDURE — 3052F HG A1C>EQUAL 8.0%<EQUAL 9.0%: CPT | Performed by: INTERNAL MEDICINE

## 2020-09-24 PROCEDURE — 1090F PRES/ABSN URINE INCON ASSESS: CPT | Performed by: INTERNAL MEDICINE

## 2020-09-24 PROCEDURE — 4040F PNEUMOC VAC/ADMIN/RCVD: CPT | Performed by: INTERNAL MEDICINE

## 2020-09-24 NOTE — PROGRESS NOTES
2020    TELEHEALTH EVALUATION -- Audio/Visual (During [place]XAK[place]-27 public health emergency)    HPI: Follow-up diabetes    Lynnette Kessler (:  1940) has requested an audio/video evaluation for the following concern(s):    Follow-up of her diabetes she is back in assisted living she feels back to normal but her blood sugars have been running high    Review of Systems no fevers chills shortness of breath or chest pain    Prior to Visit Medications    Medication Sig Taking? Authorizing Provider   esomeprazole Magnesium (NEXIUM) 40 MG PACK Take 1 packet by mouth 2 times daily Yes Arcelia Whelan MD   insulin glargine (LANTUS;BASAGLAR) 100 UNIT/ML injection pen Inject 20 Units into the skin nightly Yes Daryn Dean MD   aspirin 325 MG EC tablet Take 1 tablet by mouth 2 times daily Yes Erick Clemente MD   hydroCHLOROthiazide (HYDRODIURIL) 25 MG tablet Take 1 tablet by mouth daily Yes Erick Clemente MD   Sofosbuvir-Velpatasvir (EPCLUSA) 400-100 MG TABS Take 1 tablet by mouth daily 400/100 mg Yes Historical Provider, MD   FREESTYLE LITE strip USE TO TEST THREE TIMES A DAY. Yes Arcelia Whelan MD   FreeStyle Lancets MISC USE TO TEST BLOOD SUGAR ONCE DAILY.  Yes Arcelia Whelan MD   acetaminophen (TYLENOL) 325 MG tablet Take 1 tablet by mouth every 6 hours as needed for Pain Yes Britta Cope PA-C   DULoxetine (CYMBALTA) 20 MG extended release capsule TAKE ONE CAPSULE BY MOUTH TWICE A DAY Yes Arcelia Whelan MD   vitamin B-12 (CYANOCOBALAMIN) 1000 MCG tablet Take 1,000 mcg by mouth daily  Yes Historical Provider, MD   vitamin B-6 (PYRIDOXINE) 50 MG tablet Take 100 mg by mouth daily  Yes Historical Provider, MD   docusate (COLACE, DULCOLAX) 100 MG CAPS Take 100 mg by mouth daily Yes Jeannette Hutchinson MD   Biotin 74580 MCG TBDP Take 10,000 mcg by mouth daily  Yes Arcelia Whelan MD   Ascorbic Acid (VITAMIN C CR) 1000 MG TBCR Take 1 tablet by mouth daily Yes Arcelia Whelan MD   guaiFENesin ([place] [address]) 600 MG extended release tablet Take 1 tablet by mouth 2 times daily as needed for Congestion Yes Susan Lazo MD   polyethyl glycol-propyl glycol 0.4-0.3 % (SYSTANE) 0.4-0.3 % ophthalmic solution Place 1 drop into both eyes as needed for Dry Eyes Yes Susan Lazo MD   Vitamin D 3 (CHOLECALCIFEROL) 1000 UNITS TABS tablet Take 1,000 Units by mouth daily. Yes Historical Provider, MD       Social History     Tobacco Use    Smoking status: Former Smoker     Packs/day: 2.00     Years: 30.00     Pack years: 60.00     Types: Cigarettes     Start date:      Last attempt to quit: 1980     Years since quittin.7    Smokeless tobacco: Never Used    Tobacco comment: smoked 30 yrs ago   Substance Use Topics    Alcohol use:  Yes     Alcohol/week: 1.0 standard drinks     Types: 1 Glasses of wine per week     Frequency: Monthly or less     Drinks per session: 1 or 2     Binge frequency: Never     Comment: 2 per day    Drug use: No        Past Medical History:   Diagnosis Date    ADHD (attention deficit hyperactivity disorder)     Attention deficit disorder without mention of hyperactivity 2010    Autoimmune disease NEC     Carotid artery disease ([facility] 2013    LEFT CAROTID ENDARTERECTOMY               Carotid artery disease ([facility] 8/3/2013    Chronic persistent hepatitis ([facility] 2010    Depression     Depressive disorder, not elsewhere classified 2010    Double vision     left eye, since cataract surgery    Fractures     GERD (gastroesophageal reflux disease)     Hx of interstitial lung disease 2019    Neuropathy     Rheumatoid arthritis(714.0) 2010    Spinal stenosis     Type II or unspecified type diabetes mellitus without mention of complication, not stated as uncontrolled 2010    Unspecified cerebral artery occlusion with cerebral infarction     right hand, loss of some sensation       PHYSICAL EXAMINATION:  [ INSTRUCTIONS:  \"[x]\" Indicates a positive item  \"[]\" Indicates a negative item  -- DELETE ALL ITEMS NOT EXAMINED]  Vital Signs: (As obtained by patient/caregiver or practitioner observation)    Blood pressure-  Heart rate-    Respiratory rate-    Temperature-  Pulse oximetry-     Constitutional: [x] Appears well-developed and well-nourished [x] No apparent distress      [] Abnormal-   Mental status  [] Alert and awake  [x] Oriented to person/place/time [x]Able to follow commands      Eyes:  EOM    []  Normal  [] Abnormal-  Sclera  []  Normal  [] Abnormal -         Discharge []  None visible  [] Abnormal -    HENT:   [] Normocephalic, atraumatic. [] Abnormal   [] Mouth/Throat: Mucous membranes are moist.     External Ears [] Normal  [] Abnormal-     Neck: [] No visualized mass     Pulmonary/Chest: [x] Respiratory effort normal.  [x] No visualized signs of difficulty breathing or respiratory distress        [] Abnormal-      Musculoskeletal:   [] Normal gait with no signs of ataxia         [] Normal range of motion of neck        [] Abnormal-       Neurological:        [x] No Facial Asymmetry (Cranial nerve 7 motor function) (limited exam to video visit)          [x] No gaze palsy        [] Abnormal-         Skin:        [] No significant exanthematous lesions or discoloration noted on facial skin         [] Abnormal-            Psychiatric:       [x] Normal Affect [x] No Hallucinations        [] Abnormal-     Other pertinent observable physical exam findings-     ASSESSMENT/PLAN:  Diabetes currently out of control will increase Lantus insulin to 30 units from 25 and follow-up in 3 months        Tay Disla is a 79 y.o. female being evaluated by a Virtual Visit (video visit) encounter to address concerns as mentioned above. A caregiver was present when appropriate. Due to this being a TeleHealth encounter (During [place] [address] public health emergency), evaluation of the following organ systems was limited: Vitals/Constitutional/EENT/Resp/CV/GI//MS/Neuro/Skin/Heme-Lymph-Imm.   Pursuant to the emergency declaration under the [facility], [address] [place] [address] authority and the [employer] and [employer] Act, this Virtual Visit was conducted with patient's (and/or legal guardian's) consent, to reduce the patient's risk of exposure to COVID-19 and provide necessary medical care. The patient (and/or legal guardian) has also been advised to contact this office for worsening conditions or problems, and seek emergency medical treatment and/or call 911 if deemed necessary. Patient identification was verified at the start of the visit: Yes    Total time spent on this encounter: Not billed by time    Services were provided through a video synchronous discussion virtually to substitute for in-person clinic visit. Patient and provider were located at their individual homes. --Mukund Echevarria MD on 9/24/2020 at 3:41 PM    An electronic signature was used to authenticate this note.

## 2020-09-28 ENCOUNTER — TELEPHONE (OUTPATIENT)
Dept: ORTHOPEDIC SURGERY | Age: 80
End: 2020-09-28

## 2020-09-28 NOTE — TELEPHONE ENCOUNTER
Spoke with Otto Jessica. Let her know I faxed over x-ray order on 9/14/2020 when we spoke on the phone.

## 2020-09-28 NOTE — TELEPHONE ENCOUNTER
Patient's daughter Merry Blackburn would like a call back to confirm if orders have be sent over to the snf home for her mother to get an xray.  Ph [phone]

## 2020-10-06 RX ORDER — INSULIN GLARGINE 100 [IU]/ML
INJECTION, SOLUTION SUBCUTANEOUS
Qty: 5 PEN | Refills: 1 | Status: SHIPPED
Start: 2020-10-06 | End: 2020-10-14 | Stop reason: ALTCHOICE

## 2020-10-08 ENCOUNTER — VIRTUAL VISIT (OUTPATIENT)
Dept: ORTHOPEDIC SURGERY | Age: 80
End: 2020-10-08

## 2020-10-08 ENCOUNTER — TELEPHONE (OUTPATIENT)
Dept: ORTHOPEDIC SURGERY | Age: 80
End: 2020-10-08

## 2020-10-08 PROCEDURE — 99024 POSTOP FOLLOW-UP VISIT: CPT | Performed by: ORTHOPAEDIC SURGERY

## 2020-10-08 NOTE — PROGRESS NOTES
really benefit from a course of physical therapy for further strengthening and stretching. An Rx for [facility] physical therapy will be ordered. The patient will come back for a follow up in 6 weeks. At that time, we will take 3 views of the right shoulder. As this patient has demonstrated risk factors for osteoporosis, such as age and evidence of a fracture, I have referred the patient back to the primary care physician for evaluation for osteoporosis, including consideration for DEXA scanning, if this is felt to be clinically indicated. The patient is advised to contact the primary care physician to follow-up for further evaluation. Pursuant to the emergency declaration under the [facility], [facility] waiver authority and the [employer] Act, this Virtual  Visit was conducted, with patient's consent, to reduce the patient's risk of exposure to COVID-19 and provide continuity of care for an established patient. Services were provided through a video synchronous discussion virtually to substitute for in-person clinic visit.       Moise Alba MD

## 2020-10-08 NOTE — TELEPHONE ENCOUNTER
Called and spoke with Abimael Faust. Let her know we still have not received x-ray report and images for her VVV today 10/8. Abimael Faust will check with NH can call us back to r/s appointment.

## 2020-10-08 NOTE — TELEPHONE ENCOUNTER
Test Results     Type of Test: XR  Date of Test: 08/11  Location of Test: [facility]  Patient Contact Number: [phone]    [facility] IS REQUESTING WE CALL AND REQUEST THE XRAYS AND THE REPORT  [phone]

## 2020-10-09 ENCOUNTER — TELEPHONE (OUTPATIENT)
Dept: INTERNAL MEDICINE CLINIC | Age: 80
End: 2020-10-09

## 2020-10-12 ENCOUNTER — TELEPHONE (OUTPATIENT)
Dept: INTERNAL MEDICINE CLINIC | Age: 80
End: 2020-10-12

## 2020-10-12 RX ORDER — ALBUTEROL SULFATE 90 UG/1
2 AEROSOL, METERED RESPIRATORY (INHALATION) EVERY 6 HOURS PRN
Qty: 1 INHALER | Refills: 3 | COMMUNITY
Start: 2020-10-12

## 2020-10-13 ENCOUNTER — TELEPHONE (OUTPATIENT)
Dept: INTERNAL MEDICINE CLINIC | Age: 80
End: 2020-10-13

## 2020-10-13 RX ORDER — INSULIN GLARGINE 100 [IU]/ML
36 INJECTION, SOLUTION SUBCUTANEOUS NIGHTLY
Qty: 5 PEN | Refills: 3 | Status: SHIPPED | OUTPATIENT
Start: 2020-10-13 | End: 2021-01-19 | Stop reason: DRUGHIGH

## 2020-10-13 NOTE — TELEPHONE ENCOUNTER
Pt is still looking for this medication to be sent over \"basaglar\" stated she was told it would get sent and it still isnt at the pharmacy pls advise      [facility] -  [phone]    Pt would like a call back once sent

## 2020-10-14 RX ORDER — HYDROCODONE BITARTRATE AND ACETAMINOPHEN 5; 325 MG/1; MG/1
1 TABLET ORAL EVERY 4 HOURS PRN
Qty: 30 TABLET | Refills: 0 | Status: SHIPPED | OUTPATIENT
Start: 2020-10-14 | End: 2020-10-27 | Stop reason: SDUPTHER

## 2020-10-14 NOTE — TELEPHONE ENCOUNTER
Med refill    HYDROcodone-acetaminophen (NORCO) 5-325 MG per tablet     [facility], Denise Landau, [address]

## 2020-10-14 NOTE — TELEPHONE ENCOUNTER
HYDROcodone-acetaminophen (NORCO) 5-325 MG per tablet 1 tablet [[id number]] - in need of a new rx pls advise

## 2020-10-21 ENCOUNTER — TELEPHONE (OUTPATIENT)
Dept: INTERNAL MEDICINE CLINIC | Age: 80
End: 2020-10-21

## 2020-10-21 RX ORDER — HYDROCODONE BITARTRATE AND ACETAMINOPHEN 5; 325 MG/1; MG/1
1 TABLET ORAL EVERY 6 HOURS PRN
Qty: 90 TABLET | Refills: 0 | Status: CANCELLED | OUTPATIENT
Start: 2020-10-21 | End: 2020-11-20

## 2020-10-21 NOTE — TELEPHONE ENCOUNTER
Sunil Dixon with [facility] called requesting a refill on:     HYDROcodone-acetaminophen (NORCO) 5-325 MG per tablet [[id number]    Patient is taking this every 4 hours as instructed. Is there anyway they can get a 30 day supply and not seven day? Nursing home did advise her patient is running low on her medication. Please call to advise.      [facility], Blossom Shannon, [address]

## 2020-10-26 ENCOUNTER — TELEPHONE (OUTPATIENT)
Dept: INTERNAL MEDICINE CLINIC | Age: 80
End: 2020-10-26

## 2020-10-26 NOTE — TELEPHONE ENCOUNTER
Pt. Seems to be a little more short of breath lately resp. Was 26 and with resp lying down it was about a 30 she was given inhaler.

## 2020-10-27 RX ORDER — HYDROCODONE BITARTRATE AND ACETAMINOPHEN 5; 325 MG/1; MG/1
1 TABLET ORAL EVERY 4 HOURS PRN
Qty: 90 TABLET | Refills: 0 | Status: SHIPPED | OUTPATIENT
Start: 2020-10-27 | End: 2020-11-03

## 2020-10-27 NOTE — TELEPHONE ENCOUNTER
The patient daughter is calling again for a med refill 30 day-supply     HYDROcodone-acetaminophen (Daniel Hussar) 5-325 MG per tablet     [facility], [address]

## 2020-10-28 RX ORDER — HYDROCODONE BITARTRATE AND ACETAMINOPHEN 5; 325 MG/1; MG/1
TABLET ORAL
Qty: 30 TABLET | Refills: 0 | OUTPATIENT
Start: 2020-10-28

## 2020-10-29 NOTE — TELEPHONE ENCOUNTER
This prescription was filled at [facility]. Spoke to [facility] [place].

## 2020-11-04 NOTE — TELEPHONE ENCOUNTER
PA submitted via [facility] for Esomeprazole Magnesium 40MG packets.   (Key: [id number])     STATUS: PENDING

## 2020-11-05 ENCOUNTER — TELEPHONE (OUTPATIENT)
Dept: INTERNAL MEDICINE CLINIC | Age: 80
End: 2020-11-05

## 2020-11-05 NOTE — TELEPHONE ENCOUNTER
To make sure she did not have a blood disorder. She did not. Call returned to daughter. Message left for Ozella Essex.

## 2020-11-05 NOTE — TELEPHONE ENCOUNTER
Patient's daughter called stating her mother had a blood test done on 01/06/2020, JAK2 V617F MUTATION. She would like to know exactly what this was for? Please call to advise.

## 2020-11-09 ENCOUNTER — TELEPHONE (OUTPATIENT)
Dept: INTERNAL MEDICINE CLINIC | Age: 80
End: 2020-11-09

## 2020-11-09 NOTE — TELEPHONE ENCOUNTER
Daughter states she is calling about the dosage on the   insulin glargine [facility]) 100 UNIT/ML injection pen [[id number]]     Order Details   Dose: 36 Units  Route: Subcutaneous         And to be faxed over to the lodge   Fax number is [phone]

## 2020-11-10 RX ORDER — POLYVINYL ALCOHOL, POVIDONE 14; 6 MG/ML; MG/ML
SOLUTION/ DROPS OPHTHALMIC
Qty: 1 BOTTLE | Refills: 2 | Status: SHIPPED | OUTPATIENT
Start: 2020-11-10 | End: 2021-02-03

## 2020-11-11 NOTE — TELEPHONE ENCOUNTER
Patient daughter called thinking that her  insulin glargine [facility]) 100 UNIT/ML injection pen [[id number]    May need to be reduced. She states the nursing home is giving this to her in the morning and her blood sugar is been low, average is 69. If so she needs a order sent to the nursing home @ [facility] - [phone].      Please call with any questions

## 2020-12-18 ENCOUNTER — TELEPHONE (OUTPATIENT)
Dept: INTERNAL MEDICINE CLINIC | Age: 80
End: 2020-12-18

## 2020-12-18 DIAGNOSIS — K21.9 GASTROESOPHAGEAL REFLUX DISEASE, UNSPECIFIED WHETHER ESOPHAGITIS PRESENT: Primary | ICD-10-CM

## 2020-12-18 RX ORDER — ESOMEPRAZOLE MAGNESIUM 40 MG/1
40 FOR SUSPENSION ORAL 2 TIMES DAILY
Qty: 60 PACKET | Refills: 2 | Status: SHIPPED
Start: 2020-12-18 | End: 2021-01-05 | Stop reason: CLARIF

## 2020-12-18 NOTE — TELEPHONE ENCOUNTER
Patients daughter called to request a PA for esomeprazole Magnesium (NEXIUM) 40 MG PACK. Please call and advise.

## 2020-12-29 ENCOUNTER — TELEPHONE (OUTPATIENT)
Dept: INTERNAL MEDICINE CLINIC | Age: 80
End: 2020-12-29

## 2020-12-29 NOTE — TELEPHONE ENCOUNTER
Pt has a poss uti and a fax was sent for a order and daughter would like to know the out come requesting a call back regarding this matter pls advise        [facility], Vimal Mike, [address] [phone]

## 2020-12-29 NOTE — TELEPHONE ENCOUNTER
Call returned to [facility]. Patient has had diarrhea for 5 days. Dementia seems alittle worse. Urine is malodorus. Patient suggest she may have uti. Will discuss with physician again tomorrow.     Macrobid bid x 5 days

## 2020-12-30 RX ORDER — NITROFURANTOIN 25; 75 MG/1; MG/1
100 CAPSULE ORAL 2 TIMES DAILY
Qty: 10 CAPSULE | Refills: 0 | Status: SHIPPED | OUTPATIENT
Start: 2020-12-30 | End: 2021-01-04

## 2021-01-04 ENCOUNTER — TELEPHONE (OUTPATIENT)
Dept: INTERNAL MEDICINE CLINIC | Age: 81
End: 2021-01-04

## 2021-01-04 NOTE — TELEPHONE ENCOUNTER
Deondre Pritchard with [facility] called to advise the patients respirations are 32 and O2 is at 91 sugar is up as high as 291. Patient did start extra lasix yesterday and today. Patient is getting a rapid Covid-19. The patients family does not want the patient to go to the hospital but Deondre Pritchard feels the patient may need to go to the ED. Deondre Pritchard would like to know what Dr. Jessica Parkinson thinks should be done. Please call and advise.

## 2021-01-05 ENCOUNTER — VIRTUAL VISIT (OUTPATIENT)
Dept: INTERNAL MEDICINE CLINIC | Age: 81
End: 2021-01-05
Payer: COMMERCIAL

## 2021-01-05 DIAGNOSIS — Z79.4 TYPE 2 DIABETES MELLITUS WITH HYPERGLYCEMIA, WITH LONG-TERM CURRENT USE OF INSULIN (HCC): Primary | ICD-10-CM

## 2021-01-05 DIAGNOSIS — I50.9 CONGESTIVE HEART FAILURE, UNSPECIFIED HF CHRONICITY, UNSPECIFIED HEART FAILURE TYPE (HCC): ICD-10-CM

## 2021-01-05 DIAGNOSIS — E11.65 TYPE 2 DIABETES MELLITUS WITH HYPERGLYCEMIA, WITH LONG-TERM CURRENT USE OF INSULIN (HCC): Primary | ICD-10-CM

## 2021-01-05 PROCEDURE — G8427 DOCREV CUR MEDS BY ELIG CLIN: HCPCS | Performed by: INTERNAL MEDICINE

## 2021-01-05 PROCEDURE — 1090F PRES/ABSN URINE INCON ASSESS: CPT | Performed by: INTERNAL MEDICINE

## 2021-01-05 PROCEDURE — 1123F ACP DISCUSS/DSCN MKR DOCD: CPT | Performed by: INTERNAL MEDICINE

## 2021-01-05 PROCEDURE — G8400 PT W/DXA NO RESULTS DOC: HCPCS | Performed by: INTERNAL MEDICINE

## 2021-01-05 PROCEDURE — 99213 OFFICE O/P EST LOW 20 MIN: CPT | Performed by: INTERNAL MEDICINE

## 2021-01-05 PROCEDURE — 4040F PNEUMOC VAC/ADMIN/RCVD: CPT | Performed by: INTERNAL MEDICINE

## 2021-01-05 RX ORDER — FUROSEMIDE 40 MG/1
40 TABLET ORAL DAILY
COMMUNITY
End: 2021-01-11

## 2021-01-05 RX ORDER — ESOMEPRAZOLE MAGNESIUM 40 MG/1
40 CAPSULE, DELAYED RELEASE ORAL 2 TIMES DAILY
COMMUNITY
End: 2021-01-19

## 2021-01-05 NOTE — PROGRESS NOTES
2021    TELEHEALTH EVALUATION -- Audio/Visual (During [facility])    HPI: Follow-up congestive heart failure    Eryn Russ (:  1940) has requested an audio/video evaluation for the following concern(s):    Follow-up of congestive heart failure she was placed on Lasix for 3 days and is doing somewhat better she still having some edema and shortness of breath    Review of Systems no fevers or chills    Prior to Visit Medications    Medication Sig Taking?  Authorizing Provider   furosemide (LASIX) 40 MG tablet Take 40 mg by mouth daily Yes Historical Provider, MD   esomeprazole (NEXIUM) 40 MG delayed release capsule Take 40 mg by mouth 2 times daily Yes Historical Provider, MD   REFRESH 1.4-0.6 % SOLN INSTILL 1 TO 2 DROPS INTO BOTH EYES TWICE A DAY Yes Lidia Rojas MD   insulin glargine (BASAGLAR KWIKPEN) 100 UNIT/ML injection pen Inject 36 Units into the skin nightly  Patient taking differently: Inject 31 Units into the skin nightly  Yes Lidia Rojas MD   albuterol sulfate HFA (PROVENTIL HFA) 108 (90 Base) MCG/ACT inhaler Inhale 2 puffs into the lungs every 6 hours as needed for Wheezing Yes Lidia Rojas MD   aspirin 325 MG EC tablet Take 1 tablet by mouth 2 times daily Yes Enrike Chowdhury MD   acetaminophen (TYLENOL) 325 MG tablet Take 1 tablet by mouth every 6 hours as needed for Pain Yes Britta Cope PA-C   DULoxetine (CYMBALTA) 20 MG extended release capsule TAKE ONE CAPSULE BY MOUTH TWICE A DAY Yes Lidia Rojas MD   vitamin B-12 (CYANOCOBALAMIN) 1000 MCG tablet Take 1,000 mcg by mouth daily  Yes Historical Provider, MD   vitamin B-6 (PYRIDOXINE) 50 MG tablet Take 100 mg by mouth daily  Yes Historical Provider, MD   docusate (COLACE, DULCOLAX) 100 MG CAPS Take 100 mg by mouth daily Yes Gerhard Parham MD   Biotin 23618 MCG TBDP Take 10,000 mcg by mouth daily  Yes Lidia Rojas MD Ascorbic Acid (VITAMIN C CR) 1000 MG TBCR Take 1 tablet by mouth daily Yes Butch Hernandez MD   guaiFENesin (MUCINEX) 600 MG extended release tablet Take 1 tablet by mouth 2 times daily as needed for Congestion Yes Butch Hernandez MD   polyethyl glycol-propyl glycol 0.4-0.3 % (SYSTANE) 0.4-0.3 % ophthalmic solution Place 1 drop into both eyes as needed for Dry Eyes Yes Butch Hernandez MD   Vitamin D 3 (CHOLECALCIFEROL) 1000 UNITS TABS tablet Take 1,000 Units by mouth daily. Yes Historical Provider, MD       Social History     Tobacco Use    Smoking status: Former Smoker     Packs/day: 2.00     Years: 30.00     Pack years: 60.00     Types: Cigarettes     Start date:      Quit date:      Years since quittin.0    Smokeless tobacco: Never Used    Tobacco comment: smoked 30 yrs ago   Substance Use Topics    Alcohol use:  Yes     Alcohol/week: 1.0 standard drinks     Types: 1 Glasses of wine per week     Frequency: Monthly or less     Drinks per session: 1 or 2     Binge frequency: Never     Comment: 2 per day    Drug use: No        Past Medical History:   Diagnosis Date    ADHD (attention deficit hyperactivity disorder)     Attention deficit disorder without mention of hyperactivity 2010    Autoimmune disease NEC     [facility]    LEFT CAROTID ENDARTERECTOMY               Carot[facility] disease [facility] 8/3/2013    Chronic persistent hepatitis ([facility] 2010    Depression     Depressive disorder, not elsewhere classified 2010    Double vision     left eye, since cataract surgery    Fractures     GERD (gastroesophageal reflux disease)     Hx of interstitial lung disease 2019    Neuropathy     Rheumatoid arthritis(714.0) 2010    Spinal stenosis     Type II or unspecified type diabetes mellitus without mention of complication, not stated as uncontrolled 2010    Unspecified cerebral artery occlusion with cerebral infarction right hand, loss of some sensation       PHYSICAL EXAMINATION:  [ INSTRUCTIONS:  \"[x]\" Indicates a positive item  \"[]\" Indicates a negative item  -- DELETE ALL ITEMS NOT EXAMINED]  Vital Signs: (As obtained by patient/caregiver or practitioner observation)    Blood pressure-  Heart rate-    Respiratory rate-    Temperature-  Pulse oximetry-     Constitutional: [x] Appears well-developed and well-nourished [x] No apparent distress      [] Abnormal-   Mental status  [x] Alert and awake  [] Oriented to person/place/time []Able to follow commands      Eyes:  EOM    []  Normal  [] Abnormal-  Sclera  []  Normal  [] Abnormal -         Discharge []  None visible  [] Abnormal -    HENT:   [] Normocephalic, atraumatic.   [] Abnormal   [] Mouth/Throat: Mucous membranes are moist.     External Ears [] Normal  [] Abnormal-     Neck: [] No visualized mass     Pulmonary/Chest: [x] Respiratory effort normal.  [x] No visualized signs of difficulty breathing or respiratory distress        [] Abnormal-      Musculoskeletal:   [] Normal gait with no signs of ataxia         [] Normal range of motion of neck        [] Abnormal-       Neurological:        [] No Facial Asymmetry (Cranial nerve 7 motor function) (limited exam to video visit)          [] No gaze palsy        [] Abnormal-         Skin:        [] No significant exanthematous lesions or discoloration noted on facial skin         [x] Abnormal-trace pitting edema bilaterally           Psychiatric:       [] Normal Affect [] No Hallucinations        [] Abnormal-     Other pertinent observable physical exam findings-     ASSESSMENT/PLAN:  Congestive heart failure now on Lasix we will continue Lasix for now stop hydrochlorothiazide  Diabetes under fair control continue Basaglar plus correction factor insulin and follow-up in 2 weeks in the office Eryn Russ is a 79 y.o. female being evaluated by a Virtual Visit (video visit) encounter to address concerns as mentioned above. A caregiver was present when appropriate. Due to this being a TeleHealth encounter (During SXKZV-97 public health emergency), evaluation of the following organ systems was limited: Vitals/Constitutional/EENT/Resp/CV/GI//MS/Neuro/Skin/Heme-Lymph-Imm. Pursuant to the emergency declaration under the [address] [place] [address] and the [employer] Act, this Virtual Visit was conducted with patient's (and/or legal guardian's) consent, to reduce the patient's risk of exposure to COVID-19 and provide necessary medical care. The patient (and/or legal guardian) has also been advised to contact this office for worsening conditions or problems, and seek emergency medical treatment and/or call 911 if deemed necessary. Patient identification was verified at the start of the visit: Yes    Total time spent on this encounter: Not billed by time    Services were provided through a video synchronous discussion virtually to substitute for in-person clinic visit. Patient and provider were located at their individual homes. --Lidia Rojas MD on 1/5/2021 at 4:37 PM    An electronic signature was used to authenticate this note.

## 2021-01-11 RX ORDER — FUROSEMIDE 40 MG/1
20 TABLET ORAL DAILY
Qty: 60 TABLET | Refills: 0
Start: 2021-01-11

## 2021-01-11 NOTE — TELEPHONE ENCOUNTER
Patients daughter states [facility] requires this change of medication to faxed in an order to   [phone].     Please advise/

## 2021-01-11 NOTE — TELEPHONE ENCOUNTER
Pt states she was on 40 mg of lasix over the weekend was having pains in her kidneys so they lowered the dosage to 20 mg and wants to know if that's ok.  Please advise

## 2021-01-12 RX ORDER — PANTOPRAZOLE SODIUM 40 MG/1
40 TABLET, DELAYED RELEASE ORAL
Qty: 90 TABLET | Refills: 1 | Status: SHIPPED | OUTPATIENT
Start: 2021-01-12

## 2021-01-12 NOTE — TELEPHONE ENCOUNTER
[place] with [address] called stating she needs to know if  You are going to continue to try to get this medication covered or if you are going to try her on some thing else?      Please call to advise

## 2021-01-15 ENCOUNTER — TELEPHONE (OUTPATIENT)
Dept: INTERNAL MEDICINE CLINIC | Age: 81
End: 2021-01-15

## 2021-01-15 NOTE — TELEPHONE ENCOUNTER
Gabby Ferrell the daughter of the patient called to have  call her mothers gastroenterologist Dr. Selena Eddy. Phone Number [phone]  Fax Number [phone]    Please call and advise.

## 2021-01-19 ENCOUNTER — VIRTUAL VISIT (OUTPATIENT)
Dept: INTERNAL MEDICINE CLINIC | Age: 81
End: 2021-01-19
Payer: COMMERCIAL

## 2021-01-19 DIAGNOSIS — I50.9 CHRONIC CONGESTIVE HEART FAILURE, UNSPECIFIED HEART FAILURE TYPE (HCC): ICD-10-CM

## 2021-01-19 DIAGNOSIS — F34.1 DYSTHYMIA: Primary | ICD-10-CM

## 2021-01-19 DIAGNOSIS — Z79.4 TYPE 2 DIABETES MELLITUS WITH HYPERGLYCEMIA, WITH LONG-TERM CURRENT USE OF INSULIN (HCC): ICD-10-CM

## 2021-01-19 DIAGNOSIS — E11.65 TYPE 2 DIABETES MELLITUS WITH HYPERGLYCEMIA, WITH LONG-TERM CURRENT USE OF INSULIN (HCC): ICD-10-CM

## 2021-01-19 PROCEDURE — 1123F ACP DISCUSS/DSCN MKR DOCD: CPT | Performed by: INTERNAL MEDICINE

## 2021-01-19 PROCEDURE — 4040F PNEUMOC VAC/ADMIN/RCVD: CPT | Performed by: INTERNAL MEDICINE

## 2021-01-19 PROCEDURE — 99213 OFFICE O/P EST LOW 20 MIN: CPT | Performed by: INTERNAL MEDICINE

## 2021-01-19 PROCEDURE — 1090F PRES/ABSN URINE INCON ASSESS: CPT | Performed by: INTERNAL MEDICINE

## 2021-01-19 PROCEDURE — G8427 DOCREV CUR MEDS BY ELIG CLIN: HCPCS | Performed by: INTERNAL MEDICINE

## 2021-01-19 PROCEDURE — G8400 PT W/DXA NO RESULTS DOC: HCPCS | Performed by: INTERNAL MEDICINE

## 2021-01-19 RX ORDER — INSULIN GLARGINE 100 [IU]/ML
31 INJECTION, SOLUTION SUBCUTANEOUS NIGHTLY
Qty: 1 PEN | Refills: 0 | Status: SHIPPED
Start: 2021-01-19 | End: 2021-01-22 | Stop reason: DRUGHIGH

## 2021-01-19 NOTE — PROGRESS NOTES
2021    TELEHEALTH EVALUATION -- Audio/Visual (During KWCSK-06 public health emergency)    HPI: Follow-up congestive heart failure    Corbin Garcia (:  1940) has requested an audio/video evaluation for the following concern(s):    Follow-up of congestive heart failure she is feeling better now her weight is stable at 120 and she is less short of breath    Review of Systems does note some increasing depression    Prior to Visit Medications    Medication Sig Taking?  Authorizing Provider   insulin glargine (BASAGLAR KWIKPEN) 100 UNIT/ML injection pen Inject 31 Units into the skin nightly Yes Roxanna Laura MD   pantoprazole (PROTONIX) 40 MG tablet Take 1 tablet by mouth every morning (before breakfast) Yes Roxanna Laura MD   furosemide (LASIX) 40 MG tablet Take 0.5 tablets by mouth daily Yes Roxanna Laura MD   REFRESH 1.4-0.6 % SOLN INSTILL 1 TO 2 DROPS INTO BOTH EYES TWICE A DAY Yes Roxanna Laura MD   albuterol sulfate HFA (PROVENTIL HFA) 108 (90 Base) MCG/ACT inhaler Inhale 2 puffs into the lungs every 6 hours as needed for Wheezing Yes Roxanna Laura MD   acetaminophen (TYLENOL) 325 MG tablet Take 1 tablet by mouth every 6 hours as needed for Pain Yes Britta Cope PA-C   DULoxetine (CYMBALTA) 20 MG extended release capsule TAKE ONE CAPSULE BY MOUTH TWICE A DAY Yes Roxanna Laura MD   vitamin B-12 (CYANOCOBALAMIN) 1000 MCG tablet Take 1,000 mcg by mouth daily  Yes Historical Provider, MD   vitamin B-6 (PYRIDOXINE) 50 MG tablet Take 100 mg by mouth daily  Yes Historical Provider, MD   docusate (COLACE, DULCOLAX) 100 MG CAPS Take 100 mg by mouth daily Yes Terrie Troy MD   Biotin 85206 MCG TBDP Take 10,000 mcg by mouth daily  Yes Roxanna Laura MD   Ascorbic Acid (VITAMIN C CR) 1000 MG TBCR Take 1 tablet by mouth daily Yes Roxanna Laura MD   guaiFENesin (MUCINEX) 600 MG extended release tablet Take 1 tablet by mouth 2 times daily as needed for Congestion Yes Roxanna Laura MD polyethyl glycol-propyl glycol 0.4-0.3 % (SYSTANE) 0.4-0.3 % ophthalmic solution Place 1 drop into both eyes as needed for Dry Eyes Yes Tuan Carrington MD   Vitamin D 3 (CHOLECALCIFEROL) 1000 UNITS TABS tablet Take 1,000 Units by mouth daily. Yes Historical Provider, MD       Social History     Tobacco Use    Smoking status: Former Smoker     Packs/day: 2.00     Years: 30.00     Pack years: 60.00     Types: Cigarettes     Start date: 11     Quit date:      Years since quittin.0    Smokeless tobacco: Never Used    Tobacco comment: smoked 30 yrs ago   Substance Use Topics    Alcohol use:  Yes     Alcohol/week: 1.0 standard drinks     Types: 1 Glasses of wine per week     Frequency: Monthly or less     Drinks per session: 1 or 2     Binge frequency: Never     Comment: 2 per day    Drug use: No        Past Medical History:   Diagnosis Date    ADHD (attention deficit hyperactivity disorder)     Attention deficit disorder without mention of hyperactivity 2010    Autoimmune disease NEC     [facility] 2013    LEFT CAROTID ENDARTERECTOMY               Carotid [facility] disease ([facility] 8/3/2013    Chronic persistent hepatitis ([facility] 2010    Depression     Depressive disorder, not elsewhere classified 2010    Double vision     left eye, since cataract surgery    Fractures     GERD (gastroesophageal reflux disease)     Hx of interstitial lung disease 2019    Neuropathy     Rheumatoid arthritis(714.0) 2010    Spinal stenosis     Type II or unspecified type diabetes mellitus without mention of complication, not stated as uncontrolled 2010    Unspecified cerebral artery occlusion with cerebral infarction     right hand, loss of some sensation       PHYSICAL EXAMINATION:  [ INSTRUCTIONS:  \"[x]\" Indicates a positive item  \"[]\" Indicates a negative item  -- DELETE ALL ITEMS NOT EXAMINED] Fani Yap is a 79 y.o. female being evaluated by a Virtual Visit (video visit) encounter to address concerns as mentioned above. A caregiver was present when appropriate. Due to this being a TeleHealth encounter (During VEWLT-17 public health emergency), evaluation of the following organ systems was limited: Vitals/Constitutional/EENT/Resp/CV/GI//MS/Neuro/Skin/Heme-Lymph-Imm. Pursuant to the emergency declaration under the [address] [place] [address] and the [employer] Act, this Virtual Visit was conducted with patient's (and/or legal guardian's) consent, to reduce the patient's risk of exposure to COVID-19 and provide necessary medical care. The patient (and/or legal guardian) has also been advised to contact this office for worsening conditions or problems, and seek emergency medical treatment and/or call 911 if deemed necessary. Patient identification was verified at the start of the visit: Yes    Total time spent on this encounter: Not billed by time    Services were provided through a video synchronous discussion virtually to substitute for in-person clinic visit. Patient and provider were located at their individual homes. --Laina Vargas MD on 2021 at 5:04 PM    An electronic signature was used to authenticate this note.

## 2021-01-20 ENCOUNTER — TELEPHONE (OUTPATIENT)
Dept: INTERNAL MEDICINE CLINIC | Age: 81
End: 2021-01-20

## 2021-01-21 ENCOUNTER — TELEPHONE (OUTPATIENT)
Dept: INTERNAL MEDICINE CLINIC | Age: 81
End: 2021-01-21

## 2021-01-21 NOTE — TELEPHONE ENCOUNTER
Patients daughter is calling for Dr. Mindy Vilchis or his MA to fax over the number of units for Keira insulin glargine [facility]) 100 UNIT/ML injection pen over to [facility].      [facility] [employer]. [phone]    If any questions please feel free to call   Patients daughter [facility] [phone]

## 2021-01-21 NOTE — TELEPHONE ENCOUNTER
Blood sugar readings:    152 Average in morning    168 Average afternoon    206 Average evening     Currently on 31 units nightly

## 2021-01-22 RX ORDER — INSULIN GLARGINE 100 [IU]/ML
35 INJECTION, SOLUTION SUBCUTANEOUS NIGHTLY
Qty: 1 PEN | Refills: 0 | Status: SHIPPED | OUTPATIENT
Start: 2021-01-22 | End: 2021-02-17

## 2021-01-29 ENCOUNTER — TELEPHONE (OUTPATIENT)
Dept: INTERNAL MEDICINE CLINIC | Age: 81
End: 2021-01-29

## 2021-01-29 NOTE — TELEPHONE ENCOUNTER
Pt daughter was advised to make sure Dr. Jeana Combs has received request for pt to have Palliative care from home care facility. Pt daughter would like a confirmation call that office has received paperwork and when paperwork expects to be completed and sent in.   Please advise

## 2021-02-03 RX ORDER — POLYVINYL ALCOHOL, POVIDONE 14; 6 MG/ML; MG/ML
SOLUTION/ DROPS OPHTHALMIC
Qty: 1 BOTTLE | Refills: 2 | Status: SHIPPED | OUTPATIENT
Start: 2021-02-03

## 2021-02-03 RX ORDER — SPIRONOLACTONE AND HYDROCHLOROTHIAZIDE 25; 25 MG/1; MG/1
1 TABLET ORAL DAILY
Qty: 90 TABLET | Refills: 1 | Status: SHIPPED | OUTPATIENT
Start: 2021-02-03 | End: 2021-02-25

## 2021-02-17 RX ORDER — INSULIN GLARGINE 100 [IU]/ML
INJECTION, SOLUTION SUBCUTANEOUS
Qty: 15 ML | Refills: 3 | Status: SHIPPED | OUTPATIENT
Start: 2021-02-17 | End: 2021-03-01 | Stop reason: SDUPTHER

## 2021-02-24 ENCOUNTER — TELEPHONE (OUTPATIENT)
Dept: INTERNAL MEDICINE CLINIC | Age: 81
End: 2021-02-24

## 2021-02-25 ENCOUNTER — VIRTUAL VISIT (OUTPATIENT)
Dept: INTERNAL MEDICINE CLINIC | Age: 81
End: 2021-02-25
Payer: COMMERCIAL

## 2021-02-25 DIAGNOSIS — Z79.4 TYPE 2 DIABETES MELLITUS WITH HYPOGLYCEMIA WITHOUT COMA, WITH LONG-TERM CURRENT USE OF INSULIN (HCC): Primary | ICD-10-CM

## 2021-02-25 DIAGNOSIS — E11.649 TYPE 2 DIABETES MELLITUS WITH HYPOGLYCEMIA WITHOUT COMA, WITH LONG-TERM CURRENT USE OF INSULIN (HCC): Primary | ICD-10-CM

## 2021-02-25 PROCEDURE — 1123F ACP DISCUSS/DSCN MKR DOCD: CPT | Performed by: INTERNAL MEDICINE

## 2021-02-25 PROCEDURE — 99213 OFFICE O/P EST LOW 20 MIN: CPT | Performed by: INTERNAL MEDICINE

## 2021-02-25 PROCEDURE — 4040F PNEUMOC VAC/ADMIN/RCVD: CPT | Performed by: INTERNAL MEDICINE

## 2021-02-25 PROCEDURE — 1090F PRES/ABSN URINE INCON ASSESS: CPT | Performed by: INTERNAL MEDICINE

## 2021-02-25 PROCEDURE — G8400 PT W/DXA NO RESULTS DOC: HCPCS | Performed by: INTERNAL MEDICINE

## 2021-02-25 PROCEDURE — G8427 DOCREV CUR MEDS BY ELIG CLIN: HCPCS | Performed by: INTERNAL MEDICINE

## 2021-02-25 RX ORDER — HYDROCODONE BITARTRATE AND ACETAMINOPHEN 5; 325 MG/1; MG/1
1 TABLET ORAL EVERY 6 HOURS PRN
COMMUNITY
End: 2021-03-02

## 2021-02-25 RX ORDER — SPIRONOLACTONE 25 MG/1
25 TABLET ORAL DAILY
COMMUNITY
End: 2021-03-03

## 2021-02-25 ASSESSMENT — PATIENT HEALTH QUESTIONNAIRE - PHQ9
SUM OF ALL RESPONSES TO PHQ QUESTIONS 1-9: 0
2. FEELING DOWN, DEPRESSED OR HOPELESS: 0
SUM OF ALL RESPONSES TO PHQ QUESTIONS 1-9: 0
SUM OF ALL RESPONSES TO PHQ QUESTIONS 1-9: 0

## 2021-02-25 NOTE — PROGRESS NOTES
2021    TELEHEALTH EVALUATION -- Audio/Visual (During  public health emergency)    HPI: Follow-up diabetes    Corbin Garcia (:  1940) has requested an audio/video evaluation for the following concern(s):    Follow-up for diabetes her blood sugars have been averaging around 87 but she has had some low blood sugars she is not eating very well and has lost weight she had blood work done which I have not received and an ultrasound of her liver she feels fine now    Review of Systems no fevers chills shortness of breath or chest pain    Prior to Visit Medications    Medication Sig Taking? Authorizing Provider   spironolactone (ALDACTONE) 25 MG tablet Take 25 mg by mouth daily Yes Historical Provider, MD   HYDROcodone-acetaminophen (NORCO) 5-325 MG per tablet Take 1 tablet by mouth every 6 hours as needed for Pain. Yes Historical Provider, MD DONNELL MEDRANO 100 UNIT/ML injection pen INJECT 35 UNITS SUBCUTANEOUSLY EVERY DAY. Yes Roxanna Laura MD   REFRESH 1.4-0.6 % SOLN INSTILL 1 TO 2 DROPS INTO BOTH EYES TWICE A DAY.  Yes Roxanna Laura MD   pantoprazole (PROTONIX) 40 MG tablet Take 1 tablet by mouth every morning (before breakfast) Yes Roxanna Laura MD   furosemide (LASIX) 40 MG tablet Take 0.5 tablets by mouth daily Yes Roxanna Laura MD   albuterol sulfate HFA (PROVENTIL HFA) 108 (90 Base) MCG/ACT inhaler Inhale 2 puffs into the lungs every 6 hours as needed for Wheezing Yes Roxanna Laura MD   acetaminophen (TYLENOL) 325 MG tablet Take 1 tablet by mouth every 6 hours as needed for Pain Yes Britta Cope PA-C   DULoxetine (CYMBALTA) 20 MG extended release capsule TAKE ONE CAPSULE BY MOUTH TWICE A DAY Yes Roxanna Laura MD   docusate (COLACE, DULCOLAX) 100 MG CAPS Take 100 mg by mouth daily Yes Terrie Troy MD   Biotin 57903 MCG TBDP Take 10,000 mcg by mouth daily  Yes Roxanna Laura MD   Ascorbic Acid (VITAMIN C CR) 1000 MG TBCR Take 1 tablet by mouth daily Yes Roxanna Laura MD   guaiFENesin (MUCINEX) 600 MG extended release tablet Take 1 tablet by mouth 2 times daily as needed for Congestion Yes Tuan Carrington MD   polyethyl glycol-propyl glycol 0.4-0.3 % (SYSTANE) 0.4-0.3 % ophthalmic solution Place 1 drop into both eyes as needed for Dry Eyes Yes Tuan aCrrington MD   Vitamin D 3 (CHOLECALCIFEROL) 1000 UNITS TABS tablet Take 1,000 Units by mouth daily. Yes Historical Provider, MD       Social History     Tobacco Use    Smoking status: Former Smoker     Packs/day: 2.00     Years: 30.00     Pack years: 60.00     Types: Cigarettes     Start date:      Quit date:      Years since quittin.1    Smokeless tobacco: Never Used    Tobacco comment: smoked 30 yrs ago   Substance Use Topics    Alcohol use:  Yes     Alcohol/week: 1.0 standard drinks     Types: 1 Glasses of wine per week     Frequency: Monthly or less     Drinks per session: 1 or 2     Binge frequency: Never     Comment: 2 per day    Drug use: No        Past Medical History:   Diagnosis Date    ADHD (attention deficit hyperactivity disorder)     Attention deficit disorder without mention of hyperactivity 2010    Autoimmune disease NEC     Carotid artery disease ([facility] 2013    LEFT CAROTID ENDARTERECTOMY               Carotid artery disease ([facility] 8/3/2013    Chronic persistent hepatitis ([facility] 2010    Depression     Depressive disorder, not elsewhere classified 2010    Double vision     left eye, since cataract surgery    Fractures     GERD (gastroesophageal reflux disease)     Hx of interstitial lung disease 2019    Neuropathy     Rheumatoid arthritis(714.0) 2010    Spinal stenosis     Type II or unspecified type diabetes mellitus without mention of complication, not stated as uncontrolled 2010    Unspecified cerebral artery occlusion with cerebral infarction     right hand, loss of some sensation       PHYSICAL EXAMINATION:  [ INSTRUCTIONS:  \"[x]\" Indicates a positive item  \"[]\" Indicates a negative item  -- DELETE ALL ITEMS NOT EXAMINED]  Vital Signs: (As obtained by patient/caregiver or practitioner observation)    Blood pressure-  Heart rate-    Respiratory rate-    Temperature-  Pulse oximetry-     Constitutional: [x] Appears well-developed and well-nourished [x] No apparent distress      [x] Abnormal-lightly thin mental status  [x] Alert and awake  [x] Oriented to person/place/time []Able to follow commands      Eyes:  EOM    []  Normal  [] Abnormal-  Sclera  []  Normal  [] Abnormal -         Discharge []  None visible  [] Abnormal -    HENT:   [] Normocephalic, atraumatic. [] Abnormal   [] Mouth/Throat: Mucous membranes are moist.     External Ears [] Normal  [] Abnormal-     Neck: [] No visualized mass     Pulmonary/Chest: [x] Respiratory effort normal.  [x] No visualized signs of difficulty breathing or respiratory distress        [] Abnormal-      Musculoskeletal:   [] Normal gait with no signs of ataxia         [] Normal range of motion of neck        [] Abnormal-       Neurological:        [x] No Facial Asymmetry (Cranial nerve 7 motor function) (limited exam to video visit)          [] No gaze palsy        [] Abnormal-         Skin:        [] No significant exanthematous lesions or discoloration noted on facial skin         [] Abnormal-            Psychiatric:       [x] Normal Affect [x] No Hallucinations        [] Abnormal-     Other pertinent observable physical exam findings-     ASSESSMENT/PLAN:  Diabetes mellitus associated with some weight loss and poor p.o. intake decrease Basaglar to 30 units start boost and follow-up in 1 month we will review her blood tests from the assisted [facility] now clinically stable followed by GI    No follow-ups on file. Pradeep Orantes is a 79 y.o. female being evaluated by a Virtual Visit (video visit) encounter to address concerns as mentioned above. A caregiver was present when appropriate.  Due to this being a TeleHealth encounter (During KITNM-58 public health emergency), evaluation of the following organ systems was limited: Vitals/Constitutional/EENT/Resp/CV/GI//MS/Neuro/Skin/Heme-Lymph-Imm. Pursuant to the emergency declaration under the [facility], [address] [place] [address] authority and the [employer] Act, this Virtual Visit was conducted with patient's (and/or legal guardian's) consent, to reduce the patient's risk of exposure to COVID-19 and provide necessary medical care. The patient (and/or legal guardian) has also been advised to contact this office for worsening conditions or problems, and seek emergency medical treatment and/or call 911 if deemed necessary. Patient identification was verified at the start of the visit: Yes    Total time spent on this encounter: Not billed by time    Services were provided through a video synchronous discussion virtually to substitute for in-person clinic visit. Patient and provider were located at their individual homes. --Marco A Calzada MD on 2/25/2021 at 10:28 AM    An electronic signature was used to authenticate this note.

## 2021-03-01 ENCOUNTER — TELEPHONE (OUTPATIENT)
Dept: INTERNAL MEDICINE CLINIC | Age: 81
End: 2021-03-01

## 2021-03-01 ENCOUNTER — PATIENT MESSAGE (OUTPATIENT)
Dept: INTERNAL MEDICINE CLINIC | Age: 81
End: 2021-03-01

## 2021-03-01 RX ORDER — INSULIN GLARGINE 100 [IU]/ML
INJECTION, SOLUTION SUBCUTANEOUS
Qty: 15 ML | Refills: 3
Start: 2021-03-01

## 2021-03-01 NOTE — TELEPHONE ENCOUNTER
Yes stop Docusate. Documentation has been made in chart of change in dose of Basaglar. Daughter has been notified.

## 2021-03-01 NOTE — TELEPHONE ENCOUNTER
Dayana Ahumada:     Based on [address] Jackie Dexter recent virtual visit with you this past Thursday, February 25th (10:15am), we have TWO follow-up questions for you --     1. You recommended her nightly Basaglar dose be reduced from 35 units down to 30 units. Will you please document this reduction on her medications list?     2. Based on Keira's recent development of fecal incontinence, you recommended she begin a daily regimen of Metamucil. She is already currently taking daily Docusate Calcium. Should she discontinue this before beginning the Metamucil?     We look forward to hearing back SOON :-)     Thank You!   Danny Chowdhury, Keira's Daughter

## 2021-03-02 ENCOUNTER — TELEPHONE (OUTPATIENT)
Dept: INTERNAL MEDICINE CLINIC | Age: 81
End: 2021-03-02

## 2021-03-02 DIAGNOSIS — M19.90 ARTHRITIS: ICD-10-CM

## 2021-03-02 DIAGNOSIS — M19.90 ARTHRITIS: Primary | ICD-10-CM

## 2021-03-02 RX ORDER — HYDROCODONE BITARTRATE AND ACETAMINOPHEN 5; 325 MG/1; MG/1
TABLET ORAL
Qty: 30 TABLET | Refills: 0 | Status: SHIPPED | OUTPATIENT
Start: 2021-03-02 | End: 2021-04-01

## 2021-03-02 NOTE — TELEPHONE ENCOUNTER
[facility] called to say that Pt is out of medication and needs it refilled urgently per the home she is in.    HYDROcodone-acetaminophen (Curtistine Lipoma) 5-325 MG per tablet [[id number]    [facility] [address]   [address] [place] [address], [address] [place] [address]   Phone:  [phone]  Fax:  [phone]

## 2021-03-02 NOTE — TELEPHONE ENCOUNTER
RE:Medication adjustment    From   Raven Chiang To   [facility] Practice Support Sent   3/1/2021  5:23 PM   Thank you Mili!       Will you please now send a fax to [facility] (the assisted living facility where Logan Funk lives), instructing them to stop the daily Docusate Calcium and to begin giving her a daily dose of Metamucil instead? Also, PLEASE let them know (as per the Metamucil website) \"Metamucil can make it harder for the body to absorb other medicines taken by mouth, possibly making them less effective. Give oral medicines 2 hours before OR 2 hours after taking Metamucil. \"     The fax number for [facility] is: [phone]   I will buy the Metamucil and bring to them tomorrow (Tuesday).      [address] 65 YOU!!     Claudette Imperial

## 2021-03-03 ENCOUNTER — APPOINTMENT (OUTPATIENT)
Dept: GENERAL RADIOLOGY | Age: 81
DRG: 291 | End: 2021-03-03
Payer: COMMERCIAL

## 2021-03-03 ENCOUNTER — TELEPHONE (OUTPATIENT)
Dept: INTERNAL MEDICINE CLINIC | Age: 81
End: 2021-03-03

## 2021-03-03 ENCOUNTER — HOSPITAL ENCOUNTER (INPATIENT)
Age: 81
LOS: 4 days | Discharge: HOSPICE | DRG: 291 | End: 2021-03-07
Attending: EMERGENCY MEDICINE | Admitting: INTERNAL MEDICINE
Payer: COMMERCIAL

## 2021-03-03 DIAGNOSIS — I50.9 ACUTE ON CHRONIC CONGESTIVE HEART FAILURE, UNSPECIFIED HEART FAILURE TYPE (HCC): Primary | ICD-10-CM

## 2021-03-03 LAB
BACTERIA: ABNORMAL /HPF
BILIRUBIN URINE: NEGATIVE
BLOOD, URINE: NEGATIVE
CLARITY: CLEAR
COLOR: YELLOW
GLUCOSE URINE: NEGATIVE MG/DL
KETONES, URINE: NEGATIVE MG/DL
LEUKOCYTE ESTERASE, URINE: ABNORMAL
MICROSCOPIC EXAMINATION: YES
NITRITE, URINE: NEGATIVE
PH UA: 6.5 (ref 5–8)
PRO-BNP: ABNORMAL PG/ML (ref 0–449)
PROTEIN UA: NEGATIVE MG/DL
RBC UA: ABNORMAL /HPF (ref 0–4)
SPECIFIC GRAVITY UA: 1.01 (ref 1–1.03)
URINE REFLEX TO CULTURE: ABNORMAL
URINE TYPE: ABNORMAL
UROBILINOGEN, URINE: 0.2 E.U./DL
WBC UA: ABNORMAL /HPF (ref 0–5)

## 2021-03-03 PROCEDURE — U0003 INFECTIOUS AGENT DETECTION BY NUCLEIC ACID (DNA OR RNA); SEVERE ACUTE RESPIRATORY SYNDROME CORONAVIRUS 2 (SARS-COV-2) (CORONAVIRUS DISEASE [COVID-19]), AMPLIFIED PROBE TECHNIQUE, MAKING USE OF HIGH THROUGHPUT TECHNOLOGIES AS DESCRIBED BY CMS-2020-01-R: HCPCS

## 2021-03-03 PROCEDURE — 83880 ASSAY OF NATRIURETIC PEPTIDE: CPT

## 2021-03-03 PROCEDURE — 87040 BLOOD CULTURE FOR BACTERIA: CPT

## 2021-03-03 PROCEDURE — 96374 THER/PROPH/DIAG INJ IV PUSH: CPT

## 2021-03-03 PROCEDURE — 2060000000 HC ICU INTERMEDIATE R&B

## 2021-03-03 PROCEDURE — 81001 URINALYSIS AUTO W/SCOPE: CPT

## 2021-03-03 PROCEDURE — 2580000003 HC RX 258: Performed by: EMERGENCY MEDICINE

## 2021-03-03 PROCEDURE — 99284 EMERGENCY DEPT VISIT MOD MDM: CPT

## 2021-03-03 PROCEDURE — 36415 COLL VENOUS BLD VENIPUNCTURE: CPT

## 2021-03-03 PROCEDURE — 71046 X-RAY EXAM CHEST 2 VIEWS: CPT

## 2021-03-03 PROCEDURE — 93005 ELECTROCARDIOGRAM TRACING: CPT | Performed by: EMERGENCY MEDICINE

## 2021-03-03 PROCEDURE — 94664 DEMO&/EVAL PT USE INHALER: CPT

## 2021-03-03 PROCEDURE — 6360000002 HC RX W HCPCS: Performed by: EMERGENCY MEDICINE

## 2021-03-03 RX ORDER — ACETAMINOPHEN 325 MG/1
650 TABLET ORAL EVERY 6 HOURS PRN
Status: DISCONTINUED | OUTPATIENT
Start: 2021-03-03 | End: 2021-03-07 | Stop reason: HOSPADM

## 2021-03-03 RX ORDER — POLYVINYL ALCOHOL 14 MG/ML
1 SOLUTION/ DROPS OPHTHALMIC PRN
Status: DISCONTINUED | OUTPATIENT
Start: 2021-03-03 | End: 2021-03-07 | Stop reason: HOSPADM

## 2021-03-03 RX ORDER — ACETAMINOPHEN 650 MG/1
650 SUPPOSITORY RECTAL EVERY 6 HOURS PRN
Status: DISCONTINUED | OUTPATIENT
Start: 2021-03-03 | End: 2021-03-07 | Stop reason: HOSPADM

## 2021-03-03 RX ORDER — DEXTROSE MONOHYDRATE 50 MG/ML
100 INJECTION, SOLUTION INTRAVENOUS PRN
Status: DISCONTINUED | OUTPATIENT
Start: 2021-03-03 | End: 2021-03-07 | Stop reason: HOSPADM

## 2021-03-03 RX ORDER — FUROSEMIDE 10 MG/ML
20 INJECTION INTRAMUSCULAR; INTRAVENOUS DAILY
Status: DISCONTINUED | OUTPATIENT
Start: 2021-03-04 | End: 2021-03-04

## 2021-03-03 RX ORDER — ALBUTEROL SULFATE 2.5 MG/3ML
2.5 SOLUTION RESPIRATORY (INHALATION) EVERY 4 HOURS PRN
Status: DISCONTINUED | OUTPATIENT
Start: 2021-03-03 | End: 2021-03-07 | Stop reason: HOSPADM

## 2021-03-03 RX ORDER — VITAMIN B COMPLEX
1000 TABLET ORAL DAILY
Status: DISCONTINUED | OUTPATIENT
Start: 2021-03-04 | End: 2021-03-07

## 2021-03-03 RX ORDER — SODIUM CHLORIDE 0.9 % (FLUSH) 0.9 %
10 SYRINGE (ML) INJECTION EVERY 12 HOURS SCHEDULED
Status: DISCONTINUED | OUTPATIENT
Start: 2021-03-03 | End: 2021-03-07 | Stop reason: HOSPADM

## 2021-03-03 RX ORDER — DEXTROSE MONOHYDRATE 25 G/50ML
12.5 INJECTION, SOLUTION INTRAVENOUS PRN
Status: DISCONTINUED | OUTPATIENT
Start: 2021-03-03 | End: 2021-03-07 | Stop reason: HOSPADM

## 2021-03-03 RX ORDER — POLYETHYLENE GLYCOL 3350 17 G/17G
17 POWDER, FOR SOLUTION ORAL DAILY PRN
Status: DISCONTINUED | OUTPATIENT
Start: 2021-03-03 | End: 2021-03-07 | Stop reason: HOSPADM

## 2021-03-03 RX ORDER — HYDROCODONE BITARTRATE AND ACETAMINOPHEN 5; 325 MG/1; MG/1
1 TABLET ORAL EVERY 6 HOURS PRN
Status: DISCONTINUED | OUTPATIENT
Start: 2021-03-03 | End: 2021-03-07

## 2021-03-03 RX ORDER — FERROUS SULFATE 325(65) MG
325 TABLET ORAL DAILY
COMMUNITY

## 2021-03-03 RX ORDER — DOCUSATE SODIUM 100 MG/1
100 CAPSULE, LIQUID FILLED ORAL DAILY
Status: DISCONTINUED | OUTPATIENT
Start: 2021-03-04 | End: 2021-03-07

## 2021-03-03 RX ORDER — 0.9 % SODIUM CHLORIDE 0.9 %
500 INTRAVENOUS SOLUTION INTRAVENOUS ONCE
Status: COMPLETED | OUTPATIENT
Start: 2021-03-03 | End: 2021-03-03

## 2021-03-03 RX ORDER — SPIRONOLACTONE AND HYDROCHLOROTHIAZIDE 25; 25 MG/1; MG/1
1 TABLET ORAL DAILY
COMMUNITY

## 2021-03-03 RX ORDER — GUAIFENESIN 600 MG/1
600 TABLET, EXTENDED RELEASE ORAL 2 TIMES DAILY PRN
Status: DISCONTINUED | OUTPATIENT
Start: 2021-03-03 | End: 2021-03-07 | Stop reason: HOSPADM

## 2021-03-03 RX ORDER — MAGNESIUM OXIDE 400 MG/1
400 TABLET ORAL DAILY
COMMUNITY

## 2021-03-03 RX ORDER — ASCORBIC ACID 500 MG
1000 TABLET ORAL DAILY
Status: DISCONTINUED | OUTPATIENT
Start: 2021-03-04 | End: 2021-03-07

## 2021-03-03 RX ORDER — DULOXETIN HYDROCHLORIDE 30 MG/1
30 CAPSULE, DELAYED RELEASE ORAL 2 TIMES DAILY
COMMUNITY

## 2021-03-03 RX ORDER — DULOXETIN HYDROCHLORIDE 30 MG/1
30 CAPSULE, DELAYED RELEASE ORAL 2 TIMES DAILY
Status: DISCONTINUED | OUTPATIENT
Start: 2021-03-03 | End: 2021-03-07 | Stop reason: HOSPADM

## 2021-03-03 RX ORDER — NICOTINE POLACRILEX 4 MG
15 LOZENGE BUCCAL PRN
Status: DISCONTINUED | OUTPATIENT
Start: 2021-03-03 | End: 2021-03-07 | Stop reason: HOSPADM

## 2021-03-03 RX ORDER — POLYVINYL ALCOHOL 14 MG/ML
1 SOLUTION/ DROPS OPHTHALMIC 2 TIMES DAILY
Status: DISCONTINUED | OUTPATIENT
Start: 2021-03-03 | End: 2021-03-07 | Stop reason: HOSPADM

## 2021-03-03 RX ORDER — MAGNESIUM SULFATE IN WATER 40 MG/ML
2000 INJECTION, SOLUTION INTRAVENOUS PRN
Status: DISCONTINUED | OUTPATIENT
Start: 2021-03-03 | End: 2021-03-04

## 2021-03-03 RX ORDER — FUROSEMIDE 10 MG/ML
40 INJECTION INTRAMUSCULAR; INTRAVENOUS ONCE
Status: COMPLETED | OUTPATIENT
Start: 2021-03-03 | End: 2021-03-03

## 2021-03-03 RX ORDER — LOPERAMIDE HYDROCHLORIDE 2 MG/1
2 CAPSULE ORAL 3 TIMES DAILY PRN
COMMUNITY

## 2021-03-03 RX ORDER — LACTULOSE 10 G/15ML
20 SOLUTION ORAL 3 TIMES DAILY PRN
COMMUNITY

## 2021-03-03 RX ORDER — ONDANSETRON 2 MG/ML
4 INJECTION INTRAMUSCULAR; INTRAVENOUS EVERY 6 HOURS PRN
Status: DISCONTINUED | OUTPATIENT
Start: 2021-03-03 | End: 2021-03-07 | Stop reason: HOSPADM

## 2021-03-03 RX ORDER — PROMETHAZINE HYDROCHLORIDE 25 MG/1
12.5 TABLET ORAL EVERY 6 HOURS PRN
Status: DISCONTINUED | OUTPATIENT
Start: 2021-03-03 | End: 2021-03-07 | Stop reason: HOSPADM

## 2021-03-03 RX ORDER — PANTOPRAZOLE SODIUM 40 MG/1
40 TABLET, DELAYED RELEASE ORAL
Status: DISCONTINUED | OUTPATIENT
Start: 2021-03-04 | End: 2021-03-07

## 2021-03-03 RX ORDER — SODIUM CHLORIDE 0.9 % (FLUSH) 0.9 %
10 SYRINGE (ML) INJECTION PRN
Status: DISCONTINUED | OUTPATIENT
Start: 2021-03-03 | End: 2021-03-07 | Stop reason: HOSPADM

## 2021-03-03 RX ORDER — POTASSIUM CHLORIDE 7.45 MG/ML
10 INJECTION INTRAVENOUS PRN
Status: DISCONTINUED | OUTPATIENT
Start: 2021-03-03 | End: 2021-03-04

## 2021-03-03 RX ADMIN — SODIUM CHLORIDE 500 ML: 9 INJECTION, SOLUTION INTRAVENOUS at 19:03

## 2021-03-03 RX ADMIN — FUROSEMIDE 40 MG: 10 INJECTION, SOLUTION INTRAMUSCULAR; INTRAVENOUS at 20:30

## 2021-03-03 ASSESSMENT — ENCOUNTER SYMPTOMS
SHORTNESS OF BREATH: 0
COUGH: 0
ABDOMINAL PAIN: 0
NAUSEA: 0
VOMITING: 0
DIARRHEA: 0

## 2021-03-03 ASSESSMENT — PAIN SCALES - GENERAL: PAINLEVEL_OUTOF10: 0

## 2021-03-03 NOTE — TELEPHONE ENCOUNTER
Labs [facility]ed [facility] from the [facility]  there are some critical please reveiw and advise you can isreal Eddi Hawkins at     [phone]

## 2021-03-03 NOTE — ED PROVIDER NOTES
[id number]          ATTENDING PHYSICIAN NOTE       Date of evaluation: 3/3/2021    Chief Complaint     Abnormal Lab      History of Present Illness     Ameena Valadez is a 79 y.o. female who presents from the [facility] because of abnormal labs done routinely today. Apparently her white blood cell count was significantly elevated and her BNP was elevated. She was sent in after they contacted her primary care physician and were advised to do so. The patient is DNR CC and and palliative care. She has no complaints currently. She denies any difficulty breathing or any abdominal pain and has no complaints of any dysuria or fever. She does have dementia so history is somewhat limited. Her daughter did arrive and informed me that she was just informed that her mother was being sent to the hospital and had not known that there was anything wrong. I have reassured her. All she reports is that the patient's appetite has been decreased and she has had some weight loss over the last several months as a result. She has had some diarrhea recently but she was also put on a stool softener. Review of Systems     Review of Systems   Constitutional: Negative for chills and fever. Respiratory: Negative for cough and shortness of breath. Cardiovascular: Negative for chest pain. Gastrointestinal: Negative for abdominal pain, diarrhea, nausea and vomiting. Genitourinary: Negative for dysuria. All other systems reviewed and are negative.       Past Medical, Surgical, Family, and Social History     She has a past medical history of ADHD (attention deficit hyperactivity disorder), Attention deficit disorder without mention of hyperactivity, Autoimmune disease NEC, Carotid artery disease ([address] Carotid artery disease ([address] Chronic persistent hepatitis ([address] Depression, Depressive disorder, not elsewhere classified, Double vision, Fractures, GERD (gastroesophageal reflux disease), Hx of interstitial lung disease, Leukocytosis, Neuropathy, Rheumatoid arthritis(714.0), Spinal stenosis, Type II or unspecified type diabetes mellitus without mention of complication, not stated as uncontrolled, and Unspecified cerebral artery occlusion with cerebral infarction. She has a past surgical history that includes Cosmetic surgery; eye surgery (Bilateral); Colonoscopy; and Humerus fracture surgery (Right, 8/11/2020). Her family history is not on file. She reports that she quit smoking about 41 years ago. Her smoking use included cigarettes. She started smoking about 67 years ago. She has a 60.00 pack-year smoking history. She has never used smokeless tobacco. She reports current alcohol use of about 1.0 standard drinks of alcohol per week. She reports that she does not use drugs. Medications     Previous Medications    ACETAMINOPHEN (TYLENOL) 325 MG TABLET    Take 1 tablet by mouth every 6 hours as needed for Pain    ALBUTEROL SULFATE HFA (PROVENTIL HFA) 108 (90 BASE) MCG/ACT INHALER    Inhale 2 puffs into the lungs every 6 hours as needed for Wheezing    ASCORBIC ACID (VITAMIN C CR) 1000 MG TBCR    Take 1 tablet by mouth daily    BIOTIN 01806 MCG TBDP    Take 10,000 mcg by mouth daily     DOCUSATE (COLACE, DULCOLAX) 100 MG CAPS    Take 100 mg by mouth daily    DULOXETINE (CYMBALTA) 20 MG EXTENDED RELEASE CAPSULE    TAKE ONE CAPSULE BY MOUTH TWICE A DAY    FUROSEMIDE (LASIX) 40 MG TABLET    Take 0.5 tablets by mouth daily    GUAIFENESIN (MUCINEX) 600 MG EXTENDED RELEASE TABLET    Take 1 tablet by mouth 2 times daily as needed for Congestion    HYDROCODONE-ACETAMINOPHEN (NORCO) 5-325 MG PER TABLET    TAKE ONE TABLET BY MOUTH EVERY 4 HOURS AS NEEDED FOR PAIN FOR UP TO 7DAYS    INSULIN GLARGINE (BASAGLAR KWIKPEN) 100 UNIT/ML INJECTION PEN    INJECT 30 UNITS SUBCUTANEOUSLY EVERY DAY.     PANTOPRAZOLE (PROTONIX) 40 MG TABLET    Take 1 tablet by mouth every morning (before LABS:   Results for orders placed or performed during the hospital encounter of 03/03/21   Urinalysis Reflex to Culture    Specimen: Urine, clean catch   Result Value Ref Range    Color, UA Yellow Straw/Yellow    Clarity, UA Clear Clear    Glucose, Ur Negative Negative mg/dL    Bilirubin Urine Negative Negative    Ketones, Urine Negative Negative mg/dL    Specific Gravity, UA 1.015 1.005 - 1.030    Blood, Urine Negative Negative    pH, UA 6.5 5.0 - 8.0    Protein, UA Negative Negative mg/dL    Urobilinogen, Urine 0.2 <2.0 E.U./dL    Nitrite, Urine Negative Negative    Leukocyte Esterase, Urine TRACE (A) Negative    Microscopic Examination YES     Urine Type Voided     Urine Reflex to Culture Not Indicated    Brain Natriuretic Peptide   Result Value Ref Range    Pro-BNP 16,753 (H) 0 - 449 pg/mL   Microscopic Urinalysis   Result Value Ref Range    WBC, UA 0-2 0 - 5 /HPF    RBC, UA 0-2 0 - 4 /HPF    Bacteria, UA Rare (A) None Seen /HPF       RECENT VITALS:  BP: (!) 91/58,Temp: 97.5 °F (36.4 °C), Pulse: 105, Resp: 20, SpO2: 98 %       ED Course     Nursing Notes, Past Medical Hx, Past Surgical Hx, Social Hx,Allergies, and Family Hx were reviewed. patient was given the following medications:  Orders Placed This Encounter   Medications    0.9 % sodium chloride bolus    furosemide (LASIX) injection 40 mg       CONSULTS:  IP CONSULT TO PRIMARY CARE PROVIDER    MEDICAL DECISIONMAKING / ASSESSMENT / Isamar Carmelita is a 79 y.o. female with history of congestive heart failure presenting from her nursing home with abnormal labs. Her white blood cell count was elevated to 29,000 which appears to be at baseline for her which is noted In her prior medical records. Her BNP iwas also somewhat elevated at the nursing home but was significantly higher here and her chest xray shows findings consistent with pulmonary edema.   There is no evidence of pulmonary infection nor any infection in her urine so I do not think that she needs antibiotics at this time given the chronic leukocytosis but this can be trended. Blood cultures were drawn. Case was discussed with primary care who recommends bringing her into the hospital for diuresis and treatment of congestive heart failure as well as potential hematology consult regarding the leukocytosis. Clinical Impression     1.  Acute on chronic congestive heart failure, unspecified heart failure type [facility])        Disposition     DISPOSITION Decision To Admit 03/03/2021 08:24:11 PM       John Sequeira MD  03/03/21 2030

## 2021-03-04 LAB
ANION GAP SERPL CALCULATED.3IONS-SCNC: 13 MMOL/L (ref 3–16)
ANISOCYTOSIS: ABNORMAL
BASOPHILS ABSOLUTE: 0 K/UL (ref 0–0.2)
BASOPHILS RELATIVE PERCENT: 0 %
BUN BLDV-MCNC: 35 MG/DL (ref 7–20)
C-REACTIVE PROTEIN: 18.2 MG/L (ref 0–5.1)
CALCIUM SERPL-MCNC: 9.8 MG/DL (ref 8.3–10.6)
CHLORIDE BLD-SCNC: 95 MMOL/L (ref 99–110)
CO2: 26 MMOL/L (ref 21–32)
CREAT SERPL-MCNC: 1.3 MG/DL (ref 0.6–1.2)
CREATININE URINE: 23.2 MG/DL (ref 28–259)
EKG ATRIAL RATE: 105 BPM
EKG ATRIAL RATE: 133 BPM
EKG DIAGNOSIS: NORMAL
EKG DIAGNOSIS: NORMAL
EKG P AXIS: 84 DEGREES
EKG P-R INTERVAL: 136 MS
EKG P-R INTERVAL: 184 MS
EKG Q-T INTERVAL: 328 MS
EKG Q-T INTERVAL: 356 MS
EKG QRS DURATION: 112 MS
EKG QRS DURATION: 114 MS
EKG QTC CALCULATION (BAZETT): 470 MS
EKG QTC CALCULATION (BAZETT): 475 MS
EKG R AXIS: -50 DEGREES
EKG R AXIS: -51 DEGREES
EKG T AXIS: 108 DEGREES
EKG T AXIS: 109 DEGREES
EKG VENTRICULAR RATE: 105 BPM
EKG VENTRICULAR RATE: 126 BPM
EOSINOPHILS ABSOLUTE: 0 K/UL (ref 0–0.6)
EOSINOPHILS RELATIVE PERCENT: 0 %
ESTIMATED AVERAGE GLUCOSE: 122.6 MG/DL
GFR AFRICAN AMERICAN: 48
GFR NON-AFRICAN AMERICAN: 39
GLUCOSE BLD-MCNC: 170 MG/DL (ref 70–99)
GLUCOSE BLD-MCNC: 207 MG/DL (ref 70–99)
GLUCOSE BLD-MCNC: 211 MG/DL (ref 70–99)
GLUCOSE BLD-MCNC: 216 MG/DL (ref 70–99)
GLUCOSE BLD-MCNC: 224 MG/DL (ref 70–99)
GLUCOSE BLD-MCNC: 253 MG/DL (ref 70–99)
HBA1C MFR BLD: 5.9 %
HCT VFR BLD CALC: 29 % (ref 36–48)
HEMOGLOBIN: 9 G/DL (ref 12–16)
IRON SATURATION: 7 % (ref 15–50)
IRON: 23 UG/DL (ref 37–145)
LYMPHOCYTES ABSOLUTE: 3 K/UL (ref 1–5.1)
LYMPHOCYTES RELATIVE PERCENT: 11 %
MAGNESIUM: 1.1 MG/DL (ref 1.8–2.4)
MAGNESIUM: 2.7 MG/DL (ref 1.8–2.4)
MCH RBC QN AUTO: 20.2 PG (ref 26–34)
MCHC RBC AUTO-ENTMCNC: 31 G/DL (ref 31–36)
MCV RBC AUTO: 65 FL (ref 80–100)
MONOCYTES ABSOLUTE: 4.9 K/UL (ref 0–1.3)
MONOCYTES RELATIVE PERCENT: 18 %
NEUTROPHILS ABSOLUTE: 19.4 K/UL (ref 1.7–7.7)
NEUTROPHILS RELATIVE PERCENT: 71 %
OVALOCYTES: ABNORMAL
PDW BLD-RTO: 15.7 % (ref 12.4–15.4)
PERFORMED ON: ABNORMAL
PLATELET # BLD: ABNORMAL K/UL (ref 135–450)
PLATELET SLIDE REVIEW: ABNORMAL
PMV BLD AUTO: ABNORMAL FL (ref 5–10.5)
POTASSIUM REFLEX MAGNESIUM: 3.9 MMOL/L (ref 3.5–5.1)
PROCALCITONIN: 0.13 NG/ML (ref 0–0.15)
RBC # BLD: 4.47 M/UL (ref 4–5.2)
SARS-COV-2: NOT DETECTED
SCHISTOCYTES: ABNORMAL
SEDIMENTATION RATE, ERYTHROCYTE: 37 MM/HR (ref 0–30)
SODIUM BLD-SCNC: 134 MMOL/L (ref 136–145)
STOMATOCYTES: ABNORMAL
TEAR DROP CELLS: ABNORMAL
TOTAL IRON BINDING CAPACITY: 307 UG/DL (ref 260–445)
UREA NITROGEN, UR: 146.4 MG/DL (ref 800–1666)
WBC # BLD: 27.3 K/UL (ref 4–11)

## 2021-03-04 PROCEDURE — 6370000000 HC RX 637 (ALT 250 FOR IP): Performed by: STUDENT IN AN ORGANIZED HEALTH CARE EDUCATION/TRAINING PROGRAM

## 2021-03-04 PROCEDURE — 2060000000 HC ICU INTERMEDIATE R&B

## 2021-03-04 PROCEDURE — 93010 ELECTROCARDIOGRAM REPORT: CPT | Performed by: INTERNAL MEDICINE

## 2021-03-04 PROCEDURE — 84238 ASSAY NONENDOCRINE RECEPTOR: CPT

## 2021-03-04 PROCEDURE — 36415 COLL VENOUS BLD VENIPUNCTURE: CPT

## 2021-03-04 PROCEDURE — 83540 ASSAY OF IRON: CPT

## 2021-03-04 PROCEDURE — 88237 TISSUE CULTURE BONE MARROW: CPT

## 2021-03-04 PROCEDURE — 80048 BASIC METABOLIC PNL TOTAL CA: CPT

## 2021-03-04 PROCEDURE — 85652 RBC SED RATE AUTOMATED: CPT

## 2021-03-04 PROCEDURE — 84145 PROCALCITONIN (PCT): CPT

## 2021-03-04 PROCEDURE — 93005 ELECTROCARDIOGRAM TRACING: CPT | Performed by: INTERNAL MEDICINE

## 2021-03-04 PROCEDURE — 87040 BLOOD CULTURE FOR BACTERIA: CPT

## 2021-03-04 PROCEDURE — 85025 COMPLETE CBC W/AUTO DIFF WBC: CPT

## 2021-03-04 PROCEDURE — 99222 1ST HOSP IP/OBS MODERATE 55: CPT | Performed by: INTERNAL MEDICINE

## 2021-03-04 PROCEDURE — 2580000003 HC RX 258: Performed by: STUDENT IN AN ORGANIZED HEALTH CARE EDUCATION/TRAINING PROGRAM

## 2021-03-04 PROCEDURE — 6360000002 HC RX W HCPCS: Performed by: STUDENT IN AN ORGANIZED HEALTH CARE EDUCATION/TRAINING PROGRAM

## 2021-03-04 PROCEDURE — 82570 ASSAY OF URINE CREATININE: CPT

## 2021-03-04 PROCEDURE — 83550 IRON BINDING TEST: CPT

## 2021-03-04 PROCEDURE — 83735 ASSAY OF MAGNESIUM: CPT

## 2021-03-04 PROCEDURE — 2500000003 HC RX 250 WO HCPCS: Performed by: STUDENT IN AN ORGANIZED HEALTH CARE EDUCATION/TRAINING PROGRAM

## 2021-03-04 PROCEDURE — 88275 CYTOGENETICS 100-300: CPT

## 2021-03-04 PROCEDURE — 86140 C-REACTIVE PROTEIN: CPT

## 2021-03-04 PROCEDURE — 88271 CYTOGENETICS DNA PROBE: CPT

## 2021-03-04 PROCEDURE — 84540 ASSAY OF URINE/UREA-N: CPT

## 2021-03-04 PROCEDURE — 83036 HEMOGLOBIN GLYCOSYLATED A1C: CPT

## 2021-03-04 RX ORDER — DILTIAZEM HYDROCHLORIDE 5 MG/ML
10 INJECTION INTRAVENOUS ONCE
Status: COMPLETED | OUTPATIENT
Start: 2021-03-04 | End: 2021-03-04

## 2021-03-04 RX ORDER — LORAZEPAM 2 MG/ML
0.5 INJECTION INTRAMUSCULAR ONCE
Status: COMPLETED | OUTPATIENT
Start: 2021-03-04 | End: 2021-03-04

## 2021-03-04 RX ORDER — METOPROLOL TARTRATE 5 MG/5ML
5 INJECTION INTRAVENOUS EVERY 5 MIN PRN
Status: DISCONTINUED | OUTPATIENT
Start: 2021-03-04 | End: 2021-03-07 | Stop reason: HOSPADM

## 2021-03-04 RX ORDER — FUROSEMIDE 40 MG/1
40 TABLET ORAL DAILY
Status: COMPLETED | OUTPATIENT
Start: 2021-03-04 | End: 2021-03-04

## 2021-03-04 RX ORDER — LACTOBACILLUS RHAMNOSUS GG 10B CELL
1 CAPSULE ORAL DAILY
Status: DISCONTINUED | OUTPATIENT
Start: 2021-03-04 | End: 2021-03-07

## 2021-03-04 RX ORDER — MAGNESIUM SULFATE IN WATER 40 MG/ML
2000 INJECTION, SOLUTION INTRAVENOUS
Status: COMPLETED | OUTPATIENT
Start: 2021-03-04 | End: 2021-03-04

## 2021-03-04 RX ORDER — FERROUS SULFATE 325(65) MG
325 TABLET ORAL DAILY
Status: DISCONTINUED | OUTPATIENT
Start: 2021-03-04 | End: 2021-03-05

## 2021-03-04 RX ORDER — POTASSIUM CHLORIDE 20 MEQ/1
40 TABLET, EXTENDED RELEASE ORAL ONCE
Status: COMPLETED | OUTPATIENT
Start: 2021-03-04 | End: 2021-03-04

## 2021-03-04 RX ORDER — FUROSEMIDE 40 MG/1
40 TABLET ORAL DAILY
Status: DISCONTINUED | OUTPATIENT
Start: 2021-03-04 | End: 2021-03-04

## 2021-03-04 RX ADMIN — POLYVINYL ALCOHOL 1 DROP: 14 SOLUTION/ DROPS OPHTHALMIC at 00:00

## 2021-03-04 RX ADMIN — Medication 10 ML: at 20:02

## 2021-03-04 RX ADMIN — LORAZEPAM 0.5 MG: 2 INJECTION INTRAMUSCULAR; INTRAVENOUS at 20:02

## 2021-03-04 RX ADMIN — Medication 1000 UNITS: at 08:39

## 2021-03-04 RX ADMIN — INSULIN GLARGINE 20 UNITS: 100 INJECTION, SOLUTION SUBCUTANEOUS at 22:27

## 2021-03-04 RX ADMIN — Medication 1 CAPSULE: at 08:39

## 2021-03-04 RX ADMIN — DULOXETINE HYDROCHLORIDE 30 MG: 30 CAPSULE, DELAYED RELEASE ORAL at 20:01

## 2021-03-04 RX ADMIN — DILTIAZEM HYDROCHLORIDE 10 MG: 5 INJECTION INTRAVENOUS at 11:06

## 2021-03-04 RX ADMIN — MAGNESIUM SULFATE HEPTAHYDRATE 2000 MG: 40 INJECTION, SOLUTION INTRAVENOUS at 12:02

## 2021-03-04 RX ADMIN — Medication 10 ML: at 11:11

## 2021-03-04 RX ADMIN — Medication 10 ML: at 00:00

## 2021-03-04 RX ADMIN — HYDROCODONE BITARTRATE AND ACETAMINOPHEN 1 TABLET: 5; 325 TABLET ORAL at 20:01

## 2021-03-04 RX ADMIN — PROMETHAZINE HYDROCHLORIDE 12.5 MG: 25 TABLET ORAL at 16:22

## 2021-03-04 RX ADMIN — POLYVINYL ALCOHOL 1 DROP: 14 SOLUTION/ DROPS OPHTHALMIC at 20:02

## 2021-03-04 RX ADMIN — METOPROLOL TARTRATE 5 MG: 5 INJECTION INTRAVENOUS at 11:58

## 2021-03-04 RX ADMIN — MAGNESIUM SULFATE HEPTAHYDRATE 2000 MG: 40 INJECTION, SOLUTION INTRAVENOUS at 14:31

## 2021-03-04 RX ADMIN — FUROSEMIDE 40 MG: 40 TABLET ORAL at 11:06

## 2021-03-04 RX ADMIN — POTASSIUM CHLORIDE 40 MEQ: 1500 TABLET, EXTENDED RELEASE ORAL at 11:06

## 2021-03-04 RX ADMIN — OXYCODONE HYDROCHLORIDE AND ACETAMINOPHEN 1000 MG: 500 TABLET ORAL at 08:39

## 2021-03-04 RX ADMIN — FERROUS SULFATE TAB 325 MG (65 MG ELEMENTAL FE) 325 MG: 325 (65 FE) TAB at 08:39

## 2021-03-04 RX ADMIN — DULOXETINE HYDROCHLORIDE 30 MG: 30 CAPSULE, DELAYED RELEASE ORAL at 08:39

## 2021-03-04 RX ADMIN — POLYVINYL ALCOHOL 1 DROP: 14 SOLUTION/ DROPS OPHTHALMIC at 08:41

## 2021-03-04 RX ADMIN — DILTIAZEM HYDROCHLORIDE 30 MG: 30 TABLET, FILM COATED ORAL at 18:44

## 2021-03-04 ASSESSMENT — PAIN SCALES - GENERAL
PAINLEVEL_OUTOF10: 0
PAINLEVEL_OUTOF10: 5
PAINLEVEL_OUTOF10: 0
PAINLEVEL_OUTOF10: 7

## 2021-03-04 ASSESSMENT — PAIN DESCRIPTION - PAIN TYPE: TYPE: ACUTE PAIN

## 2021-03-04 ASSESSMENT — PAIN DESCRIPTION - DESCRIPTORS
DESCRIPTORS: ACHING;SORE
DESCRIPTORS: ACHING

## 2021-03-04 ASSESSMENT — PAIN DESCRIPTION - PROGRESSION: CLINICAL_PROGRESSION: NOT CHANGED

## 2021-03-04 ASSESSMENT — PAIN DESCRIPTION - LOCATION: LOCATION: COCCYX

## 2021-03-04 ASSESSMENT — PAIN - FUNCTIONAL ASSESSMENT: PAIN_FUNCTIONAL_ASSESSMENT: ACTIVITIES ARE NOT PREVENTED

## 2021-03-04 NOTE — H&P
disease ([facility] [address] 8/3/2013    Chronic persistent hepatitis ([facility] [address] 7/22/2010    Depression     Depressive disorder, not elsewhere classified 7/22/2010    Double vision     left eye, since cataract surgery    Fractures     GERD (gastroesophageal reflux disease)     Hx of interstitial lung disease 6/12/2019    Leukocytosis     Negative ELIZABETH-2 mutation (07/29/2020). Most likely 2/2 to inflammation: hx of chronic hep C (on treatment) and RA.      Neuropathy     Rheumatoid arthritis(714.0) 7/22/2010    Spinal stenosis     Type II or unspecified type diabetes mellitus without mention of complication, not stated as uncontrolled 7/22/2010    Unspecified cerebral artery occlusion with cerebral infarction     right hand, loss of some sensation   ·     Past Surgical History:        Procedure Laterality Date    COLONOSCOPY      COSMETIC SURGERY      tummy tuck,face lift,mammoplasties- hepatitis blood transfusion complication    EYE SURGERY Bilateral     cataract    HUMERUS FRACTURE SURGERY Right 8/11/2020    RIGHT PROXIMAL HUMERUS AND SHAFT FRACTURE OPEN REDUCTION INTERNAL FIXATION performed by Calixto Cohen MD at [address]   ·     Medications Priorto Admission:    · Medications Prior to Admission: DULoxetine (CYMBALTA) 30 MG extended release capsule, Take 30 mg by mouth 2 times daily  · spironolactone-hydroCHLOROthiazide (ALDACTAZIDE) 25-25 MG per tablet, Take 1 tablet by mouth daily  · loperamide (IMODIUM) 2 MG capsule, Take 2 mg by mouth 3 times daily as needed for Diarrhea  · Calcium Carbonate Antacid (TUMS E-X PO), Take by mouth Chew and swallow one tablet by mouth 3 times daily as needed  · Lactobacillus (PROBIOTIC ACIDOPHILUS PO), Take 1 capsule by mouth daily  · Homeopathic Products (THERAWORX RELIEF EX), Apply topically Use as directed 3 times daily as needed for muscle cramps  · Menthol, Topical Analgesic, (BIOFREEZE ROLL-ON EX), Apply topically 3 times a day as needed for pain  · Lidocaine HCl-Benzyl Alcohol (SALONPAS LIDOCAINE PLUS EX), Apply topically Apply to affected area 3 times a day as needed for pain  · Eyelid Cleansers (OCUSOFT LID SCRUB EX), Apply topically Use as directed for blepharitis as needed  · insulin regular (HUMULIN R;NOVOLIN R) 100 UNIT/ML injection, Inject 0-20 units subcutaneously per sliding scale  · Psyllium (METAMUCIL FIBER PO), Take by mouth daily 2 hours after oral medications  · magnesium oxide (MAG-OX) 400 MG tablet, Take 400 mg by mouth daily  · ferrous sulfate (IRON 325) 325 (65 Fe) MG tablet, Take 325 mg by mouth daily  · lactulose (CHRONULAC) 10 GM/15ML solution, Take 20 mLs by mouth 3 times daily as needed (constipation)  · HYDROcodone-acetaminophen (NORCO) 5-325 MG per tablet, TAKE ONE TABLET BY MOUTH EVERY 4 HOURS AS NEEDED FOR PAIN FOR UP TO 7DAYS  · insulin glargine (BASAGLAR KWIKPEN) 100 UNIT/ML injection pen, INJECT 30 UNITS SUBCUTANEOUSLY EVERY DAY. · REFRESH 1.4-0.6 % SOLN, INSTILL 1 TO 2 DROPS INTO BOTH EYES TWICE A DAY. · pantoprazole (PROTONIX) 40 MG tablet, Take 1 tablet by mouth every morning (before breakfast)  · furosemide (LASIX) 40 MG tablet, Take 0.5 tablets by mouth daily  · albuterol sulfate HFA (PROVENTIL HFA) 108 (90 Base) MCG/ACT inhaler, Inhale 2 puffs into the lungs every 6 hours as needed for Wheezing  · acetaminophen (TYLENOL) 325 MG tablet, Take 1 tablet by mouth every 6 hours as needed for Pain  · Biotin 90912 MCG TBDP, Take 10,000 mcg by mouth daily   · Ascorbic Acid (VITAMIN C CR) 1000 MG TBCR, Take 1 tablet by mouth daily  · guaiFENesin (MUCINEX) 600 MG extended release tablet, Take 1 tablet by mouth 2 times daily as needed for Congestion  · polyethyl glycol-propyl glycol 0.4-0.3 % (SYSTANE) 0.4-0.3 % ophthalmic solution, Place 1 drop into both eyes as needed for Dry Eyes  · Vitamin D 3 (CHOLECALCIFEROL) 1000 UNITS TABS tablet, Take 1,000 Units by mouth daily.     Allergies:  Erythromycin and Sulfites    Social History:   · TOBACCO:   reports that she quit smoking about 41 years ago. Her smoking use included cigarettes. She started smoking about 67 years ago. She has a 60.00 pack-year smoking history. She has never used smokeless tobacco.  · ETOH:   reports current alcohol use of about 1.0 standard drinks of alcohol per week. · DRUGS : None  · Patient currently lives in a nursing home, [facility] [phone]  ·   Family History:   · History reviewed. No pertinent family history. Physical Exam  HENT:      Head: Normocephalic and atraumatic. Eyes:      General: No scleral icterus. Extraocular Movements: Extraocular movements intact. Cardiovascular:      Rate and Rhythm: Tachycardia present. Rhythm irregular. Heart sounds: No murmur. No gallop. Pulmonary:      Effort: Pulmonary effort is normal. No respiratory distress. Breath sounds: Rales present. Comments: Bilateral crackles appreciated  Abdominal:      General: Bowel sounds are normal. There is no distension. Palpations: Abdomen is soft. Tenderness: There is no abdominal tenderness. There is no guarding. Musculoskeletal:      Right lower leg: No edema. Left lower leg: No edema. Neurological:      Mental Status: She is alert and oriented to person, place, and time. Psychiatric:      Comments: Patient was a little irritated because she was hungry. Physical exam:       Vitals:    03/03/21 2345   BP: (!) 110/53   Pulse: 110   Resp: 26   Temp: 98.1 °F (36.7 °C)   SpO2: 100%       Review of Systems  ROS: A 10 point review of systems was conducted, significant findings as noted in HPI. DATA:    Labs:  CBC: No results for input(s): WBC, HGB, HCT, PLT in the last 72 hours. BMP: No results for input(s): NA, K, CL, CO2, BUN, CREATININE, GLUCOSE, PHOS in the last 72 hours. Invalid input(s):  CA  LFT's: No results for input(s): AST, ALT, ALB, BILITOT, ALKPHOS in the last 72 hours. Troponin: No results for input(s): TROPONINI in the last 72 hours.   BNP:No results for input(s): BNP in the last 72 hours. ABGs: No results for input(s): PHART, [id number], PO2ART in the last 72 hours. INR: No results for input(s): INR in the last 72 hours. U/A:  Recent Labs     03/03/21 1934   COLORU Yellow   PHUR 6.5   WBCUA 0-2   RBCUA 0-2   BACTERIA Rare*   CLARITYU Clear   SPECGRAV 1.015   LEUKOCYTESUR TRACE*   UROBILINOGEN 0.2   BILIRUBINUR Negative   BLOODU Negative   GLUCOSEU Negative       XR CHEST (2 VW)   Final Result      Small bilateral pleural effusions right greater than left with associated prominent interstitial opacities throughout both lungs favored to be volume overload/interstitial edema. Assessment/Plan:   Patient is an 16-year-old female with past medical history of diastolic heart failure, Q2AM, HTN who was admitted for acute on chronic heart failure exacerbation    Plan  -20 mg IV Lasix daily  -Daily weights  -Strict I's and O's  -Low-sodium diet  -FeUrea pending  -Monitor creatinine while diuresing  -Avoid nephrotoxic agents  -Hgb A1c pending    Problem  #Acute on chronic diastolic heart failure exacerbation  Likely secondary to high salt intake  Per nursing staff, patient continues to remain a little short of breath. Patient had a significantly elevated BNP. Other labs were also deranged including an elevated creatinine of 1.4. Examination supports fluid overload in the lungs. Chest x-ray supports picture of CHF exacerbation  -20 mg IV Lasix daily (patient claims she is very sensitive to Lasix)  -Daily weights  -Strict I's and O's  -Low-sodium diet  -Encouraged low-salt diet    #ORI  Likely secondary to cardiorenal syndrome  Per labs at nursing home, creatinine increased to 1.4 from baseline of 1.0 in January 2021.  -FeUrea pending  -Monitor creatinine while diuresing  -Avoid nephrotoxic agents    #Chronic Leukocytosis  WBC from nursing home 29.8, absolute neutrophils elevated at 55.1. No evidence of infection.  Prior records show elevated WBC, on last admission WBC was 27.6. UA negative for infections. Negative ELIZABETH-2 mutation (07/29/2020).  Most likely 2/2 to inflammation: hx of chronic hep C and RA.  -Daily CBC    #[id number]  Patient on Basaglar 30 units nightly + sliding scale   -Last HgbA1c = 8.1 on 7/20  -Hgb A1c pending  -2/3 Home Basaglar dose = 20 units  -POCT glucose AC/HS    #HTN  BP on admission soft 91/58 now stable  -Continue to monitor BP while inpatient    #Hep C cirrhosis  -Per notes, currently stable followed by GI    #Depression  -Continue duloxetine 30 mg twice daily    #Microcytic anemia  Per nursing home staff, Hgb 9.2, MCV 66.5  -Continue iron 325 mg daily     This patient will be staffed and discussed with Raegan Quesada MD.     Code Status: DNR CC  FEN: Low Na diet  PPX: Lovenox  DISPO: [facility]    Macy Steele MD  3/4/2021,  1:17 AM

## 2021-03-04 NOTE — PROGRESS NOTES
4 Eyes Admission Assessment     I agree as the admission nurse that 2 RN's have performed a thorough Head to Toe Skin Assessment on the patient. ALL assessment sites listed below have been assessed on admission. Areas assessed by both nurses:   [x]   Head, Face, and Ears   [x]   Shoulders, Back, and Chest  [x]   Arms, Elbows, and Hands   [x]   Coccyx, Sacrum, and Ischium  [x]   Legs, Feet, and Heels        Does the Patient have Skin Breakdown?   NO  Blanchable redness to coccyx, bilateral elbows, bilateral heels and spine         Anup Prevention initiated:  YES   Wound Care Orders initiated:  NO      [facility] nurse consulted for Pressure Injury (Stage 3,4, Unstageable, DTI, NWPT, and Complex wounds) or Anup score 18 or lower:  NO      Nurse 1 eSignature: Electronically signed by Tita Tejeda RN on 3/4/21 at 1:34 AM EST    **SHARE this note so that the co-signing nurse is able to place an eSignature**    Nurse 2 eSignature: Electronically signed by Joy Mendoza RN on 3/4/2021 at 12:54 AM

## 2021-03-04 NOTE — PROGRESS NOTES
Pt HR noted to be in to the 140s while MD at bedside. STAT EKG ordered for rhythm change. Awaiting possible new orders.

## 2021-03-04 NOTE — ACP (ADVANCE CARE PLANNING)
Patient is not decisional.  She has a  Recent DNR-CC order from Dr. Laina Vargas. She also has DNR-CC orders dating back many years from Dr. Laina Vargas. She has documentation of plans to donate her body to . Her hospital records state multiple times that she has Living Will and HCPOA documents attached  in her chart, but I couldn't find them.

## 2021-03-04 NOTE — PLAN OF CARE
Problem: Skin Integrity:  Goal: Absence of new skin breakdown  Description: Absence of new skin breakdown  Outcome: Ongoing  Note: No new evidence of skin breakdown. Pt has noted red areas on all bony prominences and buttocks. Preventives applied, purwick for incontinence and q2 turn/repositioning done. Will continue to monitor. Problem: Pain:  Goal: Pain level will decrease  Description: Pain level will decrease  Outcome: Ongoing  Note: Pt stated she had a stomach/upset stomach. 6/10 on pain scale. Medicated per Mar. Reassessed and pt asleep with resp >12.

## 2021-03-04 NOTE — PROGRESS NOTES
RESPIRATORY THERAPY ASSESSMENT    Name:  [address] [place] [address] Record Number:  [id number]  Age: 79 y.o. Gender: female  : 1940  Today's Date:  3/3/2021  Room:  [facility]-    Assessment     Is the patient being admitted for a COPD or Asthma exacerbation? No   (If yes the patient will be seen every 4 hours for the first 24 hours and then reassessed)    Patient Admission Diagnosis      Allergies  Allergies   Allergen Reactions    Erythromycin     Sulfites        Minimum Predicted Vital Capacity:     816          Actual Vital Capacity:      n/a              Pulmonary History:CHF/Pulmonary Edema  Home Oxygen Therapy:  room air  Home Respiratory Therapy:Albuterol   Current Respiratory Therapy: Albuterol Q4HPRN          Respiratory Severity Index(RSI)   Patients with orders for inhalation medications, oxygen, or any therapeutic treatment modality will be placed on Respiratory Protocol. They will be assessed with the first treatment and at least every 72 hours thereafter. The following severity scale will be used to determine frequency of treatment intervention. Smoking History: Pulmonary Disease or Smoking History, Greater than 15 pack year = 2    Social History  Social History     Tobacco Use    Smoking status: Former Smoker     Packs/day: 2.00     Years: 30.00     Pack years: 60.00     Types: Cigarettes     Start date:      Quit date:      Years since quittin.1    Smokeless tobacco: Never Used    Tobacco comment: smoked 30 yrs ago   Substance Use Topics    Alcohol use:  Yes     Alcohol/week: 1.0 standard drinks     Types: 1 Glasses of wine per week     Frequency: Monthly or less     Drinks per session: 1 or 2     Binge frequency: Never     Comment: 2 per day    Drug use: No       Recent Surgical History: None = 0  Past Surgical History  Past Surgical History:   Procedure Laterality Date    COLONOSCOPY      COSMETIC SURGERY      tummy tuck,face lift,mammoplasties- hepatitis blood transfusion complication    EYE SURGERY Bilateral     cataract    HUMERUS FRACTURE SURGERY Right 8/11/2020    RIGHT PROXIMAL HUMERUS AND SHAFT FRACTURE OPEN REDUCTION INTERNAL FIXATION performed by Humphrey Workman MD at [address]       Level of Consciousness: Alert, Oriented, and Cooperative = 0    Level of Activity: Walking with assistance = 1    Respiratory Pattern: Increased; RR 21-30 = 1    Breath Sounds: Diminished unilaterally = 1    Sputum   ,  ,    Cough: Strong, spontaneous, non-productive = 0    Vital Signs   BP (!) 103/57   Pulse 130   Temp 97.5 °F (36.4 °C) (Oral)   Resp 25   SpO2 98%   SPO2 (COPD values may differ): Greater than or equal to 92% on room air = 0    Peak Flow (asthma only): not applicable = 0    RSI: 5-6 = Q4hr PRN (every four hours as needed) for dyspnea        Plan       Goals: medication delivery    Patient/caregiver was educated on the proper method of use for Respiratory Care Devices:  Yes      Level of patient/caregiver understanding able to:   ? Verbalize understanding   ? Demonstrate understanding       ? Teach back        ? Needs reinforcement       ? No available caregiver               ? Other:     Response to education:  Steven Wolf     Is patient being placed on Home Treatment Regimen? Yes     Does the patient have everything they need prior to discharge? NA     Comments: Patient is on equivalent to home regimen. Plan of Care: Continue Albuterol MDI 2 puffs Q4HPRN. Electronically signed by Radha Lim RCP on 3/3/2021 at 11:25 PM    Respiratory Protocol Guidelines     1. Assessment and treatment by Respiratory Therapy will be initiated for medication and therapeutic interventions upon initiation of aerosolized medication. 2. Physician will be contacted for respiratory rate (RR) greater than 35 breaths per minute. Therapy will be held for heart rate (HR) greater than 140 beats per minute, pending direction from physician.   3. Bronchodilators will be administered via Metered Dose Inhaler (MDI) with spacer when the following criteria are met:  a. Alert and cooperative     b. HR < 140 bpm  c. RR < 30 bpm                d. Can demonstrate a 2-3 second inspiratory hold  4. Bronchodilators will be administered via Hand Held Nebulizer [facility]) to patients when ANY of the following criteria are met  a. Incognizant or uncooperative          b. Patients treated with HHN at Home        c. Unable to demonstrate proper use of MDI with spacer     d. RR > 30 bpm   5. Bronchodilators will be delivered via Metered Dose Inhaler (MDI), HHN, Aerogen to intubated patients on mechanical ventilation. 6. Inhalation medication orders will be delivered and/or substituted as outlined below. Aerosolized Medications Ordering and Administration Guidelines:    1. All Medications will be ordered by a physician, and their frequency and/or modality will be adjusted as defined by the patients Respiratory Severity Index (RSI) score. 2. If the patient does not have documented COPD, consider discontinuing anticholinergics when RSI is less than 9.  3. If the bronchospasm worsens (increased RSI), then the bronchodilator frequency can be increased to a maximum of every 4 hours. If greater than every 4 hours is required, the physician will be contacted. 4. If the bronchospasm improves, the frequency of the bronchodilator can be decreased, based on the patient's RSI, but not less than home treatment regimen frequency. 5. Bronchodilator(s) will be discontinued if patient has a RSI less than 9 and has received no scheduled or as needed treatment for 72  Hrs. Patients Ordered on a Mucolytic Agent:    1. Must always be administered with a bronchodilator. 2. Discontinue if patient experiences worsened bronchospasm, or secretions have lessened to the point that the patient is able to clear them with a cough. Anti-inflammatory and Combination Medications:    1.  If the patient lacks prior history of lung disease, is not using inhaled anti-inflammatory medication at home, and lacks wheezing by examination or by history for at least 24 hours, contact physician for possible discontinuation.

## 2021-03-04 NOTE — CARE COORDINATION
Case Management Assessment           Initial Evaluation                Date / Time of Evaluation: 3/4/2021 1:18 PM                 Assessment Completed by: Charles Zhu    Patient Name: Corbin Garcia     YOB: 1940  Diagnosis: Heart failure exacerbated by sotalol [facility]) [I50.9]     Date / Time: 3/3/2021  6:12 PM    Patient Admission Status: Inpatient    If patient is discharged prior to next notation, then this note serves as note for discharge by case management.      Current PCP: Roxanna Laura MD  Clinic Patient: No    Chart Reviewed: Yes  Patient/ Family Interviewed: Yes    Initial assessment completed at bedside with: via telephone with daughterGabby    Hospitalization in the last 30 days: No    Emergency Contacts:  Extended Emergency Contact Information  Primary Emergency Contact: [address] [place] [address] Phone: [phone]  Relation: Child  Secondary Emergency Contact: Javy Moses  Address: [address] Phone: [phone]  Mobile Phone: [phone]  Relation: Child    Advance Directives:   Code Status: DNR-CC    Healthcare Power of : Yes  Agent: Art Moy  Contact Number: [phone]        Financial  Payor: hCaro Cook / Plan: Ger Weston / Product Type: *No Product type* /     Pre-cert required for SNF: Yes    Pharmacy    [address] [place] [address]  Phone: [phone] Fax: [phone]    [facility] 27 [phone] Jono Brooms [phone]  [address] [facility] [address]  Phone: [phone] Fax: [phone]    Independent [address] [phone] Jono Brooms [phone]  [facility] [address]  Phone: [phone] Fax: [phone]      Potential assistance Purchasing Deep Sutures: 5-0 Polysorb Medications:    Does Patient want to participate in local refill/ meds to beds program?:      Meds To Beds General Rules:  1. Can ONLY be done Monday- Friday between 8:30am-5pm  2. Prescription(s) must be in pharmacy by 3pm to be filled same day  3. Copy of patient's insurance/ prescription drug card and patient face sheet must be sent along with the prescription(s)  4. Cost of Rx cannot be added to hospital bill. If financial assistance is needed, please contact unit  or ;  or  CANNOT provide pharmacy voucher for patients co-pays  5. Patients can then  the prescription on their way out of the hospital at discharge, or pharmacy can deliver to the bedside if staff is available. (payment due at time of pick-up or delivery - cash, check, or card accepted)     Able to afford home medications/ co-pay costs: Yes    ADLS  Support Systems:      PT AM-PAC:     OT AM-PAC:       [facility]ung:  [facility]  Steps:      Plans to RETURN to current housing: Yes  Barriers to RETURNING to current [address]in[address] [place] [address]  Currently ACTIVE with  [employer] [address]: No  Home Care Agency: Not Applicable          Durable Medical Equipment  DME Provider:    Equipment:     Home Oxygen and [address] prior to admission: No  [facility] [place] [facility]: Not Applicable  Other Respiratory Equipment:          Dialysis  Active with HD/PD prior to admission: No  Nephrologist:      HD Center:  Not Applicable    DISCHARGE PLAN:  Disposition: Home- No Services Needed    Transportation PLAN for discharge: EMS transportation     Factors facilitating achievement of predicted outcomes: Family support and Caregiver support    Barriers to discharge: medical stability    Additional Case Management Notes:  FRANNIE spoke with daughter Dennys Rasheed for pt to return to the [facility]. Pt has been to the [facility], but does not want SNF at [place].      Frannie called Miguel at the [address] [place] [address], left voice mail asking if anything is needed for pt to return. 2:03 PM  Received call from Robina Robertson moncho the [place]. Pt can return if she is safe. She will need PT/OT evals prior to dc. Miguel asked to be updated when closer to dc. The Plan for Transition of Care is related to the following treatment goals of Heart failure exacerbated by sotalol [facility]) [I50.9]    The Patient and/or patient representative Cinthia Leung and her family were provided with a choice of provider and agrees with the discharge plan Not Indicated    Freedom of choice list was provided with basic dialogue that supports the patient's individualized plan of care/goals and shares the quality data associated with the providers.  Not Indicated    Care Transition patient: No    Perlita Deshpande RN  [facility]  Case Management Department  Ph: [phone]

## 2021-03-04 NOTE — PROGRESS NOTES
AM assessment charted. /85   Pulse 117   Temp 97.4 °F (36.3 °C) (Oral)   Resp 22   Ht 5' (1.524 m)   SpO2 95%   BMI 24.97 kg/m²   Pt denies any pain or SOB. Pt confused within conversation and A&O x3. Will continue to monitor.

## 2021-03-04 NOTE — PROGRESS NOTES
Internal Medicine  PGY 1  Progress note    CC: Abnormal labs    History Obtained From:  Patient    Interval Hx:  Grzegorz Mcgee. Pt continues to be tachycardic and the rhythm on tele is hard to decipher, Will get a stat EKG and go from there. Pt however has no symptoms. Will continue lasix 40 PO as pt takes 20 PO at home and leading hypothesis is heart failure exacerbation with pleural effusions. Consult from heme/onc appreciated. My biggest concern right now is her rate control. If she has an abnormal rhythm that may have caused her to go into heart failure exacerbation although she does endorse a high salty diet at home as well. HISTORY OF PRESENT ILLNESS:    Patient is an 54-year-old female with past medical history of Z7FY, HTN, diastolic HF (last echo 5/24 EF 55 to 60% with grade 2 diastolic dysfunction), hep C, dementia, depression, rheumatoid arthritis who presented from her nursing home with abnormal labs. Per the patient, she feels just fine and is not having any trouble and does not comprehend why she needed to come to the hospital.  Patient denied any fever, chills, nausea, vomiting, chest pain, shortness of breath, lower extremity swelling, lower extremity pain, diarrhea, constipation, PND, cough. At bedside, I spoke with daughter Antony Escobar, who endorsed that her mother has been ordering a lot of outside food and may have contributed to her current exacerbation. I called the [facility] and spoke to the nighttime nurse who endorsed that her daytime nurse has left for the night. According to her, the patient has been having some shortness of breath and had some abnormal labs today. Per her chart review, patient's BUN 36, creatinine 1.4 (baseline 1.0, 1/14/2021), glucose 30, WBC 29.8, Hgb 9.2, MCV 66.5, MG 1.3, BNP 16,925. She was not sure the other details as to why the patient was brought into the hospital.    In the ED, BP 91/58 . BNP [id number].  CXR with small bilateral pleural effusions right greater than left with associated prominent interstitial opacities throughout both lungs favored to be volume overload/interstitial edema. Patient was admitted for acute on chronic CHF exacerbation and was given one-time 40 mg IV Lasix. Past Medical History:        Diagnosis Date    ADHD (attention deficit hyperactivity disorder)     Attention deficit disorder without mention of hyperactivity 7/22/2010    Autoimmune disease NEC     Carotid artery disease ([facility] [address] 8/2/2013    LEFT CAROTID ENDARTERECTOMY               Carotid artery disease ([facility] [address] 8/3/2013    [facility] 7/22/2010    Depression     Depressive disorder, not elsewhere classified 7/22/2010    Double vision     left eye, since cataract surgery    Fractures     GERD (gastroesophageal reflux disease)     Hx of interstitial lung disease 6/12/2019    Leukocytosis     Negative ELIZABETH-2 mutation (07/29/2020). Most likely 2/2 to inflammation: hx of chronic hep C (on treatment) and RA.      Neuropathy     Rheumatoid arthritis(714.0) 7/22/2010    Spinal stenosis     Type II or unspecified type diabetes mellitus without mention of complication, not stated as uncontrolled 7/22/2010    Unspecified cerebral artery occlusion with cerebral infarction     right hand, loss of some sensation       Past Surgical History:        Procedure Laterality Date    COLONOSCOPY      COSMETIC SURGERY      tummy tuck,face lift,mammoplasties- hepatitis blood transfusion complication    EYE SURGERY Bilateral     cataract    HUMERUS FRACTURE SURGERY Right 8/11/2020    RIGHT PROXIMAL HUMERUS AND SHAFT FRACTURE OPEN REDUCTION INTERNAL FIXATION performed by Rancho Ahmadi MD at [address] [place] [address] Admission:    Medications Prior to Admission: DULoxetine (CYMBALTA) 30 MG extended release capsule, Take 30 mg by mouth 2 times daily  spironolactone-hydroCHLOROthiazide (ALDACTAZIDE) 25-25 MG per tablet, Take 1 tablet by mouth daily  loperamide (IMODIUM) 2 MG capsule, Take 2 mg by mouth 3 times daily as needed for Diarrhea  Calcium Carbonate Antacid (TUMS E-X PO), Take by mouth Chew and swallow one tablet by mouth 3 times daily as needed  Lactobacillus (PROBIOTIC ACIDOPHILUS PO), Take 1 capsule by mouth daily  Homeopathic Products (THERAWORX RELIEF EX), Apply topically Use as directed 3 times daily as needed for muscle cramps  Menthol, Topical Analgesic, (BIOFREEZE ROLL-ON EX), Apply topically 3 times a day as needed for pain  Lidocaine HCl-Benzyl Alcohol (SALONPAS LIDOCAINE PLUS EX), Apply topically Apply to affected area 3 times a day as needed for pain  Eyelid Cleansers (OCUSOFT LID SCRUB EX), Apply topically Use as directed for blepharitis as needed  insulin regular (HUMULIN R;NOVOLIN R) 100 UNIT/ML injection, Inject 0-20 units subcutaneously per sliding scale  Psyllium (METAMUCIL FIBER PO), Take by mouth daily 2 hours after oral medications  magnesium oxide (MAG-OX) 400 MG tablet, Take 400 mg by mouth daily  ferrous sulfate (IRON 325) 325 (65 Fe) MG tablet, Take 325 mg by mouth daily  lactulose (CHRONULAC) 10 GM/15ML solution, Take 20 mLs by mouth 3 times daily as needed (constipation)  HYDROcodone-acetaminophen (NORCO) 5-325 MG per tablet, TAKE ONE TABLET BY MOUTH EVERY 4 HOURS AS NEEDED FOR PAIN FOR UP TO 7DAYS  insulin glargine (BASAGLAR KWIKPEN) 100 UNIT/ML injection pen, INJECT 30 UNITS SUBCUTANEOUSLY EVERY DAY. REFRESH 1.4-0.6 % SOLN, INSTILL 1 TO 2 DROPS INTO BOTH EYES TWICE A DAY.   pantoprazole (PROTONIX) 40 MG tablet, Take 1 tablet by mouth every morning (before breakfast)  furosemide (LASIX) 40 MG tablet, Take 0.5 tablets by mouth daily  albuterol sulfate HFA (PROVENTIL HFA) 108 (90 Base) MCG/ACT inhaler, Inhale 2 puffs into the lungs every 6 hours as needed for Wheezing  acetaminophen (TYLENOL) 325 MG tablet, Take 1 tablet by mouth every 6 hours as needed for Pain  Biotin 85374 MCG TBDP, Take 10,000 mcg by mouth daily   Ascorbic Acid (VITAMIN C CR) 1000 MG TBCR, Take 1 tablet by mouth daily  guaiFENesin (MUCINEX) 600 MG extended release tablet, Take 1 tablet by mouth 2 times daily as needed for Congestion  polyethyl glycol-propyl glycol 0.4-0.3 % (SYSTANE) 0.4-0.3 % ophthalmic solution, Place 1 drop into both eyes as needed for Dry Eyes  Vitamin D 3 (CHOLECALCIFEROL) 1000 UNITS TABS tablet, Take 1,000 Units by mouth daily. Allergies:  Erythromycin and Sulfites    Social History:   · TOBACCO:   reports that she quit smoking about 41 years ago. Her smoking use included cigarettes. She started smoking about 67 years ago. She has a 60.00 pack-year smoking history. She has never used smokeless tobacco.  · ETOH:   reports current alcohol use of about 1.0 standard drinks of alcohol per week. · DRUGS : None  · Patient currently lives in a nursing home, [facility] [phone]  ·   Family History:   History reviewed. No pertinent family history. Physical Exam  HENT:      Head: Normocephalic and atraumatic. Eyes:      General: No scleral icterus. Extraocular Movements: Extraocular movements intact. Cardiovascular:      Rate and Rhythm: Tachycardia present. Rhythm irregular. Heart sounds: No murmur. No gallop. Pulmonary:      Effort: Pulmonary effort is normal. No respiratory distress. Breath sounds: Rales present. Comments: Bilateral crackles appreciated  Abdominal:      General: Bowel sounds are normal. There is no distension. Palpations: Abdomen is soft. Tenderness: There is no abdominal tenderness. There is no guarding. Musculoskeletal:      Right lower leg: No edema. Left lower leg: No edema. Neurological:      Mental Status: She is alert and oriented to person, place, and time. Psychiatric:      Comments: Patient was a little irritated because she was hungry.        Physical exam:       Vitals:    03/04/21 0831   BP: 116/85   Pulse: 117   Resp: 22   Temp: 97.4 °F (36.3 °C)   SpO2: 95%       Review of Systems  ROS: A 10 point review of systems was conducted, significant findings as noted in HPI. DATA:    Labs:  CBC:   Recent Labs     03/04/21  0335   WBC 27.3*   HGB 9.0*   HCT 29.0*   PLT see below       BMP:   Recent Labs     03/04/21  0335   *   K 3.9   CL 95*   CO2 26   BUN 35*   CREATININE 1.3*   GLUCOSE 207*     LFT's: No results for input(s): AST, ALT, ALB, BILITOT, ALKPHOS in the last 72 hours. Troponin: No results for input(s): TROPONINI in the last 72 hours. BNP:No results for input(s): BNP in the last 72 hours. ABGs: No results for input(s): PHART, HYZ5NKG, PO2ART in the last 72 hours. INR: No results for input(s): INR in the last 72 hours. U/A:  Recent Labs     03/03/21  1934   COLORU Yellow   PHUR 6.5   WBCUA 0-2   RBCUA 0-2   BACTERIA Rare*   CLARITYU Clear   SPECGRAV 1.015   LEUKOCYTESUR TRACE*   UROBILINOGEN 0.2   BILIRUBINUR Negative   BLOODU Negative   GLUCOSEU Negative       XR CHEST (2 VW)   Final Result      Small bilateral pleural effusions right greater than left with associated prominent interstitial opacities throughout both lungs favored to be volume overload/interstitial edema. Assessment/Plan:   Patient is an 76-year-old female with past medical history of diastolic heart failure, O0VA, HTN who was admitted for acute on chronic heart failure exacerbation    Problem  #Acute on chronic diastolic heart failure exacerbation  Likely secondary to high salt intake  Per nursing staff, patient continues to remain a little short of breath. Patient had a significantly elevated BNP. Other labs were also deranged including an elevated creatinine of 1.4. Examination supports fluid overload in the lungs.   Chest x-ray supports picture of CHF exacerbation  -40 lasix PO for 1 dose and will assess repsone  -Daily weights  -Strict I's and O's  -Low-sodium diet  -Encouraged low-salt diet    #ORI  Likely secondary to cardiorenal syndrome  Per labs at nursing home, creatinine increased to 1.4 from baseline of 1.0 in 2021.  -FeUrea pending  -Monitor creatinine while diuresing  -Avoid nephrotoxic agents    #Chronic Leukocytosis  WBC from nursing home 29.8, absolute neutrophils elevated at 55.1. No evidence of infection. Prior records show elevated WBC, on last admission WBC was 27.6. UA negative for infections. Negative ELIZABETH-2 mutation (2020).  Most likely 2/2 to inflammation: hx of chronic hep C and RA.  -Daily CBC  -Heme Irving Gomez chandler appreciated    #[id number]  Patient on Basaglar 30 units nightly + sliding scale   -Last HgbA1c = 8.1 on   -Hgb A1c pending  -2/3 Home Basaglar dose = 20 units  -POCT glucose AC/HS    #HTN  BP on admission soft  now stable  -Continue to monitor BP while inpatient    #Hep C cirrhosis  -Per notes, currently stable followed by GI    #Depression  -Continue duloxetine 30 mg twice daily    #Microcytic anemia  Per nursing home staff, Hgb 9.2, MCV 66.5  -Continue iron 325 mg daily     This patient will be staffed and discussed with Laina Vargas MD.     Code Status: DNR CC  FEN: Low Na diet  PPX: Lovenox  DISPO: GMF    Erika Ortiz MD  3/4/2021,  10:09 AM

## 2021-03-04 NOTE — CONSULTS
[facility]                        Consult Note      Requesting Physician:  Jeanette Andrews  CHIEF COMPLAINT:   Chief Complaint   Patient presents with    Abnormal Lab         HISTORY OF PRESENT ILLNESS:      Ms. Gladys Boateng  is a 79 y.o. female we are seeing in consultation for leukocytosis. Patient is an 79-year-old female with past medical history of R3AL, HTN, diastolic HF (last echo 9/96 EF 55 to 60% with grade 2 diastolic dysfunction), hep C, dementia, depression, rheumatoid arthritis who presented from her nursing home with abnormal labs. Per the patient, she feels just fine and is not having any trouble and does not comprehend why she needed to come to the hospital.  Patient denied any fever, chills, nausea, vomiting, chest pain, shortness of breath, lower extremity swelling, lower extremity pain, diarrhea, constipation, PND, cough. At bedside, I spoke with daughter Bret Lorenzana, who endorsed that her mother has been ordering a lot of outside food and may have contributed to her current exacerbation. I called the [facility] and spoke to the nighttime nurse who endorsed that her daytime nurse has left for the night. According to her, the patient has been having some shortness of breath and had some abnormal labs today. Per her chart review, patient's BUN 36, creatinine 1.4 (baseline 1.0, 1/14/2021), glucose 30, WBC 29.8, Hgb 9.2, MCV 66.5, MG 1.3, BNP 16,925. She was not sure the other details as to why the patient was brought into the hospital.     In the ED, BP 91/58 . BNP [id number]. CXR with small bilateral pleural effusions right greater than left with associated prominent interstitial opacities throughout both lungs favored to be volume overload/interstitial edema.   Patient was admitted for acute on chronic CHF exacerbation and was given one-time 40 mg IV Lasix    Past Medical History:        Diagnosis Date    ADHD (attention deficit hyperactivity disorder)     Attention deficit disorder without mention of hyperactivity 7/22/2010    Autoimmune disease NEC     Carotid artery disease ([facility] [address] 8/2/2013    LEFT CAROTID ENDARTERECTOMY               Carotid artery disease ([facility] [address] 8/3/2013    Chronic persistent hepatitis ([facility] [address] 7/22/2010    Depression     Depressive disorder, not elsewhere classified 7/22/2010    Double vision     left eye, since cataract surgery    Fractures     GERD (gastroesophageal reflux disease)     Hx of interstitial lung disease 6/12/2019    Leukocytosis     Negative ELIZABETH-2 mutation (07/29/2020). Most likely 2/2 to inflammation: hx of chronic hep C (on treatment) and RA.      Neuropathy     Rheumatoid arthritis(714.0) 7/22/2010    Spinal stenosis     Type II or unspecified type diabetes mellitus without mention of complication, not stated as uncontrolled 7/22/2010    Unspecified cerebral artery occlusion with cerebral infarction     right hand, loss of some sensation     Past Surgical History:        Procedure Laterality Date    COLONOSCOPY      COSMETIC SURGERY      tummy tuck,face lift,mammoplasties- hepatitis blood transfusion complication    EYE SURGERY Bilateral     cataract    HUMERUS FRACTURE SURGERY Right 8/11/2020    RIGHT PROXIMAL HUMERUS AND SHAFT FRACTURE OPEN REDUCTION INTERNAL FIXATION performed by Cuco Vargas MD at [facility]       Current Medications:    Current Facility-Administered Medications: ferrous sulfate (IRON 325) tablet 325 mg, 325 mg, Oral, Daily  lactobacillus (CULTURELLE) capsule 1 capsule, 1 capsule, Oral, Daily  potassium chloride (KLOR-CON M) extended release tablet 40 mEq, 40 mEq, Oral, Once  albuterol (PROVENTIL) nebulizer solution 2.5 mg, 2.5 mg, Nebulization, Q4H PRN  ascorbic acid (VITAMIN C) tablet 1,000 mg, 1,000 mg, Oral, Daily  docusate sodium (COLACE) capsule 100 mg, 100 mg, Oral, Daily  DULoxetine (CYMBALTA) extended release capsule 30 mg, 30 mg, Oral, BID  guaiFENesin (MUCINEX) extended release tablet 600 mg, 600 mg, Oral, BID PRN  HYDROcodone-acetaminophen (NORCO) 5-325 MG per tablet 1 tablet, 1 tablet, Oral, Q6H PRN  insulin glargine (LANTUS;BASAGLAR) injection pen 20 Units, 20 Units, Subcutaneous, Nightly  pantoprazole (PROTONIX) tablet 40 mg, 40 mg, Oral, QAM AC  polyvinyl alcohol (LIQUIFILM TEARS) 1.4 % ophthalmic solution 1 drop, 1 drop, Both Eyes, PRN  polyvinyl alcohol (LIQUIFILM TEARS) 1.4 % ophthalmic solution 1 drop, 1 drop, Both Eyes, BID  vitamin D (CHOLECALCIFEROL) tablet 1,000 Units, 1,000 Units, Oral, Daily  sodium chloride flush 0.9 % injection 10 mL, 10 mL, Intravenous, 2 times per day  sodium chloride flush 0.9 % injection 10 mL, 10 mL, Intravenous, PRN  promethazine (PHENERGAN) tablet 12.5 mg, 12.5 mg, Oral, Q6H PRN **OR** ondansetron (ZOFRAN) injection 4 mg, 4 mg, Intravenous, Q6H PRN  polyethylene glycol (GLYCOLAX) packet 17 g, 17 g, Oral, Daily PRN  acetaminophen (TYLENOL) tablet 650 mg, 650 mg, Oral, Q6H PRN **OR** acetaminophen (TYLENOL) suppository 650 mg, 650 mg, Rectal, Q6H PRN  [Held by provider] enoxaparin (LOVENOX) injection 40 mg, 40 mg, Subcutaneous, Daily  magnesium sulfate 2000 mg in 50 mL IVPB premix, 2,000 mg, Intravenous, PRN  glucose (GLUTOSE) 40 % oral gel 15 g, 15 g, Oral, PRN  dextrose 50 % IV solution, 12.5 g, Intravenous, PRN  glucagon (rDNA) injection 1 mg, 1 mg, Intramuscular, PRN  dextrose 5 % solution, 100 mL/hr, Intravenous, PRN  Allergies:  Erythromycin and Sulfites    Social History:      Social History     Socioeconomic History    Marital status:      Spouse name: Not on file    Number of children: Not on file    Years of education: Not on file    Highest education level: Not on file   Occupational History    Not on file   Social Needs    Financial resource strain: Not on file    Food insecurity     Worry: Not on file     Inability: Not on file    Transportation needs     Medical: Not on file     Non-medical: Not on file   Tobacco Use    Smoking status: Former Smoker     Packs/day: 2.00     Years: 30.00     Pack years: 60.00     Types: Cigarettes     Start date: 11     Quit date:      Years since quittin.2    Smokeless tobacco: Never Used    Tobacco comment: smoked 30 yrs ago   Substance and Sexual Activity    Alcohol use: Yes     Alcohol/week: 1.0 standard drinks     Types: 1 Glasses of wine per week     Frequency: Monthly or less     Drinks per session: 1 or 2     Binge frequency: Never     Comment: 2 per day    Drug use: No    Sexual activity: Not Currently   Lifestyle    Physical activity     Days per week: Not on file     Minutes per session: Not on file    Stress: Not on file   Relationships    Social connections     Talks on phone: Not on file     Gets together: Not on file     Attends L[employer]-day service: Not on file     Active member of club or organization: Not on file     Attends meetings of clubs or organizations: Not on file     Relationship status: Not on file    Intimate partner violence     Fear of current or ex partner: Not on file     Emotionally abused: Not on file     Physically abused: Not on file     Forced sexual activity: Not on file   Other Topics Concern    Not on file   Social History Narrative    Not on file          Family History:     History reviewed. No pertinent family history. REVIEW OF SYSTEMS:      · Constitutional: Denies fever, sweats, weight loss. .     · Eyes: No visual changes or diplopia. No scleral icterus. · ENT: No Headaches, hearing loss or vertigo. No mouth sores or sore throat. · Cardiovascular: No chest pain, dyspnea on exertion, palpitations or loss of consciousness. · Respiratory: No cough or wheezing, no sputum production. No hemoptysis. .    · Gastrointestinal: No abdominal pain, appetite loss, blood in stools. No change in bowel habits. · Genitourinary: No dysuria, trouble voiding, or hematuria. · Musculoskeletal:  Generalized weakness. No joint complaints. · Integumentary: No rash or pruritis.   · Neurological: No headache, diplopia. No change in gait, balance, or coordination. No paresthesias. · Endocrine: No temperature intolerance. No excessive thirst, fluid intake, or urination. · Hematologic/Lymphatic: No abnormal bruising or ecchymoses, blood clots or swollen lymph nodes. · Allergic/Immunologic: No nasal congestion or hives. PHYSICAL EXAM:      Vitals:  /85   Pulse 117   Temp 97.4 °F (36.3 °C) (Oral)   Resp 22   Ht 5' (1.524 m)   SpO2 95%   BMI 24.97 kg/m²     CONSTITUTIONAL:  awake, alert, cooperative, no apparent distress, and appears stated age NAD  EYES:  Lids and lashes normal, pupils equal, round and reactive to light, extra ocular muscles intact, sclera clear, conjunctiva normal  ENT:  Normocephalic, without obvious abnormality, atramatic, sinuses nontender on palpation, external ears without lesions, oral pharynx with moist mucus membranes, tonsils without erythema or exudates, gums normal and good dentition. NECK:  Supple, symmetrical, trachea midline, no adenopathy, thyroid symmetric, not enlarged and no tenderness, skin normal  HEMATOLOGIC/LYMPHATICS:  no cervical lymphadenopathy, no supraclavicular lymphadenopathy, no axillary lymphadenopathy and no inguinal lymphadenopathy  LUNGS:  No increased work of breathing, good air exchange, clear to auscultation bilaterally, no crackles or wheezing  CARDIOVASCULAR:  , regular rate and rhythm, normal S1 and S2, no S3 or S4, and no murmur noted  ABDOMEN:  No scars, normal bowel sounds, soft, non-distended, non-tender, no masses palpated, no hepatosplenomegally  CHEST/BREASTS:  Breasts symmetrical, skin without lesion(s), no nipple retraction or dimpling, no nipple discharge, no masses palpated, no axillary or supraclavicular adenopathy    MUSCULOSKELETAL:  There is no redness, warmth, or swelling of the joints. Full range of motion noted. Motor strength is 5 out of 5 all extremities bilaterally.   NEUROLOGIC:  Awake, alert, oriented to name, place and time.  Cranial nerves II-XII are grossly intact. Motor is 5 out of 5 bilaterally. SKIN:  no bruising or bleeding      DATA:    PT/INR:  No results for input(s): PROT, INR in the last 72 hours. PTT:  No results for input(s): APTT in the last 72 hours. CMP:    Lab Results   Component Value Date     03/04/2021    K 3.9 03/04/2021    CL 95 03/04/2021    CO2 26 03/04/2021    BUN 35 03/04/2021    PROT 8.6 07/29/2020    PROT 7.8 03/18/2013     Magnesium:    Lab Results   Component Value Date    MG 1.70 08/14/2020     Phosphorus:  No components found for: PO4  Calcium:  No components found for: CA  CBC:    Lab Results   Component Value Date    WBC 27.3 03/04/2021    RBC 4.47 03/04/2021    HGB 9.0 03/04/2021    HCT 29.0 03/04/2021    MCV 65.0 03/04/2021    RDW 15.7 03/04/2021    PLT see below 03/04/2021     DIFF:    Lab Results   Component Value Date    MCV 65.0 03/04/2021    RDW 15.7 03/04/2021        IMPRESSION/RECOMMENDATIONS:  1. Leukocytosis:  Chronic problem present since at least last July. She had a Justin 2 sent at that time which was negative. Do not see where a bcr abl or eval for CLL has been obtained. Without splenomegaly doubt this represents an myeloproliferative syndrome though a bone marrow biopsy would be necessary to rule that out. The question is what would we do with that information in this setting. She does have a progressive microcytic anemia and we will evaluate her iron stores. If the leukocytosis is isolated in this elderly, somewhat demented lady would probably just follow expectantly. Thank you for the consultation.   Will follow with you

## 2021-03-04 NOTE — PROGRESS NOTES
Clinical Pharmacy Progress Note  Medication History     Admit Date: 03/03/21    List of of current medications patient is taking is complete. Home Medication list in EPIC updated to reflect changes noted below.     Source of information: The [facility] [address] [address] made to medication list:   Medications removed (no longer taking):  Docusate    Medications added:   Spironolactone/hydrochlorothiazide 25mg/25mg  Lactobacillus  Insulin regular (Novolin R)  Calcium carbonate (as needed)  Loperamide (as needed)  Magnesium oxide 400 mg  Lactulose solution   Biofreeze roll on(as needed)  Ferrous sulfate 325 mg  Salonpas Lidocaine patch  Metamucil    Medication doses / instructions adjusted:   Duloxetine (20 mg to 30 mg)  Spironolactone combined with HCTZ in combo tablet    Complete Home Medication List:    Current Outpatient Medications on File Prior to Encounter   Medication Sig    DULoxetine (CYMBALTA) 30 MG extended release capsule Take 30 mg by mouth 2 times daily    spironolactone-hydroCHLOROthiazide (ALDACTAZIDE) 25-25 MG per tablet Take 1 tablet by mouth daily    loperamide (IMODIUM) 2 MG capsule Take 2 mg by mouth 3 times daily as needed for Diarrhea    Calcium Carbonate Antacid (TUMS E-X PO) Take by mouth Chew and swallow one tablet by mouth 3 times daily as needed    Lactobacillus (PROBIOTIC ACIDOPHILUS PO) Take 1 capsule by mouth daily    Homeopathic Products (THERAWORX RELIEF EX) Apply topically Use as directed 3 times daily as needed for muscle cramps    Menthol, Topical Analgesic, (BIOFREEZE ROLL-ON EX) Apply topically 3 times a day as needed for pain    Lidocaine HCl-Benzyl Alcohol (SALONPAS LIDOCAINE PLUS EX) Apply topically Apply to affected area 3 times a day as needed for pain    Eyelid Cleansers (OCUSOFT LID SCRUB EX) Apply topically Use as directed for blepharitis as needed    insulin regular (HUMULIN R;NOVOLIN R) 100 UNIT/ML injection Inject 0-20 units subcutaneously per sliding scale

## 2021-03-04 NOTE — FLOWSHEET NOTE
03/04/21 0853   Encounter Summary   Services provided to: Patient not available   Continue Visiting   (es 3/4 acp)   Complexity of Encounter Low   Length of Encounter 15 minutes   Advance Care Planning Yes   Documents are in order for ACP. Details in ACP note.

## 2021-03-04 NOTE — CONSULTS
[facility]  Cardiology Inpatient Consult Service                                                                                          Pt Name: Yesenia Charles  Age: 79 y.o. Sex: female  : 1940  Location: [facility]/4456-    Referring Physician: Neptali Guardado MD      Reason for Consult:       Reason for Consultation/Chief Complaint: abnormal labs      HPI:      Yesenia Charles is a 79 y.o. female with a past medical history of E4MB, HTN, diastolic HF, hep C, dementia, depression, rheumatoid arthritis who presented to the hospital with abnormal labs. The patient has no specific complaints but doesn't feel well. She had pulmonary infiltrates suggestive of edema on CXR and pBNP elevated above baseline. Was tachypneic and HR in 110-150s on tele. EKG showed irregular rhythm with p waves changes suggestive of multifocal atrial tachycardia. Was given dilt 10mg bolus today and HR now in 90s. She does have chronically elevated WBC. She is from facility. Has DNR-CC status. She denies chest pain, dyspnea, fatigue, irregular heart beat, orthopnea, palpitations and syncope. Histories     Past Medical History:   has a past medical history of ADHD (attention deficit hyperactivity disorder), Attention deficit disorder without mention of hyperactivity, Autoimmune disease NEC, Carotid artery disease ([address] Carotid artery disease ([address] Chronic persistent hepatitis ([address] Depression, Depressive disorder, not elsewhere classified, Double vision, Fractures, GERD (gastroesophageal reflux disease), Hx of interstitial lung disease, Leukocytosis, Neuropathy, Rheumatoid arthritis(714.0), Spinal stenosis, Type II or unspecified type diabetes mellitus without mention of complication, not stated as uncontrolled, and Unspecified cerebral artery occlusion with cerebral infarction.     Surgical History:   has a past surgical history that includes Cosmetic surgery; eye surgery Neptali Guardado MD   Vitamin D 3 (CHOLECALCIFEROL) 1000 UNITS TABS tablet Take 1,000 Units by mouth daily. Yes Historical Provider, MD          Inpatient Medications:   ferrous sulfate  325 mg Oral Daily    lactobacillus  1 capsule Oral Daily    magnesium sulfate  2,000 mg Intravenous Q2H    ascorbic acid  1,000 mg Oral Daily    docusate sodium  100 mg Oral Daily    DULoxetine  30 mg Oral BID    insulin glargine  20 Units Subcutaneous Nightly    pantoprazole  40 mg Oral QAM AC    polyvinyl alcohol  1 drop Both Eyes BID    Vitamin D  1,000 Units Oral Daily    sodium chloride flush  10 mL Intravenous 2 times per day    [Held by provider] enoxaparin  40 mg Subcutaneous Daily       IV drips:   [Held by provider] dilTIAZem      dextrose         PRN:  metoprolol, albuterol, guaiFENesin, HYDROcodone-acetaminophen, polyvinyl alcohol, sodium chloride flush, promethazine **OR** ondansetron, polyethylene glycol, acetaminophen **OR** acetaminophen, glucose, dextrose, glucagon (rDNA), dextrose    Allergy:     Erythromycin and Sulfites       Review of Systems:     CONSTITUTIONAL: Nounanticipated weight loss. No change in energy level, sleep pattern, or activity level. SKIN: No rash or pruritis. EYES: No visual changes or diplopia. No scleral icterus. ENT: No Headaches, hearing loss or vertigo. No mouth sores or sore throat. CARDIOVASCULAR: No chest pain/chest pressure/chest discomfort. No palpitations. RESPIRATORY: No cough or wheezing, no sputum production. No hematemesis. GASTROINTESTINAL: No N/V/D. No abdominal pain, appetite loss, blood in stools. GENITOURINARY: No dysuria, trouble voiding, or hematuria. MUSCULOSKELETAL:  No gait disturbance, weakness or joint complaints. NEUROLOGICAL: No headache, diplopia, change in muscle strength, numbness or tingling. No change in gait, balance, coordination, mood, affect, memory, mentation, behavior.   PSHYCH: No anxiety, loss of interest, change in sexual behavior, feelings of self-harm, or confusion. ENDOCRINE: No malaise, fatigue or temperature intolerance. No excessive thirst, fluid intake, or urination. No tremor. HEMATOLOGIC: No abnormal bruising or bleeding. ALLERGY: No nasal congestion or hives.       Physical Examination:     Vitals:    03/04/21 0831 03/04/21 1103 03/04/21 1113 03/04/21 1159   BP: 116/85 113/77 (!) 92/55 (!) 127/91   Pulse: 117 131 108 121   Resp: 22 24     Temp: 97.4 °F (36.3 °C) 97.3 °F (36.3 °C)     TempSrc: Oral Oral     SpO2: 95% 93%     Weight:   110 lb 0.2 oz (49.9 kg)    Height:           Wt Readings from Last 3 Encounters:   03/04/21 110 lb 0.2 oz (49.9 kg)   08/14/20 127 lb 13.9 oz (58 kg)   07/29/20 127 lb (57.6 kg)       Objective      General Appearance:  Alert, cooperative, no distress, appears stated age Appropriate weight   Head:  Normocephalic, without obvious abnormality, atraumatic   Eyes:  PERRL, conjunctiva/corneas clear EOM intact  Ears normal   Throat no lesions       Nose: Nares normal, no drainage or sinus tenderness   Throat: Lips, mucosa, and tongue normal   Neck: Supple, symmetrical, trachea midline, no adenopathy, thyroid: not enlarged, symmetric, no tenderness/mass/nodules, no carotid bruit or JVD       Lungs:   +crackles, +tachypnea   Chest Wall:  No tenderness or deformity   Heart:  Irregular rate and rhythm S1, S2 normal, no murmur, rub or gallop PMI intact   Abdomen:   Soft, non-tender, bowel sounds active all four quadrants,  no masses, no organomegaly       Extremities: Extremities normal, atraumatic, no cyanosis or edema   Pulses: 2+ and symmetric   Skin: Skin color, texture, turgor normal, no rashes or lesions   Pysch: Normal mood and affect   Neurologic: Normal gross motor and sensory exam.  Cranial nerves intact        Labs:     Recent Labs     03/04/21  0335   *   K 3.9   BUN 35*   CREATININE 1.3*   CL 95*   CO2 26   GLUCOSE 207*   CALCIUM 9.8   MG 1.10*     Recent Labs     03/04/21  0335   WBC 27.3*   HGB 9.0*   HCT 29.0*   PLT see below   MCV 65.0*     No results for input(s): CHOLTOT, TRIG, HDL in the last 72 hours. Invalid input(s): LIPIDCOMM, CHOLHDL, VLDCHOL, LDL  No results for input(s): PTT, INR in the last 72 hours. Invalid input(s): PT  No results for input(s): CKTOTAL, CKMB, CKMBINDEX, TROPONINI in the last 72 hours. No results for input(s): BNP in the last 72 hours. No results for input(s): TSH in the last 72 hours. No results for input(s): CHOL, HDL, LDLCALC, TRIG in the last 72 hours.]    Lab Results   Component Value Date    TROPONINI 0.00 12/25/2017         Imaging:     I personally reviewed imaging studies including CXR, Stress test, TTE/LESTER. Last ECG (if available) - EKG:  I have reviewed EKG with the following interpretation  MAT  Repolarization abnormality    Telemetry:  Irregular tachyarrhythmia with p waves. There is varying morphology of p waves and IL intervals. HR up to 150s, now 90s. There were few episodes of NSVT overnight. TTE (10/19/2017)   Left ventricle size is normal. There is mild concentric left ventricular   hypertrophy. Left ventricular function is normal with ejection fraction   estimated at 50-55 %. The inferolateral (posterior) wall appears   hypokinetic. Diastolic filling parameters suggests grade I diastolic   dysfunction . Moderate mitral regurgitation is present. Trivial tricuspid regurgitation with RVSP estimated at 26 mmHg. The left atrium is dilated. A bubble study was performed and fails to show evidence of right to left   shunting. TTE(09/20/2019):  Left ventricular cavity size is normal. . Overall left ventricular systolic   function appears normal with an ejection fraction of 55-60%. Diastolic   filling parameters suggest grade II diastolic dysfunction. Mild mitral regurgitation is present. The aortic valve non coronary cusp is thickened but the valve opens   adequately. Trivial tricuspid regurgitation.    The left atrium is moderately to severely dilated. Estimated pulmonary artery systolic pressure is at 33 mmHg assuming a right   atrial pressure of 3 mmHg. The right ventricle is normal in size and function. CHEST CT 2019  Coronary artery calcification  Centrilobular emphysema    Assessment / Plan:       Multifocal atrial tachycardia  HFpEF, exacerbation  HTN    Severe LA dilation  Keep K >4, Mg >2  Start oral Diltiazem for rate control   Being diuresed for AoC CHF    Courtney Romaon MD, PGY2  2:13 PM  3/4/2021    Will discuss with Dr Mynor Caruso  I have seen,interviewed and examined the patient with the resident. Pertinent medical data and imaging studies reviewed. Refer to the residents note for details of clinical findings  I agree with his assessment and plan with following addendum:    Acute exacerbation of heart failure with preserved LV ejection fraction  Agree with diuresis  Multifocal atrial tachycardia  P.o.  Cardizem  Monitor in telemetry  Follow renal parameters    Reza Turpin MD   Cardiac Electrophysiology  [facility] [phone]

## 2021-03-05 ENCOUNTER — APPOINTMENT (OUTPATIENT)
Dept: GENERAL RADIOLOGY | Age: 81
DRG: 291 | End: 2021-03-05
Payer: COMMERCIAL

## 2021-03-05 ENCOUNTER — APPOINTMENT (OUTPATIENT)
Dept: CT IMAGING | Age: 81
DRG: 291 | End: 2021-03-05
Payer: COMMERCIAL

## 2021-03-05 LAB
ALBUMIN SERPL-MCNC: 3.3 G/DL (ref 3.4–5)
AMMONIA: 63 UMOL/L (ref 11–51)
ANION GAP SERPL CALCULATED.3IONS-SCNC: 14 MMOL/L (ref 3–16)
ANISOCYTOSIS: ABNORMAL
BASE EXCESS ARTERIAL: 2.4 MMOL/L (ref -3–3)
BASOPHILS ABSOLUTE: 0.4 K/UL (ref 0–0.2)
BASOPHILS RELATIVE PERCENT: 1 %
BUN BLDV-MCNC: 41 MG/DL (ref 7–20)
CALCIUM SERPL-MCNC: 10.4 MG/DL (ref 8.3–10.6)
CARBOXYHEMOGLOBIN ARTERIAL: 1.4 % (ref 0–1.5)
CHLORIDE BLD-SCNC: 96 MMOL/L (ref 99–110)
CO2: 23 MMOL/L (ref 21–32)
CREAT SERPL-MCNC: 1.7 MG/DL (ref 0.6–1.2)
EOSINOPHILS ABSOLUTE: 0 K/UL (ref 0–0.6)
EOSINOPHILS RELATIVE PERCENT: 0 %
GFR AFRICAN AMERICAN: 35
GFR NON-AFRICAN AMERICAN: 29
GLUCOSE BLD-MCNC: 203 MG/DL (ref 70–99)
GLUCOSE BLD-MCNC: 208 MG/DL (ref 70–99)
GLUCOSE BLD-MCNC: 217 MG/DL (ref 70–99)
GLUCOSE BLD-MCNC: 223 MG/DL (ref 70–99)
GLUCOSE BLD-MCNC: 227 MG/DL (ref 70–99)
GLUCOSE BLD-MCNC: 305 MG/DL (ref 70–99)
HCO3 ARTERIAL: 27 MMOL/L (ref 21–29)
HCT VFR BLD CALC: 33.1 % (ref 36–48)
HEMOGLOBIN, ART, EXTENDED: 8.4 G/DL
HEMOGLOBIN: 9.7 G/DL (ref 12–16)
LV EF: 33 %
LVEF MODALITY: NORMAL
LYMPHOCYTES ABSOLUTE: 3.8 K/UL (ref 1–5.1)
LYMPHOCYTES RELATIVE PERCENT: 10 %
MAGNESIUM: 2.5 MG/DL (ref 1.8–2.4)
MAGNESIUM: 2.5 MG/DL (ref 1.8–2.4)
MCH RBC QN AUTO: 19.7 PG (ref 26–34)
MCHC RBC AUTO-ENTMCNC: 29.4 G/DL (ref 31–36)
MCV RBC AUTO: 67 FL (ref 80–100)
METAMYELOCYTES RELATIVE PERCENT: 1 %
METHEMOGLOBIN ARTERIAL: 0.3 % (ref 0–1.4)
MONOCYTES ABSOLUTE: 9.4 K/UL (ref 0–1.3)
MONOCYTES RELATIVE PERCENT: 25 %
NEUTROPHILS ABSOLUTE: 24 K/UL (ref 1.7–7.7)
NEUTROPHILS RELATIVE PERCENT: 63 %
O2 SAT, ARTERIAL: 99 % (ref 93–100)
PCO2 ARTERIAL: 42.1 MMHG (ref 35–45)
PDW BLD-RTO: 15.8 % (ref 12.4–15.4)
PERFORMED ON: ABNORMAL
PH ARTERIAL: 7.42 (ref 7.35–7.45)
PHOSPHORUS: 4.3 MG/DL (ref 2.5–4.9)
PLATELET # BLD: 245 K/UL (ref 135–450)
PLATELET SLIDE REVIEW: ADEQUATE
PMV BLD AUTO: 8.3 FL (ref 5–10.5)
PO2 ARTERIAL: 111 MMHG (ref 75–108)
POLYCHROMASIA: ABNORMAL
POTASSIUM SERPL-SCNC: 4.9 MMOL/L (ref 3.5–5.1)
RBC # BLD: 4.93 M/UL (ref 4–5.2)
SODIUM BLD-SCNC: 133 MMOL/L (ref 136–145)
SOLUBLE TRANSFERRIN RECEPT: 9.5 MG/L (ref 1.9–4.4)
TCO2 ARTERIAL: 29 MMOL/L
TOTAL CK: 96 U/L (ref 26–192)
TSH REFLEX: 2.36 UIU/ML (ref 0.27–4.2)
WBC # BLD: 37.5 K/UL (ref 4–11)

## 2021-03-05 PROCEDURE — 82803 BLOOD GASES ANY COMBINATION: CPT

## 2021-03-05 PROCEDURE — 99232 SBSQ HOSP IP/OBS MODERATE 35: CPT | Performed by: INTERNAL MEDICINE

## 2021-03-05 PROCEDURE — 2580000003 HC RX 258: Performed by: STUDENT IN AN ORGANIZED HEALTH CARE EDUCATION/TRAINING PROGRAM

## 2021-03-05 PROCEDURE — 6360000002 HC RX W HCPCS: Performed by: STUDENT IN AN ORGANIZED HEALTH CARE EDUCATION/TRAINING PROGRAM

## 2021-03-05 PROCEDURE — 1200000000 HC SEMI PRIVATE

## 2021-03-05 PROCEDURE — 2580000003 HC RX 258: Performed by: INTERNAL MEDICINE

## 2021-03-05 PROCEDURE — C8929 TTE W OR WO FOL WCON,DOPPLER: HCPCS

## 2021-03-05 PROCEDURE — 82550 ASSAY OF CK (CPK): CPT

## 2021-03-05 PROCEDURE — 36415 COLL VENOUS BLD VENIPUNCTURE: CPT

## 2021-03-05 PROCEDURE — 84443 ASSAY THYROID STIM HORMONE: CPT

## 2021-03-05 PROCEDURE — 82140 ASSAY OF AMMONIA: CPT

## 2021-03-05 PROCEDURE — 83735 ASSAY OF MAGNESIUM: CPT

## 2021-03-05 PROCEDURE — 70450 CT HEAD/BRAIN W/O DYE: CPT

## 2021-03-05 PROCEDURE — 85025 COMPLETE CBC W/AUTO DIFF WBC: CPT

## 2021-03-05 PROCEDURE — 80069 RENAL FUNCTION PANEL: CPT

## 2021-03-05 PROCEDURE — 36600 WITHDRAWAL OF ARTERIAL BLOOD: CPT

## 2021-03-05 PROCEDURE — 6370000000 HC RX 637 (ALT 250 FOR IP): Performed by: STUDENT IN AN ORGANIZED HEALTH CARE EDUCATION/TRAINING PROGRAM

## 2021-03-05 PROCEDURE — 71045 X-RAY EXAM CHEST 1 VIEW: CPT

## 2021-03-05 PROCEDURE — 99223 1ST HOSP IP/OBS HIGH 75: CPT | Performed by: INTERNAL MEDICINE

## 2021-03-05 PROCEDURE — 99232 SBSQ HOSP IP/OBS MODERATE 35: CPT | Performed by: NURSE PRACTITIONER

## 2021-03-05 PROCEDURE — 6360000004 HC RX CONTRAST MEDICATION: Performed by: INTERNAL MEDICINE

## 2021-03-05 PROCEDURE — 6360000002 HC RX W HCPCS: Performed by: INTERNAL MEDICINE

## 2021-03-05 RX ORDER — INSULIN LISPRO 100 [IU]/ML
0-3 INJECTION, SOLUTION INTRAVENOUS; SUBCUTANEOUS NIGHTLY
Status: DISCONTINUED | OUTPATIENT
Start: 2021-03-05 | End: 2021-03-07

## 2021-03-05 RX ORDER — HEPARIN SODIUM 5000 [USP'U]/ML
5000 INJECTION, SOLUTION INTRAVENOUS; SUBCUTANEOUS EVERY 8 HOURS SCHEDULED
Status: DISCONTINUED | OUTPATIENT
Start: 2021-03-05 | End: 2021-03-07 | Stop reason: HOSPADM

## 2021-03-05 RX ORDER — SODIUM CHLORIDE 9 MG/ML
INJECTION, SOLUTION INTRAVENOUS CONTINUOUS
Status: DISCONTINUED | OUTPATIENT
Start: 2021-03-05 | End: 2021-03-07

## 2021-03-05 RX ORDER — NALOXONE HYDROCHLORIDE 0.4 MG/ML
0.4 INJECTION, SOLUTION INTRAMUSCULAR; INTRAVENOUS; SUBCUTANEOUS ONCE
Status: COMPLETED | OUTPATIENT
Start: 2021-03-05 | End: 2021-03-05

## 2021-03-05 RX ORDER — LORAZEPAM 2 MG/ML
0.5 INJECTION INTRAMUSCULAR ONCE
Status: COMPLETED | OUTPATIENT
Start: 2021-03-05 | End: 2021-03-05

## 2021-03-05 RX ORDER — INSULIN LISPRO 100 [IU]/ML
0-6 INJECTION, SOLUTION INTRAVENOUS; SUBCUTANEOUS
Status: DISCONTINUED | OUTPATIENT
Start: 2021-03-05 | End: 2021-03-07

## 2021-03-05 RX ORDER — LACTULOSE 10 G/15ML
20 SOLUTION ORAL 3 TIMES DAILY
Status: DISCONTINUED | OUTPATIENT
Start: 2021-03-05 | End: 2021-03-07

## 2021-03-05 RX ADMIN — HEPARIN SODIUM 5000 UNITS: 5000 INJECTION INTRAVENOUS; SUBCUTANEOUS at 14:33

## 2021-03-05 RX ADMIN — ONDANSETRON 4 MG: 2 INJECTION INTRAMUSCULAR; INTRAVENOUS at 18:10

## 2021-03-05 RX ADMIN — POLYVINYL ALCOHOL 1 DROP: 14 SOLUTION/ DROPS OPHTHALMIC at 10:19

## 2021-03-05 RX ADMIN — HEPARIN SODIUM 5000 UNITS: 5000 INJECTION INTRAVENOUS; SUBCUTANEOUS at 21:59

## 2021-03-05 RX ADMIN — INSULIN LISPRO 2 UNITS: 100 INJECTION, SOLUTION INTRAVENOUS; SUBCUTANEOUS at 22:00

## 2021-03-05 RX ADMIN — PERFLUTREN 2.2 MG: 6.52 INJECTION, SUSPENSION INTRAVENOUS at 13:00

## 2021-03-05 RX ADMIN — SODIUM CHLORIDE: 9 INJECTION, SOLUTION INTRAVENOUS at 09:04

## 2021-03-05 RX ADMIN — INSULIN GLARGINE 20 UNITS: 100 INJECTION, SOLUTION SUBCUTANEOUS at 22:00

## 2021-03-05 RX ADMIN — LACTULOSE 20 G: 20 SOLUTION ORAL at 14:31

## 2021-03-05 RX ADMIN — NALOXONE HYDROCHLORIDE 0.4 MG: 0.4 INJECTION, SOLUTION INTRAMUSCULAR; INTRAVENOUS; SUBCUTANEOUS at 13:47

## 2021-03-05 RX ADMIN — HYDROCODONE BITARTRATE AND ACETAMINOPHEN 1 TABLET: 5; 325 TABLET ORAL at 20:30

## 2021-03-05 RX ADMIN — Medication 10 ML: at 09:04

## 2021-03-05 RX ADMIN — Medication 10 ML: at 22:15

## 2021-03-05 RX ADMIN — DULOXETINE HYDROCHLORIDE 30 MG: 30 CAPSULE, DELAYED RELEASE ORAL at 20:30

## 2021-03-05 RX ADMIN — DILTIAZEM HYDROCHLORIDE 30 MG: 30 TABLET, FILM COATED ORAL at 00:12

## 2021-03-05 RX ADMIN — INSULIN LISPRO 2 UNITS: 100 INJECTION, SOLUTION INTRAVENOUS; SUBCUTANEOUS at 11:03

## 2021-03-05 RX ADMIN — INSULIN LISPRO 2 UNITS: 100 INJECTION, SOLUTION INTRAVENOUS; SUBCUTANEOUS at 18:03

## 2021-03-05 RX ADMIN — IRON SUCROSE 300 MG: 20 INJECTION, SOLUTION INTRAVENOUS at 10:11

## 2021-03-05 RX ADMIN — DILTIAZEM HYDROCHLORIDE 30 MG: 30 TABLET, FILM COATED ORAL at 17:59

## 2021-03-05 RX ADMIN — DILTIAZEM HYDROCHLORIDE 30 MG: 30 TABLET, FILM COATED ORAL at 14:31

## 2021-03-05 RX ADMIN — LORAZEPAM 0.5 MG: 2 INJECTION INTRAMUSCULAR; INTRAVENOUS at 21:59

## 2021-03-05 RX ADMIN — POLYVINYL ALCOHOL 1 DROP: 14 SOLUTION/ DROPS OPHTHALMIC at 22:00

## 2021-03-05 ASSESSMENT — PAIN SCALES - GENERAL
PAINLEVEL_OUTOF10: 0
PAINLEVEL_OUTOF10: 9

## 2021-03-05 NOTE — PROGRESS NOTES
size is normal. . Overall left ventricular systolic   function appears normal with an ejection fraction of 55-60%. Diastolic   filling parameters suggest grade II diastolic dysfunction. Mild mitral regurgitation is present. The aortic valve non coronary cusp is thickened but the valve opens   adequately. Trivial tricuspid regurgitation. The left atrium is moderately to severely dilated. Estimated pulmonary artery systolic pressure is at 33 mmHg assuming a right   atrial pressure of 3 mmHg. The right ventricle is normal in size and function.     Assessment:  Multifocal atrial tachycardia  HFpEF  HTN  DM  Hep C  Dementia   Change in mental status    Plan:  -Keep K>4, Mg>2.  -diltiazem if able to take po  -IV metoprolol PRN   -Diuresis on hold d/t rise in BUN/Cr

## 2021-03-05 NOTE — PROGRESS NOTES
Internal Medicine  PGY 1  Progress note    CC: Abnormal labs    History Obtained From:  Patient    Interval Hx:  Gabbie Neff. Overnight vitals stable although blood pressure was on the softer side. She was started on oral diltiazem 30 mg Q6 for rate control. Her EKG looks to be MAT but still waiting for cardiology's opinion. Serum elec wnl although creat increased to 1.7 < 1.3 with baseline around 1. BUN also increased. She did 40 lasix po QD yesterday and she made 1.3 L of urine with that yesterday. I am wondering if we over diuresed her that caused this her BUN and Creat to rise. Her iron studies indicated Iron deficiency. This am she looks very different. Her SpO2 was 80 - 85 perecent which went up to 100 percent on 2 L. She was no responding with clear cut answers like the way she was yesterday. She was not ollowing commands. She was making intangible sounds. She got 0.5 mg ativan and Norco at 8 pm last night. The ativan was ordered to be given at 2: 30 pm for one dose and the norco was ordered as prn q6. She could have central nervous depression from the combined effects of the two. CXR and ABG not significant. CT head negative. Her ammonia was high, will start lactulose with goal of 2-3 BMs per day. Her mental status improved on its own without any  intervention and now she is conversant although she is still slightly confused as compared to yesterday, Will also give fluids as based on BUN/Cr and clinical exam she looks very dry. HISTORY OF PRESENT ILLNESS:    Patient is an 79-year-old female with past medical history of E2VU, HTN, diastolic HF (last echo 7/22 EF 55 to 60% with grade 2 diastolic dysfunction), hep C, dementia, depression, rheumatoid arthritis who presented from her nursing home with abnormal labs.   Per the patient, she feels just fine and is not having any trouble and does not comprehend why she needed to come to the hospital.  Patient denied any fever, chills, nausea, vomiting, chest pain, shortness of breath, lower extremity swelling, lower extremity pain, diarrhea, constipation, PND, cough. At bedside, I spoke with daughter Tonie Justin, who endorsed that her mother has been ordering a lot of outside food and may have contributed to her current exacerbation. I called the [facility] and spoke to the nighttime nurse who endorsed that her daytime nurse has left for the night. According to her, the patient has been having some shortness of breath and had some abnormal labs today. Per her chart review, patient's BUN 36, creatinine 1.4 (baseline 1.0, 1/14/2021), glucose 30, WBC 29.8, Hgb 9.2, MCV 66.5, MG 1.3, BNP 16,925. She was not sure the other details as to why the patient was brought into the hospital.    In the ED, BP 91/58 . BNP [id number]. CXR with small bilateral pleural effusions right greater than left with associated prominent interstitial opacities throughout both lungs favored to be volume overload/interstitial edema. Patient was admitted for acute on chronic CHF exacerbation and was given one-time 40 mg IV Lasix. Past Medical History:        Diagnosis Date    ADHD (attention deficit hyperactivity disorder)     Attention deficit disorder without mention of hyperactivity 7/22/2010    Autoimmune disease NEC     Carotid artery disease ([address] 8/2/2013    LEFT CAROTID ENDARTERECTOMY               Carotid artery disease ([address] 8/3/2013    Chronic persistent hepatitis ([facility] [address] 7/22/2010    Depression     Depressive disorder, not elsewhere classified 7/22/2010    Double vision     left eye, since cataract surgery    Fractures     GERD (gastroesophageal reflux disease)     Hx of interstitial lung disease 6/12/2019    Leukocytosis     Negative ELIZABETH-2 mutation (07/29/2020). Most likely 2/2 to inflammation: hx of chronic hep C (on treatment) and RA.      Neuropathy     Rheumatoid arthritis(714.0) 7/22/2010    Spinal stenosis     Type II or unspecified type diabetes mellitus without mention of complication, not stated as uncontrolled 7/22/2010    Unspecified cerebral artery occlusion with cerebral infarction     right hand, loss of some sensation       Past Surgical History:        Procedure Laterality Date    COLONOSCOPY      COSMETIC SURGERY      tummy tuck,face lift,mammoplasties- hepatitis blood transfusion complication    EYE SURGERY Bilateral     cataract    HUMERUS FRACTURE SURGERY Right 8/11/2020    RIGHT PROXIMAL HUMERUS AND SHAFT FRACTURE OPEN REDUCTION INTERNAL FIXATION performed by Frida Larose MD at [address] [place] [address] Admission:    Medications Prior to Admission: DULoxetine (CYMBALTA) 30 MG extended release capsule, Take 30 mg by mouth 2 times daily  spironolactone-hydroCHLOROthiazide (ALDACTAZIDE) 25-25 MG per tablet, Take 1 tablet by mouth daily  loperamide (IMODIUM) 2 MG capsule, Take 2 mg by mouth 3 times daily as needed for Diarrhea  Calcium Carbonate Antacid (TUMS E-X PO), Take by mouth Chew and swallow one tablet by mouth 3 times daily as needed  Lactobacillus (PROBIOTIC ACIDOPHILUS PO), Take 1 capsule by mouth daily  Homeopathic Products (THERAWORX RELIEF EX), Apply topically Use as directed 3 times daily as needed for muscle cramps  Menthol, Topical Analgesic, (BIOFREEZE ROLL-ON EX), Apply topically 3 times a day as needed for pain  Lidocaine HCl-Benzyl Alcohol (SALONPAS LIDOCAINE PLUS EX), Apply topically Apply to affected area 3 times a day as needed for pain  Eyelid Cleansers (OCUSOFT LID SCRUB EX), Apply topically Use as directed for blepharitis as needed  insulin regular (HUMULIN R;NOVOLIN R) 100 UNIT/ML injection, Inject 0-20 units subcutaneously per sliding scale  Psyllium (METAMUCIL FIBER PO), Take by mouth daily 2 hours after oral medications  magnesium oxide (MAG-OX) 400 MG tablet, Take 400 mg by mouth daily  ferrous sulfate (IRON 325) 325 (65 Fe) MG tablet, Take 325 mg by mouth daily  lactulose (CHRONULAC) 10 GM/15ML solution, Take 20 mLs by mouth 3 times daily as needed (constipation)  HYDROcodone-acetaminophen (NORCO) 5-325 MG per tablet, TAKE ONE TABLET BY MOUTH EVERY 4 HOURS AS NEEDED FOR PAIN FOR UP TO 7DAYS  insulin glargine (BASAGLAR KWIKPEN) 100 UNIT/ML injection pen, INJECT 30 UNITS SUBCUTANEOUSLY EVERY DAY. REFRESH 1.4-0.6 % SOLN, INSTILL 1 TO 2 DROPS INTO BOTH EYES TWICE A DAY. pantoprazole (PROTONIX) 40 MG tablet, Take 1 tablet by mouth every morning (before breakfast)  furosemide (LASIX) 40 MG tablet, Take 0.5 tablets by mouth daily  albuterol sulfate HFA (PROVENTIL HFA) 108 (90 Base) MCG/ACT inhaler, Inhale 2 puffs into the lungs every 6 hours as needed for Wheezing  acetaminophen (TYLENOL) 325 MG tablet, Take 1 tablet by mouth every 6 hours as needed for Pain  Biotin 26872 MCG TBDP, Take 10,000 mcg by mouth daily   Ascorbic Acid (VITAMIN C CR) 1000 MG TBCR, Take 1 tablet by mouth daily  guaiFENesin (MUCINEX) 600 MG extended release tablet, Take 1 tablet by mouth 2 times daily as needed for Congestion  polyethyl glycol-propyl glycol 0.4-0.3 % (SYSTANE) 0.4-0.3 % ophthalmic solution, Place 1 drop into both eyes as needed for Dry Eyes  Vitamin D 3 (CHOLECALCIFEROL) 1000 UNITS TABS tablet, Take 1,000 Units by mouth daily. Allergies:  Erythromycin and Sulfites    Social History:   · TOBACCO:   reports that she quit smoking about 41 years ago. Her smoking use included cigarettes. She started smoking about 67 years ago. She has a 60.00 pack-year smoking history. She has never used smokeless tobacco.  · ETOH:   reports current alcohol use of about 1.0 standard drinks of alcohol per week. · DRUGS : None  · Patient currently lives in a nursing home, [facility] [phone]  ·   Family History:   History reviewed. No pertinent family history. Physical Exam  HENT:      Head: Normocephalic and atraumatic. Eyes:      General: No scleral icterus. Extraocular Movements: Extraocular movements intact.    Cardiovascular:      Rate and Rhythm: Normal rate. Rhythm irregular. Heart sounds: No murmur. No gallop. Pulmonary:      Effort: Pulmonary effort is normal. No respiratory distress. Breath sounds: Rales present. Comments: Bilateral crackles appreciated  Abdominal:      General: Bowel sounds are normal. There is no distension. Palpations: Abdomen is soft. Tenderness: There is no abdominal tenderness. There is no guarding. Musculoskeletal:      Right lower leg: No edema. Left lower leg: No edema. Neurological:      Mental Status: She is alert and oriented to person, place, and time. Psychiatric:      Comments: Patient was not responsive this am. Is awake but will not follow commands and will only make sounds. Physical exam:       Vitals:    03/05/21 1344   BP: 113/61   Pulse: 95   Resp:    Temp:    SpO2: 100%       Review of Systems  ROS: A 10 point review of systems was conducted, significant findings as noted in HPI. DATA:    Labs:  CBC:   Recent Labs     03/04/21  0335 03/05/21  0549   WBC 27.3* 37.5*   HGB 9.0* 9.7*   HCT 29.0* 33.1*   PLT see below 245       BMP:   Recent Labs     03/04/21  0335 03/05/21  0549   * 133*   K 3.9 4.9   CL 95* 96*   CO2 26 23   BUN 35* 41*   CREATININE 1.3* 1.7*   GLUCOSE 207* 223*   PHOS  --  4.3       U/A:  Recent Labs     03/03/21  1934   COLORU Yellow   PHUR 6.5   WBCUA 0-2   RBCUA 0-2   BACTERIA Rare*   CLARITYU Clear   SPECGRAV 1.015   LEUKOCYTESUR TRACE*   UROBILINOGEN 0.2   BILIRUBINUR Negative   BLOODU Negative   GLUCOSEU Negative       CT HEAD WO CONTRAST   Final Result      No acute intracranial abnormality or significant mass effect. XR CHEST PORTABLE   Final Result      Slightly increased pulmonary edema. XR CHEST (2 VW)   Final Result      Small bilateral pleural effusions right greater than left with associated prominent interstitial opacities throughout both lungs favored to be volume overload/interstitial edema.               Assessment/Plan: Patient is an 79-year-old female with past medical history of diastolic heart failure, G9LQ, HTN who was admitted for acute on chronic heart failure exacerbation    Problem  #Acute on chronic diastolic heart failure exacerbation  Likely secondary to high salt intake  Per nursing staff, patient continues to remain a little short of breath. Patient had a significantly elevated BNP. Other labs were also deranged including an elevated creatinine of 1.4. Examination supports fluid overload in the lungs. Chest x-ray supports picture of CHF exacerbation  -40 lasix PO for 1 dose and with 1.3 L of urine output  -Daily weights  -Strict I's and O's  -Low-sodium diet  -low-salt diet    #Acute metabolic Encephalopathy:  Very less responsive as compared to baseline on morning of 3/5/2021  -ABG, CXR not significant  -CT head wo con pending  -Low suspicion for meningitis since no signs of infection  -Will f/u ammonia  -If CT head shows nothing may get MRI brain    #ORI  Likely secondary to cardiorenal syndrome   creatinine increased to 1.7 from baseline of 1.0 .  -She got one dose of lasix 40 mg PO yesterday and she looks dry  -Will give IVF  -Avoid nephrotoxic agents    #Chronic Leukocytosis  WBC from nursing home 29.8, absolute neutrophils elevated at 55.1. No evidence of infection. Prior records show elevated WBC, on last admission WBC was 27.6. UA negative for infections. Negative ELIZABETH-2 mutation (07/29/2020).  Most likely 2/2 to inflammation: hx of chronic hep C and RA.  -Daily CBC  -Heme Gerrianne Shape recs appreciated    #[id number]  Patient on Basaglar 30 units nightly + sliding scale   -Last HgbA1c = 8.1 on 7/20  -Hgb A1c pending  -2/3 Home Basaglar dose = 20 units  -POCT glucose AC/HS    #HTN  BP on admission soft 91/58 now stable  -Continue to monitor BP while inpatient    #Hep C cirrhosis  -Per notes, currently stable followed by GI    #Depression  -Continue duloxetine 30 mg twice daily    #Microcytic anemia  Per nursing home staff, Hgb 9.2, MCV 66.5  -Continue iron 325 mg daily     This patient will be staffed and discussed with Zoë Nolan MD.     Code Status: DNR CC  FEN: Low Na diet  PPX: heparin  DISPO: CARMELLA Garrett MD  3/5/2021,  2:04 PM

## 2021-03-05 NOTE — PROGRESS NOTES
Pt admitted to room 6301. VSS. Pt still lethargic from dose of ativan given over night. A & O x 3. Bed alarm on and call light within reach. Will continue to monitor.

## 2021-03-05 NOTE — PROGRESS NOTES
Pt lethargic and only responsive to pain upon entering room. Vital signs were taken, SpO2 found to be 85% on RA. 2L nasal cannula placed and SpO2 now 100%. Dr. Jessy Schlatter bedside. ABG and XR chest ordered. Pt also now NPO. Will hold morning meds due to NPO and mental status. All other vital signs stable. Pt does not appear to be in distress. Resting comfortably in bed. Will continue to monitor.

## 2021-03-05 NOTE — PLAN OF CARE
Patient lethargic, less responsive  Nonvocal    Telemetry: Predominantly sinus rhythm with a few runs of multifocal atrial tachycardia    Continue her current medications (if able to take p.o.)    Moise Red MD   Cardiac Electrophysiology  [address] [place] [address] [phone]

## 2021-03-05 NOTE — DISCHARGE SUMMARY
INTERNAL MEDICINE DEPARTMENT AT [address] [place] [address]  DISCHARGE SUMMARY    Patient ID: Jessee Peter                                             Discharge Date: 3/8/2021   Patient's PCP: Daniella Horner MD                                          Discharge Physician: Wilmer Jaimes MD  Admit Date: 3/3/2021   Admitting Physician: Daniella Horner MD    PROBLEMS DURING HOSPITALIZATION:  Hospital Problems           Last Modified POA    Acute on chronic congestive heart failure ([address] 3/4/2021 Yes    Multifocal atrial tachycardia ([address] 3/5/2021 Yes    Acute on chronic heart failure with preserved ejection fraction ([address] 3/5/2021 Yes        DISCHARGE DIAGNOSES:      Hospital Course:    79-year-old female with a past medical history of type 2 diabetes, hypertension, diastolic heart failure, hep C, depression who presented to the hospital with abnormal labs.  Patient's chest x-ray showed bilateral pleural effusions with the right greater than left and associated prominent interstitial opacities.  She was admitted for acute on chronic diastolic heart failure exacerbation and she was treated with diuretics. Her Echo showed an LVEF of 30 %.  She also developed an OIR which was attributed to hypotension for which she was started on  intravenous fluids.  During her stay she became slow in her responses and was barely following commands.  ABG and chest x-ray at the time was normal, CT head without contrast showed no abnormalities. Her ammonia levels were found to be high for which she was started on lactulose and rifamixin. She subsequently developed SVT requiring multiple rounds of adenosine. Her clinical course was further complicated by sepsis. She subsequently developed multi organ failure and her prognosis seemed grave after which the family decided to switch her to hospice care. She was transferred to hospice care with goals of making her as comfortable as possible.     Physical Exam:  BP (!) 87/53   Pulse 90   Temp 97.5 °F 108 (90 Base) MCG/ACT inhaler  Generic drug: albuterol sulfate HFA     Refresh 1.4-0.6 % Soln  Generic drug: Polyvinyl Alcohol-Povidone PF  INSTILL 1 TO 2 DROPS INTO BOTH EYES TWICE A DAY.      SALONPAS LIDOCAINE PLUS EX     spironolactone-hydroCHLOROthiazide 25-25 MG per tablet  Commonly known as: ALDACTAZIDE     THERAWORX RELIEF EX     TUMS E-X PO     Vitamin C CR 1000 MG Tbcr     vitamin D 25 MCG (1000 UT) Tabs tablet  Commonly known as: CHOLECALCIFEROL          Activity: activity as tolerated  Diet: regular diet  Wound Care: none needed    Time Spent on discharge is more than 30 minutes    Signed:  Sheyla Santana MD   3/8/2021

## 2021-03-05 NOTE — PLAN OF CARE
Problem: Skin Integrity:  Goal: Will show no infection signs and symptoms  Description: Will show no infection signs and symptoms  Outcome: Ongoing  Goal: Absence of new skin breakdown  Description: Absence of new skin breakdown  3/5/2021 0700 by Yudi Ferguson RN  Outcome: Ongoing  3/4/2021 1705 by Erika Patton RN  Outcome: Ongoing  Note: No new evidence of skin breakdown. Pt has noted red areas on all bony prominences and buttocks. Preventives applied, purwick for incontinence and q2 turn/repositioning done. Will continue to monitor. Problem: Falls - Risk of:  Goal: Will remain free from falls  Description: Will remain free from falls  Outcome: Ongoing  Goal: Absence of physical injury  Description: Absence of physical injury  Outcome: Ongoing     Problem: Pain:  Goal: Pain level will decrease  Description: Pain level will decrease  3/5/2021 0700 by Yudi Ferguson RN  Outcome: Ongoing  3/4/2021 1705 by Erika Patton RN  Outcome: Ongoing  Note: Pt stated she had a stomach/upset stomach. 6/10 on pain scale. Medicated per Mar. Reassessed and pt asleep with resp >12.    Goal: Control of acute pain  Description: Control of acute pain  Outcome: Ongoing  Goal: Control of chronic pain  Description: Control of chronic pain  Outcome: Ongoing

## 2021-03-05 NOTE — CARE COORDINATION
Case Management Assessment           Daily Note                 Date/ Time of Note: 3/5/2021 3:26 PM         Note completed by: Sofia Richmond     Patient Name: Jeri Urias  YOB: 1940    Diagnosis:Heart failure exacerbated by [facility]) [I50.9]  Patient Admission Status: Inpatient    Date of Admission:3/3/2021  6:12 PM Length of Stay: 2 GLOS:      Current Plan of Care:  Acute CHF :    Elevated  WBC  ; Hematology consulted ,   3/5/21:  She has a microcytic anemia which is due to iron deficiency. Will give IV Venofer x 3 doses beginning today. The other eval for leukocytosis is pending  ________________________________________________________________________________________  PT AM-PAC:   / 24 per last evaluation on: ordered  Pending     OT AM-PAC:   / 24 per last evaluation on: ordered pending     DME Needs for discharge: TBD   ________________________________________________________________________________________  Discharge Plan: Home with [address] [place] [address] Way: [place]  w/ [address] if needed     Tentative discharge date: 1-2 days     Current barriers to discharge: PT OT / Medical clearance     Referrals completed: Not Applicable    Resources/ information provided: Not indicated at this time  ________________________________________________________________________________________  Case Management Notes:     CM spoke with daughter Elaina Salmeron for pt to return to the [address]. Pt has been to the [facility], but does not want SNF at dc. Patient not on O 2 at baseline , was placed  On 2 L nc this am .      CM called Jovi Boo at the [facility]    Active w/ COA  :  Francisco Nash is CM  [phone]      Jovi Boo states patient can return to [facility], if she is safe. She will need PT/OT evals prior to dc. Miguel asked to be updated when closer to dc. Grace Ford and her family were provided with choice of provider; she and her family are in agreement with the discharge plan.     Care Transition Patient: Daija Castillo RN  [facility]  Case Management Department  Ph: [phone]

## 2021-03-05 NOTE — PROGRESS NOTES
4 Eyes Admission Assessment     I agree as the admission nurse that 2 RN's have performed a thorough Head to Toe Skin Assessment on the patient. ALL assessment sites listed below have been assessed on admission. Areas assessed by both nurses: ***  [x]   Head, Face, and Ears   [x]   Shoulders, Back, and Chest  [x]   Arms, Elbows, and Hands   [x]   Coccyx, Sacrum, and Ischium  [x]   Legs, Feet, and Heels        Does the Patient have Skin Breakdown?   No         Anup Prevention initiated:  No   Wound Care Orders initiated:  No      [facility] nurse consulted for Pressure Injury (Stage 3,4, Unstageable, DTI, NWPT, and Complex wounds) or Anup score 18 or lower:  No      Nurse 1 eSignature: Electronically signed by Rahul Ray RN on 3/5/21 at 8:19 AM EST    **SHARE this note so that the co-signing nurse is able to place an eSignature**    Nurse 2 eSignature: {Esignature:[id number]}

## 2021-03-05 NOTE — CONSULTS
[facility]                        Consult Note      Requesting Physician:  Medina Medrano  CHIEF COMPLAINT:   Chief Complaint   Patient presents with    Abnormal Lab         HISTORY OF PRESENT ILLNESS:      Ms. Dayton Lopez  is a 79 y.o. female we are seeing in consultation for leukocytosis. Patient is an 39-year-old female with past medical history of W7IG, HTN, diastolic HF (last echo 3/50 EF 55 to 60% with grade 2 diastolic dysfunction), hep C, dementia, depression, rheumatoid arthritis who presented from her nursing home with abnormal labs. Per the patient, she feels just fine and is not having any trouble and does not comprehend why she needed to come to the hospital.  Patient denied any fever, chills, nausea, vomiting, chest pain, shortness of breath, lower extremity swelling, lower extremity pain, diarrhea, constipation, PND, cough. At bedside, I spoke with daughter Tonie Justin, who endorsed that her mother has been ordering a lot of outside food and may have contributed to her current exacerbation. I called the [facility] and spoke to the nighttime nurse who endorsed that her daytime nurse has left for the night. According to her, the patient has been having some shortness of breath and had some abnormal labs today. Per her chart review, patient's BUN 36, creatinine 1.4 (baseline 1.0, 1/14/2021), glucose 30, WBC 29.8, Hgb 9.2, MCV 66.5, MG 1.3, BNP 16,925. She was not sure the other details as to why the patient was brought into the hospital.     In the ED, BP 91/58 . BNP [id number]. CXR with small bilateral pleural effusions right greater than left with associated prominent interstitial opacities throughout both lungs favored to be volume overload/interstitial edema.   Patient was admitted for acute on chronic CHF exacerbation and was given one-time 40 mg IV Lasix    Past Medical History:        Diagnosis Date    ADHD (attention deficit hyperactivity disorder)     Attention deficit disorder without mention of hyperactivity 7/22/2010    Autoimmune disease NEC     Carotid artery disease [facility] 8/2/2013    LEFT CAROTID ENDARTERECTOMY               Carotid artery disease ([facility] 8/3/2013    Chronic persistent hepatitis ([facility] 7/22/2010    Depression     Depressive disorder, not elsewhere classified 7/22/2010    Double vision     left eye, since cataract surgery    Fractures     GERD (gastroesophageal reflux disease)     Hx of interstitial lung disease 6/12/2019    Leukocytosis     Negative ELIZABETH-2 mutation (07/29/2020). Most likely 2/2 to inflammation: hx of chronic hep C (on treatment) and RA.      Neuropathy     Rheumatoid arthritis(714.0) 7/22/2010    Spinal stenosis     Type II or unspecified type diabetes mellitus without mention of complication, not stated as uncontrolled 7/22/2010    Unspecified cerebral artery occlusion with cerebral infarction     right hand, loss of some sensation     Past Surgical History:        Procedure Laterality Date    COLONOSCOPY      COSMETIC SURGERY      tummy tuck,face lift,mammoplasties- hepatitis blood transfusion complication    EYE SURGERY Bilateral     cataract    HUMERUS FRACTURE SURGERY Right 8/11/2020    RIGHT PROXIMAL HUMERUS AND SHAFT FRACTURE OPEN REDUCTION INTERNAL FIXATION performed by Adam Montague MD at [facility] OR       Current Medications:    Current Facility-Administered Medications: insulin lispro (1 Unit Dial) 0-6 Units, 0-6 Units, Subcutaneous, TID WC  insulin lispro (1 Unit Dial) 0-3 Units, 0-3 Units, Subcutaneous, Nightly  0.9 % sodium chloride infusion, , Intravenous, Continuous  iron sucrose (VENOFER) 300 mg in sodium chloride 0.9 % 250 mL IVPB, 300 mg, Intravenous, Q24H  heparin (porcine) injection 5,000 Units, 5,000 Units, Subcutaneous, 3 times per day  lactobacillus (CULTURELLE) capsule 1 capsule, 1 capsule, Oral, Daily  metoprolol (LOPRESSOR) injection 5 mg, 5 mg, Intravenous, Q5 Min PRN  dilTIAZem (CARDIZEM) tablet 30 mg, 30 mg, Oral, 4 times per day  albuterol (PROVENTIL) nebulizer solution 2.5 mg, 2.5 mg, Nebulization, Q4H PRN  ascorbic acid (VITAMIN C) tablet 1,000 mg, 1,000 mg, Oral, Daily  docusate sodium (COLACE) capsule 100 mg, 100 mg, Oral, Daily  DULoxetine (CYMBALTA) extended release capsule 30 mg, 30 mg, Oral, BID  guaiFENesin (MUCINEX) extended release tablet 600 mg, 600 mg, Oral, BID PRN  HYDROcodone-acetaminophen (NORCO) 5-325 MG per tablet 1 tablet, 1 tablet, Oral, Q6H PRN  insulin glargine (LANTUS;BASAGLAR) injection pen 20 Units, 20 Units, Subcutaneous, Nightly  pantoprazole (PROTONIX) tablet 40 mg, 40 mg, Oral, QAM AC  polyvinyl alcohol (LIQUIFILM TEARS) 1.4 % ophthalmic solution 1 drop, 1 drop, Both Eyes, PRN  polyvinyl alcohol (LIQUIFILM TEARS) 1.4 % ophthalmic solution 1 drop, 1 drop, Both Eyes, BID  vitamin D (CHOLECALCIFEROL) tablet 1,000 Units, 1,000 Units, Oral, Daily  sodium chloride flush 0.9 % injection 10 mL, 10 mL, Intravenous, 2 times per day  sodium chloride flush 0.9 % injection 10 mL, 10 mL, Intravenous, PRN  promethazine (PHENERGAN) tablet 12.5 mg, 12.5 mg, Oral, Q6H PRN **OR** ondansetron (ZOFRAN) injection 4 mg, 4 mg, Intravenous, Q6H PRN  polyethylene glycol (GLYCOLAX) packet 17 g, 17 g, Oral, Daily PRN  acetaminophen (TYLENOL) tablet 650 mg, 650 mg, Oral, Q6H PRN **OR** acetaminophen (TYLENOL) suppository 650 mg, 650 mg, Rectal, Q6H PRN  glucose (GLUTOSE) 40 % oral gel 15 g, 15 g, Oral, PRN  dextrose 50 % IV solution, 12.5 g, Intravenous, PRN  glucagon (rDNA) injection 1 mg, 1 mg, Intramuscular, PRN  dextrose 5 % solution, 100 mL/hr, Intravenous, PRN  Allergies:  Erythromycin and Sulfites    Social History:      Social History     Socioeconomic History    Marital status:      Spouse name: Not on file    Number of children: Not on file    Years of education: Not on file    Highest education level: Not on file   Occupational History    Not on file   Social Needs    Financial resource strain: Not on file    Food insecurity Worry: Not on file     Inability: Not on file    Transportation needs     Medical: Not on file     Non-medical: Not on file   Tobacco Use    Smoking status: Former Smoker     Packs/day: 2.00     Years: 30.00     Pack years: 60.00     Types: Cigarettes     Start date: 11     Quit date:      Years since quittin.2    Smokeless tobacco: Never Used    Tobacco comment: smoked 30 yrs ago   Substance and Sexual Activity    Alcohol use: Yes     Alcohol/week: 1.0 standard drinks     Types: 1 Glasses of wine per week     Frequency: Monthly or less     Drinks per session: 1 or 2     Binge frequency: Never     Comment: 2 per day    Drug use: No    Sexual activity: Not Currently   Lifestyle    Physical activity     Days per week: Not on file     Minutes per session: Not on file    Stress: Not on file   Relationships    Social connections     Talks on phone: Not on file     Gets together: Not on file     Attends Buddhism service: Not on file     Active member of club or organization: Not on file     Attends meetings of clubs or organizations: Not on file     Relationship status: Not on file    Intimate partner violence     Fear of current or ex partner: Not on file     Emotionally abused: Not on file     Physically abused: Not on file     Forced sexual activity: Not on file   Other Topics Concern    Not on file   Social History Narrative    Not on file          Family History:     History reviewed. No pertinent family history. REVIEW OF SYSTEMS:      · Constitutional: Denies fever, sweats, weight loss. .     · Eyes: No visual changes or diplopia. No scleral icterus. · ENT: No Headaches, hearing loss or vertigo. No mouth sores or sore throat. · Cardiovascular: No chest pain, dyspnea on exertion, palpitations or loss of consciousness. · Respiratory: No cough or wheezing, no sputum production. No hemoptysis. .    · Gastrointestinal: No abdominal pain, appetite loss, blood in stools.  No change in bowel adenopathy    MUSCULOSKELETAL:  There is no redness, warmth, or swelling of the joints. Full range of motion noted. Motor strength is 5 out of 5 all extremities bilaterally. NEUROLOGIC:  Awake, alert, oriented to name, place and time. Cranial nerves II-XII are grossly intact. Motor is 5 out of 5 bilaterally. SKIN:  no bruising or bleeding      DATA:    PT/INR:  No results for input(s): PROT, INR in the last 72 hours. PTT:  No results for input(s): APTT in the last 72 hours. CMP:    Lab Results   Component Value Date     03/05/2021    K 4.9 03/05/2021    K 3.9 03/04/2021    CL 96 03/05/2021    CO2 23 03/05/2021    BUN 41 03/05/2021    PROT 8.6 07/29/2020    PROT 7.8 03/18/2013     Magnesium:    Lab Results   Component Value Date    MG 2.70 03/04/2021     Phosphorus:  No components found for: PO4  Calcium:  No components found for: CA  CBC:    Lab Results   Component Value Date    WBC 37.5 03/05/2021    RBC 4.93 03/05/2021    HGB 9.7 03/05/2021    HCT 33.1 03/05/2021    MCV 67.0 03/05/2021    RDW 15.8 03/05/2021     03/05/2021     DIFF:    Lab Results   Component Value Date    MCV 67.0 03/05/2021    RDW 15.8 03/05/2021        IMPRESSION/RECOMMENDATIONS:  1. Leukocytosis:  Chronic problem present since at least last July. She had a Justin 2 sent at that time which was negative. Do not see where a bcr abl or eval for CLL has been obtained. Without splenomegaly doubt this represents an myeloproliferative syndrome though a bone marrow biopsy would be necessary to rule that out. The question is what would we do with that information in this setting. She does have a progressive microcytic anemia and we will evaluate her iron stores. If the leukocytosis is isolated in this elderly, somewhat demented lady would probably just follow expectantly. 3/5/21:  She has a microcytic anemia which is due to iron deficiency. Will give IV Venofer x 3 doses beginning today.   The other eval for leukocytosis is pending. Will follow with you. Thank you for the consultation.   Will follow with you

## 2021-03-06 ENCOUNTER — APPOINTMENT (OUTPATIENT)
Dept: GENERAL RADIOLOGY | Age: 81
DRG: 291 | End: 2021-03-06
Payer: COMMERCIAL

## 2021-03-06 LAB
ALBUMIN SERPL-MCNC: 3.3 G/DL (ref 3.4–5)
ANION GAP SERPL CALCULATED.3IONS-SCNC: 15 MMOL/L (ref 3–16)
ANISOCYTOSIS: ABNORMAL
ANISOCYTOSIS: ABNORMAL
BANDED NEUTROPHILS RELATIVE PERCENT: 2 % (ref 0–7)
BANDED NEUTROPHILS RELATIVE PERCENT: 3 % (ref 0–7)
BASOPHILS ABSOLUTE: 0 K/UL (ref 0–0.2)
BASOPHILS ABSOLUTE: 0 K/UL (ref 0–0.2)
BASOPHILS RELATIVE PERCENT: 0 %
BASOPHILS RELATIVE PERCENT: 0 %
BUN BLDV-MCNC: 44 MG/DL (ref 7–20)
CALCIUM SERPL-MCNC: 10.2 MG/DL (ref 8.3–10.6)
CHLORIDE BLD-SCNC: 98 MMOL/L (ref 99–110)
CO2: 21 MMOL/L (ref 21–32)
CREAT SERPL-MCNC: 2.1 MG/DL (ref 0.6–1.2)
EOSINOPHILS ABSOLUTE: 0 K/UL (ref 0–0.6)
EOSINOPHILS ABSOLUTE: 0 K/UL (ref 0–0.6)
EOSINOPHILS RELATIVE PERCENT: 0 %
EOSINOPHILS RELATIVE PERCENT: 0 %
GFR AFRICAN AMERICAN: 27
GFR NON-AFRICAN AMERICAN: 23
GLUCOSE BLD-MCNC: 133 MG/DL (ref 70–99)
GLUCOSE BLD-MCNC: 144 MG/DL (ref 70–99)
GLUCOSE BLD-MCNC: 151 MG/DL (ref 70–99)
GLUCOSE BLD-MCNC: 194 MG/DL (ref 70–99)
GLUCOSE BLD-MCNC: 196 MG/DL (ref 70–99)
HBV SURFACE AB TITR SER: <3.5 MIU/ML
HCT VFR BLD CALC: 31 % (ref 36–48)
HCT VFR BLD CALC: 32.7 % (ref 36–48)
HEMOGLOBIN: 8.6 G/DL (ref 12–16)
HEMOGLOBIN: 9.3 G/DL (ref 12–16)
HYPOCHROMIA: ABNORMAL
HYPOCHROMIA: ABNORMAL
LYMPHOCYTES ABSOLUTE: 1.3 K/UL (ref 1–5.1)
LYMPHOCYTES ABSOLUTE: 2.3 K/UL (ref 1–5.1)
LYMPHOCYTES RELATIVE PERCENT: 1 %
LYMPHOCYTES RELATIVE PERCENT: 2 %
MAGNESIUM: 2.2 MG/DL (ref 1.8–2.4)
MCH RBC QN AUTO: 18.7 PG (ref 26–34)
MCH RBC QN AUTO: 19 PG (ref 26–34)
MCHC RBC AUTO-ENTMCNC: 27.6 G/DL (ref 31–36)
MCHC RBC AUTO-ENTMCNC: 28.3 G/DL (ref 31–36)
MCV RBC AUTO: 67.1 FL (ref 80–100)
MCV RBC AUTO: 67.8 FL (ref 80–100)
METAMYELOCYTES RELATIVE PERCENT: 1 %
METAMYELOCYTES RELATIVE PERCENT: 1 %
MONOCYTES ABSOLUTE: 23.9 K/UL (ref 0–1.3)
MONOCYTES ABSOLUTE: 24.6 K/UL (ref 0–1.3)
MONOCYTES RELATIVE PERCENT: 19 %
MONOCYTES RELATIVE PERCENT: 21 %
MYELOCYTE PERCENT: 2 %
NEUTROPHILS ABSOLUTE: 100.7 K/UL (ref 1.7–7.7)
NEUTROPHILS ABSOLUTE: 90.2 K/UL (ref 1.7–7.7)
NEUTROPHILS RELATIVE PERCENT: 71 %
NEUTROPHILS RELATIVE PERCENT: 77 %
OVALOCYTES: ABNORMAL
OVALOCYTES: ABNORMAL
PDW BLD-RTO: 16.2 % (ref 12.4–15.4)
PDW BLD-RTO: 16.3 % (ref 12.4–15.4)
PERFORMED ON: ABNORMAL
PHOSPHORUS: 4.7 MG/DL (ref 2.5–4.9)
PLATELET # BLD: 246 K/UL (ref 135–450)
PLATELET # BLD: 261 K/UL (ref 135–450)
PLATELET SLIDE REVIEW: ADEQUATE
PLATELET SLIDE REVIEW: ADEQUATE
PMV BLD AUTO: 8.4 FL (ref 5–10.5)
PMV BLD AUTO: 8.6 FL (ref 5–10.5)
POLYCHROMASIA: ABNORMAL
POLYCHROMASIA: ABNORMAL
POTASSIUM SERPL-SCNC: 4.1 MMOL/L (ref 3.5–5.1)
PRO-BNP: ABNORMAL PG/ML (ref 0–449)
RBC # BLD: 4.57 M/UL (ref 4–5.2)
RBC # BLD: 4.88 M/UL (ref 4–5.2)
REASON FOR REJECTION: NORMAL
REJECTED TEST: NORMAL
SCHISTOCYTES: ABNORMAL
SCHISTOCYTES: ABNORMAL
SODIUM BLD-SCNC: 134 MMOL/L (ref 136–145)
STOMATOCYTES: ABNORMAL
STOMATOCYTES: ABNORMAL
TARGET CELLS: ABNORMAL
TEAR DROP CELLS: ABNORMAL
WBC # BLD: 117.2 K/UL (ref 4–11)
WBC # BLD: 125.9 K/UL (ref 4–11)

## 2021-03-06 PROCEDURE — 2580000003 HC RX 258: Performed by: STUDENT IN AN ORGANIZED HEALTH CARE EDUCATION/TRAINING PROGRAM

## 2021-03-06 PROCEDURE — 6370000000 HC RX 637 (ALT 250 FOR IP): Performed by: INTERNAL MEDICINE

## 2021-03-06 PROCEDURE — 83735 ASSAY OF MAGNESIUM: CPT

## 2021-03-06 PROCEDURE — 2500000003 HC RX 250 WO HCPCS: Performed by: NURSE PRACTITIONER

## 2021-03-06 PROCEDURE — 1200000000 HC SEMI PRIVATE

## 2021-03-06 PROCEDURE — 71045 X-RAY EXAM CHEST 1 VIEW: CPT

## 2021-03-06 PROCEDURE — 86706 HEP B SURFACE ANTIBODY: CPT

## 2021-03-06 PROCEDURE — 82105 ALPHA-FETOPROTEIN SERUM: CPT

## 2021-03-06 PROCEDURE — 80069 RENAL FUNCTION PANEL: CPT

## 2021-03-06 PROCEDURE — 81208 BCR/ABL1 GENE OTHER BP: CPT

## 2021-03-06 PROCEDURE — 6370000000 HC RX 637 (ALT 250 FOR IP): Performed by: STUDENT IN AN ORGANIZED HEALTH CARE EDUCATION/TRAINING PROGRAM

## 2021-03-06 PROCEDURE — 6360000002 HC RX W HCPCS: Performed by: STUDENT IN AN ORGANIZED HEALTH CARE EDUCATION/TRAINING PROGRAM

## 2021-03-06 PROCEDURE — 81207 BCR/ABL1 GENE MINOR BP: CPT

## 2021-03-06 PROCEDURE — C9113 INJ PANTOPRAZOLE SODIUM, VIA: HCPCS | Performed by: STUDENT IN AN ORGANIZED HEALTH CARE EDUCATION/TRAINING PROGRAM

## 2021-03-06 PROCEDURE — 74018 RADEX ABDOMEN 1 VIEW: CPT

## 2021-03-06 PROCEDURE — 2580000003 HC RX 258: Performed by: INTERNAL MEDICINE

## 2021-03-06 PROCEDURE — 87040 BLOOD CULTURE FOR BACTERIA: CPT

## 2021-03-06 PROCEDURE — 99233 SBSQ HOSP IP/OBS HIGH 50: CPT | Performed by: NURSE PRACTITIONER

## 2021-03-06 PROCEDURE — 83880 ASSAY OF NATRIURETIC PEPTIDE: CPT

## 2021-03-06 PROCEDURE — 85025 COMPLETE CBC W/AUTO DIFF WBC: CPT

## 2021-03-06 PROCEDURE — 87522 HEPATITIS C REVRS TRNSCRPJ: CPT

## 2021-03-06 PROCEDURE — 36415 COLL VENOUS BLD VENIPUNCTURE: CPT

## 2021-03-06 PROCEDURE — 6360000002 HC RX W HCPCS: Performed by: INTERNAL MEDICINE

## 2021-03-06 PROCEDURE — 99232 SBSQ HOSP IP/OBS MODERATE 35: CPT | Performed by: INTERNAL MEDICINE

## 2021-03-06 PROCEDURE — 86708 HEPATITIS A ANTIBODY: CPT

## 2021-03-06 PROCEDURE — 81206 BCR/ABL1 GENE MAJOR BP: CPT

## 2021-03-06 RX ORDER — METOPROLOL TARTRATE 5 MG/5ML
2.5 INJECTION INTRAVENOUS EVERY 6 HOURS
Status: DISCONTINUED | OUTPATIENT
Start: 2021-03-06 | End: 2021-03-07 | Stop reason: HOSPADM

## 2021-03-06 RX ORDER — PANTOPRAZOLE SODIUM 40 MG/10ML
40 INJECTION, POWDER, LYOPHILIZED, FOR SOLUTION INTRAVENOUS DAILY
Status: DISCONTINUED | OUTPATIENT
Start: 2021-03-06 | End: 2021-03-07 | Stop reason: HOSPADM

## 2021-03-06 RX ORDER — QUETIAPINE FUMARATE 25 MG/1
12.5 TABLET, FILM COATED ORAL NIGHTLY
Status: DISCONTINUED | OUTPATIENT
Start: 2021-03-06 | End: 2021-03-07 | Stop reason: HOSPADM

## 2021-03-06 RX ORDER — LIDOCAINE 4 G/G
1 PATCH TOPICAL DAILY
Status: DISCONTINUED | OUTPATIENT
Start: 2021-03-06 | End: 2021-03-07 | Stop reason: HOSPADM

## 2021-03-06 RX ADMIN — IRON SUCROSE 300 MG: 20 INJECTION, SOLUTION INTRAVENOUS at 11:01

## 2021-03-06 RX ADMIN — PANTOPRAZOLE SODIUM 40 MG: 40 INJECTION, POWDER, FOR SOLUTION INTRAVENOUS at 17:04

## 2021-03-06 RX ADMIN — PIPERACILLIN AND TAZOBACTAM 3375 MG: 3; .375 INJECTION, POWDER, LYOPHILIZED, FOR SOLUTION INTRAVENOUS at 14:25

## 2021-03-06 RX ADMIN — RIFAXIMIN 550 MG: 550 TABLET ORAL at 10:41

## 2021-03-06 RX ADMIN — DULOXETINE HYDROCHLORIDE 30 MG: 30 CAPSULE, DELAYED RELEASE ORAL at 21:37

## 2021-03-06 RX ADMIN — INSULIN GLARGINE 20 UNITS: 100 INJECTION, SOLUTION SUBCUTANEOUS at 21:39

## 2021-03-06 RX ADMIN — DULOXETINE HYDROCHLORIDE 30 MG: 30 CAPSULE, DELAYED RELEASE ORAL at 10:41

## 2021-03-06 RX ADMIN — PIPERACILLIN AND TAZOBACTAM 3.38 MG: 3; .375 INJECTION, POWDER, LYOPHILIZED, FOR SOLUTION INTRAVENOUS at 21:30

## 2021-03-06 RX ADMIN — METOPROLOL TARTRATE 2.5 MG: 5 INJECTION INTRAVENOUS at 22:24

## 2021-03-06 RX ADMIN — INSULIN LISPRO 1 UNITS: 100 INJECTION, SOLUTION INTRAVENOUS; SUBCUTANEOUS at 21:39

## 2021-03-06 RX ADMIN — HYDROCODONE BITARTRATE AND ACETAMINOPHEN 1 TABLET: 5; 325 TABLET ORAL at 21:37

## 2021-03-06 RX ADMIN — HEPARIN SODIUM 5000 UNITS: 5000 INJECTION INTRAVENOUS; SUBCUTANEOUS at 16:53

## 2021-03-06 RX ADMIN — QUETIAPINE FUMARATE 12.5 MG: 25 TABLET ORAL at 21:37

## 2021-03-06 RX ADMIN — ACETAMINOPHEN 650 MG: 325 TABLET ORAL at 15:25

## 2021-03-06 RX ADMIN — POLYVINYL ALCOHOL 1 DROP: 14 SOLUTION/ DROPS OPHTHALMIC at 21:38

## 2021-03-06 RX ADMIN — DOCUSATE SODIUM 100 MG: 100 CAPSULE, LIQUID FILLED ORAL at 10:41

## 2021-03-06 RX ADMIN — HEPARIN SODIUM 5000 UNITS: 5000 INJECTION INTRAVENOUS; SUBCUTANEOUS at 21:40

## 2021-03-06 RX ADMIN — HEPARIN SODIUM 5000 UNITS: 5000 INJECTION INTRAVENOUS; SUBCUTANEOUS at 06:38

## 2021-03-06 RX ADMIN — RIFAXIMIN 550 MG: 550 TABLET ORAL at 21:37

## 2021-03-06 RX ADMIN — Medication 10 ML: at 10:42

## 2021-03-06 RX ADMIN — OXYCODONE HYDROCHLORIDE AND ACETAMINOPHEN 1000 MG: 500 TABLET ORAL at 10:41

## 2021-03-06 RX ADMIN — Medication 1000 UNITS: at 10:41

## 2021-03-06 RX ADMIN — DILTIAZEM HYDROCHLORIDE 30 MG: 30 TABLET, FILM COATED ORAL at 01:00

## 2021-03-06 RX ADMIN — Medication 10 ML: at 21:38

## 2021-03-06 RX ADMIN — Medication 1 CAPSULE: at 10:41

## 2021-03-06 ASSESSMENT — PAIN SCALES - WONG BAKER: WONGBAKER_NUMERICALRESPONSE: 0

## 2021-03-06 ASSESSMENT — PAIN SCALES - GENERAL
PAINLEVEL_OUTOF10: 5
PAINLEVEL_OUTOF10: 0
PAINLEVEL_OUTOF10: 0
PAINLEVEL_OUTOF10: 4

## 2021-03-06 NOTE — PROGRESS NOTES
Internal Medicine Progress Note    Admit Date: 3/3/2021    Diet: DIET LOW SODIUM 2 GM; Carb Control: 3 carb choices (45 gms)/meal; Low Protein    CC: Abnormal Labs    Interval history: Patient received Ativan last night for agitation. Had several bowel movements. HR better controlled. Patient stated she has no complaints at the moment, but not eating well. GI was consulted to see patient overnight for h/o her liver cirrhosis and had elevated ammonia yesterday.        Medications:     Scheduled Meds:   insulin lispro  0-6 Units Subcutaneous TID WC    insulin lispro  0-3 Units Subcutaneous Nightly    iron sucrose  300 mg Intravenous Q24H    heparin (porcine)  5,000 Units Subcutaneous 3 times per day    [Held by provider] lactulose  20 g Oral TID    lactobacillus  1 capsule Oral Daily    dilTIAZem  30 mg Oral 4 times per day    ascorbic acid  1,000 mg Oral Daily    docusate sodium  100 mg Oral Daily    DULoxetine  30 mg Oral BID    insulin glargine  20 Units Subcutaneous Nightly    pantoprazole  40 mg Oral QAM AC    polyvinyl alcohol  1 drop Both Eyes BID    Vitamin D  1,000 Units Oral Daily    sodium chloride flush  10 mL Intravenous 2 times per day     Continuous Infusions:   sodium chloride 125 mL/hr at 03/05/21 0904    dextrose       PRN Meds:metoprolol, albuterol, guaiFENesin, HYDROcodone-acetaminophen, polyvinyl alcohol, sodium chloride flush, promethazine **OR** ondansetron, polyethylene glycol, acetaminophen **OR** acetaminophen, glucose, dextrose, glucagon (rDNA), dextrose    Objective:   Vitals:   T-max:  Patient Vitals for the past 8 hrs:   BP Temp Temp src Pulse Resp SpO2   03/06/21 0636 106/65 -- -- 101 -- --   03/06/21 0356 100/60 97.2 °F (36.2 °C) Oral 60 18 90 %       Intake/Output Summary (Last 24 hours) at 3/6/2021 9597  Last data filed at 3/5/2021 2036  Gross per 24 hour   Intake 170 ml   Output 0 ml   Net 170 ml       Physical Exam  Constitutional:       Appearance: She is ill-appearing. HENT:      Head: Normocephalic and atraumatic. Eyes:      General:         Right eye: No discharge. Extraocular Movements: Extraocular movements intact. Conjunctiva/sclera: Conjunctivae normal.      Pupils: Pupils are equal, round, and reactive to light. Neck:      Musculoskeletal: Normal range of motion and neck supple. Cardiovascular:      Rate and Rhythm: Normal rate and regular rhythm. Pulses: Normal pulses. Heart sounds: Normal heart sounds. No murmur. Pulmonary:      Effort: Pulmonary effort is normal. No respiratory distress. Breath sounds: Normal breath sounds. No wheezing. Abdominal:      General: Abdomen is flat. Bowel sounds are normal. There is no distension. Palpations: Abdomen is soft. Musculoskeletal: Normal range of motion. Right lower leg: No edema. Left lower leg: No edema. Skin:     General: Skin is warm and dry. Coloration: Skin is not jaundiced or pale. Neurological:      General: No focal deficit present. Mental Status: She is alert. Comments: Oriented to persona and place but still appears somnolent.    Psychiatric:      Comments: Austin waxes and wanes and has underlying dementia           LABS:    CBC:   Recent Labs     03/04/21  0335 03/05/21  0549   WBC 27.3* 37.5*   HGB 9.0* 9.7*   HCT 29.0* 33.1*   PLT see below 245   MCV 65.0* 67.0*     Renal:    Recent Labs     03/04/21  0335 03/04/21 2059 03/05/21  0549 03/05/21  1151 03/05/21  1152   *  --  133*  --   --    K 3.9  --  4.9  --   --    CL 95*  --  96*  --   --    CO2 26  --  23  --   --    BUN 35*  --  41*  --   --    CREATININE 1.3*  --  1.7*  --   --    GLUCOSE 207*  --  223*  --   --    CALCIUM 9.8  --  10.4  --   --    MG 1.10* 2.70*  --  2.50* 2.50*   PHOS  --   --  4.3  --   --    ANIONGAP 13  --  14  --   --      Hepatic:   Recent Labs     03/05/21  0549   LABALBU 3.3*     Troponin: No results for input(s): TROPONINI in the last 72 hours.  BNP: No results for input(s): BNP in the last 72 hours. Lipids: No results for input(s): CHOL, HDL in the last 72 hours. Invalid input(s): LDLCALCU, TRIGLYCERIDE  ABGs:    Recent Labs     03/05/21  0935   PHART 7.418   JRN2XVB 42.1   PO2ART 111.0*   PTZ2JGF 27   BEART 2.4   A5VNDRYM 99   EJV0SXG 29       INR: No results for input(s): INR in the last 72 hours. Lactate: No results for input(s): LACTATE in the last 72 hours. Cultures:  -----------------------------------------------------------------  RAD:   CT HEAD WO CONTRAST   Final Result      No acute intracranial abnormality or significant mass effect. XR CHEST PORTABLE   Final Result      Slightly increased pulmonary edema. XR CHEST (2 VW)   Final Result      Small bilateral pleural effusions right greater than left with associated prominent interstitial opacities throughout both lungs favored to be volume overload/interstitial edema. Assessment/Plan:   Patient is an 58-year-old female with past medical history of diastolic heart failure, M0DW, HTN who was admitted for acute on chronic heart failure exacerbation     Problem  #Acute on chronic diastolic heart failure exacerbation. Echo with now EF 30-35% with GD3DD  Likely secondary to high salt intake  Per nursing staff, patient continues to remain a little short of breath. Patient had a significantly elevated BNP. Other labs were also deranged including an elevated creatinine of 1.4. Examination supports fluid overload in the lungs. Chest x-ray supports picture of CHF exacerbation  -40 lasix PO for 1 dose and with 1.3 L of urine output  -Daily weights  -Strict I's and O's  -Low-sodium diet    #Acute metabolic Encephalopathy: More responsive today compared to previous morning on 3/5. Did receive dose of ativan overnight for agitation and screaming.    -CT head wo con negative  -Low suspicion for meningitis since no signs of infection  -Ammonia elevated -GI consulted appreciate recommendations.  -Please try to avoid further sedating medications overnight.  -Seroquel ordered nightly as long as patient not already somnolent     #ORI Cr increased overnight with fluids, as she appeared dry yesterday.   -Fluids held as patient is eating   -Will give IVF  -Avoid nephrotoxic agents     #Chronic Leukocytosis. Steadily increasing now 117 today . WBC from nursing home 29.8, absolute neutrophils elevated at 55.1. No evidence of infection. Prior records show elevated WBC, on last admission WBC was 27.6. UA negative for infections. Negative ELIZABETH-2 mutation (07/29/2020). Most likely 2/2 to inflammation: hx of chronic hep C and RA.  Concern for CML   -Daily CBC  -Heme Gildardo Gambinoine recs appreciated     #[id number]  Patient on Basaglar 30 units nightly + sliding scale   -Last HgbA1c = 8.1 on 7/20  -Hgb A1c pending  -2/3 Home Basaglar dose = 20 units  -POCT glucose AC/HS     #HTN  BP on admission soft 91/58 now stable  -Continue to monitor BP while inpatient     #Hep C cirrhosis  -Per notes, currently stable followed by GI     #Depression  -Continue duloxetine 30 mg twice daily     #Microcytic anemia  Per nursing home staff, Hgb 9.2, MCV 66.5  -Continue iron 325 mg daily     This patient will be staffed and discussed with Anil Meneses MD.      Code Status: DNR CC  FEN: Low Na diet  PPX: heparin  DISPO: [facility]       Anurag Youssef, PGY-2  03/06/21  8:08 AM    This patient has been staffed and discussed with Anil Meneses MD.

## 2021-03-06 NOTE — CONSULTS
[facility]                        Progress Note      Requesting Physician:  Mark Hill  CHIEF COMPLAINT:   Chief Complaint   Patient presents with    Abnormal Lab         HISTORY OF PRESENT ILLNESS:      Ms. Sidra Roberto  is a 79 y.o. female . Patient was seen by by colleague, Dr. Isabell Larson for leukocytosis. History reviewed - wbc chronically elevated; JAK2 reported to be negative in July 2020.     WBC has significantly inc'ed over the past 24 hours, 117-125        Current Medications:    Current Facility-Administered Medications: rifaximin (XIFAXAN) tablet 550 mg, 550 mg, Oral, BID  metoprolol (LOPRESSOR) injection 2.5 mg, 2.5 mg, Intravenous, Q6H  QUEtiapine (SEROQUEL) tablet 12.5 mg, 12.5 mg, Oral, Nightly  pantoprazole (PROTONIX) injection 40 mg, 40 mg, Intravenous, Daily  piperacillin-tazobactam (ZOSYN) 3.375 mg in dextrose 5 % 100 mL IVPB extended infusion (mini-bag), 3.375 mg, Intravenous, Q12H  insulin lispro (1 Unit Dial) 0-6 Units, 0-6 Units, Subcutaneous, TID WC  insulin lispro (1 Unit Dial) 0-3 Units, 0-3 Units, Subcutaneous, Nightly  0.9 % sodium chloride infusion, , Intravenous, Continuous  iron sucrose (VENOFER) 300 mg in sodium chloride 0.9 % 250 mL IVPB, 300 mg, Intravenous, Q24H  heparin (porcine) injection 5,000 Units, 5,000 Units, Subcutaneous, 3 times per day  [Held by provider] lactulose (CHRONULAC) 10 GM/15ML solution 20 g, 20 g, Oral, TID  lactobacillus (CULTURELLE) capsule 1 capsule, 1 capsule, Oral, Daily  metoprolol (LOPRESSOR) injection 5 mg, 5 mg, Intravenous, Q5 Min PRN  [Held by provider] dilTIAZem (CARDIZEM) tablet 30 mg, 30 mg, Oral, 4 times per day  albuterol (PROVENTIL) nebulizer solution 2.5 mg, 2.5 mg, Nebulization, Q4H PRN  ascorbic acid (VITAMIN C) tablet 1,000 mg, 1,000 mg, Oral, Daily  docusate sodium (COLACE) capsule 100 mg, 100 mg, Oral, Daily  DULoxetine (CYMBALTA) extended release capsule 30 mg, 30 mg, Oral, BID  guaiFENesin (MUCINEX) extended release tablet 600 mg, 600 mg, Oral, BID PRN  HYDROcodone-acetaminophen (NORCO) 5-325 MG per tablet 1 tablet, 1 tablet, Oral, Q6H PRN  insulin glargine (LANTUS;BASAGLAR) injection pen 20 Units, 20 Units, Subcutaneous, Nightly  [Held by provider] pantoprazole (PROTONIX) tablet 40 mg, 40 mg, Oral, QAM AC  polyvinyl alcohol (LIQUIFILM TEARS) 1.4 % ophthalmic solution 1 drop, 1 drop, Both Eyes, PRN  polyvinyl alcohol (LIQUIFILM TEARS) 1.4 % ophthalmic solution 1 drop, 1 drop, Both Eyes, BID  vitamin D (CHOLECALCIFEROL) tablet 1,000 Units, 1,000 Units, Oral, Daily  sodium chloride flush 0.9 % injection 10 mL, 10 mL, Intravenous, 2 times per day  sodium chloride flush 0.9 % injection 10 mL, 10 mL, Intravenous, PRN  promethazine (PHENERGAN) tablet 12.5 mg, 12.5 mg, Oral, Q6H PRN **OR** ondansetron (ZOFRAN) injection 4 mg, 4 mg, Intravenous, Q6H PRN  polyethylene glycol (GLYCOLAX) packet 17 g, 17 g, Oral, Daily PRN  acetaminophen (TYLENOL) tablet 650 mg, 650 mg, Oral, Q6H PRN **OR** acetaminophen (TYLENOL) suppository 650 mg, 650 mg, Rectal, Q6H PRN  glucose (GLUTOSE) 40 % oral gel 15 g, 15 g, Oral, PRN  dextrose 50 % IV solution, 12.5 g, Intravenous, PRN  glucagon (rDNA) injection 1 mg, 1 mg, Intramuscular, PRN  dextrose 5 % solution, 100 mL/hr, Intravenous, PRN        REVIEW OF SYSTEMS:      · Constitutional: Denies fever, sweats, weight loss. .     · Eyes: No visual changes or diplopia. No scleral icterus. · ENT: No Headaches, hearing loss or vertigo. No mouth sores or sore throat. · Cardiovascular: No chest pain, dyspnea on exertion, palpitations or loss of consciousness. · Respiratory: No cough or wheezing, no sputum production. No hemoptysis. .    · Gastrointestinal: No abdominal pain, appetite loss, blood in stools. No change in bowel habits. · Genitourinary: No dysuria, trouble voiding, or hematuria. · Musculoskeletal:  Generalized weakness. No joint complaints. · Integumentary: No rash or pruritis. · Neurological: No headache, diplopia.  No change in gait, balance, or coordination. No paresthesias. · Endocrine: No temperature intolerance. No excessive thirst, fluid intake, or urination. · Hematologic/Lymphatic: No abnormal bruising or ecchymoses, blood clots or swollen lymph nodes. · Allergic/Immunologic: No nasal congestion or hives. PHYSICAL EXAM:      Vitals:  BP (!) 80/53   Pulse 96   Temp 97.7 °F (36.5 °C) (Axillary)   Resp 20   Ht 5' (1.524 m)   Wt 110 lb 0.2 oz (49.9 kg)   SpO2 95%   BMI 21.48 kg/m²     CONSTITUTIONAL:  awake, alert, cooperative, no apparent distress, and appears stated age NAD  EYES:  Lids and lashes normal, pupils equal, round and reactive to light, extra ocular muscles intact, sclera clear, conjunctiva normal  ENT:  Normocephalic, without obvious abnormality, atramatic, sinuses nontender on palpation, external ears without lesions, oral pharynx with moist mucus membranes, tonsils without erythema or exudates, gums normal and good dentition. NECK:  Supple, symmetrical, trachea midline, no adenopathy, thyroid symmetric, not enlarged and no tenderness, skin normal  HEMATOLOGIC/LYMPHATICS:  no cervical lymphadenopathy, no supraclavicular lymphadenopathy, no axillary lymphadenopathy and no inguinal lymphadenopathy  LUNGS:  No increased work of breathing, good air exchange, clear to auscultation bilaterally, no crackles or wheezing  CARDIOVASCULAR:  , regular rate and rhythm, normal S1 and S2, no S3 or S4, and no murmur noted  ABDOMEN:  No scars, normal bowel sounds, soft, non-distended, non-tender, no masses palpated, no hepatosplenomegally  CHEST/BREASTS:  Breasts symmetrical, skin without lesion(s), no nipple retraction or dimpling, no nipple discharge, no masses palpated, no axillary or supraclavicular adenopathy    MUSCULOSKELETAL:  There is no redness, warmth, or swelling of the joints. Full range of motion noted. Motor strength is 5 out of 5 all extremities bilaterally.   NEUROLOGIC:  Awake, alert, oriented to name, place and time. Cranial nerves II-XII are grossly intact. Motor is 5 out of 5 bilaterally. SKIN:  no bruising or bleeding      DATA:    PT/INR:  No results for input(s): PROT, INR in the last 72 hours. PTT:  No results for input(s): APTT in the last 72 hours. CMP:    Lab Results   Component Value Date     03/06/2021    K 4.1 03/06/2021    K 3.9 03/04/2021    CL 98 03/06/2021    CO2 21 03/06/2021    BUN 44 03/06/2021    PROT 8.6 07/29/2020    PROT 7.8 03/18/2013     Magnesium:    Lab Results   Component Value Date    MG 2.50 03/05/2021     Phosphorus:  No components found for: PO4  Calcium:  No components found for: CA  CBC:    Lab Results   Component Value Date    .9 03/06/2021    RBC 4.57 03/06/2021    HGB 8.6 03/06/2021    HCT 31.0 03/06/2021    MCV 67.8 03/06/2021    RDW 16.2 03/06/2021     03/06/2021     DIFF:    Lab Results   Component Value Date    MCV 67.8 03/06/2021    RDW 16.2 03/06/2021        IMPRESSION/RECOMMENDATIONS:    Leukocytosis - Leukomoid reaction (LR) vs chronic myeloproliferative disorder    - this would be unusual for a leukomoid reaction, as these are generally below 50K  - LR, which usually produce WBC counts lower than 50,000/ÂµL, toxic granulocytic vacuolation, DÃ¶hle's bodies in the granulocytes, absence of basophilia, and normal or increased LAP levels    - however, LR with wbc over 50K, can be seen    - the current differential, positive for monocytes and neutrophils, with an absence of myelocytes (seen in CML)  - furthermore, absence of basophils and eosinophils (also seen more in CML)    - will send off bcr/abl testing and LAP levels to help distinguish      Lalo Rasmussen DO, MS  [facility]    Please contact via:  1. Perfect Serve  2.   Cell Phone:  [phone]    3/6/2021   5:03 PM

## 2021-03-06 NOTE — PROGRESS NOTES
Zosyn 2.25g IV q6h ordered for patient. This medication is renally eliminated. Will change to Zosyn 3.375g IV q12h EI per renal dose adjustment policy. Estimated Creatinine Clearance: 15 mL/min (A) (based on SCr of 2.1 mg/dL (H)). Pharmacy will continue to monitor renal function and adjust dose as necessary. Please call with any questions. Thanks!   Antonio Green, PharmD, [address] [place] [address] Pharmacy: [phone]  [address] [place] [address]: [phone]  3/6/2021 11:54 AM

## 2021-03-06 NOTE — PROGRESS NOTES
Patient screaming out into hallway, upon entering the room patient is stating Yomaira Zengwers me a shot\" and rates her abdominal pain 9/10. Pt given PRN norco per orders. No relief noted, patient continues to yell out. MD Kandy Hunter notified via perfect serve, ordered 0.5 mg Ativan once. Administered per orders. Patient calm but alert. Remains only alert to self. Pt checked and changed q2 and prn for incontinence. She has had 6 bowel movements since Friday morning, lactulose evening dose held per orders. Fall precautions in place, Avasys at bedside, call light and belongings within reach. Pt removed IV, new PIV placed in right upper and continuous NS infusing at 125ml/hr per orders. Will continue to monitor.

## 2021-03-06 NOTE — CONSULTS
Consult Note      Irma Martinez  1940    Consultant: Erik Hussein  Reason for Consult:  Liver disease \"follows with Dr Lew Dave"  Requesting Physician:  Marie Fishman:  None - patient drowsy and confused    History Obtained From:  Electronic medical record    HISTORY OF PRESENT ILLNESS:                The patient is a 79 y.o. female with significant past medical history of cirrhosis who presents with abnormal labs and CHF. We are asked to see her for \"liver disease. \" The patient can give me no history. Last office note from Dr Vance Chadwick was 6/2020 at which time it was stated that \"patient with hep C cirrhosis to start Epclusa today. \" In December 2020 there is a phone message stating that \"hepatitis C is negative. \" Last labs in office chart were 1/14/2021 with normal LFTs and INR. Last outpatient imaging was CT 12/2019 read as fatty infiltration of liver and extensive colonic diverticulosis. Past Medical History:        Diagnosis Date    ADHD (attention deficit hyperactivity disorder)     Attention deficit disorder without mention of hyperactivity 7/22/2010    Autoimmune disease NEC     Carotid artery disease ([address] 8/2/2013    LEFT CAROTID ENDARTERECTOMY               Carotid artery disease ([address] 8/3/2013    Chronic persistent hepatitis ([address] 7/22/2010    Depression     Depressive disorder, not elsewhere classified 7/22/2010    Double vision     left eye, since cataract surgery    Fractures     GERD (gastroesophageal reflux disease)     Hx of interstitial lung disease 6/12/2019    Leukocytosis     Negative ELIZABETH-2 mutation (07/29/2020). Most likely 2/2 to inflammation: hx of chronic hep C (on treatment) and RA.      Neuropathy     Rheumatoid arthritis(714.0) 7/22/2010    Spinal stenosis     Type II or unspecified type diabetes mellitus without mention of complication, not stated as uncontrolled 7/22/2010    Unspecified cerebral artery occlusion with cerebral infarction     right hand, loss of some sensation     Past Surgical History:        Procedure Laterality Date    COLONOSCOPY      COSMETIC SURGERY      tummy tuck,face lift,mammoplasties- hepatitis blood transfusion complication    EYE SURGERY Bilateral     cataract    HUMERUS FRACTURE SURGERY Right 8/11/2020    RIGHT PROXIMAL HUMERUS AND SHAFT FRACTURE OPEN REDUCTION INTERNAL FIXATION performed by Ramos Shine MD at [address]     Medications at Home:  Medications Prior to Admission: DULoxetine (CYMBALTA) 30 MG extended release capsule, Take 30 mg by mouth 2 times daily  spironolactone-hydroCHLOROthiazide (ALDACTAZIDE) 25-25 MG per tablet, Take 1 tablet by mouth daily  loperamide (IMODIUM) 2 MG capsule, Take 2 mg by mouth 3 times daily as needed for Diarrhea  Calcium Carbonate Antacid (TUMS E-X PO), Take by mouth Chew and swallow one tablet by mouth 3 times daily as needed  Lactobacillus (PROBIOTIC ACIDOPHILUS PO), Take 1 capsule by mouth daily  Homeopathic Products (THERAWORX RELIEF EX), Apply topically Use as directed 3 times daily as needed for muscle cramps  Menthol, Topical Analgesic, (BIOFREEZE ROLL-ON EX), Apply topically 3 times a day as needed for pain  Lidocaine HCl-Benzyl Alcohol (SALONPAS LIDOCAINE PLUS EX), Apply topically Apply to affected area 3 times a day as needed for pain  Eyelid Cleansers (OCUSOFT LID SCRUB EX), Apply topically Use as directed for blepharitis as needed  insulin regular (HUMULIN R;NOVOLIN R) 100 UNIT/ML injection, Inject 0-20 units subcutaneously per sliding scale  Psyllium (METAMUCIL FIBER PO), Take by mouth daily 2 hours after oral medications  magnesium oxide (MAG-OX) 400 MG tablet, Take 400 mg by mouth daily  ferrous sulfate (IRON 325) 325 (65 Fe) MG tablet, Take 325 mg by mouth daily  lactulose (CHRONULAC) 10 GM/15ML solution, Take 20 mLs by mouth 3 times daily as needed (constipation)  HYDROcodone-acetaminophen (NORCO) 5-325 MG per tablet, TAKE ONE TABLET BY MOUTH EVERY 4 HOURS AS NEEDED FOR PAIN FOR UP TO 7DAYS  insulin glargine (BASAGLAR KWIKPEN) 100 UNIT/ML injection pen, INJECT 30 UNITS SUBCUTANEOUSLY EVERY DAY. REFRESH 1.4-0.6 % SOLN, INSTILL 1 TO 2 DROPS INTO BOTH EYES TWICE A DAY. pantoprazole (PROTONIX) 40 MG tablet, Take 1 tablet by mouth every morning (before breakfast)  furosemide (LASIX) 40 MG tablet, Take 0.5 tablets by mouth daily  albuterol sulfate HFA (PROVENTIL HFA) 108 (90 Base) MCG/ACT inhaler, Inhale 2 puffs into the lungs every 6 hours as needed for Wheezing  acetaminophen (TYLENOL) 325 MG tablet, Take 1 tablet by mouth every 6 hours as needed for Pain  Biotin 30660 MCG TBDP, Take 10,000 mcg by mouth daily   Ascorbic Acid (VITAMIN C CR) 1000 MG TBCR, Take 1 tablet by mouth daily  guaiFENesin (MUCINEX) 600 MG extended release tablet, Take 1 tablet by mouth 2 times daily as needed for Congestion  polyethyl glycol-propyl glycol 0.4-0.3 % (SYSTANE) 0.4-0.3 % ophthalmic solution, Place 1 drop into both eyes as needed for Dry Eyes  Vitamin D 3 (CHOLECALCIFEROL) 1000 UNITS TABS tablet, Take 1,000 Units by mouth daily.      Current Medications:    Current Facility-Administered Medications: insulin lispro (1 Unit Dial) 0-6 Units, 0-6 Units, Subcutaneous, TID WC  insulin lispro (1 Unit Dial) 0-3 Units, 0-3 Units, Subcutaneous, Nightly  0.9 % sodium chloride infusion, , Intravenous, Continuous  iron sucrose (VENOFER) 300 mg in sodium chloride 0.9 % 250 mL IVPB, 300 mg, Intravenous, Q24H  heparin (porcine) injection 5,000 Units, 5,000 Units, Subcutaneous, 3 times per day  [Held by provider] lactulose (CHRONULAC) 10 GM/15ML solution 20 g, 20 g, Oral, TID  lactobacillus (CULTURELLE) capsule 1 capsule, 1 capsule, Oral, Daily  metoprolol (LOPRESSOR) injection 5 mg, 5 mg, Intravenous, Q5 Min PRN  dilTIAZem (CARDIZEM) tablet 30 mg, 30 mg, Oral, 4 times per day  albuterol (PROVENTIL) nebulizer solution 2.5 mg, 2.5 mg, Nebulization, Q4H PRN  ascorbic acid (VITAMIN C) tablet 1,000 mg, 1,000 mg, Oral, Daily  docusate sodium (COLACE) capsule 100 mg, 100 mg, Oral, Daily  DULoxetine (CYMBALTA) extended release capsule 30 mg, 30 mg, Oral, BID  guaiFENesin (MUCINEX) extended release tablet 600 mg, 600 mg, Oral, BID PRN  HYDROcodone-acetaminophen (NORCO) 5-325 MG per tablet 1 tablet, 1 tablet, Oral, Q6H PRN  insulin glargine (LANTUS;BASAGLAR) injection pen 20 Units, 20 Units, Subcutaneous, Nightly  pantoprazole (PROTONIX) tablet 40 mg, 40 mg, Oral, QAM AC  polyvinyl alcohol (LIQUIFILM TEARS) 1.4 % ophthalmic solution 1 drop, 1 drop, Both Eyes, PRN  polyvinyl alcohol (LIQUIFILM TEARS) 1.4 % ophthalmic solution 1 drop, 1 drop, Both Eyes, BID  vitamin D (CHOLECALCIFEROL) tablet 1,000 Units, 1,000 Units, Oral, Daily  sodium chloride flush 0.9 % injection 10 mL, 10 mL, Intravenous, 2 times per day  sodium chloride flush 0.9 % injection 10 mL, 10 mL, Intravenous, PRN  promethazine (PHENERGAN) tablet 12.5 mg, 12.5 mg, Oral, Q6H PRN **OR** ondansetron (ZOFRAN) injection 4 mg, 4 mg, Intravenous, Q6H PRN  polyethylene glycol (GLYCOLAX) packet 17 g, 17 g, Oral, Daily PRN  acetaminophen (TYLENOL) tablet 650 mg, 650 mg, Oral, Q6H PRN **OR** acetaminophen (TYLENOL) suppository 650 mg, 650 mg, Rectal, Q6H PRN  glucose (GLUTOSE) 40 % oral gel 15 g, 15 g, Oral, PRN  dextrose 50 % IV solution, 12.5 g, Intravenous, PRN  glucagon (rDNA) injection 1 mg, 1 mg, Intramuscular, PRN  dextrose 5 % solution, 100 mL/hr, Intravenous, PRN     Allergies:  Erythromycin and Sulfites    Social History:    Remote smoker  Some EtOH use  Lives at [address] [address] History:   History reviewed. No pertinent family history.      REVIEW OF SYSTEMS:    Unable to obtain due to patient's mental status     PHYSICAL EXAM:      Vitals:    /65   Pulse 101   Temp 97.2 °F (36.2 °C) (Oral)   Resp 18   Ht 5' (1.524 m)   Wt 110 lb 0.2 oz (49.9 kg)   SpO2 90%   BMI 21.48 kg/m²     General: No acute distress, very lethargic  HEENT: PERRL, EOMI, oral mucosa moist and intact, no sclericterus  Neck: no thyromegaly  Lymphatic: no cervical or supraclavicular lymphadenopathy  Heart: rrr  Lungs: CTA bilaterally  Abdomen: soft, NT, no HSM  Extremities: 2+pulses, no edema  Skin: no rashes or lesions  Neuro: does not answer my questions    DATA:    Recent Labs     03/04/21 0335 03/05/21  0549   WBC 27.3* 37.5*   HGB 9.0* 9.7*   HCT 29.0* 33.1*   MCV 65.0* 67.0*   PLT see below 245     Recent Labs     03/04/21 0335 03/05/21  0549 03/06/21  0649   * 133* 134*   K 3.9 4.9 4.1   CL 95* 96* 98*   CO2 26 23 21   PHOS  --  4.3 4.7   BUN 35* 41* 44*   CREATININE 1.3* 1.7* 2.1*     No results for input(s): AST, ALT, ALB, BILIDIR, BILITOT, ALKPHOS in the last 72 hours. No results for input(s): LIPASE, AMYLASE in the last 72 hours. No results for input(s): PROT, INR in the last 72 hours. No results for input(s): PTT in the last 72 hours. No results for input(s): OCCULTBLD in the last 72 hours. Imaging: See HPI  Previous endoscopy: 2012    IMPRESSION/RECOMMENDATIONS:    History of hepatitis C - office notes lead me to believe she was treated with Epclusa and when last checked, hepatitis C RNA was negative  History of cirrhosis  Mental status changes with elevated ammonia - suspect HE  ORI - likely due to aggressive diuresis  CHF  Leukocytosis - being worked up by hematology  Chronic DARRELL    Indication for consult very vague. Phone call to office yesterday stated \" NP wants to know how advanced patient's cirrhosis is. \" By definition, cirrhosis is stage 4 liver disease. While here, will check HCV RNA, Hep A/B status to see if vaccinations are indicated, US, AFP, and Fibrosure. Recommend lactulose titrated to 2-3 loose stools daily and Rifaximin 550mg BID due to lethargy/ HE. Cautious attention to kidney function - BUN/ Cr have risen significantly with diuresis. Thank you for allowing me to participate in [address] [place] [address]. Diallo Perez.  Russel Umana MD

## 2021-03-06 NOTE — PROGRESS NOTES
cavity size is normal. . Overall left ventricular systolic   function appears normal with an ejection fraction of 55-60%. Diastolic   filling parameters suggest grade II diastolic dysfunction. Mild mitral regurgitation is present. The aortic valve non coronary cusp is thickened but the valve opens   adequately. Trivial tricuspid regurgitation. The left atrium is moderately to severely dilated. Estimated pulmonary artery systolic pressure is at 33 mmHg assuming a right   atrial pressure of 3 mmHg. The right ventricle is normal in size and function.     Assessment:  Multifocal atrial tachycardia  HFpEF  HTN  DM  Hep C  Dementia   Change in mental status  ARF    Plan:  -Keep K>4, Mg>2.  -diltiazem on hold because pt cannot swallow   -IV metoprolol 2.5 mg  q 6 hours   -Diuresis on hold d/t rise in BUN/Cr

## 2021-03-07 VITALS
SYSTOLIC BLOOD PRESSURE: 87 MMHG | HEART RATE: 90 BPM | TEMPERATURE: 97.5 F | DIASTOLIC BLOOD PRESSURE: 53 MMHG | RESPIRATION RATE: 20 BRPM | HEIGHT: 60 IN | OXYGEN SATURATION: 100 % | BODY MASS INDEX: 22.33 KG/M2 | WEIGHT: 113.76 LBS

## 2021-03-07 LAB
ALBUMIN SERPL-MCNC: 2.9 G/DL (ref 3.4–5)
AMMONIA: 86 UMOL/L (ref 11–51)
ANION GAP SERPL CALCULATED.3IONS-SCNC: 13 MMOL/L (ref 3–16)
ANISOCYTOSIS: ABNORMAL
ANISOCYTOSIS: ABNORMAL
BANDED NEUTROPHILS RELATIVE PERCENT: 4 % (ref 0–7)
BANDED NEUTROPHILS RELATIVE PERCENT: 9 % (ref 0–7)
BASOPHILS ABSOLUTE: 0 K/UL (ref 0–0.2)
BASOPHILS ABSOLUTE: 0 K/UL (ref 0–0.2)
BASOPHILS RELATIVE PERCENT: 0 %
BASOPHILS RELATIVE PERCENT: 0 %
BLOOD CULTURE, ROUTINE: NORMAL
BUN BLDV-MCNC: 57 MG/DL (ref 7–20)
CALCIUM SERPL-MCNC: 9.5 MG/DL (ref 8.3–10.6)
CHLORIDE BLD-SCNC: 104 MMOL/L (ref 99–110)
CO2: 19 MMOL/L (ref 21–32)
CREAT SERPL-MCNC: 3 MG/DL (ref 0.6–1.2)
EKG ATRIAL RATE: 37 BPM
EKG ATRIAL RATE: 86 BPM
EKG DIAGNOSIS: NORMAL
EKG DIAGNOSIS: NORMAL
EKG P AXIS: 67 DEGREES
EKG P-R INTERVAL: 146 MS
EKG Q-T INTERVAL: 306 MS
EKG Q-T INTERVAL: 390 MS
EKG QRS DURATION: 110 MS
EKG QRS DURATION: 112 MS
EKG QTC CALCULATION (BAZETT): 466 MS
EKG QTC CALCULATION (BAZETT): 511 MS
EKG R AXIS: -68 DEGREES
EKG R AXIS: -68 DEGREES
EKG T AXIS: 107 DEGREES
EKG T AXIS: 97 DEGREES
EKG VENTRICULAR RATE: 168 BPM
EKG VENTRICULAR RATE: 86 BPM
EOSINOPHILS ABSOLUTE: 0 K/UL (ref 0–0.6)
EOSINOPHILS ABSOLUTE: 0 K/UL (ref 0–0.6)
EOSINOPHILS RELATIVE PERCENT: 0 %
EOSINOPHILS RELATIVE PERCENT: 0 %
GFR AFRICAN AMERICAN: 18
GFR NON-AFRICAN AMERICAN: 15
GLUCOSE BLD-MCNC: 154 MG/DL (ref 70–99)
GLUCOSE BLD-MCNC: 184 MG/DL (ref 70–99)
HCT VFR BLD CALC: 27.9 % (ref 36–48)
HCT VFR BLD CALC: 28.8 % (ref 36–48)
HEMOGLOBIN: 8.1 G/DL (ref 12–16)
HEMOGLOBIN: 8.2 G/DL (ref 12–16)
HYPOCHROMIA: ABNORMAL
LYMPHOCYTES ABSOLUTE: 0 K/UL (ref 1–5.1)
LYMPHOCYTES ABSOLUTE: 2.8 K/UL (ref 1–5.1)
LYMPHOCYTES RELATIVE PERCENT: 0 %
LYMPHOCYTES RELATIVE PERCENT: 3 %
MCH RBC QN AUTO: 19.3 PG (ref 26–34)
MCH RBC QN AUTO: 19.8 PG (ref 26–34)
MCHC RBC AUTO-ENTMCNC: 28.3 G/DL (ref 31–36)
MCHC RBC AUTO-ENTMCNC: 29.4 G/DL (ref 31–36)
MCV RBC AUTO: 67.4 FL (ref 80–100)
MCV RBC AUTO: 68.4 FL (ref 80–100)
METAMYELOCYTES RELATIVE PERCENT: 1 %
MONOCYTES ABSOLUTE: 18.8 K/UL (ref 0–1.3)
MONOCYTES ABSOLUTE: 24.2 K/UL (ref 0–1.3)
MONOCYTES RELATIVE PERCENT: 19 %
MONOCYTES RELATIVE PERCENT: 26 %
MYELOCYTE PERCENT: 2 %
NEUTROPHILS ABSOLUTE: 66.2 K/UL (ref 1.7–7.7)
NEUTROPHILS ABSOLUTE: 80.4 K/UL (ref 1.7–7.7)
NEUTROPHILS RELATIVE PERCENT: 61 %
NEUTROPHILS RELATIVE PERCENT: 75 %
PDW BLD-RTO: 15.8 % (ref 12.4–15.4)
PDW BLD-RTO: 16.1 % (ref 12.4–15.4)
PERFORMED ON: ABNORMAL
PHOSPHORUS: 5.2 MG/DL (ref 2.5–4.9)
PLATELET # BLD: 208 K/UL (ref 135–450)
PLATELET # BLD: 208 K/UL (ref 135–450)
PMV BLD AUTO: 8.2 FL (ref 5–10.5)
PMV BLD AUTO: 8.5 FL (ref 5–10.5)
POLYCHROMASIA: ABNORMAL
POLYCHROMASIA: ABNORMAL
POTASSIUM SERPL-SCNC: 4.8 MMOL/L (ref 3.5–5.1)
RBC # BLD: 4.14 M/UL (ref 4–5.2)
RBC # BLD: 4.21 M/UL (ref 4–5.2)
SODIUM BLD-SCNC: 136 MMOL/L (ref 136–145)
WBC # BLD: 93.2 K/UL (ref 4–11)
WBC # BLD: 99.2 K/UL (ref 4–11)

## 2021-03-07 PROCEDURE — 2580000003 HC RX 258: Performed by: INTERNAL MEDICINE

## 2021-03-07 PROCEDURE — 6370000000 HC RX 637 (ALT 250 FOR IP): Performed by: STUDENT IN AN ORGANIZED HEALTH CARE EDUCATION/TRAINING PROGRAM

## 2021-03-07 PROCEDURE — 2580000003 HC RX 258: Performed by: STUDENT IN AN ORGANIZED HEALTH CARE EDUCATION/TRAINING PROGRAM

## 2021-03-07 PROCEDURE — 6360000002 HC RX W HCPCS: Performed by: STUDENT IN AN ORGANIZED HEALTH CARE EDUCATION/TRAINING PROGRAM

## 2021-03-07 PROCEDURE — 93005 ELECTROCARDIOGRAM TRACING: CPT | Performed by: STUDENT IN AN ORGANIZED HEALTH CARE EDUCATION/TRAINING PROGRAM

## 2021-03-07 PROCEDURE — 36415 COLL VENOUS BLD VENIPUNCTURE: CPT

## 2021-03-07 PROCEDURE — 82140 ASSAY OF AMMONIA: CPT

## 2021-03-07 PROCEDURE — C9113 INJ PANTOPRAZOLE SODIUM, VIA: HCPCS | Performed by: STUDENT IN AN ORGANIZED HEALTH CARE EDUCATION/TRAINING PROGRAM

## 2021-03-07 PROCEDURE — 99238 HOSP IP/OBS DSCHRG MGMT 30/<: CPT | Performed by: INTERNAL MEDICINE

## 2021-03-07 PROCEDURE — 99232 SBSQ HOSP IP/OBS MODERATE 35: CPT | Performed by: NURSE PRACTITIONER

## 2021-03-07 PROCEDURE — 85025 COMPLETE CBC W/AUTO DIFF WBC: CPT

## 2021-03-07 PROCEDURE — 2500000003 HC RX 250 WO HCPCS: Performed by: NURSE PRACTITIONER

## 2021-03-07 PROCEDURE — 6360000002 HC RX W HCPCS: Performed by: INTERNAL MEDICINE

## 2021-03-07 PROCEDURE — 93010 ELECTROCARDIOGRAM REPORT: CPT | Performed by: INTERNAL MEDICINE

## 2021-03-07 PROCEDURE — 80069 RENAL FUNCTION PANEL: CPT

## 2021-03-07 PROCEDURE — 6360000002 HC RX W HCPCS

## 2021-03-07 RX ORDER — LORAZEPAM 2 MG/ML
0.5 INJECTION INTRAMUSCULAR PRN
Status: DISCONTINUED | OUTPATIENT
Start: 2021-03-07 | End: 2021-03-07 | Stop reason: HOSPADM

## 2021-03-07 RX ORDER — SODIUM CHLORIDE 9 MG/ML
INJECTION, SOLUTION INTRAVENOUS CONTINUOUS
Status: ACTIVE | OUTPATIENT
Start: 2021-03-07 | End: 2021-03-07

## 2021-03-07 RX ORDER — BISACODYL 10 MG
10 SUPPOSITORY, RECTAL RECTAL DAILY
Status: DISCONTINUED | OUTPATIENT
Start: 2021-03-07 | End: 2021-03-07 | Stop reason: HOSPADM

## 2021-03-07 RX ORDER — MIDAZOLAM HYDROCHLORIDE 1 MG/ML
INJECTION INTRAMUSCULAR; INTRAVENOUS
Status: COMPLETED
Start: 2021-03-07 | End: 2021-03-07

## 2021-03-07 RX ORDER — SODIUM CHLORIDE 9 MG/ML
INJECTION, SOLUTION INTRAVENOUS CONTINUOUS
Status: DISCONTINUED | OUTPATIENT
Start: 2021-03-07 | End: 2021-03-07 | Stop reason: HOSPADM

## 2021-03-07 RX ORDER — ADENOSINE 3 MG/ML
6 INJECTION, SOLUTION INTRAVENOUS ONCE
Status: COMPLETED | OUTPATIENT
Start: 2021-03-07 | End: 2021-03-07

## 2021-03-07 RX ORDER — MIDAZOLAM HYDROCHLORIDE 1 MG/ML
2 INJECTION INTRAMUSCULAR; INTRAVENOUS ONCE
Status: COMPLETED | OUTPATIENT
Start: 2021-03-07 | End: 2021-03-07

## 2021-03-07 RX ORDER — ADENOSINE 3 MG/ML
12 INJECTION, SOLUTION INTRAVENOUS ONCE
Status: COMPLETED | OUTPATIENT
Start: 2021-03-07 | End: 2021-03-07

## 2021-03-07 RX ADMIN — HEPARIN SODIUM 5000 UNITS: 5000 INJECTION INTRAVENOUS; SUBCUTANEOUS at 13:47

## 2021-03-07 RX ADMIN — Medication 10 ML: at 09:55

## 2021-03-07 RX ADMIN — HEPARIN SODIUM 5000 UNITS: 5000 INJECTION INTRAVENOUS; SUBCUTANEOUS at 05:46

## 2021-03-07 RX ADMIN — HYDROMORPHONE HYDROCHLORIDE 0.5 MG: 1 INJECTION, SOLUTION INTRAMUSCULAR; INTRAVENOUS; SUBCUTANEOUS at 11:30

## 2021-03-07 RX ADMIN — ADENOSINE 6 MG: 3 INJECTION, SOLUTION INTRAVENOUS at 04:22

## 2021-03-07 RX ADMIN — MIDAZOLAM 2 MG: 1 INJECTION INTRAMUSCULAR; INTRAVENOUS at 04:21

## 2021-03-07 RX ADMIN — ADENOSINE 12 MG: 3 INJECTION, SOLUTION INTRAVENOUS at 04:25

## 2021-03-07 RX ADMIN — POLYVINYL ALCOHOL 1 DROP: 14 SOLUTION/ DROPS OPHTHALMIC at 09:35

## 2021-03-07 RX ADMIN — METOPROLOL TARTRATE 2.5 MG: 5 INJECTION INTRAVENOUS at 11:06

## 2021-03-07 RX ADMIN — PIPERACILLIN AND TAZOBACTAM 3375 MG: 3; .375 INJECTION, POWDER, LYOPHILIZED, FOR SOLUTION INTRAVENOUS at 09:53

## 2021-03-07 RX ADMIN — MIDAZOLAM 2 MG: 1 INJECTION INTRAMUSCULAR; INTRAVENOUS at 04:11

## 2021-03-07 RX ADMIN — BISACODYL 10 MG: 10 SUPPOSITORY RECTAL at 09:53

## 2021-03-07 RX ADMIN — HYDROMORPHONE HYDROCHLORIDE 0.5 MG: 1 INJECTION, SOLUTION INTRAMUSCULAR; INTRAVENOUS; SUBCUTANEOUS at 15:27

## 2021-03-07 RX ADMIN — INSULIN LISPRO 1 UNITS: 100 INJECTION, SOLUTION INTRAVENOUS; SUBCUTANEOUS at 09:37

## 2021-03-07 RX ADMIN — SODIUM CHLORIDE: 9 INJECTION, SOLUTION INTRAVENOUS at 09:35

## 2021-03-07 RX ADMIN — PANTOPRAZOLE SODIUM 40 MG: 40 INJECTION, POWDER, FOR SOLUTION INTRAVENOUS at 09:58

## 2021-03-07 RX ADMIN — LORAZEPAM 0.5 MG: 2 INJECTION INTRAMUSCULAR; INTRAVENOUS at 16:46

## 2021-03-07 RX ADMIN — IRON SUCROSE 300 MG: 20 INJECTION, SOLUTION INTRAVENOUS at 09:42

## 2021-03-07 RX ADMIN — ADENOSINE 6 MG: 3 INJECTION, SOLUTION INTRAVENOUS at 04:13

## 2021-03-07 ASSESSMENT — PAIN SCALES - PAIN ASSESSMENT IN ADVANCED DEMENTIA (PAINAD)
TOTALSCORE: 0
BREATHING: 0
FACIALEXPRESSION: 0
CONSOLABILITY: 0
BODYLANGUAGE: 0

## 2021-03-07 ASSESSMENT — PAIN SCALES - GENERAL
PAINLEVEL_OUTOF10: 0
PAINLEVEL_OUTOF10: 5
PAINLEVEL_OUTOF10: 0

## 2021-03-07 NOTE — PLAN OF CARE
Problem: Pain:  Goal: Pain level will decrease  Description: Pain level will decrease  Outcome: Ongoing  Note: Patient complains of pain at level 5/10. Patient describes pain as ache. Patient requests pain medication. Patient medicated with dilaudid . Will continue to monitor.

## 2021-03-07 NOTE — CARE COORDINATION
Case Management Assessment            Discharge Note                    Date / Time of Note: 3/7/2021 2:25 PM                  Discharge Note Completed by: Nolvia Bains    Patient Name: Fani Yap   YOB: 1940  Diagnosis: Heart failure exacerbated by sotalol [facility]) [I50.9]   Date / Time: 3/3/2021  6:12 PM    Current PCP: Laina Vargas MD  Clinic patient: No    Hospitalization in the last 30 days: No    Advance Directives:  Code Status: [facility] DNR form completed and on chart: Yes    Financial:  Payor: Gwen Bumpers / Plan: [facility] / Product Type: *No Product type* /      Pharmacy:    [address] [place] [address] Vimal Shelbie [phone]  [facility] [address]  Phone: [phone] Fax: [phone]    [address] [address] [phone] - F [phone]  [address] [facility] [address]  Phone: [phone] Fax: [phone]    Independent Rx, [address] [phone] Vimal Morfin [phone]  [facility] [address]  Phone: [phone] Fax: [phone]      Assistance purchasing medications?:    Assistance provided by Case Management: None at this time    Does patient want to participate in local refill/ meds to beds program?:      Meds To Beds General Rules:  1. Can ONLY be done Monday- Friday between 8:30am-5pm  2. Prescription(s) must be in pharmacy by 3pm to be filled same day  3. Copy of patient's insurance/ prescription drug card and patient face sheet must be sent along with the prescription(s)  4. Cost of Rx cannot be added to hospital bill. If financial assistance is needed, please contact unit  or ;  or  CANNOT provide pharmacy voucher for patients co-pays  5.  Patients can then  the prescription on their way out of the hospital at discharge, or pharmacy can deliver to the bedside if staff is available. (payment due at time of pick-up or delivery - cash, check, or card accepted)     Able to afford home medications/ co-pay costs: Yes    ADLS:  Current PT AM-PAC Score:   /24  Current OT AM-PAC Score:   /24      DISCHARGE Disposition: Inpatient Hospice Unit: [address] [place] [address]     LOC at discharge: Not Applicable  ELLE Completed: Not Indicated    Notification completed in HENS/PAS?:  Not Applicable    IMM Completed:   Not Indicated    Transportation:  Transportation PLAN for discharge: EMS transportation   Mode of Transport: Ambulance stretcher - BLS  Reason for medical transport: Other: Hospice  Name of [address] [place] [address],[address]: [facility]  Phone: [phone]  Time of Transport: 4p    Transport form completed: Yes      Hospice Services:  Location: Inpatient Unit  Agency: [facility]  Phone: [phone]    Consents signed: Yes       Additional CM Notes: Pt will DC to [facility]. Family aware, of DC today at 4pm via [facility]. The Plan for Transition of Care is related to the following treatment goals of Heart failure exacerbated by sotalol [facility]) [I50.9]    The Patient and/or patient representative Cinthia Leung and her family were provided with a choice of provider and agrees with the discharge plan Yes    Freedom of choice list was provided with basic dialogue that supports the patient's individualized plan of care/goals and shares the quality data associated with the providers.  Yes    Care Transitions patient: No    Ashley Pastrana RN  [facility]  Case Management Department  Ph: [phone]  Fax: [phone]

## 2021-03-07 NOTE — PLAN OF CARE
Problem: Skin Integrity:  Goal: Will show no infection signs and symptoms  Description: Will show no infection signs and symptoms  Outcome: Ongoing  Note: No signs/symptoms of skin infection. Problem: Skin Integrity:  Goal: Absence of new skin breakdown  Description: Absence of new skin breakdown  Outcome: Ongoing  Note: No new skin breakdown noted. Heels elevated over pillows to prevent breakdown. Repositioning pt every 2 hours for comfort and prevention. Problem: Falls - Risk of:  Goal: Will remain free from falls  Description: Will remain free from falls  Outcome: Ongoing  Note: Pt free of falls or injury. Avas[employer] camera in room, bed alarm activated, monitoring closely. Problem: Pain:  Goal: Pain level will decrease  Description: Pain level will decrease  Outcome: Ongoing  Note: Pt in pain at the beginning of night shift in her lower back, agitated, squirming, and groans with repositioning. Pain now under control with Lidocaine patch and prn dose of Vicodin. Pt is resting comfortably.

## 2021-03-07 NOTE — PROGRESS NOTES
CC:  HPI:  Pradeep Orantes is a 79 y.o. female with a past medical history of Y2MX, HTN, diastolic HF, hep C, dementia, depression, rheumatoid arthritis who presented to the hospital with abnormal labs. The patient has no specific complaints but doesn't feel well. She had pulmonary infiltrates suggestive of edema on CXR and pBNP elevated above baseline. Was tachypneic and HR in 110-150s on tele. EKG showed irregular rhythm with p waves changes suggestive of multifocal atrial tachycardia. Was given dilt 10mg bolus today and HR now in 90s. S: Lethargic and moaning. Apparently pt had rapid response overnight after she developed sustained SVTand hypotension. She converted to sinus after adenosine. BP soft but stable. She is tachypneic. Surgery consulted possible illus or bowel  obstruction. Medicine team has had discussed with family regarding code status. Tele: sinus, SVT    O:  Physical Exam:  BP 94/60   Pulse 93   Temp 97.8 °F (36.6 °C) (Axillary)   Resp (S) (!) 32   Ht 5' (1.524 m)   Wt 113 lb 12.1 oz (51.6 kg)   SpO2 100%   BMI 22.22 kg/m²    General (appearance):  No acute distress  Eyes: anicteric   Neck: soft, No JVD  Ears/Nose/Mouth/Thorat: No cyanosis  CV: Reg, tachy   Respiratory:  Normal effort  GI: distended. Skin: Warm, dry. No rashes  Neuro/Psych: Lethargic,. Moaning. Ext:  No c/c.  No edema   Pulses:  2+ radial     I.O's=     Weight  Admission: Weight: 110 lb 0.2 oz (49.9 kg)   Today: Weight: 113 lb 12.1 oz (51.6 kg)    CBC:   Recent Labs     03/06/21  0936 03/06/21  1247 03/07/21  0658   .2* 125.9* 99.2*   HGB 9.3* 8.6* 8.1*   HCT 32.7* 31.0* 28.8*   MCV 67.1* 67.8* 68.4*    246 208     BMP:   Recent Labs     03/05/21  0549 03/06/21  0649 03/07/21  0658   * 134* 136   K 4.9 4.1 4.8   CL 96* 98* 104   CO2 23 21 19*   PHOS 4.3 4.7 5.2*   BUN 41* 44* 57*   CREATININE 1.7* 2.1* 3.0*     Mag:   Lab Results   Component Value Date    MG 2.20 03/06/2021     Pro-BNP: Lab Results   Component Value Date    PROBNP 40,674 03/06/2021    PROBNP 16,753 03/03/2021    PROBNP 1,097 09/10/2019         Imaging:    3/5/2021 CXR:     Slightly increased pulmonary edema. 3/5/2021 CT head:     No acute intracranial abnormality or significant mass effect. 9/2019 Echo:   Left ventricular cavity size is normal. . Overall left ventricular systolic   function appears normal with an ejection fraction of 55-60%. Diastolic   filling parameters suggest grade II diastolic dysfunction. Mild mitral regurgitation is present. The aortic valve non coronary cusp is thickened but the valve opens   adequately. Trivial tricuspid regurgitation. The left atrium is moderately to severely dilated. Estimated pulmonary artery systolic pressure is at 33 mmHg assuming a right   atrial pressure of 3 mmHg. The right ventricle is normal in size and function.     Assessment:  Multifocal atrial tachycardia  HFpEF  HTN  DM  Hep C  Dementia   Change in mental status  ARF    Plan:  -Keep K>4, Mg>2.  -Metoprolol IV  -Avoiding CCB d/t soft BP and concern for bowel obstruction  Code status and plan of care being discussed with medicine team and family

## 2021-03-07 NOTE — PROGRESS NOTES
Pt given an additional dose of adenosine 12mg, and this dose worked, brought her down to 67 bpm initially and then into the 80's sustained.

## 2021-03-07 NOTE — PROGRESS NOTES
Pt having an ongoing episode of SVT with HR in the 160-170's. Unable to give Prn Metoprolol d/t SBP in the 70's. Resident paged. Came straight to room and is assessing the pt.

## 2021-03-07 NOTE — PROGRESS NOTES
[facility]    Met with family to discuss hospice philosophy and services. They are agreeable to hospice and plan is:[facility] [address] set up with [address] [facility] at 4pm  Recommendations:    Please have MD place discharge order and complete the discharge task per hospital procedure      Updated staff nurse on plan. Notified FRANNIE Wu  Thank you for the opportunity to serve this patient and family.

## 2021-03-07 NOTE — PROGRESS NOTES
Another dose of adenosine 6 mg given in new 20g IV, with no result. Resident now ordering a dose of 12 mg.

## 2021-03-07 NOTE — DISCHARGE INSTR - COC
Continuity of Care Form    Patient Name: Marylin Blanton   :  1940  MRN:  [id number]    Admit date:  3/3/2021  Discharge date:  ***    Code Status Order: DNR-CC   Advance Directives:     Admitting Physician:  Breanna Flores MD  PCP: Breanna Flores MD    Discharging Nurse: [facility] Unit/Room#: 7555/2356-76  Discharging Unit Phone Number: ***    Emergency Contact:   Extended Emergency Contact Information  Primary Emergency Contact: Gilmer Nephew of [address] [place] [address] Phone: [phone]  Relation: Child  Secondary Emergency Contact: AureliaJavy  Address: [place] [address] of [facility] Phone: [phone]  Mobile Phone: [phone]  Relation: Child    Past Surgical History:  Past Surgical History:   Procedure Laterality Date    COLONOSCOPY      COSMETIC SURGERY      tummy tuck,face lift,mammoplasties- hepatitis blood transfusion complication    EYE SURGERY Bilateral     cataract    HUMERUS FRACTURE SURGERY Right 2020    RIGHT PROXIMAL HUMERUS AND SHAFT FRACTURE OPEN REDUCTION INTERNAL FIXATION performed by Natacha Miranda MD at [address]       Immunization History:   Immunization History   Administered Date(s) Administered    Influenza Virus Vaccine 2008    Influenza Whole 2008    Influenza, High Dose (Fluzone 65 yrs and older) 2011, 2018, 10/23/2019    Pneumococcal Conjugate 13-valent ([id number]) 2015    Pneumococcal Conjugate 7-valent (Prevnar7) 2006    Pneumococcal Conjugate Vaccine 2015    Pneumococcal Polysaccharide (Anaercywt61) 2006, 2009, 2020       Active Problems:  Patient Active Problem List   Diagnosis Code    Rheumatoid arthritis ([facility] M06.9    Diabetes mellitus type 2 in nonobese ([address] E11.9    Chronic persistent hepatitis ([facility] [address] K73.0    Depressive disorder, not elsewhere classified F32.9    Attention deficit disorder F98.8    DNR (do not resuscitate) discussion Z71.89    Hep C w/o coma, chronic ([facility]) B18.2    Spinal stenosis M48.00    Carotid artery disease ([facility]) I77.9    Weakness R53.1    Oropharyngeal dysphagia R13.12    Encounter for palliative care Z51.5    Dementia with behavioral disturbance ([facility] [address] [facility] F03.91    Delirium due to another medical condition F05    Hyponatremia E87.1    Essential hypertension I10    Slow transit constipation K59.01    Advance care planning Z71.89    Edema R60.9    Fracture T14. 8XXA    Closed fracture of right proximal humerus S42.201A    Acute pain of right shoulder M25.511    Goals of care, counseling/discussion Z71.89    DNR (do not resuscitate) Z66    Dry eyes H04.123    Primary insomnia F51.01    PAD (peripheral artery disease) ([facility]) I73.9    Multiple closed fractures of ribs of right side S22.41XA    Hx of interstitial lung disease Z87.09    Chronic diastolic congestive heart failure ([facility]) I50.32    Chronic obstructive pulmonary disease ([facility] [address] [facility] J44.9    Fall at home, sequela W19. Summer Cobos, Y92.009    Generalized pain R52    New onset atrial fibrillation ([facility]) I48.91    Closed physeal fracture of lower end of humerus S49.109A    Closed displaced comminuted fracture of shaft of right humerus S42.351A    Acute on chronic congestive heart failure ([facility]) I50.9    Multifocal atrial tachycardia ([facility]) I47.1    Acute on chronic heart failure with preserved ejection fraction ([facility]) I50.33       Isolation/Infection:   Isolation          No Isolation        Patient Infection Status     Infection Onset Added Last Indicated Last Indicated By Review Planned Expiration Resolved Resolved By    None active    Resolved    COVID-19 Rule Out 03/03/21 03/03/21 03/03/21 COVID-19 (Ordered)   03/04/21 Rule-Out Test Resulted    COVID-19 Rule Out 08/10/20 08/10/20 08/11/20 COVID-19 (Ordered)   08/11/20 Rule-Out Test Resulted    C-diff Rule Out  12/10/19 12/11/19 Clostridium difficile toxin/antigen (Ordered)   08/11/20 [place]ñito personal belongings (please select all that are sent with patient):  None    RN SIGNATURE:  Electronically signed by Ana Paula Vega RN on 3/7/21 at 4:02 PM EST    CASE MANAGEMENT/SOCIAL WORK SECTION    Inpatient Status Date: ***    Readmission Risk Assessment Score:  Readmission Risk              Risk of Unplanned Readmission:        32           Discharging to Facility/ Agency   · Name:   · Address:  · Phone:  · Fax:    Dialysis Facility (if applicable)   · Name:  · Address:  · Dialysis Schedule:  · Phone:  · Fax:    / signature: {Esignature:[id number]}    PHYSICIAN SECTION    Prognosis: {Prognosis:4840453439}    Condition at Discharge: Estelita Tavarez Jani Patient Condition:[id number]}    Rehab Potential (if transferring to Rehab): {Prognosis:2585212042}    Recommended Labs or Other Treatments After Discharge: ***    Physician Certification: I certify the above information and transfer of Roosevelt Bustamante  is necessary for the continuing treatment of the diagnosis listed and that she requires {Admit to Appropriate Level of Care:36456} for {GREATER/LESS:442545304} 30 days.      Update Admission H&P: {CHP DME Changes in PNRPL:402305359}    PHYSICIAN SIGNATURE:  {Esignature:[id number]}

## 2021-03-07 NOTE — PROGRESS NOTES
Noted that vein actually is blown at IV site where adenosine was given. Will attempt restart. Other site is merely a 24 gauge, and is infusing bolus fluids per resident order.

## 2021-03-07 NOTE — PROGRESS NOTES
Internal Medicine Progress Note    Admit Date: 3/3/2021    Diet: Diet NPO Effective Now    CC: Abnormal Labs    Interval history:   Overnight HR of 165 and SBP in 70s. at this time confirmed patient was in SVT. IV obtained on right arm. Versed 2mg given with appropriate reaction. Adenosine 6mg given without effect, adenosine 12mg given and patient cardioverted back to NSR with a rate in the 80s and sustained. EKG obtained and patient was in NSR, BP improved to systolic 028W. Yesterday her lactulose was held and patient was started on zosyn. I am not sure why as there in no documenation of it although I assume that the zosyn was for suspecte infection andlactulose due to increased bowel movements. This AM her ammonia increased from 63 to 86. I will restart lactulose, pt already recivin rifaximin. WBC count down to 99 from 125.9 after two days of zosyn. BC pending. Pt's creat is elevated as well this am to 3 with baseline being 1. BUN also elevated to 57. Pt has been getting NS at 50 ml/hr for he past 2 days. Her Pro-Bnp is also up to 40,000 from 16,000 a few days ago. Echo Left ventricular cavity size is normal. Normal left ventricular wall thickness. Overall left ventricular systolic function appears moderately reduced with an ejection fraction of 30-35%. There is moderate diffuse hypokinesis. Diastolic filling parameters suggest grade III diastolic   dysfunction. Mild to moderate mitral regurgitation is present. The aortic valve non coronary cusp is thickened /calcified but the valve open adequately. Mild tricuspid regurgitation. Estimated pulmonary artery systolic pressure is at 36 mmHg assuming a right   atrial pressure of 8 mmHg. The left atrium is severely dilated. The right atrium is mildly dilated. IVC size is normal (<2.1 cm) but collapses < 50% with respiration consistent with elevated RA pressure (8 mmHg).  After discussion with family it was decided that due to he patient's severe  liver disease and severe heart disease it would be in the patient's best interest for her to go into hospice care. Medications:     Scheduled Meds:   bisacodyl  10 mg Rectal Daily    metoprolol  2.5 mg Intravenous Q6H    QUEtiapine  12.5 mg Oral Nightly    pantoprazole  40 mg Intravenous Daily    lidocaine  1 patch Transdermal Daily    insulin lispro  0-6 Units Subcutaneous TID WC    insulin lispro  0-3 Units Subcutaneous Nightly    heparin (porcine)  5,000 Units Subcutaneous 3 times per day    DULoxetine  30 mg Oral BID    insulin glargine  20 Units Subcutaneous Nightly    polyvinyl alcohol  1 drop Both Eyes BID    sodium chloride flush  10 mL Intravenous 2 times per day     Continuous Infusions:   sodium chloride 100 mL/hr at 03/07/21 0935    sodium chloride      dextrose       PRN Meds:HYDROmorphone, metoprolol, albuterol, guaiFENesin, polyvinyl alcohol, sodium chloride flush, promethazine **OR** ondansetron, polyethylene glycol, acetaminophen **OR** acetaminophen, glucose, dextrose, glucagon (rDNA), dextrose    Objective:   Vitals:   T-max:  Patient Vitals for the past 8 hrs:   BP Temp Temp src Pulse Resp SpO2 Weight   03/07/21 0930 94/60 -- -- 93 -- 100 % --   03/07/21 0907 110/69 -- -- 94 (!) 32 98 % --   03/07/21 0759 (!) 89/52 97.8 °F (36.6 °C) Axillary 93 (!) 32 97 % --   03/07/21 0605 95/60 -- -- 89 -- -- --   03/07/21 0447 -- -- -- -- -- -- 113 lb 12.1 oz (51.6 kg)   03/07/21 0430 (!) 99/44 97.6 °F (36.4 °C) Axillary 87 18 97 % --   03/07/21 0311 (!) 73/45 97.5 °F (36.4 °C) Axillary -- -- 96 % --       Intake/Output Summary (Last 24 hours) at 3/7/2021 1018  Last data filed at 3/7/2021 0086  Gross per 24 hour   Intake 4916 ml   Output 100 ml   Net 4816 ml       Physical Exam  Constitutional:       Appearance: She is ill-appearing. HENT:      Head: Normocephalic and atraumatic. Eyes:      General:         Right eye: No discharge. Extraocular Movements: Extraocular movements intact. Conjunctiva/sclera: Conjunctivae normal.      Pupils: Pupils are equal, round, and reactive to light. Neck:      Musculoskeletal: Normal range of motion and neck supple. Cardiovascular:      Rate and Rhythm: Normal rate and regular rhythm. Pulses: Normal pulses. Heart sounds: Normal heart sounds. No murmur. Pulmonary:      Effort: Pulmonary effort is normal. No respiratory distress. Breath sounds: Normal breath sounds. No wheezing. Abdominal:      General: Abdomen is flat. Bowel sounds are normal. There is no distension. Palpations: Abdomen is soft. Musculoskeletal: Normal range of motion. Right lower leg: No edema. Left lower leg: No edema. Skin:     General: Skin is warm and dry. Coloration: Skin is not jaundiced or pale. Neurological:      General: No focal deficit present. Mental Status: She is alert. Comments: Oriented to persona and place but still appears somnolent. Psychiatric:      Comments: Austin waxes and wanes and has underlying dementia           LABS:    CBC:   Recent Labs     03/06/21  0936 03/06/21  1247 03/07/21  0658   .2* 125.9* 99.2*   HGB 9.3* 8.6* 8.1*   HCT 32.7* 31.0* 28.8*    246 208   MCV 67.1* 67.8* 68.4*     Renal:    Recent Labs     03/05/21  0549 03/05/21  1151 03/05/21  1152 03/06/21  0649 03/06/21  1821 03/07/21  0658   *  --   --  134*  --  136   K 4.9  --   --  4.1  --  4.8   CL 96*  --   --  98*  --  104   CO2 23  --   --  21  --  19*   BUN 41*  --   --  44*  --  57*   CREATININE 1.7*  --   --  2.1*  --  3.0*   GLUCOSE 223*  --   --  144*  --  184*   CALCIUM 10.4  --   --  10.2  --  9.5   MG  --  2.50* 2.50*  --  2.20  --    PHOS 4.3  --   --  4.7  --  5.2*   ANIONGAP 14  --   --  15  --  13     Hepatic:   Recent Labs     03/05/21  0549 03/06/21  0649 03/07/21  0658   LABALBU 3.3* 3.3* 2.9*     Troponin: No results for input(s): TROPONINI in the last 72 hours.   BNP: No results for input(s): BNP in the last 72 hours. Lipids: No results for input(s): CHOL, HDL in the last 72 hours. Invalid input(s): LDLCALCU, TRIGLYCERIDE  ABGs:    Recent Labs     03/05/21  0935   PHART 7.418   YWU7WNF 42.1   PO2ART 111.0*   QKN5BWK 27   BEART 2.4   I4PXBZQF 99   SRM2BOU 29       INR: No results for input(s): INR in the last 72 hours. Lactate: No results for input(s): LACTATE in the last 72 hours. Cultures:  -----------------------------------------------------------------  RAD:   XR ABDOMEN (KUB) (SINGLE AP VIEW)   Final Result   1. There is gaseous distention of large and small bowel which may reflect incomplete obstruction or colonic ileus. 2. Scoliosis of the spine and demineralization with no acute change      XR CHEST PORTABLE   Final Result      1. Prominent interstitial markings and basilar airspace disease, similar compared to the previous exam   2. Stable mild cardiac enlargement tortuous aorta   3. Previously fixated proximal right humeral fracture again noted with hardware               CT HEAD WO CONTRAST   Final Result      No acute intracranial abnormality or significant mass effect. XR CHEST PORTABLE   Final Result      Slightly increased pulmonary edema. XR CHEST (2 VW)   Final Result      Small bilateral pleural effusions right greater than left with associated prominent interstitial opacities throughout both lungs favored to be volume overload/interstitial edema. US ABDOMEN COMPLETE    (Results Pending)         Assessment/Plan:   Patient is an 65-year-old female with past medical history of diastolic heart failure, T7WG, HTN who was admitted for acute on chronic heart failure exacerbation     Problem  #Acute on chronic diastolic heart failure exacerbation. Echo with now EF 30-35% with GD3DD  Likely secondary to high salt intake  Per nursing staff, patient continues to remain a little short of breath. Patient had a significantly elevated BNP.   Other labs were also deranged including an elevated creatinine of 1.4. Examination supports fluid overload in the lungs. Chest x-ray supports picture of CHF exacerbation  -40 lasix PO for 1 dose and with 1.3 L of urine output  -Daily weights  -Strict I's and O's  -Low-sodium diet    #Acute metabolic Encephalopathy: More responsive today compared to previous morning on 3/5. Did receive dose of ativan overnight for agitation and screaming. -CT head wo con negative  -Low suspicion for meningitis since no signs of infection  -Ammonia elevated -GI consulted appreciate recommendations.  -Please try to avoid further sedating medications overnight.  -Seroquel ordered nightly as long as patient not already somnolent     #ORI Cr increased overnight with fluids, as she appeared dry yesterday.   -Fluids held as patient is eating   -Will give IVF  -Avoid nephrotoxic agents     #Chronic Leukocytosis. Steadily increasing now 117 today . WBC from nursing home 29.8, absolute neutrophils elevated at 55.1. No evidence of infection. Prior records show elevated WBC, on last admission WBC was 27.6. UA negative for infections. Negative ELIZABETH-2 mutation (07/29/2020). Most likely 2/2 to inflammation: hx of chronic hep C and RA.  Concern for CML   -Daily CBC  -Heme Jason Clamp recs appreciated     #[id number]  Patient on Basaglar 30 units nightly + sliding scale   -Last HgbA1c = 8.1 on 7/20  -Hgb A1c pending  -2/3 Home Basaglar dose = 20 units  -POCT glucose AC/HS     #HTN  BP on admission soft 91/58 now stable  -Continue to monitor BP while inpatient     #Hep C cirrhosis  -Per notes, currently stable followed by GI     #Depression  -Continue duloxetine 30 mg twice daily     #Microcytic anemia  Per nursing home staff, Hgb 9.2, MCV 66.5  -Continue iron 325 mg daily     This patient will be staffed and discussed with Garo Cotton MD.      Code Status: DNR CC  FEN: Low Na diet  PPX: heparin  DISPO: GMF, will go hospice       Bret Gray, PGY-1  03/07/21  10:18 AM    This patient has been staffed and discussed with Reji Rodríguez MD.

## 2021-03-07 NOTE — PROGRESS NOTES
Notified pt's daughter, Kale Langford, that pt has left with transporters and that she received dilaudid for pain and ativan for anxiety. Pt has 2 working iv's in the left arm. Hair was washed, viky care, new gown and oral care performed- pt appears relaxed. Pt is not oriented. Family had picked up her belongings earlier today.

## 2021-03-07 NOTE — CARE COORDINATION
CM following, new Hospice order noted, spoke with dtr Kandi Talbot they are active with [facility] pal care the dtr would like to use them for hospice. Call made to [facility] spoke with Maira they will call dtr and send nurse to eval for DC plan.   Electronically signed by Alan Everett RN on 3/7/2021 at 10:49 AM  [phone]

## 2021-03-07 NOTE — PROGRESS NOTES
Patient with progressive mulotiorgan failure and possible acute abd and sepsis, prognosis grave agree with hospice and pain contorl.

## 2021-03-07 NOTE — PROGRESS NOTES
Events of last 24 hours noted  Patient has progressive multiorgan failure  Planning for hospice  Will sign off.  Please call us back, if needed

## 2021-03-08 LAB
CULTURE, BLOOD 2: NORMAL
HAV AB SERPL IA-ACNC: NEGATIVE

## 2021-03-09 LAB — AFP: 1.3 UG/L

## 2021-03-10 ENCOUNTER — TELEPHONE (OUTPATIENT)
Dept: INTERNAL MEDICINE CLINIC | Age: 81
End: 2021-03-10

## 2021-03-10 LAB
BLOOD CULTURE, ROUTINE: NORMAL
CULTURE, BLOOD 2: NORMAL
HCV QNT BY NAAT IU/ML: NOT DETECTED IU/ML
HCV QNT BY NAAT LOG IU/ML: NOT DETECTED LOG IU/ML
INTERPRETATION: NOT DETECTED

## 2021-03-12 NOTE — PROGRESS NOTES
Physician Progress Note      PATIENTTera Profit  [facility]:                  [id number]  :                       1940  ADMIT DATE:       3/3/2021 6:12 PM  [address] [place] DATE:        3/7/2021 5:00 PM  RESPONDING  PROVIDER #:        Jack Sneed MD          QUERY TEXT:    Patient admitted with AoC Diastolic  CHF. Noted documentation of sepsis in   d/c summary. If possible, please document in progress notes and discharge   summary the source of sepsis:    The medical record reflects the following:  Risk Factors: 78 y/o with  Clinical Indicators: H&P: #Chronic Leukocytosis  WBC from nursing home 29.8, absolute neutrophils elevated at 55.1. No evidence   of infection. Prior records show elevated WBC, on last admission WBC was   27.6. UA negative for infections. Negative ELIZABETH-2 mutation (2020). Most   likely 2/2 to inflammation: hx of chronic hep C and RA. Per d/c summary: \"She   subsequently developed SVT requiring multiple rounds of adenosine. Her   clinical course was further complicated by sepsis. She subsequently developed   multi organ failure and her prognosis seemed grave after which the family   decided to switch her to hospice care. \"   3/5 CXR:    Treatment: IVF Bolus, Zosyn, Blood cultures, Hospice, lab monitoring  Options provided:  -- Severe Sepsis, present on admission, due to, Please document source. -- Severe Sepsis, not present on admission due to, Please document source.   -- Other - I will add my own diagnosis  -- Disagree - Not applicable / Not valid  -- Disagree - Clinically unable to determine / Unknown  -- Refer to Clinical Documentation Reviewer    PROVIDER RESPONSE TEXT:    This patient has sepsis which was present on admission due to Hypotension    Query created by: Little Howard on 3/12/2021 6:26 AM      Electronically signed by:  Jack Sneed MD 3/12/2021 2:35 PM

## 2021-03-19 LAB
BCR-ABL1,  T(9;22) SOURCE: NORMAL
BCR/ABL T(9,22): NOT DETECTED

## 2021-04-08 RX ORDER — HYDROCODONE BITARTRATE AND ACETAMINOPHEN 5; 325 MG/1; MG/1
TABLET ORAL
Qty: 30 TABLET | Refills: 0 | OUTPATIENT
Start: 2021-04-08 | End: 2021-05-08

## 2021-11-29 NOTE — TELEPHONE ENCOUNTER
Oarrs ran  Last filled 5/19/20  Last seen 7/29/20 Vascular Surgery  --duplex confirms axillary artery thrombus/occlusion  --discussed with her--will proceed with emergent thrombectomy/revascularization of left arm

## 2022-11-02 NOTE — PROGRESS NOTES
I am not entirely sure as to what the patient's code status is. It says DNR CC but will confirm. I called the daughter who did not . I did not leave a voice message.     Will also get assistance from palliative This is a 36 year old       who presents today for her 6 week postpartum exam.  Her pregnancy was uncomplicated  She is status post low transverse  section for twin pregnancy on 22 of a liveborn infanst.  Her bleeding has stopped   She has been afebrile.  She is breast feeding.  She has not experienced symptoms of postpartum depression.  She has not had intercourse since delivery.  She has good control of urination and bowel movements.  She had a tubal ligation for birth control.     PE:  General - No acute distress.  Breasts - deferred  Abdomen - Soft, non-tender.  Incision - Well healed.  Perineum - Healed.  Vulva - Normal.   Vagina - Normal mucosa, no unusual discharge.  Cervix - Pink and no cervical motion tenderness, pap not performed.  Uterus - Well-involuted.  Adnexa - Nontender, normal size.    Impression:  1. Postpartum exam    2. S/P tubal ligation    3. Need for COVID-19 vaccine    4. Need for vaccination        Plan:  1. Pap - due   2. She can return to all activities without restrictions    Orders Placed This Encounter   • COVID-19 Pfizer Bivalent Booster Vaccine (12+ years) - [id number]   • Influenza Quadrivalent Split Pres Free 0.5 mL Pre-filled Vacc (FluLaval, Fluzone, Fluarix; ages 6+ months - [id number]       Return in about 1 year (around 2023).

## 2024-03-19 NOTE — SIGNIFICANT EVENT
I was called to the room for patient having a HR of 165 and SBP in 70s. When I got to the room, patient was mildly agitated, not following commands. HR on tele was 165 and BP with a yazmin cuff was 70s/palp. EKG at this time confirmed patient was in SVT. Patient is DNR-CC. IV attempted in left AC, initially had flow. Pushed versed 2mg and adenosine 6mg. Patient did not respond to either. Used US to evaluate IV and noted that IV had blown so patient did not receive any medication. IV obtained on right arm. Versed 2mg given with appropriate reaction. Adenosine 6mg given without effect, adenosine 12mg given and patient cardioverted back to NSR with a rate in the 80s and sustained. EKG obtained and patient was in NSR, BP improved to systolic 881J.     Kayley Thomas MD  PGY-2
Detail Level: Detailed
Detail Level: Zone

## (undated) DEVICE — PLATE ES AD W 9FT CRD 2

## (undated) DEVICE — GOWN,SIRUS,POLYRNF,BRTHSLV,XL,30/CS: Brand: MEDLINE

## (undated) DEVICE — SUTURE VCRL UD BR CT 3-0 18IN CT1 J838D

## (undated) DEVICE — APPLICATOR MEDICATED 26 CC SOLUTION HI LT ORNG CHLORAPREP

## (undated) DEVICE — EYE PROTECTOR FOAM MEDICHOICE

## (undated) DEVICE — DRILL BIT AO FITTING

## (undated) DEVICE — DRILL BIT  TI LOCKING, MEDIUM,: Brand: AXSOS

## (undated) DEVICE — STRIP,CLOSURE,WOUND,MEDI-STRIP,1/2X4: Brand: MEDLINE

## (undated) DEVICE — GAUZE,SPONGE,4"X4",16PLY,XRAY,STRL,LF: Brand: MEDLINE

## (undated) DEVICE — TUBING, SUCTION, 1/4" X 12', STRAIGHT: Brand: MEDLINE

## (undated) DEVICE — PAD,ABDOMINAL,5"X9",ST,LF,25/BX: Brand: MEDLINE INDUSTRIES, INC.

## (undated) DEVICE — STAPLER SKIN H3.9MM WIRE DIA0.58MM CRWN 6.9MM 35 STPL FIX

## (undated) DEVICE — KIT SHLDR STBL MARCO FOR SPIDER LIMB POS

## (undated) DEVICE — DBD-PACK,SHOULDER III: Brand: MEDLINE

## (undated) DEVICE — STOCKINETTE ORTH W9XL36IN COT 2 PLY HLLW FOR HANDLING LMB

## (undated) DEVICE — SPONGE GZ W4XL8IN COT WVN 12 PLY

## (undated) DEVICE — SYRINGE IRRIG 60ML SFT PLIABLE BLB EZ TO GRP 1 HND USE W/

## (undated) DEVICE — UNTHREADED GUIDE WIRE
Type: IMPLANTABLE DEVICE | Site: HUMERUS | Status: NON-FUNCTIONAL
Brand: FIXOS
Removed: 2020-08-11

## (undated) DEVICE — SURE SET-DOUBLE BASIN-LF: Brand: MEDLINE INDUSTRIES, INC.

## (undated) DEVICE — COVER LT HNDL BLU PLAS

## (undated) DEVICE — TUBING SUCT 10FR MAL ALUM SHFT FN CAP VENT UNIV CONN W/ OBT

## (undated) DEVICE — 3M™ STERI-DRAPE™ U-DRAPE 1015: Brand: STERI-DRAPE™

## (undated) DEVICE — DRESSING PETRO W3XL8IN N ADH KNIT CELOS ACETT ADPTC

## (undated) DEVICE — Z DISCONTINUED USE 2275686 GLOVE SURG SZ 8 L12IN FNGR THK13MIL WHT ISOLEX POLYISOPRENE

## (undated) DEVICE — SOLUTION IV 1000ML 0.9% SOD CHL

## (undated) DEVICE — DRAPE C ARM UNIV W41XL74IN CLR PLAS XR VELC CLSR POLY STRP

## (undated) DEVICE — TOWEL,OR,DSP,ST,BLUE,DLX,8/PK,10PK/CS: Brand: MEDLINE

## (undated) DEVICE — DRESSING FOAM SELF ADH 20X10 CM ABSORBENT MEPILEX BORDER

## (undated) DEVICE — BANDAGE COBAN 4 IN COMPR W4INXL5YD FOAM COHESIVE QUIK STK SELF ADH SFT

## (undated) DEVICE — PROTECTOR ULN NRV PUR FOAM HK LOOP STRP ANATOMICALLY

## (undated) DEVICE — INTENDED FOR TISSUE SEPARATION, AND OTHER PROCEDURES THAT REQUIRE A SHARP SURGICAL BLADE TO PUNCTURE OR CUT.: Brand: BARD-PARKER ® CARBON RIB-BACK BLADES

## (undated) DEVICE — DRILL BIT LOCKING, MEDIUM: Brand: AXSOS

## (undated) DEVICE — COVER,TABLE,HEAVY DUTY,77"X90",STRL: Brand: MEDLINE

## (undated) DEVICE — JEWISH HOSPITAL TURNOVER KIT: Brand: MEDLINE INDUSTRIES, INC.

## (undated) DEVICE — SUTURE VCRL SZ 0 L18IN ABSRB UD L36MM CT-1 1/2 CIR J840D

## (undated) DEVICE — SPONGE,LAP,18"X18",DLX,XR,ST,5/PK,40/PK: Brand: MEDLINE

## (undated) DEVICE — ELECTROSURGICAL PENCIL ROCKER SWITCH NON COATED BLADE ELECTRODE 10 FT (3 M) CORD HOLSTER: Brand: MEGADYNE

## (undated) DEVICE — TEMPORARY PLATE FIXATOR AO FITTING
Type: IMPLANTABLE DEVICE | Site: HUMERUS | Status: NON-FUNCTIONAL
Brand: AXSOS
Removed: 2020-08-11

## (undated) DEVICE — SURGICAL SET UP - SURE SET: Brand: MEDLINE INDUSTRIES, INC.

## (undated) DEVICE — SUTURE VCRL SZ 2-0 L18IN ABSRB UD CT-1 L36MM 1/2 CIR J839D

## (undated) DEVICE — SUTURE VCRL SZ 4-0 L27IN ABSRB UD L19MM PS-2 3/8 CIR PRIM J426H

## (undated) DEVICE — FRAZIER SUCTION INSTRUMENT 12 FR W/CONTROL VENT & OBTURATOR: Brand: FRAZIER

## (undated) DEVICE — SLING ARM L L17 1/2IN D8.5IN COT POLY DLX W/ SHLDR PD